# Patient Record
Sex: FEMALE | Race: WHITE | NOT HISPANIC OR LATINO | Employment: FULL TIME | URBAN - METROPOLITAN AREA
[De-identification: names, ages, dates, MRNs, and addresses within clinical notes are randomized per-mention and may not be internally consistent; named-entity substitution may affect disease eponyms.]

---

## 2017-03-28 RX ORDER — ONDANSETRON 4 MG/1
TABLET, ORALLY DISINTEGRATING ORAL
Qty: 30 TABLET | Refills: 0 | Status: SHIPPED | OUTPATIENT
Start: 2017-03-28 | End: 2021-08-03

## 2019-10-10 DIAGNOSIS — S69.91XS INJURY OF RIGHT THUMB, SEQUELA: Primary | ICD-10-CM

## 2019-10-21 DIAGNOSIS — S69.91XS INJURY OF RIGHT THUMB, SEQUELA: Primary | ICD-10-CM

## 2019-10-22 ENCOUNTER — CLINICAL SUPPORT (OUTPATIENT)
Dept: REHABILITATION | Facility: HOSPITAL | Age: 29
End: 2019-10-22
Payer: COMMERCIAL

## 2019-10-22 DIAGNOSIS — Z74.1 SELF-CARE DEFICIT IN DRESSING: ICD-10-CM

## 2019-10-22 DIAGNOSIS — R63.39 SELF-CARE DEFICIT FOR FEEDING: ICD-10-CM

## 2019-10-22 DIAGNOSIS — R29.898 DECREASED GRIP STRENGTH: ICD-10-CM

## 2019-10-22 DIAGNOSIS — R29.898 DECREASED PINCH STRENGTH: ICD-10-CM

## 2019-10-22 DIAGNOSIS — M25.60 RANGE OF MOTION DEFICIT: ICD-10-CM

## 2019-10-22 PROCEDURE — 97165 OT EVAL LOW COMPLEX 30 MIN: CPT | Mod: PN

## 2019-10-22 NOTE — PATIENT INSTRUCTIONS
IP Flexion (Active Blocked)        Brace thumb below tip joint. Bend joint as far as possible.  Repeat __10__ times. Do __2__ sessions per day.    Copyright © [employer]. All rights reserved.      MP Flexion (Active Blocked)        Using other hand to brace base of thumb, bend as far as possible with tip joint held straight.  Repeat __10__ times. Do __2__ sessions per day.    Copyright © [employer]. All rights reserved.       Composite Flexion (Active)        Bend both joints of thumb as far as possible. Try to touch base of little finger.  Repeat __10__ times. Do __2__ sessions per day.    Copyright © [employer]. All rights reserved.   Palmar Adduction/Abduction (Active)        Move thumb down, away from palm. Move back to rest along palm.  Repeat _10___ times. Do __2__ sessions per day.    Copyright © [employer]. All rights reserved.   Radial Adduction/Abduction (Active)        Move thumb out to side. Move back alongside index finger.  Repeat __10__ times. Do _2___ sessions per day.    Copyright © [employer]. All rights reserved.   Extension (Active With Finger Flexion)        With fingers curled, bend hand back at wrist. Hold _2___ seconds.  Repeat __10__ times. Do __2__ sessions per day.    Copyright © [employer]. All rights reserved.

## 2019-10-22 NOTE — PLAN OF CARE
[facility] Occupational Therapy  Initial Evaluation     Date:  10/22/2019    Name: Jennifer Nguyen  Clinic Number: [id number]    Therapy Diagnosis:   1. Range of motion deficit     2. Decreased pinch strength     3. Decreased  strength     4. Self-care deficit in dressing     5. Self-care deficit for feeding        Physician: Jennifer Laureano NP-C    Physician Orders: Eval and Treat  Medical Diagnosis: unspecified injury of right wrist, hand and fingers  Surgical Procedure and Date: n/a  Evaluation Date: 10/22/2019  Insurance Authorization Period Expiration: 12/31/19  Plan of Care Certification Period: 10/22/19 to 12/17/19   Date of Return to MD: not appointed    Visit # / Visits authorized: 1 / 20    Time In:  8:37 am  Time Out: 9:45 am  Total Billable Time: 68 minutes    Precautions:  Standard    Subjective     Involved Side: right   Dominant Side: Right  Date of Onset: Mid July 17, 2019, Then again in September  Mechanism of Injury: Pt was playing dodgeball and the ball hit the thumb.  The slipped and fell playing kickball.  History of Current Condition:  Pt reports she has had multiple MD visits out of the system, no fractures noted but pain present.  She is using thumb splint, during the day and removed at night.   Surgical Procedure: n/a  Imaging: outside xrays scanned in   **    Previous Therapy: none noted    Past Medical History/Physical Systems Review:   Jennifer Nguyen  has a past medical history of Celiac disease.    Jennifer Nguyen  has a past surgical history that includes Breast surgery; Hoffman tooth extraction; and Intrauterine device insertion (04/2015 ).    Jennifer has a current medication list which includes the following prescription(s): clobetasol, fluoxetine, hydroxyzine hcl, ibuprofen, ondansetron, sertraline, triamcinolone acetonide 0.1%, and zolpidem.    Review of patient's allergies indicates:   Allergen Reactions    Ciprofloxacin         Patient's Goals for Therapy: To be able  to  things.    Pain:  Functional Pain Scale Rating 0-10:   2/10 on average  0/10 at best  7-8/10 at worst  Location: Right radial and ulnar MP   Description: dull achy, sharp/burning  Aggravating Factors: grab, sweep, carrying  Easing Factors: rest, elevation and ibuprofen    Occupation:  Director of [employer]  Working presently: employed  Duties: general office work, physical work-serving meals, clean, maintenance    Functional Limitations/Social History:    Previous functional status includes: as follows:    Current FunctionalStatus   Home/Living environment : lives alone      Limitation of Functional Status as follows:   ADLs/IADLs:     - Feeding: modified eating    - Bathing: modified    - Dressing/Grooming: brushing teeth is difficulty, difficulty with pony tails,  Difficulty tying shoes    - Driving: modified starting of the car,      Leisure: recreational kickball/dodgeball, social    Objective    Observation: Skin intact, no significant swelling or bruising noted    Sensation: median and ulnar nerves grossly intact  [place] Eric Monofilament Test: No  Stereognosis: Intact    Special Tests:  UCL stress painful but tight no shifting of proximal phalanx on metacarpal    Wound Assessment: n/a      Edema: Circumferential measurements: as follows:    Thumb left  Thumb right      Proximal Plalnx  5.3cm 5.2 cm  cm  cm  cm   DIP Joint 5.0 cm 5.0 cm  cm  cm  cm   Distal Phalnx 4.2 cm 4.2 cm  cm  cm  cm     Range of Motion: right Active  Digits 2-5 WFL all joints  (Ext/Flex) Thumb left  Thumb right       MP 0/73 0/45 / / /   DIP 0/54 0/56 / / /     Thumb Opposition: all tips                               Left     Right   Palmar Abduction: 50     45  Radial Abduction: 45      30    Wrist Ext/Flex: 75/70     70/60  Wrist RD/UD: 10/40      10/30  Supination/Pronation: WFL/WFL      Manual Muscle Test:   Right Thumb adduction 3/5               Abduction 3/5     Strength: (JAMILA Dynamometer in lbs.) 1  trial, Position II  Right: 20.4#  Left: 55.6#    Pinch Strength: (Pinch Gauge in psi's), Average 3 trials  3pt Pinch   R) 4psi's  L) 13psi's    Fine Joseph Coordination Tests:   9 hole Peg Test R) 28 seconds no drops   L) 16 seconds no drops    Impairment/Limitation/Restriction for FOTO hand Survey    Therapist reviewed FOTO scores for Alec Nguyen on 10/22/2019.   FOTO documents entered into [employer] - see Media section.    Limitation Score: 44%  Category: Carrying         Treatment       Home Exercise Program/Education:  Issued HEP (see patient instructions in EMR) and educated on modality use for pain management . Exercises were reviewed and ALEC was able to demonstrate them prior to the end of the session.   Pt received a written copy of exercises to perform at home. ALEC demonstrated good  understanding of the education provided.  Pt was advised to perform these exercises free of pain, and to stop performing them if pain occurs.    Patient/Family Education: role of OT, goals for OT, scheduling/cancellations - pt verbalized understanding. Discussed insurance limitations with patient.    Additional Education provided: importance of 24 hour use of her splint, avoidance of any lifting or carrying in her hand, use of ice to assist with pain in hand.    Assessment     Alec Nguyen is a 29 y.o. female referred to outpatient occupational therapy and presents with a medical diagnosis of  unspecified injury of right wrist, hand and fingers, resulting in Decreased ROM, Decreased  strength, Decreased pinch strength, Decreased muscle strength, Decreased functional hand use and Increased pain and demonstrates limitations as described in the chart below. Following medical record review it is determined that pt will benefit from occupational therapy services in order to maximize pain free and/or functional use of right hand. The following goals were discussed with the patient and patient is in agreement with them as to be  addressed in the treatment plan. The patient's rehab potential is Good.     Anticipated barriers to occupational therapy: none noed  Pt has no cultural, educational or language barriers to learning provided.    Profile and History Assessment of Occupational Performance Level of Clinical Decision Making Complexity Score   Occupational Profile:   Jennifer Nguyen is a 29 y.o. female who lives alone and is currently employed as director of [employer]. Jennifer Nguyen has difficulty with  feeding, bathing, grooming and dressing  driving/transportation management and housework/household chores  affecting his/her daily functional abilities. His/her main goal for therapy is To be able to  things..     Comorbidities:   none noted    Medical and Therapy History Review:   Brief               Performance Deficits    Physical:  Joint Mobility  Muscle Power/Strength   Strength  Pinch Strength  Pain    Cognitive:  No Deficits    Psychosocial:    No Deficits     Clinical Decision Making:  low    Assessment Process:  Problem-Focused Assessments    Modification/Need for Assistance:  Minimal-Moderate Modifications/Assistance    Intervention Selection:  Several Treatment Options       low  Based on PMHX, co morbidities , data from assessments and functional level of assistance required with task and clinical presentation directly impacting function.       The following goals were discussed with the patient and patient is in agreement with them as to be addressed in the treatment plan.     Goals:    Short term goals to be met in 4 weeks(11/18/19)  Patient to be IND with HEP and modalities for pain/edema managment.  Increase thumb ROM 5 degrees to improve functional hand use for ADLs.    Decrease complaints of pain to  5 out of 10 at worst to increase functional hand use.    Long term goals to be met by d/c.  Increase AROM of thumb to symmetry with left hand to increase functional hand use for dressing and eating.,   Increase   strength 15 lbs. to improve functional grasp for household tasks.   Increase pinch 4 psi's to increase IND with buttoning and opening bottles.    Decrease complaints of pain to  2 out of 10 to increase functional hand use for ADL/work/leisure activities.      Plan   Certification Period/Plan of care expiration: 10/22/2019 to 12/19/19.    Outpatient Occupational Therapy 2 times weekly for 8 weeks to include the following interventions: Paraffin, Fluidotherapy, Manual therapy/joint mobilizations, Modalities for pain management, US 3 mhz, Therapeutic exercises/activities. and Strengthening.      RADHA Espino    I certify the need for these services furnished under this plan of treatment and while under my care    ____________________________________  Physician/Referring Practitioner    _______________  Date of Signature

## 2019-10-28 NOTE — PROGRESS NOTES
Occupational Therapy Daily Treatment Note     Date: 10/29/2019  Name: Alec Nguyen  Clinic Number: [id number]    Therapy Diagnosis:   Encounter Diagnoses   Name Primary?    Range of motion deficit     Decreased pinch strength     Decreased  strength     Self-care deficit in dressing     Self-care deficit for feeding      Physician: Alec Laureano, NP-C    Physician Orders: Eval and Treat  Medical Diagnosis: unspecified injury of right wrist, hand and fingers  Surgical Procedure and Date: n/a  Evaluation Date: 10/22/2019  Insurance Authorization Period Expiration: 12/31/19  Plan of Care Certification Period: 10/22/19 to 12/17/19   Date of Return to MD: not appointed     Visit # / Visits authorized: 2/ 20     Time In:  8:29 am  Time Out: 9:07 am  Total Billable Time: 30 minutes     Precautions:  Standard    Subjective     Pt reports: It took me a couple of days to get into a routine of doing the exercises and so now it may be a little bit less stiff.  she was compliant with home exercise program given last session.   Response to previous treatment: sore  Functional change: none noted    Pain: 1/10  Location: right  Ulnar thumb more the radial       Objective     ALEC received the following direct contact modalities after being cleared for contraindications for 8 minutes:  -Patient receives ultrasound  for pain control and decreased inflammation @ continuous duty cycle, 3.3 Mhz, applied to ulnar and radial tumb @ MP and radial wrist, intensity = .9 w/cm2 for 8 minutes.    ALEC received therapeutic exercises for 22 minutes including:  -active thumb palmar and radial abduction x 10 reps each  -active wrist flexion and extension from table top in pron/supinated forearm x 10 reps each  -manipulation of round head pegs into and out of pegboard x 20 reps  -juxiciser x 5 reps   -flipping of playing cards vertically    ALEC received the following supervised modalities after being cleared for contradictions for 4  minutes:   -ice applied to right wrist and hand      Home Exercises and Education Provided     Education provided:   -continue with her splint use   - Progress towards goals     Written Home Exercises Provided: Patient instructed to cont prior HEP.  Exercises were reviewed and JENNIFER was able to demonstrate them prior to the end of the session.  JENNIFER demonstrated good  understanding of the HEP provided.   .   See EMR under Patient Instructions for exercises provided 10/22/19.        Assessment     Pt would continue to benefit from skilled OT. Jennifer has pain in right thumb and radial wrist, less pain in ulnar wrist.  Pt very expressive re: her pain during therapy.  She still postures her thumb out of splint.      JENNIFER is progressing  towards her goals and there are no updates to goals at this time. Pt prognosis is Good.     Pt will continue to benefit from skilled outpatient occupational therapy to address the deficits listed in the problem list on initial evaluation provide pt/family education and to maximize pt's level of independence in the home and community environment.     Anticipated barriers to occupational therapy: none noted    Pt's spiritual, cultural and educational needs considered and pt agreeable to plan of care and goals.    Goals:  Short term goals to be met in 4 weeks(11/18/19)  Patient to be IND with HEP and modalities for pain/edema managment.  Increase thumb ROM 5 degrees to improve functional hand use for ADLs.    Decrease complaints of pain to  5 out of 10 at worst to increase functional hand use.     Long term goals to be met by d/c.  Increase AROM of thumb to symmetry with left hand to increase functional hand use for dressing and eating.,   Increase  strength 15 lbs. to improve functional grasp for household tasks.   Increase pinch 4 psi's to increase IND with buttoning and opening bottles.    Decrease complaints of pain to  2 out of 10 to increase functional hand use for  ADL/work/leisure activities    Plan   Progress AROM of wrist and thumb as tolerated   Updates/Grading for next session:       RADHA Espino

## 2019-10-29 ENCOUNTER — CLINICAL SUPPORT (OUTPATIENT)
Dept: REHABILITATION | Facility: HOSPITAL | Age: 29
End: 2019-10-29
Payer: COMMERCIAL

## 2019-10-29 DIAGNOSIS — R29.898 DECREASED GRIP STRENGTH: ICD-10-CM

## 2019-10-29 DIAGNOSIS — R63.39 SELF-CARE DEFICIT FOR FEEDING: ICD-10-CM

## 2019-10-29 DIAGNOSIS — R29.898 DECREASED PINCH STRENGTH: ICD-10-CM

## 2019-10-29 DIAGNOSIS — Z74.1 SELF-CARE DEFICIT IN DRESSING: ICD-10-CM

## 2019-10-29 DIAGNOSIS — M25.60 RANGE OF MOTION DEFICIT: ICD-10-CM

## 2019-10-29 PROCEDURE — 97035 APP MDLTY 1+ULTRASOUND EA 15: CPT | Mod: PN

## 2019-10-29 PROCEDURE — 97110 THERAPEUTIC EXERCISES: CPT | Mod: PN

## 2019-11-05 ENCOUNTER — CLINICAL SUPPORT (OUTPATIENT)
Dept: REHABILITATION | Facility: HOSPITAL | Age: 29
End: 2019-11-05
Payer: COMMERCIAL

## 2019-11-05 DIAGNOSIS — M25.60 RANGE OF MOTION DEFICIT: ICD-10-CM

## 2019-11-05 DIAGNOSIS — Z74.1 SELF-CARE DEFICIT IN DRESSING: ICD-10-CM

## 2019-11-05 DIAGNOSIS — R63.39 SELF-CARE DEFICIT FOR FEEDING: ICD-10-CM

## 2019-11-05 DIAGNOSIS — R29.898 DECREASED GRIP STRENGTH: ICD-10-CM

## 2019-11-05 DIAGNOSIS — R29.898 DECREASED PINCH STRENGTH: ICD-10-CM

## 2019-11-05 PROCEDURE — 97110 THERAPEUTIC EXERCISES: CPT | Mod: PN

## 2019-11-05 PROCEDURE — 97035 APP MDLTY 1+ULTRASOUND EA 15: CPT | Mod: PN

## 2019-11-05 NOTE — PROGRESS NOTES
Occupational Therapy Daily Treatment Note     Date: 11/5/2019  Name: Alec Nguyen  Clinic Number: [id number]    Therapy Diagnosis:   Encounter Diagnoses   Name Primary?    Range of motion deficit     Decreased pinch strength     Decreased  strength     Self-care deficit in dressing     Self-care deficit for feeding      Physician: Alec Laureano, NP-C    Physician Orders: Eval and Treat  Medical Diagnosis: unspecified injury of right wrist, hand and fingers  Surgical Procedure and Date: n/a  Evaluation Date: 10/22/2019  Insurance Authorization Period Expiration: 12/31/19  Plan of Care Certification Period: 10/22/19 to 12/17/19   Date of Return to MD: not appointed     Visit # / Visits authorized: 3/ 20     Time In:  8:35 am  Time Out: 9:15 am  Total Billable Time: 35 minutes     Precautions:  Standard    Subjective     Pt reports: My arm got jostled on Halloween.  she was compliant with home exercise program given last session.   Response to previous treatment: slightly sore  Functional change: none reported    Pain: 2/10  Location: right  Ulnar thumb more the radial       Objective     ALEC received the following direct contact modalities after being cleared for contraindications for 8 minutes:  -Patient receives ultrasound  for pain control and decreased inflammation @ continuous duty cycle, 3.3 Mhz, applied to ulnar and radial tumb @ MP and radial wrist, intensity = .9 w/cm2 for 8 minutes.    ALEC received therapeutic exercises for 27 minutes including:  -active thumb palmar and radial abduction x 10 reps each  -yellow band resisted palmar and radial abduction x 10 reps each  -yellow band resisted palmar and radial adduction x 10 reps each  -manipulation of chinese meditation balls x 2 min  -thumb exerciser with yellow band resistance x 2 min  -ergo gripper yellow band x 3 min    ALEC received the following supervised modalities after being cleared for contradictions for 5 minutes:   -ice applied  to right wrist and hand      Home Exercises and Education Provided     Education provided:   -continue with her splint use   - Progress towards goals     Written Home Exercises Provided: Patient instructed to cont prior HEP.  Exercises were reviewed and JENNIFER was able to demonstrate them prior to the end of the session.  JENNIFER demonstrated good  understanding of the HEP provided.   .   See EMR under Patient Instructions for exercises provided 10/22/19.        Assessment     Pt would continue to benefit from skilled OT. Pain in wrist has subsided only pain in radial thumb remains.  Jennifer tolerated light resistive exercises well.    JENNIFER is progressing  towards her goals and there are no updates to goals at this time. Pt prognosis is Good.     Pt will continue to benefit from skilled outpatient occupational therapy to address the deficits listed in the problem list on initial evaluation provide pt/family education and to maximize pt's level of independence in the home and community environment.     Anticipated barriers to occupational therapy: none noted    Pt's spiritual, cultural and educational needs considered and pt agreeable to plan of care and goals.    Goals:  Short term goals to be met in 4 weeks(11/18/19)  Patient to be IND with HEP and modalities for pain/edema managment.  Increase thumb ROM 5 degrees to improve functional hand use for ADLs.    Decrease complaints of pain to  5 out of 10 at worst to increase functional hand use.     Long term goals to be met by d/c.  Increase AROM of thumb to symmetry with left hand to increase functional hand use for dressing and eating.,   Increase  strength 15 lbs. to improve functional grasp for household tasks.   Increase pinch 4 psi's to increase IND with buttoning and opening bottles.    Decrease complaints of pain to  2 out of 10 to increase functional hand use for ADL/work/leisure activities    Plan   Progress AROM of wrist and thumb as tolerated    Updates/Grading for next session:       RADHA Espino

## 2019-11-11 NOTE — PROGRESS NOTES
Occupational Therapy Daily Treatment Note     Date: 11/12/2019  Name: Alec Nguyen  Clinic Number: [id number]    Therapy Diagnosis:   Encounter Diagnoses   Name Primary?    Range of motion deficit     Decreased pinch strength     Decreased  strength     Self-care deficit in dressing     Self-care deficit for feeding      Physician: Alec Laureano, NP-C    Physician Orders: Eval and Treat  Medical Diagnosis: unspecified injury of right wrist, hand and fingers  Surgical Procedure and Date: n/a  Evaluation Date: 10/22/2019  Insurance Authorization Period Expiration: 12/31/19  Plan of Care Certification Period: 10/22/19 to 12/17/19   Date of Return to MD: not appointed     Visit # / Visits authorized: 4 / 20     Time In:  8:40 am  Time Out: 9:25 am  Total Billable Time: 45 minutes     Precautions:  Standard    Subjective     Pt reports: It is feeling okay, not any worse or any better  she was compliant with home exercise program given 10/22/19   Response to previous treatment:  sore  Functional change: none reported    Pain: 1/10  Location: right  Ulnar thumb more the radial       Objective     ALEC received the following direct contact modalities after being cleared for contraindications for 8 minutes:  -Patient receives ultrasound  for pain control and decreased inflammation @ continuous duty cycle, 3.3 Mhz, applied to ulnar and radial tumb @ MP and radial wrist, intensity = .9 w/cm2 for 8 minutes.    ALEC received therapeutic exercises for 32 minutes including:  -gentle passive stretch abduction and flexion  -active thumb palmar and radial abduction x 10 reps each  -ergo gripper yellow band x 3 min  -thumb exerciser with yellow band resistance x 2 min  -yellow putty small ball making with left hand x 10     ALEC received the following supervised modalities after being cleared for contradictions for 5 minutes:   -ice applied to right wrist and hand      Home Exercises and Education Provided      Education provided:   -continue with her splint use   - Progress towards goals     Written Home Exercises Provided: Patient instructed to cont prior HEP.  Exercises were reviewed and JENNIFER was able to demonstrate them prior to the end of the session.  JENNIFER demonstrated good  understanding of the HEP provided.   .   See EMR under Patient Instructions for exercises provided 10/22/19.        Assessment     Pt would continue to benefit from skilled OT. Jennifer with improvement in pain free motion of right thumb.  Good control of yellow putty manipulation.     JENNIFER is progressing  towards her goals and there are no updates to goals at this time. Pt prognosis is Good.     Pt will continue to benefit from skilled outpatient occupational therapy to address the deficits listed in the problem list on initial evaluation provide pt/family education and to maximize pt's level of independence in the home and community environment.     Anticipated barriers to occupational therapy: none noted    Pt's spiritual, cultural and educational needs considered and pt agreeable to plan of care and goals.    Goals:  Short term goals to be met in 4 weeks(11/18/19)  Patient to be IND with HEP and modalities for pain/edema managment.  Increase thumb ROM 5 degrees to improve functional hand use for ADLs.    Decrease complaints of pain to  5 out of 10 at worst to increase functional hand use.     Long term goals to be met by d/c.  Increase AROM of thumb to symmetry with left hand to increase functional hand use for dressing and eating.,   Increase  strength 15 lbs. to improve functional grasp for household tasks.   Increase pinch 4 psi's to increase IND with buttoning and opening bottles.    Decrease complaints of pain to  2 out of 10 to increase functional hand use for ADL/work/leisure activities    Plan   Progress AROM of wrist and thumb as tolerated   Updates/Grading for next session:       RADHA Espino

## 2019-11-12 ENCOUNTER — CLINICAL SUPPORT (OUTPATIENT)
Dept: REHABILITATION | Facility: HOSPITAL | Age: 29
End: 2019-11-12
Payer: COMMERCIAL

## 2019-11-12 DIAGNOSIS — R63.39 SELF-CARE DEFICIT FOR FEEDING: ICD-10-CM

## 2019-11-12 DIAGNOSIS — R29.898 DECREASED GRIP STRENGTH: ICD-10-CM

## 2019-11-12 DIAGNOSIS — R29.898 DECREASED PINCH STRENGTH: ICD-10-CM

## 2019-11-12 DIAGNOSIS — Z74.1 SELF-CARE DEFICIT IN DRESSING: ICD-10-CM

## 2019-11-12 DIAGNOSIS — M25.60 RANGE OF MOTION DEFICIT: ICD-10-CM

## 2019-11-12 PROCEDURE — 97035 APP MDLTY 1+ULTRASOUND EA 15: CPT | Mod: PN

## 2019-11-12 PROCEDURE — 97110 THERAPEUTIC EXERCISES: CPT | Mod: PN

## 2019-11-18 NOTE — PROGRESS NOTES
Occupational Therapy Progress Note     Date: 11/19/2019  Name: Alec Nguyen  Clinic Number: [id number]    Therapy Diagnosis:   Encounter Diagnoses   Name Primary?    Range of motion deficit     Decreased pinch strength     Decreased  strength     Self-care deficit in dressing     Self-care deficit for feeding      Physician: Alec Laureano, NP-C    Physician Orders: Eval and Treat  Medical Diagnosis: unspecified injury of right wrist, hand and fingers  Surgical Procedure and Date: n/a  Evaluation Date: 10/22/2019  Insurance Authorization Period Expiration: 12/31/19  Plan of Care Certification Period: 10/22/19 to 12/17/19   Date of Return to MD: not appointed     Visit # / Visits authorized: 5/ 20     Time In:  8:35 am  Time Out: 9:05 am  Total Billable Time: 30 minutes     Precautions:  Standard    Subjective     Pt reports: she reports she hurt her thumb pulling a suitcase down from the overhead bin.  she was compliant with home exercise program given 10/22/19   Response to previous treatment:  Felt better after  Functional change: none reported    Pain: 4-5/10  Location: right  Ulnar thumb more the radial       Objective     ALEC received the following direct contact modalities after being cleared for contraindications for 8 minutes:  -Patient receives ultrasound  for pain control and decreased inflammation @ continuous duty cycle, 3.3 Mhz, applied to ulnar and radial tumb @ MP and radial wrist, intensity = .9 w/cm2 for 8 minutes.    ALEC received therapeutic exercises for 22 minutes including:    Reassess:   Range of Motion: right Active  Digits 2-5 WFL all joints                                                      10/22/19         11/19/19  (Ext/Flex) Thumb left  Thumb right    thumb right        MP 0/73 0/45   0/46 / /   DIP 0/54 0/56   0/57 / /      Thumb Opposition: all tips                                  10/22/19             11/19/19                              Left     Right             Right  Palmar Abduction: 50     45                45   Radial Abduction: 45      30                 45     Wrist Ext/Flex: 75/70     70/60            85/85  Wrist RD/UD: 10/40      10/30            5/35    Manual Muscle Test:    10/22/10      11/19/19  Right Thumb adduction 3/5               3+/5                       Abduction 3/5                3+/5       Strength: (JAMILA Dynamometer in lbs.) 1 trial, Position II            10/22/19       11/19/19  Right: 20.4#             23 #  Left: 55.6#     Pinch Strength: (Pinch Gauge in psi's), Average 3 trials                    10/22/19               11/19/19  3pt Pinch   R) 4psi's                5.6 psi                     L) 13psi's     Fine Joseph Coordination Tests:                                  10/22/19                               11/19/19  9 hole Peg Test R) 28 seconds no drops         27 seconds no drops                            L) 16 seconds no drops    -active thumb palmar and radial abduction x 10 reps each  -active composite flexion x 10 reps    JENNIFER received the following supervised modalities after being cleared for contradictions for 5 minutes:   -ice applied to right wrist and hand    Impairment/Limitation/Restriction for FOTO hand Survey     Therapist reviewed FOTO scores for Jennifer Nguyen on 10/22/2019.   FOTO documents entered into [employer] - see Media section.     Limitation Score: 47% worsened 3%  Category: Carrying            Home Exercises and Education Provided     Education provided:   -continue with her splint use during activities  - Progress towards goals     Written Home Exercises Provided: Patient instructed to cont prior HEP.  Exercises were reviewed and JENNIFER was able to demonstrate them prior to the end of the session.  JENNIFER demonstrated good  understanding of the HEP provided.   .   See EMR under Patient Instructions for exercises provided 10/22/19.        Assessment     Pt would continue to benefit from skilled OT. Jennifer is  demonstrating improvement in wrist range of motion, slight change in thumb motion as noted.  Improvement in  and pinch strengths noted as well. Pt is accident prone and has injured her thumb additionally with various activities at home. .     ALEC is progressing  towards her goals and there are no updates to goals at this time. Pt prognosis is Good.     Pt will continue to benefit from skilled outpatient occupational therapy to address the deficits listed in the problem list on initial evaluation provide pt/family education and to maximize pt's level of independence in the home and community environment.     Anticipated barriers to occupational therapy: none noted    Pt's spiritual, cultural and educational needs considered and pt agreeable to plan of care and goals.    Goals:  Short term goals to be met in 4 weeks(11/18/19)  Patient to be IND with HEP and modalities for pain/edema management- in progress  Increase thumb ROM 5 degrees to improve functional hand use for ADLs met with radial abduction 11/19/19  Decrease complaints of pain to  5 out of 10 at worst to increase functional hand use - met at times.     Long term goals to be met by d/c.  Increase AROM of thumb to symmetry with left hand to increase functional hand use for dressing and eating.,   Increase  strength 15 lbs. to improve functional grasp for household tasks.   Increase pinch 4 psi's to increase IND with buttoning and opening bottles.    Decrease complaints of pain to  2 out of 10 to increase functional hand use for ADL/work/leisure activities    Plan   Progress AROM of wrist and thumb as tolerated   Updates/Grading for next session:       RADHA Espino

## 2019-11-19 ENCOUNTER — CLINICAL SUPPORT (OUTPATIENT)
Dept: REHABILITATION | Facility: HOSPITAL | Age: 29
End: 2019-11-19
Payer: COMMERCIAL

## 2019-11-19 DIAGNOSIS — R29.898 DECREASED GRIP STRENGTH: ICD-10-CM

## 2019-11-19 DIAGNOSIS — Z74.1 SELF-CARE DEFICIT IN DRESSING: ICD-10-CM

## 2019-11-19 DIAGNOSIS — M25.60 RANGE OF MOTION DEFICIT: ICD-10-CM

## 2019-11-19 DIAGNOSIS — R29.898 DECREASED PINCH STRENGTH: ICD-10-CM

## 2019-11-19 DIAGNOSIS — R63.39 SELF-CARE DEFICIT FOR FEEDING: ICD-10-CM

## 2019-11-19 PROCEDURE — 97035 APP MDLTY 1+ULTRASOUND EA 15: CPT | Mod: PN

## 2019-11-19 PROCEDURE — 97110 THERAPEUTIC EXERCISES: CPT | Mod: PN

## 2019-11-26 ENCOUNTER — DOCUMENTATION ONLY (OUTPATIENT)
Dept: REHABILITATION | Facility: HOSPITAL | Age: 29
End: 2019-11-26

## 2019-11-26 DIAGNOSIS — R63.39 SELF-CARE DEFICIT FOR FEEDING: ICD-10-CM

## 2019-11-26 DIAGNOSIS — M25.60 RANGE OF MOTION DEFICIT: ICD-10-CM

## 2019-11-26 DIAGNOSIS — R29.898 DECREASED GRIP STRENGTH: ICD-10-CM

## 2019-11-26 DIAGNOSIS — R29.898 DECREASED PINCH STRENGTH: ICD-10-CM

## 2019-11-26 DIAGNOSIS — Z74.1 SELF-CARE DEFICIT IN DRESSING: ICD-10-CM

## 2019-11-26 NOTE — PROGRESS NOTES
Jennifer cancelled her occupational therapy appointment today via the SMS system, no reason for cancellation provided.

## 2019-12-12 DIAGNOSIS — R52 PAIN: Primary | ICD-10-CM

## 2019-12-13 ENCOUNTER — OFFICE VISIT (OUTPATIENT)
Dept: ORTHOPEDICS | Facility: CLINIC | Age: 29
End: 2019-12-13
Payer: COMMERCIAL

## 2019-12-13 ENCOUNTER — HOSPITAL ENCOUNTER (OUTPATIENT)
Dept: RADIOLOGY | Facility: OTHER | Age: 29
Discharge: HOME | End: 2019-12-13
Attending: PHYSICIAN ASSISTANT
Payer: COMMERCIAL

## 2019-12-13 VITALS
WEIGHT: 156 LBS | SYSTOLIC BLOOD PRESSURE: 111 MMHG | BODY MASS INDEX: 26.63 KG/M2 | DIASTOLIC BLOOD PRESSURE: 74 MMHG | HEIGHT: 64 IN | HEART RATE: 71 BPM

## 2019-12-13 DIAGNOSIS — M79.644 THUMB PAIN, RIGHT: Primary | ICD-10-CM

## 2019-12-13 DIAGNOSIS — M25.341 CHRONIC INSTABILITY OF METACARPOPHALANGEAL JOINT OF RIGHT THUMB: ICD-10-CM

## 2019-12-13 DIAGNOSIS — R52 PAIN: ICD-10-CM

## 2019-12-13 PROCEDURE — 3008F PR BODY MASS INDEX (BMI) DOCUMENTED: ICD-10-PCS | Mod: CPTII,S$GLB,, | Performed by: PHYSICIAN ASSISTANT

## 2019-12-13 PROCEDURE — 99204 PR OFFICE/OUTPT VISIT, NEW, LEVL IV, 45-59 MIN: ICD-10-PCS | Mod: 25,S$GLB,, | Performed by: PHYSICIAN ASSISTANT

## 2019-12-13 PROCEDURE — 99999 PR PBB SHADOW E&M-EST. PATIENT-LVL III: CPT | Mod: PBBFAC,,, | Performed by: PHYSICIAN ASSISTANT

## 2019-12-13 PROCEDURE — 99204 OFFICE O/P NEW MOD 45 MIN: CPT | Mod: 25,S$GLB,, | Performed by: PHYSICIAN ASSISTANT

## 2019-12-13 PROCEDURE — 73130 XR HAND COMPLETE 3 VIEW RIGHT: ICD-10-PCS | Mod: 26,RT,, | Performed by: RADIOLOGY

## 2019-12-13 PROCEDURE — 73130 X-RAY EXAM OF HAND: CPT | Mod: TC,FY,RT

## 2019-12-13 PROCEDURE — 99999 PR PBB SHADOW E&M-EST. PATIENT-LVL III: ICD-10-PCS | Mod: PBBFAC,,, | Performed by: PHYSICIAN ASSISTANT

## 2019-12-13 PROCEDURE — 97760 PR ORTHOTIC MGMT&TRAINJ INITIAL ENC EA 15 MINS: ICD-10-PCS | Mod: GP,S$GLB,, | Performed by: PHYSICIAN ASSISTANT

## 2019-12-13 PROCEDURE — 73130 X-RAY EXAM OF HAND: CPT | Mod: 26,RT,, | Performed by: RADIOLOGY

## 2019-12-13 PROCEDURE — 97760 ORTHOTIC MGMT&TRAING 1ST ENC: CPT | Mod: GP,S$GLB,, | Performed by: PHYSICIAN ASSISTANT

## 2019-12-13 PROCEDURE — 3008F BODY MASS INDEX DOCD: CPT | Mod: CPTII,S$GLB,, | Performed by: PHYSICIAN ASSISTANT

## 2019-12-13 NOTE — LETTER
December 13, 2019      Lisette Zaman MD  [address]  [address]  [facility] [address]           [facility] [address]  [address], [address]  [facility] [address]  Phone: [phone]          Patient: Jennifer Nguyen   MR Number: [id number]   YOB: 1990   Date of Visit: 12/13/2019       Dear Dr. Lisette Zaman:    Thank you for referring Jennifer Nguyen to me for evaluation. Attached you will find relevant portions of my assessment and plan of care.    If you have questions, please do not hesitate to call me. I look forward to following Jennifer Nguyen along with you.    Sincerely,    MUSA Chapman    Enclosure  CC:  No Recipients    If you would like to receive this communication electronically, please contact [email or web address] or [phone] to request more information on [employer] access.    For providers and/or their staff who would like to refer a patient to [facility], please contact us through our one-stop-shop provider referral line, [facility], at [phone].    If you feel you have received this communication in error or would no longer like to receive these types of communications, please e-mail [email or web address]

## 2019-12-13 NOTE — PROGRESS NOTES
Subjective:      Patient ID: Jennifer Nguyen is a 29 y.o. female.    Chief Complaint: Pain of the Right Hand      HPI  Jennifer Nguyen is a right hand dominant 29 y.o. female presenting today for right thumb pain.  She reports initially injuring the right thumb playing dodge ball in July 2019.  She was seen at [facility], reports being diagnosed with a gamekeeper's thumb, placed in a brace, and eventually started in therapy.  She has attended approximately 5 weeks of occupational therapy, from October to November 2019.  She reports no significant improvement in her pain and thumb function. She does report that 3-4 weeks ago she re-injured the thumb trying to pull down a suitcase.  She denies any finger numbness or tingling.  She works for the [employer].      Review of patient's allergies indicates:   Allergen Reactions    Ciprofloxacin          Current Outpatient Medications   Medication Sig Dispense Refill    clobetasol (TEMOVATE) 0.05 % cream   0    fluoxetine (PROZAC) 20 MG tablet Take 3 tablets (60 mg total) by mouth once daily. (Patient not taking: Reported on 12/13/2019) 90 tablet 1    hydrOXYzine HCl (ATARAX) 25 MG tablet Take 1 tablet (25 mg total) by mouth every evening. (Patient not taking: Reported on 12/13/2019) 30 tablet 2    ibuprofen (ADVIL,MOTRIN) 600 MG tablet Take 1 tablet (600 mg total) by mouth every 6 (six) hours as needed for Pain. (Patient not taking: Reported on 12/13/2019) 20 tablet 0    ondansetron (ZOFRAN-ODT) 4 MG TbDL DISSOLVE 1 TABLET ON THE TONGUE EVERY 12 HOURS AS NEEDED (Patient not taking: Reported on 12/13/2019) 30 tablet 0    sertraline (ZOLOFT) 100 MG tablet Take 1.5 tablets (150 mg total) by mouth once daily. 30 tablet 1    triamcinolone acetonide 0.1% (KENALOG) 0.1 % cream Apply topically 2 (two) times daily. Topically bid prn 45 g 1    zolpidem (AMBIEN CR) 6.25 MG CR tablet Take 1 tablet (6.25 mg total) by mouth nightly as needed for Insomnia. (Patient  "not taking: Reported on 12/13/2019) 30 tablet 1     No current facility-administered medications for this visit.        Past Medical History:   Diagnosis Date    Celiac disease        Past Surgical History:   Procedure Laterality Date    BREAST SURGERY      REDUCTION    INTRAUTERINE DEVICE INSERTION  04/2015     KJB---MIRENA    WISDOM TOOTH EXTRACTION           Review of Systems:  Review of Systems   Constitution: Negative for chills and fever.   Skin: Negative for rash and suspicious lesions.   Musculoskeletal:        See HPI   Neurological: Negative for dizziness, headaches, light-headedness, numbness and paresthesias.   Psychiatric/Behavioral: Negative for depression. The patient is not nervous/anxious.          OBJECTIVE:     PHYSICAL EXAM:  Height: 5' 4" (162.6 cm) Weight: 70.8 kg (156 lb)  Vitals:    12/13/19 0822   BP: 111/74   Pulse: 71   Weight: 70.8 kg (156 lb)   Height: 5' 4" (1.626 m)   PainSc:   3     General    Vitals reviewed.  Constitutional: She is oriented to person, place, and time. She appears well-developed and well-nourished.   HENT:   Head: Normocephalic and atraumatic.   Neck: Normal range of motion.   Cardiovascular: Normal rate.    Pulmonary/Chest: Effort normal. No respiratory distress.   Neurological: She is alert and oriented to person, place, and time.   Psychiatric: She has a normal mood and affect. Her behavior is normal. Judgment and thought content normal.             Musculoskeletal:  No scars or edema appreciated.  She is tender to palpation diffusely over the right thumb and 1st metacarpal.  Significant tenderness at the MCP joint of the right thumb, both ulnarly and radially.  There is slight laxity at the MCP of the right thumb.  Pain with motion both passive and active.  She does have decreased right thumb range of motion compared to left due to pain. Neurovascularly intact-reports slight hypersensitivity in the radial nerve distribution on the right compared to left, " equal ulnar and median nerve sensation.  Good motor function, good capillary refill, 2+ radial pulses.    RADIOGRAPHS:  Right Hand XRay, 12/13/19  FINDINGS:  Skeletal structures are intact with good alignment.  The joint spaces are normal.  Articular surfaces are smooth.  No erosive change or focal soft tissue swelling is detected.      Impression     No acute abnormality or significant arthritis.     Comments: I have personally reviewed the imaging and I agree with the above radiologist's report.    ASSESSMENT/PLAN:   Jennifer was seen today for pain.    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 Patient has undergone conservative treatment with bracing and therapy with no significant improvement.  MRI to evaluate possible ligamentous instability, possible gamekeeper's thumb.  - follow-up after MRI  - continue use of thumb spica brace; patient will change to a more solid brace, thumb spica brace provided (15 minutes spent preparing, fitting, and educating on brace).      Disclaimer: This note has been generated using voice-recognition software. There may be typographical errors that have been missed during proof-reading.

## 2019-12-17 ENCOUNTER — DOCUMENTATION ONLY (OUTPATIENT)
Dept: REHABILITATION | Facility: HOSPITAL | Age: 29
End: 2019-12-17

## 2019-12-17 DIAGNOSIS — M25.60 RANGE OF MOTION DEFICIT: ICD-10-CM

## 2019-12-17 DIAGNOSIS — R63.39 SELF-CARE DEFICIT FOR FEEDING: ICD-10-CM

## 2019-12-17 DIAGNOSIS — R29.898 DECREASED GRIP STRENGTH: ICD-10-CM

## 2019-12-17 DIAGNOSIS — Z74.1 SELF-CARE DEFICIT IN DRESSING: ICD-10-CM

## 2019-12-17 DIAGNOSIS — R29.898 DECREASED PINCH STRENGTH: ICD-10-CM

## 2019-12-17 NOTE — PROGRESS NOTES
Outpatient Therapy Discharge Summary     Name: Jennifer Nguyen  Clinic Number: [id number]    Therapy Diagnosis:   Encounter Diagnoses   Name Primary?    Range of motion deficit     Decreased pinch strength     Decreased  strength     Self-care deficit in dressing     Self-care deficit for feeding      Physician: Jennifer Laureano NP-C     Physician Orders: Eval and Treat  Medical Diagnosis: unspecified injury of right wrist, hand and fingers  Surgical Procedure and Date: n/a  Evaluation Date: 10/22/2019    Date of Last visit: 11/19/19  Total Visits Received: 5  Cancelled Visits: 1  No Show Visits: 0    Assessment    Goals:   Short term goals to be met in 4 weeks(11/18/19)  Patient to be IND with HEP and modalities for pain/edema management- in progress  Increase thumb ROM 5 degrees to improve functional hand use for ADLs met with radial abduction 11/19/19  Decrease complaints of pain to  5 out of 10 at worst to increase functional hand use - met at times.     Long term goals to be met by d/c.  Increase AROM of thumb to symmetry with left hand to increase functional hand use for dressing and eating.,   Increase  strength 15 lbs. to improve functional grasp for household tasks.   Increase pinch 4 psi's to increase IND with buttoning and opening bottles.    Decrease complaints of pain to  2 out of 10 to increase functional hand use for ADL/work/leisure activities    Discharge reason: Other:  Lack of progression to goals and further medical workup for diagnostics    Plan   This patient is discharged from Occupational Therapy

## 2019-12-20 ENCOUNTER — HOSPITAL ENCOUNTER (OUTPATIENT)
Dept: RADIOLOGY | Facility: HOSPITAL | Age: 29
Discharge: HOME | End: 2019-12-20
Attending: PHYSICIAN ASSISTANT
Payer: COMMERCIAL

## 2019-12-20 DIAGNOSIS — M25.341 CHRONIC INSTABILITY OF METACARPOPHALANGEAL JOINT OF RIGHT THUMB: ICD-10-CM

## 2019-12-20 DIAGNOSIS — M79.644 THUMB PAIN, RIGHT: ICD-10-CM

## 2019-12-20 PROCEDURE — 73218 MRI THUMB WITHOUT CONTRAST RIGHT: ICD-10-PCS | Mod: 26,RT,, | Performed by: RADIOLOGY

## 2019-12-20 PROCEDURE — 73218 MRI UPPER EXTREMITY W/O DYE: CPT | Mod: TC,RT

## 2019-12-20 PROCEDURE — 73218 MRI UPPER EXTREMITY W/O DYE: CPT | Mod: 26,RT,, | Performed by: RADIOLOGY

## 2019-12-21 ENCOUNTER — OFFICE VISIT (OUTPATIENT)
Dept: URGENT CARE | Facility: CLINIC | Age: 29
End: 2019-12-21
Payer: COMMERCIAL

## 2019-12-21 VITALS
DIASTOLIC BLOOD PRESSURE: 69 MMHG | BODY MASS INDEX: 25.4 KG/M2 | SYSTOLIC BLOOD PRESSURE: 120 MMHG | TEMPERATURE: 101 F | HEART RATE: 120 BPM | WEIGHT: 148 LBS | OXYGEN SATURATION: 98 %

## 2019-12-21 DIAGNOSIS — J98.01 BRONCHOSPASM: ICD-10-CM

## 2019-12-21 DIAGNOSIS — J11.1 INFLUENZA: Primary | ICD-10-CM

## 2019-12-21 DIAGNOSIS — R50.9 FEVER, UNSPECIFIED FEVER CAUSE: ICD-10-CM

## 2019-12-21 LAB
CTP QC/QA: YES
CTP QC/QA: YES
FLUAV AG NPH QL: NEGATIVE
FLUBV AG NPH QL: POSITIVE
S PYO RRNA THROAT QL PROBE: NEGATIVE

## 2019-12-21 PROCEDURE — 94640 PR INHAL RX, AIRWAY OBST/DX SPUTUM INDUCT: ICD-10-PCS | Mod: S$GLB,,, | Performed by: FAMILY MEDICINE

## 2019-12-21 PROCEDURE — 87880 POCT RAPID STREP A: ICD-10-PCS | Mod: QW,S$GLB,, | Performed by: FAMILY MEDICINE

## 2019-12-21 PROCEDURE — 94640 AIRWAY INHALATION TREATMENT: CPT | Mod: S$GLB,,, | Performed by: FAMILY MEDICINE

## 2019-12-21 PROCEDURE — 96372 THER/PROPH/DIAG INJ SC/IM: CPT | Mod: 59,S$GLB,, | Performed by: FAMILY MEDICINE

## 2019-12-21 PROCEDURE — 99214 PR OFFICE/OUTPT VISIT, EST, LEVL IV, 30-39 MIN: ICD-10-PCS | Mod: 25,S$GLB,, | Performed by: FAMILY MEDICINE

## 2019-12-21 PROCEDURE — 99214 OFFICE O/P EST MOD 30 MIN: CPT | Mod: 25,S$GLB,, | Performed by: FAMILY MEDICINE

## 2019-12-21 PROCEDURE — 87804 POCT INFLUENZA A/B: ICD-10-PCS | Mod: 59,QW,S$GLB, | Performed by: FAMILY MEDICINE

## 2019-12-21 PROCEDURE — 87880 STREP A ASSAY W/OPTIC: CPT | Mod: QW,S$GLB,, | Performed by: FAMILY MEDICINE

## 2019-12-21 PROCEDURE — 96372 PR INJECTION,THERAP/PROPH/DIAG2ST, IM OR SUBCUT: ICD-10-PCS | Mod: 59,S$GLB,, | Performed by: FAMILY MEDICINE

## 2019-12-21 PROCEDURE — 87804 INFLUENZA ASSAY W/OPTIC: CPT | Mod: QW,S$GLB,, | Performed by: FAMILY MEDICINE

## 2019-12-21 RX ORDER — IPRATROPIUM BROMIDE 0.5 MG/2.5ML
0.5 SOLUTION RESPIRATORY (INHALATION)
Status: COMPLETED | OUTPATIENT
Start: 2019-12-21 | End: 2019-12-21

## 2019-12-21 RX ORDER — IBUPROFEN 200 MG
600 TABLET ORAL
Status: COMPLETED | OUTPATIENT
Start: 2019-12-21 | End: 2019-12-21

## 2019-12-21 RX ORDER — OSELTAMIVIR PHOSPHATE 75 MG/1
75 CAPSULE ORAL 2 TIMES DAILY
Qty: 10 CAPSULE | Refills: 0 | Status: SHIPPED | OUTPATIENT
Start: 2019-12-21 | End: 2019-12-26

## 2019-12-21 RX ORDER — BETAMETHASONE SODIUM PHOSPHATE AND BETAMETHASONE ACETATE 3; 3 MG/ML; MG/ML
9 INJECTION, SUSPENSION INTRA-ARTICULAR; INTRALESIONAL; INTRAMUSCULAR; SOFT TISSUE
Status: COMPLETED | OUTPATIENT
Start: 2019-12-21 | End: 2019-12-21

## 2019-12-21 RX ORDER — ALBUTEROL SULFATE 0.83 MG/ML
2.5 SOLUTION RESPIRATORY (INHALATION)
Status: COMPLETED | OUTPATIENT
Start: 2019-12-21 | End: 2019-12-21

## 2019-12-21 RX ORDER — PROMETHAZINE HYDROCHLORIDE AND CODEINE PHOSPHATE 6.25; 1 MG/5ML; MG/5ML
5 SOLUTION ORAL EVERY 4 HOURS PRN
Qty: 118 ML | Refills: 0 | Status: SHIPPED | OUTPATIENT
Start: 2019-12-21 | End: 2019-12-31

## 2019-12-21 RX ADMIN — Medication 600 MG: at 01:12

## 2019-12-21 RX ADMIN — IPRATROPIUM BROMIDE 0.5 MG: 0.5 SOLUTION RESPIRATORY (INHALATION) at 01:12

## 2019-12-21 RX ADMIN — ALBUTEROL SULFATE 2.5 MG: 0.83 SOLUTION RESPIRATORY (INHALATION) at 01:12

## 2019-12-21 RX ADMIN — BETAMETHASONE SODIUM PHOSPHATE AND BETAMETHASONE ACETATE 9 MG: 3; 3 INJECTION, SUSPENSION INTRA-ARTICULAR; INTRALESIONAL; INTRAMUSCULAR; SOFT TISSUE at 01:12

## 2019-12-21 NOTE — PATIENT INSTRUCTIONS
Influenza (Adult)    Influenza is also called the flu. It is a viral illness that affects the air passages of your lungs. It is different from the common cold. The flu can easily be passed from one to person to another. It may be spread through the air by coughing and sneezing. Or it can be spread by touching the sick person and then touching your own eyes, nose, or mouth.  The flu starts 1 to 3 days after you are exposed to the flu virus. It may last for 1 to 2 weeks but many people feel tired or fatigued for many weeks afterward. You usually dont need to take antibiotics unless you have a complication. This might be an ear or sinus infection or pneumonia.  Symptoms of the flu may be mild or severe. They can include extreme tiredness (wanting to stay in bed all day), chills, fevers, muscle aches, soreness with eye movement, headache, and a dry, hacking cough.  Home care  Follow these guidelines when caring for yourself at home:  · Avoid being around cigarette smoke, whether yours or other peoples.  · Acetaminophen or ibuprofen will help ease your fever, muscle aches, and headache. Dont give aspirin to anyone younger than 18 who has the flu. Aspirin can harm the liver.  · Nausea and loss of appetite are common with the flu. Eat light meals. Drink 6 to 8 glasses of liquids every day. Good choices are water, sport drinks, soft drinks without caffeine, juices, tea, and soup. Extra fluids will also help loosen secretions in your nose and lungs.  · Over-the-counter cold medicines will not make the flu go away faster. But the medicines may help with coughing, sore throat, and congestion in your nose and sinuses. Dont use a decongestant if you have high blood pressure.  · Stay home until your fever has been gone for at least 24 hours without using medicine to reduce fever.  Follow-up care  Follow up with your healthcare provider, or as advised, if you are not getting better over the next week.  If you are age 65 or  older, talk with your provider about getting a pneumococcal vaccine every 5 years. You should also get this vaccine if you have chronic asthma or COPD. All adults should get a flu vaccine every fall. Ask your provider about this.  When to seek medical advice  Call your healthcare provider right away if any of these occur:  · Cough with lots of colored mucus (sputum) or blood in your mucus  · Chest pain, shortness of breath, wheezing, or trouble breathing  · Severe headache, or face, neck, or ear pain  · New rash with fever  · Fever of 100.4°F (38°C) or higher, or as directed by your healthcare provider  · Confusion, behavior change, or seizure  · Severe weakness or dizziness  · You get a new fever or cough after getting better for a few days  Date Last Reviewed: 1/1/2017  © 1563-9987 [employer]. [address] [place] [address]. All rights reserved. This information is not intended as a substitute for professional medical care. Always follow your healthcare professional's instructions.      BE AWARE THAT THE COUGH MEDICINE CAN MAKE YOU SLEEPY, SO DO NOT TAKE BEFORE DRIVING OR OPERATING HEAVY MACHINERY.      Make sure that you follow up with your primary care doctor in the next 2-5 days if needed .  Return to the Urgent Care if signs or symptoms change and certainly if you have worsening symptoms go to the nearest emergency department for further evaluation.

## 2019-12-21 NOTE — PROGRESS NOTES
Subjective:       Patient ID: Jennifer Nguyen is a 29 y.o. female.    Vitals:  weight is 67.1 kg (148 lb). Her temperature is 101.4 °F (38.6 °C) (abnormal). Her blood pressure is 120/69 and her pulse is 120 (abnormal). Her oxygen saturation is 98%.     Chief Complaint: URI    29-year-old female with 2-3 day history of sore throat, congestion, cough, aches, and now fever, which has been worse in the past 24 hr.  Main complaint is cough, preventing sleep.  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  Nonsmoker.  Denies history of asthma or reactive airways.    URI    This is a new problem. The current episode started in the past 7 days (3 days). The problem has been gradually worsening. The maximum temperature recorded prior to her arrival was 101 - 101.9 F. The fever has been present for less than 1 day. Associated symptoms include congestion, coughing, headaches and a sore throat. Pertinent negatives include no ear pain, nausea, rash, sinus pain, vomiting or wheezing. Treatments tried: nyquil/dayquil. The treatment provided mild relief.       Constitution: Positive for chills, fatigue and fever. Negative for sweating.   HENT: Positive for congestion, sore throat and voice change. Negative for ear pain, sinus pain and sinus pressure.    Neck: Negative for painful lymph nodes.   Eyes: Negative for eye redness.   Respiratory: Positive for cough. Negative for chest tightness, sputum production, bloody sputum, COPD, shortness of breath, stridor, wheezing and asthma.    Gastrointestinal: Negative for nausea and vomiting.   Musculoskeletal: Positive for muscle ache.   Skin: Negative for rash.   Allergic/Immunologic: Negative for seasonal allergies and asthma.   Neurological: Positive for headaches.   Hematologic/Lymphatic: Negative for swollen lymph nodes.       Objective:      Physical Exam   Constitutional: She is oriented to person, place, and time. She appears well-developed and well-nourished. She is cooperative.   Non-toxic appearance. She does not have a sickly appearance. She does not appear ill. No distress.   Ill-appearing.   HENT:   Head: Normocephalic and atraumatic.   Right Ear: Hearing, tympanic membrane, external ear and ear canal normal.   Left Ear: Hearing, tympanic membrane, external ear and ear canal normal.   Nose: Nose normal. No mucosal edema, rhinorrhea or nasal deformity. No epistaxis. Right sinus exhibits no maxillary sinus tenderness and no frontal sinus tenderness. Left sinus exhibits no maxillary sinus tenderness and no frontal sinus tenderness.   Mouth/Throat: Uvula is midline, oropharynx is clear and moist and mucous membranes are normal. No trismus in the jaw. Normal dentition. No uvula swelling. No oropharyngeal exudate, posterior oropharyngeal edema or posterior oropharyngeal erythema.   Eyes: Conjunctivae and lids are normal. No scleral icterus.   Neck: Trachea normal, normal range of motion, full passive range of motion without pain and phonation normal. Neck supple. No neck rigidity. No edema and no erythema present.   Cardiovascular: Normal rate, regular rhythm, normal heart sounds, intact distal pulses and normal pulses.   Pulmonary/Chest: Effort normal. No stridor. No respiratory distress. She has no decreased breath sounds. She has no rhonchi. She has no rales.   Initially with expiratory wheezing.  This improved after nebulizer treatment with improved air movement, and only faint end expiratory wheezing noted. No rales.   Abdominal: Normal appearance.   Musculoskeletal: Normal range of motion. She exhibits no edema or deformity.   Lymphadenopathy:     She has no cervical adenopathy.   Neurological: She is alert and oriented to person, place, and time. She exhibits normal muscle tone. Coordination normal.   Skin: Skin is warm, dry, intact, not diaphoretic and not pale.   Psychiatric: She has a normal mood and affect. Her speech is normal and behavior is normal. Judgment and thought content  normal. Cognition and memory are normal.   Nursing note and vitals reviewed.        Results for orders placed or performed in visit on 12/21/19   POCT Influenza A/B   Result Value Ref Range    Rapid Influenza A Ag Negative Negative    Rapid Influenza B Ag Positive (A) Negative     Acceptable Yes    POCT rapid strep A   Result Value Ref Range    Rapid Strep A Screen Negative Negative     Acceptable Yes      Assessment:       1. Influenza    2. Fever, unspecified fever cause    3. Bronchospasm        Plan:         Influenza  -     promethazine-codeine 6.25-10 mg/5 ml (PHENERGAN WITH CODEINE) 6.25-10 mg/5 mL syrup; Take 5 mLs by mouth every 4 (four) hours as needed.  Dispense: 118 mL; Refill: 0  -     oseltamivir (TAMIFLU) 75 MG capsule; Take 1 capsule (75 mg total) by mouth 2 (two) times daily. for 5 days  Dispense: 10 capsule; Refill: 0    Fever, unspecified fever cause  -     POCT Influenza A/B  -     POCT rapid strep A  -     ibuprofen tablet 600 mg    Bronchospasm  -     albuterol nebulizer solution 2.5 mg  -     ipratropium 0.02 % nebulizer solution 0.5 mg  -     betamethasone acetate-betamethasone sodium phosphate injection 9 mg    Make sure that you follow up with your primary care doctor in the next 2-5 days if needed .  Return to the Urgent Care if signs or symptoms change and certainly if you have worsening symptoms go to the nearest emergency department for further evaluation.

## 2020-01-02 ENCOUNTER — OFFICE VISIT (OUTPATIENT)
Dept: ORTHOPEDICS | Facility: CLINIC | Age: 30
End: 2020-01-02
Payer: COMMERCIAL

## 2020-01-02 DIAGNOSIS — M79.644 THUMB PAIN, RIGHT: Primary | ICD-10-CM

## 2020-01-02 DIAGNOSIS — M25.341 CHRONIC INSTABILITY OF METACARPOPHALANGEAL JOINT OF RIGHT THUMB: Primary | ICD-10-CM

## 2020-01-02 PROCEDURE — 99213 PR OFFICE/OUTPT VISIT, EST, LEVL III, 20-29 MIN: ICD-10-PCS | Mod: S$GLB,,, | Performed by: ORTHOPAEDIC SURGERY

## 2020-01-02 PROCEDURE — 99213 OFFICE O/P EST LOW 20 MIN: CPT | Mod: S$GLB,,, | Performed by: ORTHOPAEDIC SURGERY

## 2020-01-02 PROCEDURE — 99999 PR PBB SHADOW E&M-EST. PATIENT-LVL I: CPT | Mod: PBBFAC,,, | Performed by: ORTHOPAEDIC SURGERY

## 2020-01-02 PROCEDURE — 99999 PR PBB SHADOW E&M-EST. PATIENT-LVL I: ICD-10-PCS | Mod: PBBFAC,,, | Performed by: ORTHOPAEDIC SURGERY

## 2020-01-02 NOTE — PROGRESS NOTES
The patient is here for MRI results. The patient does complain of continued thumb .    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    Plan:  Conservative versus surgical treatment was discussed. The patient is electing for surgery. The patient is not electing for conservative treatment. I have explained the risks, benefits, and alternatives of the procedure to the patient in great detail. The patient voices understanding and all questions have been answered. The patient agrees with to proceed as planned. Consents were performed in clinic.

## 2020-01-03 ENCOUNTER — TELEPHONE (OUTPATIENT)
Dept: ORTHOPEDICS | Facility: CLINIC | Age: 30
End: 2020-01-03

## 2020-01-03 ENCOUNTER — ANESTHESIA EVENT (OUTPATIENT)
Dept: SURGERY | Facility: HOSPITAL | Age: 30
End: 2020-01-03
Payer: COMMERCIAL

## 2020-01-03 ENCOUNTER — PATIENT MESSAGE (OUTPATIENT)
Dept: ORTHOPEDICS | Facility: CLINIC | Age: 30
End: 2020-01-03

## 2020-01-03 NOTE — TELEPHONE ENCOUNTER
Informed patient of arrival time for surgery (0600), location, and scheduled patient for 2 week post op visit. Patient verbalized understanding.

## 2020-01-03 NOTE — TELEPHONE ENCOUNTER
Jennifer Nguyen notified of arrival time 0600 for surgery on 1/6/20 with Dr. JOSE DANIEL Zaman at [facility]. Post Op appointment made, slip in mail. Patient verbalized understanding.

## 2020-01-06 ENCOUNTER — ANESTHESIA (OUTPATIENT)
Dept: SURGERY | Facility: HOSPITAL | Age: 30
End: 2020-01-06
Payer: COMMERCIAL

## 2020-01-06 ENCOUNTER — HOSPITAL ENCOUNTER (OUTPATIENT)
Facility: HOSPITAL | Age: 30
Discharge: HOME | End: 2020-01-06
Attending: ORTHOPAEDIC SURGERY | Admitting: ORTHOPAEDIC SURGERY
Payer: COMMERCIAL

## 2020-01-06 VITALS
TEMPERATURE: 98 F | WEIGHT: 148 LBS | HEIGHT: 64 IN | HEART RATE: 76 BPM | SYSTOLIC BLOOD PRESSURE: 92 MMHG | DIASTOLIC BLOOD PRESSURE: 62 MMHG | RESPIRATION RATE: 16 BRPM | OXYGEN SATURATION: 98 % | BODY MASS INDEX: 25.27 KG/M2

## 2020-01-06 DIAGNOSIS — S63.649A GAMEKEEPER'S THUMB: ICD-10-CM

## 2020-01-06 LAB
B-HCG UR QL: NEGATIVE
CTP QC/QA: YES

## 2020-01-06 PROCEDURE — C1713 ANCHOR/SCREW BN/BN,TIS/BN: HCPCS | Performed by: ORTHOPAEDIC SURGERY

## 2020-01-06 PROCEDURE — D9220A PRA ANESTHESIA: ICD-10-PCS | Mod: CRNA,,, | Performed by: NURSE ANESTHETIST, CERTIFIED REGISTERED

## 2020-01-06 PROCEDURE — 99900035 HC TECH TIME PER 15 MIN (STAT)

## 2020-01-06 PROCEDURE — 94770 HC EXHALED C02 TEST: CPT

## 2020-01-06 PROCEDURE — 27200750 HC INSULATED NEEDLE/ STIMUPLEX: Performed by: ANESTHESIOLOGY

## 2020-01-06 PROCEDURE — 94761 N-INVAS EAR/PLS OXIMETRY MLT: CPT

## 2020-01-06 PROCEDURE — 36000708 HC OR TIME LEV III 1ST 15 MIN: Performed by: ORTHOPAEDIC SURGERY

## 2020-01-06 PROCEDURE — 76942 SUPRACLAVICULAR BRACHIAL PLEXUS SINGLE INJECTION BLOCK: ICD-10-PCS | Mod: 26,,, | Performed by: ANESTHESIOLOGY

## 2020-01-06 PROCEDURE — 26540 REPAIR HAND JOINT: CPT | Mod: F5,,, | Performed by: ORTHOPAEDIC SURGERY

## 2020-01-06 PROCEDURE — 64415 NJX AA&/STRD BRCH PLXS IMG: CPT | Mod: 59,RT,, | Performed by: ANESTHESIOLOGY

## 2020-01-06 PROCEDURE — 63600175 PHARM REV CODE 636 W HCPCS

## 2020-01-06 PROCEDURE — 37000009 HC ANESTHESIA EA ADD 15 MINS: Performed by: ORTHOPAEDIC SURGERY

## 2020-01-06 PROCEDURE — 37000008 HC ANESTHESIA 1ST 15 MINUTES: Performed by: ORTHOPAEDIC SURGERY

## 2020-01-06 PROCEDURE — 26540 PR FIX COLLAT LIG,MC-P JT,I-P JT: ICD-10-PCS | Mod: F5,,, | Performed by: ORTHOPAEDIC SURGERY

## 2020-01-06 PROCEDURE — 36000709 HC OR TIME LEV III EA ADD 15 MIN: Performed by: ORTHOPAEDIC SURGERY

## 2020-01-06 PROCEDURE — 71000033 HC RECOVERY, INTIAL HOUR: Performed by: ORTHOPAEDIC SURGERY

## 2020-01-06 PROCEDURE — 63600175 PHARM REV CODE 636 W HCPCS: Performed by: ORTHOPAEDIC SURGERY

## 2020-01-06 PROCEDURE — 25000003 PHARM REV CODE 250

## 2020-01-06 PROCEDURE — D9220A PRA ANESTHESIA: Mod: CRNA,,, | Performed by: NURSE ANESTHETIST, CERTIFIED REGISTERED

## 2020-01-06 PROCEDURE — 71000015 HC POSTOP RECOV 1ST HR: Performed by: ORTHOPAEDIC SURGERY

## 2020-01-06 PROCEDURE — 25000003 PHARM REV CODE 250: Performed by: ANESTHESIOLOGY

## 2020-01-06 PROCEDURE — 63600175 PHARM REV CODE 636 W HCPCS: Performed by: NURSE ANESTHETIST, CERTIFIED REGISTERED

## 2020-01-06 PROCEDURE — 76942 ECHO GUIDE FOR BIOPSY: CPT | Performed by: ANESTHESIOLOGY

## 2020-01-06 PROCEDURE — D9220A PRA ANESTHESIA: ICD-10-PCS | Mod: ANES,,, | Performed by: ANESTHESIOLOGY

## 2020-01-06 PROCEDURE — 64415 SUPRACLAVICULAR BRACHIAL PLEXUS SINGLE INJECTION BLOCK: ICD-10-PCS | Mod: 59,RT,, | Performed by: ANESTHESIOLOGY

## 2020-01-06 PROCEDURE — D9220A PRA ANESTHESIA: Mod: ANES,,, | Performed by: ANESTHESIOLOGY

## 2020-01-06 RX ORDER — BUPIVACAINE HYDROCHLORIDE AND EPINEPHRINE 5; 5 MG/ML; UG/ML
INJECTION, SOLUTION EPIDURAL; INTRACAUDAL; PERINEURAL
Status: COMPLETED | OUTPATIENT
Start: 2020-01-06 | End: 2020-01-06

## 2020-01-06 RX ORDER — SODIUM CHLORIDE 9 MG/ML
INJECTION, SOLUTION INTRAVENOUS CONTINUOUS
Status: DISCONTINUED | OUTPATIENT
Start: 2020-01-06 | End: 2020-01-06 | Stop reason: HOSPADM

## 2020-01-06 RX ORDER — SODIUM CHLORIDE 0.9 % (FLUSH) 0.9 %
3 SYRINGE (ML) INJECTION EVERY 6 HOURS
Status: DISCONTINUED | OUTPATIENT
Start: 2020-01-06 | End: 2020-01-06 | Stop reason: HOSPADM

## 2020-01-06 RX ORDER — MUPIROCIN 20 MG/G
OINTMENT TOPICAL
Status: DISCONTINUED | OUTPATIENT
Start: 2020-01-06 | End: 2020-01-06 | Stop reason: HOSPADM

## 2020-01-06 RX ORDER — CELECOXIB 200 MG/1
CAPSULE ORAL
Status: COMPLETED
Start: 2020-01-06 | End: 2020-01-06

## 2020-01-06 RX ORDER — LIDOCAINE HCL/PF 100 MG/5ML
SYRINGE (ML) INTRAVENOUS
Status: DISCONTINUED | OUTPATIENT
Start: 2020-01-06 | End: 2020-01-07

## 2020-01-06 RX ORDER — FENTANYL CITRATE 50 UG/ML
25 INJECTION, SOLUTION INTRAMUSCULAR; INTRAVENOUS EVERY 5 MIN PRN
Status: DISCONTINUED | OUTPATIENT
Start: 2020-01-06 | End: 2020-01-06 | Stop reason: HOSPADM

## 2020-01-06 RX ORDER — FENTANYL CITRATE 50 UG/ML
INJECTION, SOLUTION INTRAMUSCULAR; INTRAVENOUS
Status: COMPLETED
Start: 2020-01-06 | End: 2020-01-06

## 2020-01-06 RX ORDER — OXYCODONE AND ACETAMINOPHEN 5; 325 MG/1; MG/1
1 TABLET ORAL
Qty: 28 TABLET | Refills: 0 | Status: SHIPPED | OUTPATIENT
Start: 2020-01-06 | End: 2021-08-03

## 2020-01-06 RX ORDER — PROPOFOL 10 MG/ML
VIAL (ML) INTRAVENOUS CONTINUOUS PRN
Status: DISCONTINUED | OUTPATIENT
Start: 2020-01-06 | End: 2020-01-07

## 2020-01-06 RX ORDER — MIDAZOLAM HYDROCHLORIDE 1 MG/ML
INJECTION, SOLUTION INTRAMUSCULAR; INTRAVENOUS
Status: DISCONTINUED | OUTPATIENT
Start: 2020-01-06 | End: 2020-01-07

## 2020-01-06 RX ORDER — LIDOCAINE HYDROCHLORIDE 10 MG/ML
1 INJECTION, SOLUTION EPIDURAL; INFILTRATION; INTRACAUDAL; PERINEURAL ONCE
Status: DISCONTINUED | OUTPATIENT
Start: 2020-01-06 | End: 2020-01-06 | Stop reason: HOSPADM

## 2020-01-06 RX ORDER — ACETAMINOPHEN 500 MG
1000 TABLET ORAL ONCE
Status: COMPLETED | OUTPATIENT
Start: 2020-01-06 | End: 2020-01-06

## 2020-01-06 RX ORDER — OXYCODONE HYDROCHLORIDE 5 MG/1
5 TABLET ORAL
Status: DISCONTINUED | OUTPATIENT
Start: 2020-01-06 | End: 2020-01-06 | Stop reason: HOSPADM

## 2020-01-06 RX ORDER — CELECOXIB 200 MG/1
400 CAPSULE ORAL ONCE
Status: COMPLETED | OUTPATIENT
Start: 2020-01-06 | End: 2020-01-06

## 2020-01-06 RX ORDER — MIDAZOLAM HYDROCHLORIDE 1 MG/ML
0.5 INJECTION INTRAMUSCULAR; INTRAVENOUS
Status: DISCONTINUED | OUTPATIENT
Start: 2020-01-06 | End: 2020-01-06 | Stop reason: HOSPADM

## 2020-01-06 RX ORDER — SODIUM CHLORIDE 9 MG/ML
INJECTION, SOLUTION INTRAVENOUS CONTINUOUS
Status: CANCELLED | OUTPATIENT
Start: 2020-01-06

## 2020-01-06 RX ORDER — ACETAMINOPHEN 500 MG
TABLET ORAL
Status: COMPLETED
Start: 2020-01-06 | End: 2020-01-06

## 2020-01-06 RX ORDER — CEFAZOLIN SODIUM 1 G/3ML
2 INJECTION, POWDER, FOR SOLUTION INTRAMUSCULAR; INTRAVENOUS
Status: COMPLETED | OUTPATIENT
Start: 2020-01-06 | End: 2020-01-06

## 2020-01-06 RX ORDER — FENTANYL CITRATE 50 UG/ML
100 INJECTION, SOLUTION INTRAMUSCULAR; INTRAVENOUS ONCE
Status: COMPLETED | OUTPATIENT
Start: 2020-01-06 | End: 2020-01-06

## 2020-01-06 RX ORDER — MIDAZOLAM HYDROCHLORIDE 1 MG/ML
INJECTION INTRAMUSCULAR; INTRAVENOUS
Status: COMPLETED
Start: 2020-01-06 | End: 2020-01-06

## 2020-01-06 RX ADMIN — Medication 1000 MG: at 07:01

## 2020-01-06 RX ADMIN — FENTANYL CITRATE 50 MCG: 50 INJECTION INTRAMUSCULAR; INTRAVENOUS at 07:01

## 2020-01-06 RX ADMIN — ACETAMINOPHEN 1000 MG: 500 TABLET ORAL at 07:01

## 2020-01-06 RX ADMIN — BUPIVACAINE HYDROCHLORIDE AND EPINEPHRINE BITARTRATE 30 ML: 5; .005 INJECTION, SOLUTION EPIDURAL; INTRACAUDAL; PERINEURAL at 07:01

## 2020-01-06 RX ADMIN — CELECOXIB 400 MG: 200 CAPSULE ORAL at 07:01

## 2020-01-06 RX ADMIN — MIDAZOLAM 2 MG: 1 INJECTION INTRAMUSCULAR; INTRAVENOUS at 08:01

## 2020-01-06 RX ADMIN — FENTANYL CITRATE 50 MCG: 50 INJECTION, SOLUTION INTRAMUSCULAR; INTRAVENOUS at 07:01

## 2020-01-06 RX ADMIN — CEFAZOLIN 2 G: 330 INJECTION, POWDER, FOR SOLUTION INTRAMUSCULAR; INTRAVENOUS at 08:01

## 2020-01-06 RX ADMIN — PROPOFOL 125 MCG/KG/MIN: 10 INJECTION, EMULSION INTRAVENOUS at 08:01

## 2020-01-06 RX ADMIN — LIDOCAINE HYDROCHLORIDE 60 MG: 20 INJECTION, SOLUTION INTRAVENOUS at 08:01

## 2020-01-06 RX ADMIN — SODIUM CHLORIDE 1000 ML: 0.9 INJECTION, SOLUTION INTRAVENOUS at 07:01

## 2020-01-06 RX ADMIN — MIDAZOLAM HYDROCHLORIDE 2 MG: 1 INJECTION, SOLUTION INTRAMUSCULAR; INTRAVENOUS at 07:01

## 2020-01-06 RX ADMIN — MIDAZOLAM HYDROCHLORIDE 2 MG: 1 INJECTION INTRAMUSCULAR; INTRAVENOUS at 07:01

## 2020-01-06 NOTE — H&P
01/06/2020  No chief complaint on file.       Interval History of Present Illness: Jennifer Nguyen is a 29 y.o. year old female patient here for operative management of right Gamekeeper's thumb. Symptoms have been refractory to conservative management and the pt has elected to proceed with surgical intervention. Symptoms continue to interfere with ADLs. Denies fevers, chills, nausea, vomiting. No significant change to medical history since last clinic visit.    Previous HPI (12/13/19):  Jennifer Nguyen is a right hand dominant 29 y.o. female presenting today for right thumb pain.  She reports initially injuring the right thumb playing [employer] ball in July 2019.  She was seen at [facility], reports being diagnosed with a gamekeeper's thumb, placed in a brace, and eventually started in therapy.  She has attended approximately 5 weeks of occupational therapy, from October to November 2019.  She reports no significant improvement in her pain and thumb function. She does report that 3-4 weeks ago she re-injured the thumb trying to pull down a suitcase.  She denies any finger numbness or tingling.  She works for [employer].    Past Medical History:  Active Ambulatory Problems     Diagnosis Date Noted    Anxiety 12/22/2015    Celiac disease 12/22/2015    Generalized anxiety disorder 04/21/2016    Panic disorder without agoraphobia 04/21/2016    Depression 04/21/2016     Resolved Ambulatory Problems     Diagnosis Date Noted    Range of motion deficit 10/22/2019    Decreased pinch strength 10/22/2019    Decreased  strength 10/22/2019    Self-care deficit in dressing 10/22/2019    Self-care deficit for feeding 10/22/2019     No Additional Past Medical History     Past Surgical History:   Procedure Laterality Date    BREAST SURGERY      REDUCTION    INTRAUTERINE DEVICE INSERTION  04/2015     [facility]---MIRENA    WISDOM TOOTH EXTRACTION       Medications:  Entered into EMR  Review of patient's  allergies indicates:   Allergen Reactions    Ciprofloxacin     Tree pollen-mountain cedar Hives    Gluten Nausea Only     ROS:   Negative except as stated in HPI.    Social History     Socioeconomic History    Marital status: Single     Spouse name: Not on file    Number of children: Not on file    Years of education: Not on file    Highest education level: Not on file   Occupational History    Not on file   Social Needs    Financial resource strain: Not on file    Food insecurity:     Worry: Not on file     Inability: Not on file    Transportation needs:     Medical: Not on file     Non-medical: Not on file   Tobacco Use    Smoking status: Never Smoker   Substance and Sexual Activity    Alcohol use: Yes    Drug use: No    Sexual activity: Yes   Lifestyle    Physical activity:     Days per week: Not on file     Minutes per session: Not on file    Stress: Not on file   Relationships    Social connections:     Talks on phone: Not on file     Gets together: Not on file     Attends Voodoo service: Not on file     Active member of club or organization: Not on file     Attends meetings of clubs or organizations: Not on file     Relationship status: Not on file   Other Topics Concern    Not on file   Social History Narrative    Not on file     Family History   Problem Relation Age of Onset    Breast cancer Paternal Grandmother     Colon cancer Neg Hx     Ovarian cancer Neg Hx      Physical Examination:  Vital Signs: There were no vitals filed for this visit.  General: Awake, alert, oriented x3. NAD  Psych: Mood and affect normal  HEENT: NC/AT  Cardio: Regular rate  Respiratory: Clear, equal, and unlabored respirations  Musculoskeletal:  No scars or edema appreciated.  She is tender to palpation diffusely over the right thumb and 1st metacarpal.  Significant tenderness at the MCP joint of the right thumb, both ulnarly and radially.  There is slight laxity at the MCP of the right thumb.  Pain with  motion both passive and active.  She does have decreased right thumb range of motion compared to left due to pain. Neurovascularly intact-reports slight hypersensitivity in the radial nerve distribution on the right compared to left, equal ulnar and median nerve sensation.  Good motor function, good capillary refill, 2+ radial pulses.    Radiographs:  Radiographs reviewed  The MRI ispositive for Complete tear of the ulnar collateral ligament with retraction.     Impression:  Right thumb UCL rupture     Discussion/Plan:  - NPO  - To OR today for right thumb UCL repair

## 2020-01-06 NOTE — BRIEF OP NOTE
Brief Operative Note     SUMMARY     Surgery Date: 1/6/2020     Surgeon(s) and Role:     * Lisette Zaman MD - Primary    Assistant:     * Meredith Padron PA-C    Pre-op Diagnosis:  Thumb pain, right [M79.644]    Post-op Diagnosis:  Thumb pain, right [M79.644]    Procedure(s) (LRB):  REPAIR, LIGAMENT, COLLATERAL, THUMB right (Right)    Anesthesia: Regional    Description of Procedure:   Right thumb ulnar collateral ligament repair    Findings/Key Components:  Right thumb ulnar collateral ligament repair with internal brace - arthrex  Thumb spica plaster splint with thumb in slight flexion    Estimated Blood Loss: Minimal         Specimens Removed:   Specimen (12h ago, onward)    None          Discharge Note      SUMMARY     Admit Date: 1/6/2020    Attending Physician: Lisette Zaman MD     Discharge Physician: Lisette Zaman MD    Discharge Date: 1/6/2020     Final Diagnosis: Thumb pain, right [M79.644]    Hospital Course: Patient was admitted for an outpatient procedure and tolerated the procedure well with no complications.    Disposition: Home or Self Care    Follow Up/Patient Instructions:   Current Discharge Medication List      START taking these medications    Details   oxyCODONE-acetaminophen (PERCOCET) 5-325 mg per tablet Take 1 tablet by mouth every 4 to 6 hours as needed for Pain (moderate to severe).  Qty: 28 tablet, Refills: 0    Comments: Quantity prescribed more than 7 day supply? No         CONTINUE these medications which have NOT CHANGED    Details   ondansetron (ZOFRAN-ODT) 4 MG TbDL DISSOLVE 1 TABLET ON THE TONGUE EVERY 12 HOURS AS NEEDED  Qty: 30 tablet, Refills: 0      triamcinolone acetonide 0.1% (KENALOG) 0.1 % cream Apply topically 2 (two) times daily. Topically bid prn  Qty: 45 g, Refills: 1      zolpidem (AMBIEN CR) 6.25 MG CR tablet Take 1 tablet (6.25 mg total) by mouth nightly as needed for Insomnia.  Qty: 30 tablet, Refills: 1           Follow-up Information      Follow up In 2 weeks.    Why:  For suture removal, For wound re-check               Discharge Procedure Orders (must include Diet, Follow-up, Activity)   Discharge Procedure Orders (must include Diet, Follow-up, Activity)   Keep surgical extremity elevated     Lifting restrictions     Notify your health care provider if you experience any of the following:  temperature >100.4     Notify your health care provider if you experience any of the following:  severe uncontrolled pain     Notify your health care provider if you experience any of the following:  redness, tenderness, or signs of infection (pain, swelling, redness, odor or green/yellow discharge around incision site)     Notify your health care provider if you experience any of the following:  worsening rash     Leave dressing on - Keep it clean, dry, and intact until clinic visit     Activity as tolerated        Certification of Assistant at Surgery       Surgery Date: 1/6/2020     Participating Surgeons:  Surgeon(s) and Role:     * Lisette Zaman MD - Primary    Procedures:  Procedure(s) (LRB):  REPAIR, LIGAMENT, COLLATERAL, THUMB right (Right)    Assistant Surgeon's Certification of Necessity:  I understand that section 1842 (b) (6) (d) of the Social Security Act generally prohibits Medicare Part B reasonable charge payment for the services of assistants at surgery in teaching hospitals when qualified residents are available to furnish such services. I certify that the services for which payment is claimed were medically necessary, and that no qualified resident was available to perform the services. I further understand that these services are subject to post-payment review by the Medicare carrier.      MUSA Chapman    01/06/2020  9:41 AM

## 2020-01-06 NOTE — ANESTHESIA PROCEDURE NOTES
Supraclavicular Brachial Plexus Single Injection Block    Patient location during procedure: pre-op   Block not for primary anesthetic.  Reason for block: at surgeon's request and post-op pain management   Post-op Pain Location: Right hand pain  Start time: 1/6/2020 7:48 AM  Timeout: 1/6/2020 7:46 AM   End time: 1/6/2020 7:51 AM    Staffing  Authorizing Provider: Ed Montalvo MD  Performing Provider: Ed Montalvo MD    Preanesthetic Checklist  Completed: patient identified, site marked, surgical consent, pre-op evaluation, timeout performed, IV checked, risks and benefits discussed and monitors and equipment checked  Peripheral Block  Patient position: supine  Prep: ChloraPrep  Patient monitoring: heart rate, cardiac monitor, continuous pulse ox, continuous capnometry and frequent blood pressure checks  Block type: supraclavicular  Laterality: right  Injection technique: single shot  Needle  Needle type: Stimuplex   Needle gauge: 22 G  Needle length: 2 in  Needle localization: anatomical landmarks and ultrasound guidance   -ultrasound image captured on disc.  Assessment  Injection assessment: negative aspiration, negative parasthesia and local visualized surrounding nerve  Paresthesia pain: none  Heart rate change: no  Slow fractionated injection: yes  Additional Notes  VSS.  [facility] RN monitoring vitals throughout procedure.  Patient tolerated procedure well.     Clonidine PF 50 mcg and Decardon PF 1 mg added to local anesthetic

## 2020-01-06 NOTE — ANESTHESIA PREPROCEDURE EVALUATION
01/06/2020  Jennifer Nguyen is a 29 y.o., female.    Anesthesia Evaluation    I have reviewed the Patient Summary Reports.    I have reviewed the Nursing Notes.      Review of Systems  Anesthesia Hx:  Denies Hx of Anesthetic complications    Cardiovascular:  Cardiovascular Normal Exercise tolerance: good     Pulmonary:  Pulmonary Normal    Hepatic/GI:  Hepatic/GI Normal    Neurological:  Neurology Normal    Endocrine:  Endocrine Normal        Physical Exam  General:  Well nourished    Airway/Jaw/Neck:  Airway Findings: Mallampati: II Improves to I with phonation.  TM Distance: Normal, at least 6 cm  Jaw/Neck Findings:  Neck ROM: Normal ROM       Chest/Lungs:  Chest/Lungs Clear    Heart/Vascular:  Heart Findings: Normal       Mental Status:  Mental Status Findings:  Cooperative, Alert and Oriented         Anesthesia Plan  Type of Anesthesia, risks & benefits discussed:  Anesthesia Type:  general  Patient's Preference: GA  Intra-op Monitoring Plan: standard ASA monitors  Intra-op Monitoring Plan Comments:   Post Op Pain Control Plan: multimodal analgesia, IV/PO Opiods PRN, per primary service following discharge from PACU and peripheral nerve block  Post Op Pain Control Plan Comments:   Induction:   IV  Beta Blocker:  Patient is not currently on a Beta-Blocker (No further documentation required).       Informed Consent: Patient understands risks and agrees with Anesthesia plan.  Questions answered. Anesthesia consent signed with patient.  ASA Score: 1     Day of Surgery Review of History & Physical:    H&P update referred to the surgeon.     Anesthesia Plan Notes: The patient's PMH was reviewed and PE was performed  Influenza prior to Xmas. Resolved and no residual symptoms  Plan for GA        Ready For Surgery From Anesthesia Perspective.

## 2020-01-06 NOTE — TRANSFER OF CARE
"Anesthesia Transfer of Care Note    Patient: Jennifer Nguyen    Procedure(s) Performed: Procedure(s) (LRB):  REPAIR, LIGAMENT, COLLATERAL, THUMB right (Right)    Patient location: PACU    Anesthesia Type: regional    Transport from OR: Transported from OR on room air with adequate spontaneous ventilation    Post pain: adequate analgesia    Post assessment: no apparent anesthetic complications    Post vital signs: stable    Level of consciousness: awake    Nausea/Vomiting: no nausea/vomiting    Complications: none    Transfer of care protocol was followed      Last vitals:   Visit Vitals  BP (!) 99/55 (BP Location: Left arm, Patient Position: Lying)   Pulse 86   Temp 37.1 °C (98.8 °F) (Oral)   Resp 14   Ht 5' 4" (1.626 m)   Wt 67.1 kg (148 lb)   SpO2 95%   Breastfeeding? No   BMI 25.40 kg/m²     "

## 2020-01-06 NOTE — DISCHARGE INSTRUCTIONS
Recovery After Procedural Sedation (Adult)  You have been given medicine by vein to make you sleep during your surgery. This may have included both a pain medicine and sleeping medicine. Most of the effects have worn off. But you may still have some drowsiness for the next 6 to 8 hours.    Home care  Follow these guidelines when you get home:  · For the next 8 hours, you should be watched by a responsible adult. This person should make sure your condition is not getting worse.  · Don't drink any alcohol for the next 24 hours.  · Don't drive, operate dangerous machinery, or make important business or personal decisions during the next 24 hours.  Note: Your healthcare provider may tell you not to take any medicine by mouth for pain or sleep in the next 4 hours. These medicines may react with the medicines you were given in the hospital. This could cause a much stronger response than usual.    Follow-up care  Follow up with your healthcare provider if you are not alert and back to your usual level of activity within 12 hours.    When to seek medical advice  Call your healthcare provider right away if any of these occur:  · Drowsiness gets worse  · Weakness or dizziness gets worse  · Repeated vomiting  · You can't be awakened     Date Last Reviewed: 10/18/2016  © 0859-3590 [employer]. [address] [place] [address], [place] [address]. All rights reserved. This information is not intended as a substitute for professional medical care. Always follow your healthcare professional's instructions.    Discharge Instructions: After Your Surgery  You've just had surgery. During surgery, you were given medicine called anesthesia to keep you relaxed and free of pain. After surgery, you may have some pain or nausea. This is common. Here are some tips for feeling better and getting well after surgery.    Stay on schedule with your medicine.   Going home  Your healthcare provider will show you how to take care of yourself when  you go home. He or she will also answer your questions. Have an adult family member or friend drive you home. For the first 24 hours after your surgery:  · Have someone stay with you, if needed. He or she can watch for problems and help keep you safe.  Be sure to go to all follow-up visits with your healthcare provider. And rest after your surgery for as long as your healthcare provider tells you to.    Coping with pain  If you have pain after surgery, pain medicine will help you feel better. Take it as told, before pain becomes severe. Also, ask your healthcare provider or pharmacist about other ways to control pain. This might be with heat, ice, or relaxation. And follow any other instructions your surgeon or nurse gives you.    Tips for taking pain medicine  To get the best relief possible, remember these points:  · Pain medicines can upset your stomach. Taking them with a little food may help.  · Most pain relievers taken by mouth need at least 20 to 30 minutes to start to work.  · Taking medicine on a schedule can help you remember to take it. Try to time your medicine so that you can take it before starting an activity. This might be before you get dressed, go for a walk, or sit down for dinner.  · Constipation is a common side effect of pain medicines. Call your healthcare provider before taking any medicines such as laxatives or stool softeners to help ease constipation. Also ask if you should skip any foods. Drinking lots of fluids and eating foods such as fruits and vegetables that are high in fiber can also help. Remember, do not take laxatives unless your surgeon has prescribed them.    Your healthcare provider may tell you to take acetaminophen to help ease your pain. Ask him or her how much you are supposed to take each day. Acetaminophen or other pain relievers may interact with your prescription medicines or other over-the-counter (OTC) medicines. Some prescription medicines have acetaminophen and  other ingredients. Using both prescription and OTC acetaminophen for pain can cause you to overdose. Read the labels on your OTC medicines with care. This will help you to clearly know the list of ingredients, how much to take, and any warnings. It may also help you not take too much acetaminophen. If you have questions or do not understand the information, ask your pharmacist or healthcare provider to explain it to you before you take the OTC medicine.    Managing nausea  Some people have an upset stomach after surgery. This is often because of anesthesia, pain, or pain medicine, or the stress of surgery. These tips will help you handle nausea and eat healthy foods as you get better. If you were on a special food plan before surgery, ask your healthcare provider if you should follow it while you get better. These tips may help:  · Do not push yourself to eat. Your body will tell you when to eat and how much.  · Start off with clear liquids and soup. They are easier to digest.  · Next try semi-solid foods, such as mashed potatoes, applesauce, and gelatin, as you feel ready.  · Slowly move to solid foods. Don't eat fatty, rich, or spicy foods at first.  · Do not force yourself to have 3 large meals a day. Instead eat smaller amounts more often.  · Take pain medicines with a small amount of solid food, such as crackers or toast, to avoid nausea.     Call your surgeon if  · You still have pain an hour after taking medicine. The medicine may not be strong enough.  · You feel too sleepy, dizzy, or groggy. The medicine may be too strong.  · You have side effects like nausea, vomiting, or skin changes, such as rash, itching, or hives.       If you have obstructive sleep apnea  You were given anesthesia medicine during surgery to keep you comfortable and free of pain. After surgery, you may have more apnea spells because of this medicine and other medicines you were given. The spells may last longer than usual.   At  home:  · Keep using the continuous positive airway pressure (CPAP) device when you sleep. Unless your healthcare provider tells you not to, use it when you sleep, day or night. CPAP is a common device used to treat obstructive sleep apnea.  · Talk with your provider before taking any pain medicine, muscle relaxants, or sedatives. Your provider will tell you about the possible dangers of taking these medicines.  Date Last Reviewed: 12/1/2016  © 9208-8424 [employer]. [address] [place] [address]. All rights reserved. This information is not intended as a substitute for professional medical care. Always follow your healthcare professional's instructions.          PATIENT INSTRUCTIONS  POST-ANESTHESIA    IMMEDIATELY FOLLOWING SURGERY:  Do not drive or operate machinery for the first twenty four hours after surgery.  Do not make any important decisions for twenty four hours after surgery or while taking narcotic pain medications or sedatives.  If you develop intractable nausea and vomiting or a severe headache please notify your doctor immediately.    FOLLOW-UP:  Please make an appointment with your surgeon as instructed. You do not need to follow up with anesthesia unless specifically instructed to do so.    WOUND CARE INSTRUCTIONS (if applicable):  Keep a dry clean dressing on the anesthesia/puncture wound site if there is drainage.  Once the wound has quit draining you may leave it open to air.  Generally you should leave the bandage intact for twenty four hours unless there is drainage.  If the epidural site drains for more than 36-48 hours please call the anesthesia department.    QUESTIONS?:  Please feel free to call your physician or the hospital  if you have any questions, and they will be happy to assist you.       [facility] Anesthesia Department  [address] [facility]  [phone]      Wound Check After Surgery: Bleeding  Surgery involves cutting through  layers of skin, fatty tissue, muscle, and sometimes bone and cartilage. Sutures (stitches) or staples are used to close all layers of the wound. The sutures on the inside will dissolve in about 2 to 3 weeks. Any sutures or staples used on the outside need to be removed in about 7 to 14 days, depending on the location.  It is normal to have some clear or bloody discharge on the wound covering or bandage (dressing) for the first few days after surgery. If your wound was sutured (sewn) closed, you should not have to change the dressing more than three times a day in the first few days. Bleeding or discharge requiring more frequent dressing changes can be a sign of a problem.    Home care  Different types of surgery require different types of care and dressing changes. It is important to follow all instructions and advice from your surgeon, as well as other members of your healthcare team.    Wound care  · Keep the wound clean, as directed by your healthcare provider.  · Change the dressing as directed. Change the dressing sooner if it becomes wet or stained with blood or fluid from the wound.  · Bathe with a sponge (no shower or tub baths) for the first few days after surgery, or until there is no more drainage from the wound. Unless you received different instructions from your surgeon, you can then shower. Do not soak the area in water (no baths or swimming) until the tape, sutures, or staples are removed and any wound opening has dried out and healed.    Changing the dressing  · Wash your hands before changing the dressings.  · Carefully remove the dressing and tape; don't just yank it off. If it sticks to the wound, you may need to wet it a little to remove it, unless your healthcare provider told you not to wet it.  · Wash your hands again before putting on a new, clean dressing.  · Gently clean the wound with clean water (or saline) using gauze or a clean washcloth. Do not rub it or pick at it.  · Do not use  soap, alcohol, hydrogen peroxide, or any other cleanser.  · If you were told to dry the wound before putting on a new dressing, gently pat it dry. Do not rub.  · Throw out the old dressing. Do not reuse it!  · Wash your hands again when you are done.    Types of dressings  Your healthcare team will tell you what type of dressing to put on your wound. Follow your healthcare team's instructions carefully, and contact them if you have any questions. Two common types of dressings are described below. You may have one of these or another type.  · Dry dressing. Use dry gauze. If the wound is still draining, use a nonadherent dressing, which shouldn't stick to the wound.  · Wet-to-dry dressing. Wet the gauze, and squeeze out the excess water (or saline), before putting it on. Then, cover this with a dry pad.    Medicines  · If you were given antibiotics, take them until they are used up or your healthcare provider tells you to stop. It is important to finish the antibiotics even though you feel better, to make sure the infection has cleared.  · You can take acetaminophen or ibuprofen for pain, unless you were given a different pain medicine to use. (Note: If you have chronic liver or kidney disease, or have ever had a stomach ulcer or gastrointestinal bleeding, or are taking blood thinner medicines, talk with your healthcare provider before using these medicines.)  · Aspirin should never be used in anyone under 18 years of age who is ill with a fever. It may cause severe liver damage.    Follow-up care  Follow up with your healthcare provider, or as advised, for your next wound check or removal of sutures, staples, or tape.  · If a culture was done, you will be notified if the results will affect your treatment. You can call as directed for the results.  · If imaging tests, such as X-rays, an ultrasound, or CT scan were done, they will be reviewed by a specialist. You will be notified of the results, especially if they  affect treatment.    Call 911  Call emergency services right away if any of these occur:  · Trouble breathing or swallowing, wheezing  · Hoarse voice or trouble speaking  · Extreme confusion  · Extreme drowsiness or trouble awakening  · Fainting or loss of consciousness  · Rapid heart rate or very slow heart rate  · Vomiting blood, or large amounts of blood in stool  · Discomfort in the center of the chest that feels like pressure, squeezing, a sense of fullness, or pain  · Discomfort or pain in other upper body areas, such as the back, one or both arms, neck, jaw, or stomach  · Stroke symptoms (spot a stroke FAST)  ¨ F: Face drooping. One side of the face is numb or droops.  ¨ A: Arm weakness. One arm feels weak or numb.  ¨ S: Speech difficulty: Speech is slurred, or the person is unable to speak.  ¨ T: Time to call 911. Even if symptoms go away, call 911.    When to seek medical advice  Call your healthcare provider right away if any of the following occur:  · Fluid or blood soaking 5 or more bandages a day during the first 3 days after surgery  · Fluid or blood still draining from the wound more than 3 days after surgery  · Increasing pain at the site of surgery  · Fever over 100.4º F (38.0º C)  · Redness around the wound  · Pus coming from the wound  · Vomiting, constipation, or diarrhea    Date Last Reviewed: 9/27/2015  © 1148-1367 [employer]. [address] [place] [address]. All rights reserved. This information is not intended as a substitute for professional medical care. Always follow your healthcare professional's instructions.      Wound Check After Surgery, No Complication    It is normal to feel pain at the incision site. The pain decreases as the wound heals. Most of the pain and soreness from the skin incision should go away by the time the sutures or staples are removed. Soreness and pain from deeper tissues may last another week or two.  Pain that continues more than a few  weeks after surgery or pain that worsens anytime after surgery can be a sign of a problem, such as:  · Infection  · Separation of wound edges  · Collection of blood or other below the skin        Date Last Reviewed: 9/27/2015  © 1153-3844 [employer]. [address] [place] [address]. All rights reserved. This information is not intended as a substitute for professional medical care. Always follow your healthcare professional's instructions.

## 2020-01-07 NOTE — ANESTHESIA POSTPROCEDURE EVALUATION
Anesthesia Post Evaluation    Patient: Jennifer Nguyen    Procedure(s) Performed: Procedure(s) (LRB):  REPAIR, LIGAMENT, COLLATERAL, THUMB right (Right)    Final Anesthesia Type: general    Patient location during evaluation: PACU  Patient participation: Yes- Able to Participate  Level of consciousness: awake and alert  Post-procedure vital signs: reviewed and stable  Pain management: adequate  Airway patency: patent    PONV status at discharge: No PONV  Anesthetic complications: no      Cardiovascular status: blood pressure returned to baseline  Respiratory status: unassisted, spontaneous ventilation and room air  Hydration status: euvolemic  Follow-up not needed.  Comments: I evaluated the patient prior to d/c from PACU            Vitals Value Taken Time   BP 90/55 1/6/2020 10:03 AM   Temp 36.8 °C (98.2 °F) 1/6/2020  9:35 AM   Pulse 65 1/6/2020 10:13 AM   Resp 16 1/6/2020  9:46 AM   SpO2 96 % 1/6/2020 10:13 AM   Vitals shown include unvalidated device data.      Event Time     Out of Recovery 10:14:14          Pain/Audie Score: Pain Rating Prior to Med Admin: 1 (1/6/2020  7:47 AM)  Audie Score: 9 (1/6/2020  9:35 AM)

## 2020-01-08 NOTE — OP NOTE
[facility]  Surgery Department  Operative Note    SUMMARY     Date of Procedure: 1/6/2020     Procedure: Procedure(s) (LRB):  REPAIR, LIGAMENT, COLLATERAL, THUMB right (Right)     Surgeon(s) and Role:     * Lisette Zaman MD - Primary    Assisting Surgeon:    Pre-Operative Diagnosis: Thumb pain, right [M79.644]    Post-Operative Diagnosis: Post-Op Diagnosis Codes:     * Thumb pain, right [M79.644]    Anesthesia: Regional    Technical Procedures Used: surgery    Description of the Findings of the Procedure: Date of Procedure: 1/3/2020      Procedure: Procedure(s) (LRB):  REPAIR, LIGAMENT, COLLATERAL, THUMB right repair with anchors (Right)      Surgeon(s) and Role:     * Lisette Zaman MD - Primary     Assisting Surgeon:      Pre-Operative Diagnosis:tear of metacarpophalangeal (MCP) joint of right thumb, initial encounter [S63.641A]     Post-Operative Diagnosis: Post-Op Diagnosis Codes:     * tear of metacarpophalangeal (MCP) joint of right thumb, initial encounter [S63.641A]     Anesthesia: Regional     Technical Procedures Used: surgery     Description of the Findings of the Procedure:  Indication for procedure Jennifer is a 29-year-old female who injured her right thumb she had failed casting therapy splinting MRI had been performed which did show a tear after much discussion with the patient continue after continued pain we elected for surgical intervention risks and benefits were explained to the patient in clinic.   Procedure in detail the correct site was marked with the patient's participation in the holding area patient was brought to the operating placed in supine position underwent MAC anesthesia he had undergone regional anesthesia while in the holding area in nonsterile tourniquet was placed in a well-padded position on the right upper extremity right upper extremities prepped draped normal sterile fashion time-out was conducted for the correct procedure to be indicated  IV antibiotics were given to the patient preoperatively a S-shaped incision was marked out over the MCP joint of the thumb the arm was exsanguinated with an Esmarch tourniquet was insufflated 250 mmHg the incision was made careful dissection down to the capsule and ligament was maintained of note immediately after opening the aponeurosis there was a hematoma that could be seen just below into the area of the capsule this was released and removed though this was a chronic injury could still the ligament the ligament was completely torn this point the decision was made to repair the ligament and internal brace was utilized. The proximal phalanx was prepared a Arthrex internal brace was utilized . The bone  was then drilled and placed appropriately so that it was buried into the bone. The fibertape was fell seated. The second anchor was placed after the MC was drilled with the guide. The fibertape was locked into position. The the ligament was then repaired in a stable position while the thumb MCP joint was in slight flexion it was stable the capsule as well as the aponeurosis was closed with the running suture stability was still says to showed that it was completely stable the areas irrigated copious amounts of sterile normal saline Vicryl Monocryl Dermabond closed the skin sterile dressing was applied tourniquet was deflated brisk cap refill sued patient was placed in a well-padded thumb spica splint tolerated suture was brought to cover area in stable condition     Postop plans patient keep the dressing clean dry and intact a 2 weeks time ability placed in a cast for 4 weeks 6 weeks and be in a brace therapy be initiated no contact sports for unprotected for 3 months     Significant Surgical Tasks Conducted by the Assistant(s), if Applicable: none     Complications: No    Significant Surgical Tasks Conducted by the Assistant(s), if Applicable: none    Complications: No    Estimated Blood Loss (EBL): * No values  recorded between 1/6/2020  8:32 AM and 1/6/2020  9:34 AM *           Implants:   Implant Name Type Inv. Item Serial No.  Lot No. LRB No. Used   hand wrist internal brace lligament augmentatiion repair      [id number] 1       Specimens:   Specimen (12h ago, onward)    None                  Condition: Good    Disposition: PACU - hemodynamically stable.    Attestation: I performed the procedure.    Discharge Note    SUMMARY     Admit Date: 1/6/2020    Discharge Date and Time: 1/6/2020 10:40 AM    Hospital Course (synopsis of major diagnoses, care, treatment, and services provided during the course of the hospital stay): surgery     Final Diagnosis: Post-Op Diagnosis Codes:     * Thumb pain, right [M79.644]    Disposition: Home or Self Care    Follow Up/Patient Instructions:     Medications:  Reconciled Home Medications:      Medication List      START taking these medications    oxyCODONE-acetaminophen 5-325 mg per tablet  Commonly known as:  PERCOCET  Take 1 tablet by mouth every 4 to 6 hours as needed for Pain (moderate to severe).        CONTINUE taking these medications    ondansetron 4 MG Tbdl  Commonly known as:  ZOFRAN-ODT  DISSOLVE 1 TABLET ON THE TONGUE EVERY 12 HOURS AS NEEDED     triamcinolone acetonide 0.1% 0.1 % cream  Commonly known as:  KENALOG  Apply topically 2 (two) times daily. Topically bid prn     zolpidem 6.25 MG CR tablet  Commonly known as:  AMBIEN CR  Take 1 tablet (6.25 mg total) by mouth nightly as needed for Insomnia.          Discharge Procedure Orders   Keep surgical extremity elevated     Lifting restrictions     Notify your health care provider if you experience any of the following:  temperature >100.4     Notify your health care provider if you experience any of the following:  severe uncontrolled pain     Notify your health care provider if you experience any of the following:  redness, tenderness, or signs of infection (pain, swelling, redness, odor or green/yellow  discharge around incision site)     Notify your health care provider if you experience any of the following:  worsening rash     Leave dressing on - Keep it clean, dry, and intact until clinic visit     Activity as tolerated     Follow-up Information     Follow up In 2 weeks.    Why:  For suture removal, For wound re-check

## 2020-01-14 ENCOUNTER — TELEPHONE (OUTPATIENT)
Dept: ORTHOPEDICS | Facility: CLINIC | Age: 30
End: 2020-01-14

## 2020-01-14 NOTE — TELEPHONE ENCOUNTER
----- Message from Anh Hernandez sent at 1/14/2020  2:01 PM CST -----  Contact: ALEC MCCLELLAND [[id number]]  Type: Patient Call Back    Who called:ALEC MCCLELLAND [[id number]]    What is the request in detail: Patient is requesting a call back. She states that she would like to come in to get her hand checked out. She states that she is having pain. She would like to be seen tomorrow if possible. She is really concerned.   Please contact to further advise.    Can the clinic reply by [facility]? No    Best call back number: [phone] Please leave a message.     Additional Information: N/A

## 2020-01-15 ENCOUNTER — DOCUMENTATION ONLY (OUTPATIENT)
Dept: ORTHOPEDICS | Facility: CLINIC | Age: 30
End: 2020-01-15

## 2020-01-15 ENCOUNTER — OFFICE VISIT (OUTPATIENT)
Dept: ORTHOPEDICS | Facility: CLINIC | Age: 30
End: 2020-01-15
Payer: COMMERCIAL

## 2020-01-15 VITALS
BODY MASS INDEX: 25.25 KG/M2 | HEIGHT: 64 IN | DIASTOLIC BLOOD PRESSURE: 80 MMHG | WEIGHT: 147.94 LBS | HEART RATE: 87 BPM | SYSTOLIC BLOOD PRESSURE: 123 MMHG

## 2020-01-15 DIAGNOSIS — Z98.890 POST-OPERATIVE STATE: Primary | ICD-10-CM

## 2020-01-15 PROCEDURE — 29125 PR APPLY FOREARM SPLINT,STATIC: ICD-10-PCS | Mod: 58,RT,S$GLB, | Performed by: PHYSICIAN ASSISTANT

## 2020-01-15 PROCEDURE — 99024 POSTOP FOLLOW-UP VISIT: CPT | Mod: S$GLB,,, | Performed by: PHYSICIAN ASSISTANT

## 2020-01-15 PROCEDURE — 99999 PR PBB SHADOW E&M-EST. PATIENT-LVL III: CPT | Mod: PBBFAC,,, | Performed by: PHYSICIAN ASSISTANT

## 2020-01-15 PROCEDURE — 99999 PR PBB SHADOW E&M-EST. PATIENT-LVL III: ICD-10-PCS | Mod: PBBFAC,,, | Performed by: PHYSICIAN ASSISTANT

## 2020-01-15 PROCEDURE — 99024 PR POST-OP FOLLOW-UP VISIT: ICD-10-PCS | Mod: S$GLB,,, | Performed by: PHYSICIAN ASSISTANT

## 2020-01-15 PROCEDURE — 29125 APPL SHORT ARM SPLINT STATIC: CPT | Mod: 58,RT,S$GLB, | Performed by: PHYSICIAN ASSISTANT

## 2020-01-15 NOTE — PROGRESS NOTES
Jennifer Nguyen is s/p ulnar collateral ligament repair with an internal brace of the right thumb by Dr. Zaman on 1/6/20. She reports she has had intermittent shooting pain in the thumb and wrist beginning a few days ago. This was concerning to her. She presents today for us to evaluate. She is taking Ibuprofen during the day and Perocet at night. She notes rash proximal to splint, she has eczema, she has begun applying hydrocortisone cream.     Thumb spica splint removed. No significant edema or erythema noted. NVI. There is a rash present proximal to the area of the splint. No signs of infection.     Discussed with pt no concerning findings. Continue pain medication as needed. Discussed elevation, icing. New right thumb spica plaster splint applied. RTC next week for regular 2 wk post op.

## 2020-01-21 ENCOUNTER — DOCUMENTATION ONLY (OUTPATIENT)
Dept: ORTHOPEDICS | Facility: CLINIC | Age: 30
End: 2020-01-21

## 2020-01-21 ENCOUNTER — OFFICE VISIT (OUTPATIENT)
Dept: ORTHOPEDICS | Facility: CLINIC | Age: 30
End: 2020-01-21
Payer: COMMERCIAL

## 2020-01-21 VITALS
WEIGHT: 147 LBS | HEART RATE: 78 BPM | SYSTOLIC BLOOD PRESSURE: 106 MMHG | DIASTOLIC BLOOD PRESSURE: 74 MMHG | HEIGHT: 64 IN | BODY MASS INDEX: 25.1 KG/M2

## 2020-01-21 DIAGNOSIS — Z47.89 ORTHOPEDIC AFTERCARE: ICD-10-CM

## 2020-01-21 DIAGNOSIS — S63.649A GAMEKEEPER'S THUMB: Primary | ICD-10-CM

## 2020-01-21 PROCEDURE — 29075 APPL CST ELBW FNGR SHORT ARM: CPT | Mod: 58,RT,S$GLB, | Performed by: PHYSICIAN ASSISTANT

## 2020-01-21 PROCEDURE — 99999 PR PBB SHADOW E&M-EST. PATIENT-LVL III: CPT | Mod: PBBFAC,,, | Performed by: PHYSICIAN ASSISTANT

## 2020-01-21 PROCEDURE — 99024 POSTOP FOLLOW-UP VISIT: CPT | Mod: S$GLB,,, | Performed by: PHYSICIAN ASSISTANT

## 2020-01-21 PROCEDURE — 29075 PR APPLY FOREARM CAST: ICD-10-PCS | Mod: 58,RT,S$GLB, | Performed by: PHYSICIAN ASSISTANT

## 2020-01-21 PROCEDURE — 99999 PR PBB SHADOW E&M-EST. PATIENT-LVL III: ICD-10-PCS | Mod: PBBFAC,,, | Performed by: PHYSICIAN ASSISTANT

## 2020-01-21 PROCEDURE — 99024 PR POST-OP FOLLOW-UP VISIT: ICD-10-PCS | Mod: S$GLB,,, | Performed by: PHYSICIAN ASSISTANT

## 2020-01-21 NOTE — PROGRESS NOTES
"Ms. Nguyen is here today for a post-operative visit.  She is 15 days status post repair of right thumb UCL with anchors by Dr. Zaman on 1/6/2020. She reports that she is doing well.  Pain is moderate, 6/10.  She is only taking pain medication as needed at night, taking ibuprofen as needed for pain.  She denies fever, chills, and sweats since the time of the surgery.     Physical exam:    Vitals:    01/21/20 0959   BP: 106/74   Pulse: 78   Weight: 66.7 kg (147 lb)   Height: 5' 4.02" (1.626 m)   PainSc:   6     Vital signs are stable, patient is afebrile.  Patient is well dressed and well groomed, no acute distress.  Alert and oriented to person, place, and time.  Post op dressing taken down.  Incision is clean, dry and intact.  There is no erythema or exudate.  There is no sign of any infection. She is NVI. Suture tails removed without difficulty.  New thumb spica cast applied    Assessment: 15 days status post repair of right thumb UCL with anchors.    Plan:  Jennifer was seen today for post-op evaluation, numbness and pain.    Diagnoses and all orders for this visit:    Gamekeeper's thumb  -     Ambulatory Referral to Physical/Occupational Therapy        - PO instruction reviewed and provided to patient  - Right thumb spica cast with thumb MCP in slight flexion  - No lifting or weight bearing  - Discussed scar massage  - Will start OT after next visit  - Follow up in 4 weeks   - Call with questions or concerns      "

## 2020-02-27 ENCOUNTER — OFFICE VISIT (OUTPATIENT)
Dept: ORTHOPEDICS | Facility: CLINIC | Age: 30
End: 2020-02-27
Payer: COMMERCIAL

## 2020-02-27 VITALS
BODY MASS INDEX: 25.1 KG/M2 | WEIGHT: 147 LBS | HEIGHT: 64 IN | HEART RATE: 75 BPM | DIASTOLIC BLOOD PRESSURE: 71 MMHG | SYSTOLIC BLOOD PRESSURE: 108 MMHG

## 2020-02-27 DIAGNOSIS — S63.649A GAMEKEEPER'S THUMB: Primary | ICD-10-CM

## 2020-02-27 PROCEDURE — 99024 PR POST-OP FOLLOW-UP VISIT: ICD-10-PCS | Mod: S$GLB,,, | Performed by: PHYSICIAN ASSISTANT

## 2020-02-27 PROCEDURE — 99999 PR PBB SHADOW E&M-EST. PATIENT-LVL III: CPT | Mod: PBBFAC,,, | Performed by: PHYSICIAN ASSISTANT

## 2020-02-27 PROCEDURE — 99999 PR PBB SHADOW E&M-EST. PATIENT-LVL III: ICD-10-PCS | Mod: PBBFAC,,, | Performed by: PHYSICIAN ASSISTANT

## 2020-02-27 PROCEDURE — 99024 POSTOP FOLLOW-UP VISIT: CPT | Mod: S$GLB,,, | Performed by: PHYSICIAN ASSISTANT

## 2020-02-27 NOTE — PROGRESS NOTES
"Ms. Nguyen is here today for a post-operative visit.  She is 52 days status post repair of right thumb UCL with anchors by Dr. Zaman on 1/6/2020. She reports that she is doing well.  No current pain.  She is not yet scheduled for OT.  She denies fever, chills, and sweats since the time of the surgery.     Physical exam:    Vitals:    02/27/20 0755   BP: 108/71   Pulse: 75   Weight: 66.7 kg (147 lb)   Height: 5' 4.02" (1.626 m)   PainSc: 0-No pain     Vital signs are stable, patient is afebrile.  Patient is well dressed and well groomed, no acute distress.  Alert and oriented to person, place, and time.  Incision is clean, dry and intact.  There is no erythema or exudate.  There is no sign of any infection. She is NVI- mildly diminished radial nerve sensation on the right. Thumb immobilized, good finger ROM.    Assessment: 52 days status post repair of right thumb UCL with anchors.    Plan:  Jennifer was seen today for post-op evaluation and numbness.    Diagnoses and all orders for this visit:    Gamekeeper's thumb  -     Ambulatory referral/consult to Physical/Occupational Therapy          - Right thumb spica splint (patient's own from prior treatment)   - No lifting or weight bearing  - Discussed scar massage  - start OT   - Follow up in 4-5 weeks   - Call with questions or concerns      "

## 2020-03-02 ENCOUNTER — CLINICAL SUPPORT (OUTPATIENT)
Dept: REHABILITATION | Facility: HOSPITAL | Age: 30
End: 2020-03-02
Payer: COMMERCIAL

## 2020-03-02 DIAGNOSIS — M25.649 STIFFNESS OF THUMB JOINT: ICD-10-CM

## 2020-03-02 DIAGNOSIS — M79.644 THUMB PAIN, RIGHT: ICD-10-CM

## 2020-03-02 DIAGNOSIS — M79.89 THUMB SWELLING: ICD-10-CM

## 2020-03-02 PROCEDURE — 97110 THERAPEUTIC EXERCISES: CPT | Mod: PN

## 2020-03-02 PROCEDURE — 97165 OT EVAL LOW COMPLEX 30 MIN: CPT | Mod: PN

## 2020-03-02 NOTE — PATIENT INSTRUCTIONS
thumb IPJ blocking    holding the base of your thumb with your opposite hand, bend the tip of your thumb as far as you can. hold for 5 seconds, repeat 10 times and do this 3 times a day.    Thumb MP active flexion blocking exercise    Hold thumb as shown.  Bend and straighten thumb at the second knuckle.     WRIST RADIAL DEVIATION    Bend your wrist towards the thumb side and then return.    Active Thumb Abduction    Move your thumb out from the palm as pictured.    Active Thumb Extension    Move your thumb out away from your palm as pictured.      thumb opposition    bend your thumb, trying to touch the base of your small finger. repeat 10 times and do this 3 times a day.

## 2020-03-02 NOTE — PLAN OF CARE
[facility] Occupational Therapy  Initial Evaluation     Date: 3/2/2020  Name: Jennifer Nguyen  Clinic Number: [id number]    Therapy Diagnosis:   1. Stiffness of thumb joint     2. Thumb pain, right     3. Thumb swelling         Physician: Meredith Padron PA    Physician Orders:    status post repair of right thumb UCL with anchors  Eval and Treat, modalities as needed  12+ visits  Start in 4 weeks (cast)  CHT for therapy  Early AM appt preferred        Postop plans patient keep the dressing clean dry and intact a 2 weeks time ability placed in a cast for 4 weeks 6 weeks and be in a brace therapy be initiated no contact sports for unprotected for 3 months    Medical Diagnosis:  S63.649A (ICD-10-CM) - Gamekeeper's thumb    Surgical Procedure and Date: 1/6/20, Right UCL of thumb repair  Evaluation Date: 3/2/20  Insurance Authorization Period Expiration: 3/28/20  Plan of Care Certification Period: 3/2/20-5/22/20  Date of Return to MD: 4/2/20    Visit # / Visits authorized: 1 / pending  Time In:7:00 am  Time Out: 8:00 am  Total Billable Time: 60 minutes    Precautions:  Standard    Subjective     Involved Side: Right hand  Dominant Side: Right  Date of Onset: 7/2019  Mechanism of Injury: impact from ball  History of Current Condition: Jennifer is a 29-year-old female who injured her right thumb playing [employer] ball[employer] in July 2019 she had failed casting therapy splinting seen for therapy by Olivia Loco for conservative treatment. MRI had been performed which did show a tear after much discussion with the patient continue after continued pain she elected for surgical intervention. 1/6/20, cast removal 2/27/20  Surgical Procedure: Right UCL   Imaging: Please see [facility] MRI  Previous Therapy: Prior to surgery, OT November 2019.    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 I    Current FunctionalStatus   Home/Living environment : lives alone      Limitation of Functional Status as follows:   ADLs/IADLs:     - Feeding: I    - Bathing: I    - Dressing/Grooming: I    - Driving: I     Leisure: Sports      Past Medical History/Physical Systems Review:   Jennifer Nguyen  has a past medical history of Celiac disease.    Jennifer Nguyen  has a past surgical history that includes Breast surgery; [place] tooth extraction; Intrauterine device insertion (04/2015 ); and Repair of collateral ligament of thumb (Right, 1/6/2020).    Jennifer has a current medication list which includes the following prescription(s): ondansetron, oxycodone-acetaminophen, triamcinolone acetonide 0.1%, and zolpidem.    Review of patient's allergies indicates:   Allergen Reactions    Ciprofloxacin     Tree pollen-mountain cedar Hives    Gluten Nausea Only          Objective       Edema. Measured in centimeters.  Date 3/2/2020      Right   Volume in  Ml   Wrist Crease 14 cm     Edema. Measured in centimeters.   3/2/2020      Right   Thumb:    Prox. Phalanx 8   IP 5.2   Distal Phalanx 4.2       Wrist ROM. Measured in degrees.  Date 3/2/2020      Right   Supination/Pronation WNL   Wrist ext/flex 54/55   Wrist RD/UD 10/20       Hand ROM. Measured in degrees.  Date 3/2/2020      Right       Thumb: MP 20                IP 20       Rad ADD/ABD 46       Pal ADD/ABD 42          Opposition none        Strength (Dyanmometer) and Pinch Strength (Pinch Gauge)  Measured in pounds.  Date 3/2/2020      Right   Rung II TBD   Key Pinch TBD   3pt Pinch TBD   2pt Pinch TBD         Manual Muscle Test  Date 3/2/2020      Right   Wrist Extension  TBD   Wrist  Flexion TBD   Radial Deviation TBD   Ulnar Deviation TBD   Supination TBD   Pronation TBD   EPL TBD   FLP TBD   OP TBD   APL TBD          CMS Impairment/Limitation/Restriction for FOTO hand Survey    Therapist reviewed FOTO scores for Alec Nguyen on 3/2/2020.   FOTO documents entered into [employer] - see Media section.    Limitation Score: 55%  Category: Self Care       Treatment     Treatment Time In: 7:30 am  Treatment Time Out: 8:00 am  Total Treatment time separate from Evaluation time:30  ALEC received therapeutic exercises to develop strength, endurance, ROM, flexibility and posture for 20 minutes including:  Thumb radial and palamar abduction   Thumb MP and IP blocking x 20  Wrist extension stretch + wrist flexion stretch  Radial and ulnar deviation x 20    ALEC received hot pack for 10 minutes to right hand.     Home Exercise Program/Education:  Issued HEP (see patient instructions in EMR) and educated on modality use for pain management . Exercises were reviewed and ALEC was able to demonstrate them prior to the end of the session.   Pt received a written copy of exercises to perform at home. ALEC demonstrated good  understanding of the education provided.  Pt was advised to perform these exercises free of pain, and to stop performing them if pain occurs.    Patient/Family Education: role of OT, goals for OT, scheduling/cancellations - pt verbalized understanding. Discussed insurance limitations with patient.    Additional Education provided: Pt instructed in protective principals and splint wearing schedule     Assessment     Alec Nguyen is a 29 y.o. female referred to outpatient occupational therapy and presents with a medical diagnosis of S/P right UCL repair, resulting in Decreased ROM, Decreased  strength, Decreased pinch strength, Decreased muscle strength, Decreased functional hand use, Increased pain, Edema, Joint Stiffness and Scar Adhesions and demonstrates limitations as described in the  chart below. Following medical record review it is determined that pt will benefit from occupational therapy services in order to maximize pain free and/or functional use of right hand. The following goals were discussed with the patient and patient is in agreement with them as to be addressed in the treatment plan. The patient's rehab potential is Excellent.     Anticipated barriers to occupational therapy: none  Pt has no cultural, educational or language barriers to learning provided.    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 Jennifer Nguyen has difficulty with  feeding, bathing, grooming and dressing  finance management, driving/transportation management, shopping, phone/computer use, housework/household chores and medication management  affecting his/her daily functional abilities. His/her main goal for therapy is to have full use of thumb.     Comorbidities:   none    Medical and Therapy History Review:   Brief               Performance Deficits    Physical:  Joint Mobility  Joint Stability  Muscle Power/Strength  Muscle Endurance  Skin Integrity/Scar Formation  Edema  Control of Voluntary Movement   Strength  Pinch Strength  Gross Motor Coordination  Fine Motor Coordination  Tactile Functions  Muscle Tone  Postural Control  Pain    Cognitive:  No Deficits    Psychosocial:    No Deficits     Clinical Decision Making:  low    Assessment Process:  Problem-Focused Assessments    Modification/Need for Assistance:  Not Necessary    Intervention Selection:  Multiple Treatment Options       low  Based on PMHX, co morbidities , data from assessments and functional level of assistance required with task and clinical presentation directly impacting function.       The following goals were discussed with the patient and patient is in  agreement with them as to be addressed in the treatment plan.     Goals:     Goals to be met in 4 weeks: (3/2/20)  - Patient is independent with initial home exercise program to improve participation with ADL's.  - Pt to increase AROM of thumb by 10 degrees to improve with patients ability to functional utilize arm for sorting coins.  - Pt to increase pinch strength to 4 lbs to open a bag of chips.  - Pt to decrease pain to less than or equal to 2/10 while performing all ADL's.  - Patient will be able to achieve less than or equal to 20% limitation on the FOTO, demonstrating overall improved functional ability with upper extremity.      Goals to be met by discharge:  - Patient is independent with advanced final home exercise program to improve participation with ADL's and IADL's.  - Pt to increase hand AROM to WNL to improve patients ability to throw a ball.  - Pt to increase  strength WNL as compared to RUE by d/c to improve patients ability hold a grocery bag  - Pt to report decrease in pain to less than or equal to 1/10 when performing ADL's  - Patient will be able to achieve less than or equal to 20% limitation on the FOTO, demonstrating overall improved functional ability with upper extremity.         Plan   Certification Period/Plan of care expiration: 3/2/2020 to 3/28/20.    Outpatient Occupational Therapy 2 times weekly for 12 weeks to include the following interventions: Paraffin, Fluidotherapy, Manual therapy/joint mobilizations, Modalities for pain management, US 3 mhz, Therapeutic exercises/activities., Iontophoresis with 2.0 cc Dexamethasone, Strengthening, Orthotic Fabrication/Fit/Training, Edema Control, Scar Management, Wound Care, Electrical Modalities, Joint Protection and Energy Conservation.      Celestino Monahan, OTR/L,CHT

## 2020-03-10 ENCOUNTER — CLINICAL SUPPORT (OUTPATIENT)
Dept: REHABILITATION | Facility: HOSPITAL | Age: 30
End: 2020-03-10
Payer: COMMERCIAL

## 2020-03-10 DIAGNOSIS — M79.644 THUMB PAIN, RIGHT: ICD-10-CM

## 2020-03-10 DIAGNOSIS — M25.649 STIFFNESS OF THUMB JOINT: ICD-10-CM

## 2020-03-10 PROCEDURE — 97140 MANUAL THERAPY 1/> REGIONS: CPT | Mod: PN

## 2020-03-10 PROCEDURE — 97110 THERAPEUTIC EXERCISES: CPT | Mod: PN

## 2020-03-10 PROCEDURE — 97035 APP MDLTY 1+ULTRASOUND EA 15: CPT | Mod: PN

## 2020-03-10 NOTE — PROGRESS NOTES
"  Occupational Therapy Daily Treatment Note     Date: 3/10/2020  Name: Alec Nguyen  Clinic Number: [id number]    Therapy Diagnosis:   Encounter Diagnoses   Name Primary?    Stiffness of thumb joint     Thumb pain, right      Physician: Meredith Padron PA    Physician Orders:     status post repair of right thumb UCL with anchors  Eval and Treat, modalities as needed  12+ visits  Start in 4 weeks (cast)  CHT for therapy  Early AM appt preferred         Postop plans patient keep the dressing clean dry and intact a 2 weeks time ability placed in a cast for 4 weeks 6 weeks and be in a brace therapy be initiated no contact sports for unprotected for 3 months     Medical Diagnosis:  S63.649A (ICD-10-CM) - Gamekeeper's thumb     Surgical Procedure and Date: 1/6/20, Right UCL of thumb repair  Evaluation Date: 3/2/20  Insurance Authorization Period Expiration: 3/28/20  Plan of Care Certification Period: 3/2/20-5/22/20  Date of Return to MD: 4/2/20     Visit # / Visits authorized: 1 / pending  Time In: 11:30 am  Time Out: 12:15 am  Total Billable Time: 46 minutes     Precautions:  Standard        Subjective     Pt reports: "I have more mobility in the hand, I can get to my pinky now"  she was compliant with home exercise program given last session.   Response to previous treatment:Positvie  Functional change: Patient able sort coins and clip hair better    Pain: 5/10  Location: right hands      Objective     ALEC received therapeutic exercises to develop strength, endurance, ROM, flexibility and posture for 28 minutes including:  AROM/ AAROM of thumb: MP, IP, DIP, Radial, Palmar abduction x 30 each  Pom pom  using yellow pinch pin x 2 min  Paper crumple x 5  Rubix cube x 2 min       ALEC received the following manual therapy techniques: applied to the: right hand for 10 minutes, including:  Scar mobilization x 4 min  -Mobilizations grade I-II for thumb MP, IP, CMC  flex/ext, PROM with distraction to increase joint " mobility/flexibility to MP, IP, CMC of the right thumb All mobilizations and PROM performed to patients james.       ALEC received the following direct contact modalities after being cleared for contraindications: Patient received ultrasound to 8 area to increase blood flow, circulation, tissue elasticity, pain management and for wound/scar management for 8 minutes @ 3.3 Mhz, Intensity .08 w/cm2 at 100% duty cycle.      ALEC received the following supervised modalities after being cleared for contradictions: Patient received paraffin bath to right hand(s) for 5 minutes to increase blood flow, circulation, pain management and for tissue elasticity prior to therex.       ALEC received cold pack to right hand for 5 minutes.       Home Exercises and Education Provided     Education provided:   - Pt re-instructed blocking of thumb MP joint to improve AROM  - Progress towards goals     Written Home Exercises Provided: Patient instructed to cont prior HEP.  Exercises were reviewed and ALEC was able to demonstrate them prior to the end of the session.  ALEC demonstrated good  understanding of the HEP provided.   .   See EMR under Patient Instructions for exercises provided prior visit.        Assessment     (Surgery 1/6/20) Patient is 9 weeks post. She reports gradual transition from orthosis and has noticed slight increased pain and improved mobility. Scar was sensitive put appears to be supple and well healed. Pt encouraged to apply feedback.     ALEC is progressing well towards her goals and there are no updates to goals at this time. Pt prognosis is Excellent.     Pt will continue to benefit from skilled outpatient occupational therapy to address the deficits listed in the problem list on initial evaluation provide pt/family education and to maximize pt's level of independence in the home and community environment.     Anticipated barriers to occupational therapy: None    Pt's spiritual, cultural and educational  "needs considered and pt agreeable to plan of care and goals.    Goals:  Goals to be met in 4 weeks: (3/2/20)  - Patient is independent with initial home exercise program to improve participation with ADL's.  - Pt to increase AROM of thumb by 10 degrees to improve with patients ability to functional utilize arm for sorting coins. NOT MET  3/10/20  - Pt to increase pinch strength to 4 lbs to open a bag of chips. NOT MET  3/10/20  - Pt to decrease pain to less than or equal to 2/10 while performing all ADL's. NOT MET  3/10/20  - Patient will be able to achieve less than or equal to 20% limitation on the FOTO, demonstrating overall improved functional ability with upper extremity. NOT MET  3/10/20     Goals to be met by discharge:  - Patient is independent with advanced final home exercise program to improve participation with ADL's and IADL's. NOT MET  3/10/20  - Pt to increase hand AROM to WNL to improve patients ability to throw a ball. NOT MET  3/10/20  - Pt to increase  strength WNL as compared to RUE by d/c to improve patients ability hold a grocery bag NOT MET  3/10/20  - Pt to report decrease in pain to less than or equal to 1/10 when performing ADL's NOT MET  3/10/20  - Patient will be able to achieve less than or equal to 20% limitation on the FOTO, demonstrating overall improved functional ability with upper extremity. NOT MET  3/10/20    Plan   Continue with occupational therapy POC to progress patient to improved participation with ADL's and IADL's.   Updates/Grading for next session: Cont. To progress ROM, strength pain and restore patient to PLOF with ADL"s and IADL's.       Celestino Monahan, OTR/L,CHT   "

## 2020-03-20 ENCOUNTER — CLINICAL SUPPORT (OUTPATIENT)
Dept: REHABILITATION | Facility: HOSPITAL | Age: 30
End: 2020-03-20
Payer: COMMERCIAL

## 2020-03-20 DIAGNOSIS — M79.644 THUMB PAIN, RIGHT: ICD-10-CM

## 2020-03-20 DIAGNOSIS — M25.649 STIFFNESS OF THUMB JOINT: ICD-10-CM

## 2020-03-20 PROCEDURE — 97110 THERAPEUTIC EXERCISES: CPT | Mod: PN

## 2020-03-20 PROCEDURE — 97140 MANUAL THERAPY 1/> REGIONS: CPT | Mod: PN

## 2020-03-20 PROCEDURE — 97035 APP MDLTY 1+ULTRASOUND EA 15: CPT | Mod: PN

## 2020-03-20 NOTE — PROGRESS NOTES
"  Occupational Therapy Daily Treatment Note     Date: 3/20/2020  Name: Alec Nguyen  Clinic Number: [id number]    Therapy Diagnosis:   Encounter Diagnoses   Name Primary?    Stiffness of thumb joint     Thumb pain, right      Physician: Meredith Padron PA    Physician Orders:     status post repair of right thumb UCL with anchors  Eval and Treat, modalities as needed  12+ visits  Start in 4 weeks (cast)  CHT for therapy  Early AM appt preferred         Postop plans patient keep the dressing clean dry and intact a 2 weeks time ability placed in a cast for 4 weeks 6 weeks and be in a brace therapy be initiated no contact sports for unprotected for 3 months     Medical Diagnosis:  S63.649A (ICD-10-CM) - Gamekeeper's thumb     Surgical Procedure and Date: 1/6/20, Right UCL of thumb repair  Evaluation Date: 3/2/20  Insurance Authorization Period Expiration: 3/28/20  Plan of Care Certification Period: 3/2/20-5/22/20  Date of Return to MD: 4/2/20     Visit # / Visits authorized: 3 /  20  Time In: 9:30 am  Time Out: 10:15 am  Total Billable Time: 45 minutes     Precautions:  Standard        Subjective     Pt reports: "I have been less busy and have been working on my hand more"  she was compliant with home exercise program given last session.   Response to previous treatment:Positvie  Functional change: Patient able sort coins and clip hair better    Pain: 5/10  Location: right hands      Objective     ALEC received therapeutic exercises to develop strength, endurance, ROM, flexibility and posture for 28 minutes including:  AROM/ AAROM of thumb: MP, IP, DIP, Radial, Palmar abduction x 30 each  Pom pom  using yellow pinch pin x 2 min  Paper crumple x 5  Rubix cube x 2 min  pom pom   X 50 red   Gripper 30 squeezes minimum     Theraputty: yellow: Gentle gross grasp x 2 min, Removing small objects imbedded in putty x 2 min, rolling into snake shape and opposing 2pt and 3 point pinch with cueing for tip to tip " pinch x 2 min, Rolling putty into loop and expanding x 2 min, rolling putty into snake and scissoring between fingers x 2 min, rolling putty into log and chopping using scrapping tool x 2 min, flattening putty into pancake and dimpling using dowel x 2 min          ALEC received the following manual therapy techniques: applied to the: right hand for 10 minutes, including:  Scar mobilization x 4 min  -Mobilizations grade I-II for thumb MP, IP, CMC  flex/ext, PROM with distraction to increase joint mobility/flexibility to MP, IP, CMC of the right thumb All mobilizations and PROM performed to patients james.       ALEC received the following direct contact modalities after being cleared for contraindications: Patient received ultrasound to 8 area to increase blood flow, circulation, tissue elasticity, pain management and for wound/scar management for 8 minutes @ 3.3 Mhz, Intensity .08 w/cm2 at 100% duty cycle.      ALEC received the following supervised modalities after being cleared for contradictions: Patient received paraffin bath to right hand(s) for 5 minutes to increase blood flow, circulation, pain management and for tissue elasticity prior to therex.       ALEC received cold pack to right hand for 5 minutes.       Home Exercises and Education Provided     Education provided:   - Pt re-instructed blocking of thumb MP joint to improve AROM  - Progress towards goals     Written Home Exercises Provided: Patient instructed to cont prior HEP.  Exercises were reviewed and ALEC was able to demonstrate them prior to the end of the session.  ALEC demonstrated good  understanding of the HEP provided.   .   See EMR under Patient Instructions for exercises provided prior visit.        Assessment     (Surgery 1/6/20) Patient is 10 weeks 4 days post. Reports only use of brace for highly resistive therex. She was introduced to increased resistive opposition. Pt shows progress with strength and functional use. She remains  stiff in the thumb MP and IP joints and strength is a major limitation.  Patient provided with soft yellow putty for strengthening.     Patient able to oppose MP flexion 50   IP 45 flexion      ALEC is progressing well towards her goals and there are no updates to goals at this time. Pt prognosis is Excellent.     Pt will continue to benefit from skilled outpatient occupational therapy to address the deficits listed in the problem list on initial evaluation provide pt/family education and to maximize pt's level of independence in the home and community environment.     Anticipated barriers to occupational therapy: None    Pt's spiritual, cultural and educational needs considered and pt agreeable to plan of care and goals.    Goals:  Goals to be met in 4 weeks: (3/2/20)  - Patient is independent with initial home exercise program to improve participation with ADL's.  - Pt to increase AROM of thumb by 10 degrees to improve with patients ability to functional utilize arm for sorting coins. NOT MET  3/10/20  - Pt to increase pinch strength to 4 lbs to open a bag of chips. NOT MET  3/10/20  - Pt to decrease pain to less than or equal to 2/10 while performing all ADL's. NOT MET  3/10/20  - Patient will be able to achieve less than or equal to 20% limitation on the FOTO, demonstrating overall improved functional ability with upper extremity. NOT MET  3/10/20     Goals to be met by discharge:  - Patient is independent with advanced final home exercise program to improve participation with ADL's and IADL's. NOT MET  3/10/20  - Pt to increase hand AROM to WNL to improve patients ability to throw a ball. NOT MET  3/10/20  - Pt to increase  strength WNL as compared to RUE by d/c to improve patients ability hold a grocery bag NOT MET  3/10/20  - Pt to report decrease in pain to less than or equal to 1/10 when performing ADL's NOT MET  3/10/20  - Patient will be able to achieve less than or equal to 20% limitation on the  "FOTO, demonstrating overall improved functional ability with upper extremity. NOT MET  3/10/20    Plan   Continue with occupational therapy POC to progress patient to improved participation with ADL's and IADL's.   Updates/Grading for next session: Cont. To progress ROM, strength pain and restore patient to PLOF with ADL"s and IADL's.       Celestino Monahan, OTR/L,CHT   "

## 2020-03-26 ENCOUNTER — PATIENT MESSAGE (OUTPATIENT)
Dept: REHABILITATION | Facility: HOSPITAL | Age: 30
End: 2020-03-26

## 2020-03-30 ENCOUNTER — TELEPHONE (OUTPATIENT)
Dept: ORTHOPEDICS | Facility: CLINIC | Age: 30
End: 2020-03-30

## 2020-04-02 ENCOUNTER — TELEPHONE (OUTPATIENT)
Dept: ORTHOPEDICS | Facility: CLINIC | Age: 30
End: 2020-04-02

## 2020-04-02 ENCOUNTER — PATIENT MESSAGE (OUTPATIENT)
Dept: ORTHOPEDICS | Facility: CLINIC | Age: 30
End: 2020-04-02

## 2020-04-03 ENCOUNTER — OFFICE VISIT (OUTPATIENT)
Dept: ORTHOPEDICS | Facility: CLINIC | Age: 30
End: 2020-04-03
Payer: COMMERCIAL

## 2020-04-03 DIAGNOSIS — S63.649A GAMEKEEPER'S THUMB: Primary | ICD-10-CM

## 2020-04-03 PROCEDURE — 99024 PR POST-OP FOLLOW-UP VISIT: ICD-10-PCS | Mod: 95,,, | Performed by: ORTHOPAEDIC SURGERY

## 2020-04-03 PROCEDURE — 99024 POSTOP FOLLOW-UP VISIT: CPT | Mod: 95,,, | Performed by: ORTHOPAEDIC SURGERY

## 2020-04-03 NOTE — PROGRESS NOTES
The patient location is: [place]  The chief complaint leading to consultation is: Postop f/u  Visit type: Virtual visit with synchronous audio and video  Total time spent with patient: 15 minutes  Each patient to whom he or she provides medical services by telemedicine is:  (1) informed of the relationship between the physician and patient and the respective role of any other health care provider with respect to management of the patient; and (2) notified that he or she may decline to receive medical services by telemedicine and may withdraw from such care at any time.    Notes:  Patient is now approximately 3 months status post right thumb UCL repair with internal brace augmentation by Dr. Ariel tillman.  She notes that she was doing well and progressing in therapy until COVID issues hit [place] hard.  Since that time, she has temporarily suspended her formal occupational therapy.  She does note that she is back to using the right thumb for activities of daily living and is not having any virgilio difficulties there.  Pain is minimal and she denies numbness or tingling.  She reports that her motion is good and continues to improve.  Really no complaints today.    PE:    AA&O x 4.  NAD  HEENT:  NCAT, sclera nonicteric  Lungs:  Respirations are equal and unlabored.  CV:  2+ bilateral upper and lower extremity pulses.  MSK: The wound appears to be healing well with no signs of erythema or warmth.  There is no visualized drainage.  No clinical signs or symptoms of infection are present.  The patient has what appears to be full range of motion throughout the right hand and thumb.  IP flexion and extension is intact and full.  MCP flexion extension is intact and full.  The patient can oppose the small finger and the base of the small finger without difficulty.    A/P: Status post above, doing well  1) Continue with full weight bearing.  The patient notes that she will resume some more occupational therapy once the COVID  crisis settle down.  Should she need another referral she will contact clinic.  IA did let her know that should she resume contact sports such as dodge ball and kickball she should return with a trial phase utilizing her thumb spica brace and then wean that out as tolerated.  2) follow-up in 3 months with Dr. George eyes  3) Call with any questions/concerns in the interim     Please be aware that this note has been generated with the assistance of [employer] voice-to-text.  Please excuse any spelling or grammatical errors.   This visit has been performed via a telemedicine virtual visit.  This type of encounter comes with inherent limitations.

## 2020-04-08 ENCOUNTER — PATIENT MESSAGE (OUTPATIENT)
Dept: ORTHOPEDICS | Facility: CLINIC | Age: 30
End: 2020-04-08

## 2020-04-28 ENCOUNTER — DOCUMENTATION ONLY (OUTPATIENT)
Dept: REHABILITATION | Facility: HOSPITAL | Age: 30
End: 2020-04-28

## 2020-12-02 ENCOUNTER — CLINICAL SUPPORT (OUTPATIENT)
Dept: URGENT CARE | Facility: CLINIC | Age: 30
End: 2020-12-02
Payer: COMMERCIAL

## 2020-12-02 DIAGNOSIS — Z11.9 SCREENING EXAMINATION FOR UNSPECIFIED INFECTIOUS DISEASE: Primary | ICD-10-CM

## 2020-12-02 LAB
CTP QC/QA: YES
SARS-COV-2 RDRP RESP QL NAA+PROBE: NEGATIVE

## 2020-12-02 PROCEDURE — 99211 PR OFFICE/OUTPT VISIT, EST, LEVL I: ICD-10-PCS | Mod: S$GLB,,, | Performed by: FAMILY MEDICINE

## 2020-12-02 PROCEDURE — U0002: ICD-10-PCS | Mod: QW,S$GLB,, | Performed by: FAMILY MEDICINE

## 2020-12-02 PROCEDURE — 99211 OFF/OP EST MAY X REQ PHY/QHP: CPT | Mod: S$GLB,,, | Performed by: FAMILY MEDICINE

## 2020-12-02 PROCEDURE — U0002 COVID-19 LAB TEST NON-CDC: HCPCS | Mod: QW,S$GLB,, | Performed by: FAMILY MEDICINE

## 2020-12-02 NOTE — PROGRESS NOTES
Subjective:       Patient ID: Jennifer Nguyen is a 30 y.o. female.    Vitals:  vitals were not taken for this visit.     Chief Complaint: asymptomatic    CDC TESTING AND QUARANTINE GUIDELINES FOR EXPOSURE   A CLOSE EXPOSURE IS DEFINED AS ANYONE WHO HAD A MASKED OR AN UNMASKED EXPOSURE TO A KNOWN  19 POSITIVE PERSON, AT LESS THAN 6 FT FOR MORE THAN 15 MINUTES. IF YOUR EXPOSURE MEETS THIS DEFINITION, THEN YOU ARE REQUIRED TO QUARANTINE FOR 14 DAYS PER THE CDC. THEY RECOMMEND THAT A TEST CAN BE PERFORMED IF YOU ASYMPTOMATIC.(SOMEONE WHO DOES NOT HAVE ANY SYMPTOMS), AND A TEST SHOULD BE DONE IF YOU DEVELOP SYMPTOMS AFTER AN EXPOSURE AS DESCRIBED ABOVE.    IF YOU MEET THE DEFINITION OF A CLOSE EXPOSURE, IT DOES NOT MATTER WHETHER OR NOT YOU ARE ASYMPTOMATIC OR SYMPTOMATIC- A NEGATIVE TEST DOES NOT GET YOU OUT OF 14 DAYS OF QUARANTINE!    PLEASE NOTE THAT IF YOU ARE ASYMPTOMATIC AND WAIT MORE THAN 4 DAYS TO TEST AFTER AN EXPOSURE, YOU RISK LENGTHENING YOUR QUARANTINE.THIS IS BECAUSE IF YOU TEST POSITIVE AS AN ASYMPTOMATIC, YOUR ISOLATION IS 10 DAYS FROM THE DATE OF THE POSITIVE TEST, NOT THE DATE OF EXPOSURE. SO FOR EXAMPLE, IF YOU TEST POSITIVE AS AN ASYMPTOMATIC ON DAY 7 FROM EXPOSURE, YOU HAVE NOW EXTENDED YOUR 14 DAY QUARANTINE TO A  17 DAY ISOLAION    IF YOUR EXPOSURE DOES NOT MEET THE ABOVE DEFINITION, YOU MAY RETURN TO YOUR NORMAL ACTIVITIES INCLUDING SOCIAL DISTANCING, WEARING MASKS, AND FREQUENT HANDWASHING      ROS    Objective:      Physical Exam      Assessment:       No diagnosis found.    Plan:         There are no diagnoses linked to this encounter.

## 2020-12-24 ENCOUNTER — CLINICAL SUPPORT (OUTPATIENT)
Dept: URGENT CARE | Facility: CLINIC | Age: 30
End: 2020-12-24
Payer: COMMERCIAL

## 2020-12-24 DIAGNOSIS — Z11.9 SCREENING EXAMINATION FOR UNSPECIFIED INFECTIOUS DISEASE: Primary | ICD-10-CM

## 2020-12-24 LAB
CTP QC/QA: YES
SARS-COV-2 RDRP RESP QL NAA+PROBE: NEGATIVE

## 2020-12-24 PROCEDURE — U0002 COVID-19 LAB TEST NON-CDC: HCPCS | Mod: QW,S$GLB,, | Performed by: FAMILY MEDICINE

## 2020-12-24 PROCEDURE — 99211 PR OFFICE/OUTPT VISIT, EST, LEVL I: ICD-10-PCS | Mod: S$GLB,,, | Performed by: FAMILY MEDICINE

## 2020-12-24 PROCEDURE — 99211 OFF/OP EST MAY X REQ PHY/QHP: CPT | Mod: S$GLB,,, | Performed by: FAMILY MEDICINE

## 2020-12-24 PROCEDURE — U0002: ICD-10-PCS | Mod: QW,S$GLB,, | Performed by: FAMILY MEDICINE

## 2021-03-29 ENCOUNTER — CLINICAL SUPPORT (OUTPATIENT)
Dept: URGENT CARE | Facility: CLINIC | Age: 31
End: 2021-03-29
Payer: COMMERCIAL

## 2021-03-29 DIAGNOSIS — Z11.52 ENCOUNTER FOR SCREENING FOR COVID-19: Primary | ICD-10-CM

## 2021-03-29 LAB
CTP QC/QA: YES
SARS-COV-2 RDRP RESP QL NAA+PROBE: NEGATIVE

## 2021-03-29 PROCEDURE — 99211 OFF/OP EST MAY X REQ PHY/QHP: CPT | Mod: S$GLB,,, | Performed by: FAMILY MEDICINE

## 2021-03-29 PROCEDURE — U0002 COVID-19 LAB TEST NON-CDC: HCPCS | Mod: QW,S$GLB,, | Performed by: FAMILY MEDICINE

## 2021-03-29 PROCEDURE — 99211 PR OFFICE/OUTPT VISIT, EST, LEVL I: ICD-10-PCS | Mod: S$GLB,,, | Performed by: FAMILY MEDICINE

## 2021-03-29 PROCEDURE — U0002: ICD-10-PCS | Mod: QW,S$GLB,, | Performed by: FAMILY MEDICINE

## 2021-04-26 ENCOUNTER — PATIENT MESSAGE (OUTPATIENT)
Dept: RESEARCH | Facility: HOSPITAL | Age: 31
End: 2021-04-26

## 2021-08-03 ENCOUNTER — OFFICE VISIT (OUTPATIENT)
Dept: URGENT CARE | Facility: CLINIC | Age: 31
End: 2021-08-03
Payer: COMMERCIAL

## 2021-08-03 VITALS
SYSTOLIC BLOOD PRESSURE: 111 MMHG | WEIGHT: 147 LBS | DIASTOLIC BLOOD PRESSURE: 74 MMHG | TEMPERATURE: 99 F | BODY MASS INDEX: 25.1 KG/M2 | HEART RATE: 83 BPM | OXYGEN SATURATION: 97 % | HEIGHT: 64 IN

## 2021-08-03 DIAGNOSIS — M54.9 BACK PAIN, UNSPECIFIED BACK LOCATION, UNSPECIFIED BACK PAIN LATERALITY, UNSPECIFIED CHRONICITY: Primary | ICD-10-CM

## 2021-08-03 DIAGNOSIS — N39.0 URINARY TRACT INFECTION WITH HEMATURIA, SITE UNSPECIFIED: ICD-10-CM

## 2021-08-03 DIAGNOSIS — R31.9 URINARY TRACT INFECTION WITH HEMATURIA, SITE UNSPECIFIED: ICD-10-CM

## 2021-08-03 LAB
B-HCG UR QL: NEGATIVE
BILIRUB UR QL STRIP: NEGATIVE
CTP QC/QA: YES
GLUCOSE UR QL STRIP: NEGATIVE
KETONES UR QL STRIP: NEGATIVE
LEUKOCYTE ESTERASE UR QL STRIP: NEGATIVE
PH, POC UA: 5 (ref 5–8)
POC BLOOD, URINE: POSITIVE
POC NITRATES, URINE: POSITIVE
PROT UR QL STRIP: NEGATIVE
SP GR UR STRIP: 1.02 (ref 1–1.03)
UROBILINOGEN UR STRIP-ACNC: NORMAL (ref 0.1–1.1)

## 2021-08-03 PROCEDURE — 87086 URINE CULTURE/COLONY COUNT: CPT | Performed by: NURSE PRACTITIONER

## 2021-08-03 PROCEDURE — 99204 OFFICE O/P NEW MOD 45 MIN: CPT | Mod: 25,S$GLB,, | Performed by: NURSE PRACTITIONER

## 2021-08-03 PROCEDURE — 99204 PR OFFICE/OUTPT VISIT, NEW, LEVL IV, 45-59 MIN: ICD-10-PCS | Mod: 25,S$GLB,, | Performed by: NURSE PRACTITIONER

## 2021-08-03 PROCEDURE — 96372 PR INJECTION,THERAP/PROPH/DIAG2ST, IM OR SUBCUT: ICD-10-PCS | Mod: S$GLB,,, | Performed by: NURSE PRACTITIONER

## 2021-08-03 PROCEDURE — 87186 SC STD MICRODIL/AGAR DIL: CPT | Performed by: NURSE PRACTITIONER

## 2021-08-03 PROCEDURE — 81003 URINALYSIS AUTO W/O SCOPE: CPT | Mod: QW,S$GLB,, | Performed by: NURSE PRACTITIONER

## 2021-08-03 PROCEDURE — 81025 POCT URINE PREGNANCY: ICD-10-PCS | Mod: S$GLB,,, | Performed by: NURSE PRACTITIONER

## 2021-08-03 PROCEDURE — 81003 POCT URINALYSIS, DIPSTICK, AUTOMATED, W/O SCOPE: ICD-10-PCS | Mod: QW,S$GLB,, | Performed by: NURSE PRACTITIONER

## 2021-08-03 PROCEDURE — 81025 URINE PREGNANCY TEST: CPT | Mod: S$GLB,,, | Performed by: NURSE PRACTITIONER

## 2021-08-03 PROCEDURE — 96372 THER/PROPH/DIAG INJ SC/IM: CPT | Mod: S$GLB,,, | Performed by: NURSE PRACTITIONER

## 2021-08-03 PROCEDURE — 87088 URINE BACTERIA CULTURE: CPT | Performed by: NURSE PRACTITIONER

## 2021-08-03 PROCEDURE — 87077 CULTURE AEROBIC IDENTIFY: CPT | Performed by: NURSE PRACTITIONER

## 2021-08-03 RX ORDER — NAPROXEN 500 MG/1
500 TABLET ORAL 2 TIMES DAILY WITH MEALS
Qty: 10 TABLET | Refills: 0 | Status: SHIPPED | OUTPATIENT
Start: 2021-08-04 | End: 2021-08-09

## 2021-08-03 RX ORDER — NITROFURANTOIN 25; 75 MG/1; MG/1
100 CAPSULE ORAL 2 TIMES DAILY
Qty: 10 CAPSULE | Refills: 0 | Status: SHIPPED | OUTPATIENT
Start: 2021-08-03 | End: 2021-08-08

## 2021-08-03 RX ORDER — KETOROLAC TROMETHAMINE 30 MG/ML
30 INJECTION, SOLUTION INTRAMUSCULAR; INTRAVENOUS
Status: COMPLETED | OUTPATIENT
Start: 2021-08-03 | End: 2021-08-03

## 2021-08-03 RX ADMIN — KETOROLAC TROMETHAMINE 30 MG: 30 INJECTION, SOLUTION INTRAMUSCULAR; INTRAVENOUS at 01:08

## 2021-08-06 LAB — BACTERIA UR CULT: ABNORMAL

## 2021-08-07 ENCOUNTER — TELEPHONE (OUTPATIENT)
Dept: URGENT CARE | Facility: CLINIC | Age: 31
End: 2021-08-07

## 2021-08-08 ENCOUNTER — TELEPHONE (OUTPATIENT)
Dept: URGENT CARE | Facility: CLINIC | Age: 31
End: 2021-08-08

## 2021-08-24 ENCOUNTER — OFFICE VISIT (OUTPATIENT)
Dept: DERMATOLOGY | Facility: CLINIC | Age: 31
End: 2021-08-24
Payer: COMMERCIAL

## 2021-08-24 DIAGNOSIS — L20.84 INTRINSIC ECZEMA: Primary | ICD-10-CM

## 2021-08-24 PROCEDURE — 99203 OFFICE O/P NEW LOW 30 MIN: CPT | Mod: 95,,, | Performed by: STUDENT IN AN ORGANIZED HEALTH CARE EDUCATION/TRAINING PROGRAM

## 2021-08-24 PROCEDURE — 99203 PR OFFICE/OUTPT VISIT, NEW, LEVL III, 30-44 MIN: ICD-10-PCS | Mod: 95,,, | Performed by: STUDENT IN AN ORGANIZED HEALTH CARE EDUCATION/TRAINING PROGRAM

## 2021-08-24 RX ORDER — MOMETASONE FUROATE 1 MG/G
OINTMENT TOPICAL DAILY
Qty: 45 G | Refills: 2 | Status: SHIPPED | OUTPATIENT
Start: 2021-08-24 | End: 2022-09-27

## 2021-09-15 ENCOUNTER — HOSPITAL ENCOUNTER (OUTPATIENT)
Dept: RADIOLOGY | Facility: OTHER | Age: 31
Discharge: HOME | End: 2021-09-15
Attending: NURSE PRACTITIONER
Payer: COMMERCIAL

## 2021-09-15 ENCOUNTER — OFFICE VISIT (OUTPATIENT)
Dept: OBSTETRICS AND GYNECOLOGY | Facility: CLINIC | Age: 31
End: 2021-09-15
Payer: COMMERCIAL

## 2021-09-15 VITALS
WEIGHT: 147.25 LBS | BODY MASS INDEX: 25.14 KG/M2 | HEIGHT: 64 IN | DIASTOLIC BLOOD PRESSURE: 60 MMHG | SYSTOLIC BLOOD PRESSURE: 118 MMHG

## 2021-09-15 DIAGNOSIS — T83.32XA INTRAUTERINE CONTRACEPTIVE DEVICE THREADS LOST, INITIAL ENCOUNTER: ICD-10-CM

## 2021-09-15 DIAGNOSIS — N90.89 VULVAR IRRITATION: ICD-10-CM

## 2021-09-15 DIAGNOSIS — Z11.3 SCREEN FOR STD (SEXUALLY TRANSMITTED DISEASE): ICD-10-CM

## 2021-09-15 DIAGNOSIS — Z01.411 ENCOUNTER FOR WELL WOMAN EXAM WITH ABNORMAL FINDINGS: Primary | ICD-10-CM

## 2021-09-15 DIAGNOSIS — Z12.4 PAP SMEAR FOR CERVICAL CANCER SCREENING: ICD-10-CM

## 2021-09-15 DIAGNOSIS — Z30.014 ENCOUNTER FOR INITIAL PRESCRIPTION OF INTRAUTERINE CONTRACEPTIVE DEVICE (IUD): ICD-10-CM

## 2021-09-15 PROBLEM — Z97.5 IUD (INTRAUTERINE DEVICE) IN PLACE: Status: ACTIVE | Noted: 2021-09-15

## 2021-09-15 PROCEDURE — 87591 N.GONORRHOEAE DNA AMP PROB: CPT | Mod: 59 | Performed by: NURSE PRACTITIONER

## 2021-09-15 PROCEDURE — 76857 US EXAM PELVIC LIMITED: CPT | Mod: TC

## 2021-09-15 PROCEDURE — 99385 PR PREVENTIVE VISIT,NEW,18-39: ICD-10-PCS | Mod: S$GLB,,, | Performed by: NURSE PRACTITIONER

## 2021-09-15 PROCEDURE — 99999 PR PBB SHADOW E&M-EST. PATIENT-LVL III: CPT | Mod: PBBFAC,,, | Performed by: NURSE PRACTITIONER

## 2021-09-15 PROCEDURE — 99385 PREV VISIT NEW AGE 18-39: CPT | Mod: S$GLB,,, | Performed by: NURSE PRACTITIONER

## 2021-09-15 PROCEDURE — 76857 US EXAM PELVIC LIMITED: CPT | Mod: 26,,, | Performed by: RADIOLOGY

## 2021-09-15 PROCEDURE — 76857 US PELVIS LIMITED_IUD PLACEMENT OR CHECK: ICD-10-PCS | Mod: 26,,, | Performed by: RADIOLOGY

## 2021-09-15 PROCEDURE — 87481 CANDIDA DNA AMP PROBE: CPT | Mod: 59 | Performed by: NURSE PRACTITIONER

## 2021-09-15 PROCEDURE — 87624 HPV HI-RISK TYP POOLED RSLT: CPT | Performed by: NURSE PRACTITIONER

## 2021-09-15 PROCEDURE — 87491 CHLMYD TRACH DNA AMP PROBE: CPT | Mod: 59 | Performed by: NURSE PRACTITIONER

## 2021-09-15 PROCEDURE — 99999 PR PBB SHADOW E&M-EST. PATIENT-LVL III: ICD-10-PCS | Mod: PBBFAC,,, | Performed by: NURSE PRACTITIONER

## 2021-09-15 RX ORDER — CLOTRIMAZOLE AND BETAMETHASONE DIPROPIONATE 10; .64 MG/G; MG/G
CREAM TOPICAL
Qty: 15 G | Refills: 1 | Status: SHIPPED | OUTPATIENT
Start: 2021-09-15 | End: 2022-09-15

## 2021-09-15 RX ORDER — MISOPROSTOL 200 UG/1
200 TABLET ORAL DAILY
Qty: 2 TABLET | Refills: 0 | Status: SHIPPED | OUTPATIENT
Start: 2021-09-15 | End: 2021-12-01

## 2021-09-16 LAB
C TRACH DNA SPEC QL NAA+PROBE: NOT DETECTED
N GONORRHOEA DNA SPEC QL NAA+PROBE: NOT DETECTED

## 2021-09-17 DIAGNOSIS — B96.89 BV (BACTERIAL VAGINOSIS): Primary | ICD-10-CM

## 2021-09-17 DIAGNOSIS — N76.0 BV (BACTERIAL VAGINOSIS): Primary | ICD-10-CM

## 2021-09-17 LAB
BACTERIAL VAGINOSIS DNA: POSITIVE
CANDIDA GLABRATA DNA: NEGATIVE
CANDIDA KRUSEI DNA: NEGATIVE
CANDIDA RRNA VAG QL PROBE: NEGATIVE
T VAGINALIS RRNA GENITAL QL PROBE: NEGATIVE

## 2021-09-17 RX ORDER — METRONIDAZOLE 500 MG/1
500 TABLET ORAL EVERY 12 HOURS
Qty: 14 TABLET | Refills: 0 | Status: SHIPPED | OUTPATIENT
Start: 2021-09-17 | End: 2021-09-24

## 2021-09-17 RX ORDER — FLUCONAZOLE 200 MG/1
200 TABLET ORAL ONCE
Qty: 1 TABLET | Refills: 0 | Status: SHIPPED | OUTPATIENT
Start: 2021-09-17 | End: 2021-09-17

## 2021-09-28 ENCOUNTER — OFFICE VISIT (OUTPATIENT)
Dept: ALLERGY | Facility: CLINIC | Age: 31
End: 2021-09-28
Payer: COMMERCIAL

## 2021-09-28 ENCOUNTER — LAB VISIT (OUTPATIENT)
Dept: LAB | Facility: HOSPITAL | Age: 31
End: 2021-09-28
Attending: ALLERGY & IMMUNOLOGY
Payer: COMMERCIAL

## 2021-09-28 VITALS — BODY MASS INDEX: 25.45 KG/M2 | WEIGHT: 149.06 LBS | HEIGHT: 64 IN

## 2021-09-28 DIAGNOSIS — Z91.038 ALLERGY TO INSECT STINGS: ICD-10-CM

## 2021-09-28 DIAGNOSIS — J31.0 CHRONIC RHINITIS: Primary | ICD-10-CM

## 2021-09-28 DIAGNOSIS — J31.0 CHRONIC RHINITIS: ICD-10-CM

## 2021-09-28 DIAGNOSIS — H10.403 CHRONIC CONJUNCTIVITIS OF BOTH EYES, UNSPECIFIED CHRONIC CONJUNCTIVITIS TYPE: ICD-10-CM

## 2021-09-28 DIAGNOSIS — K90.0 CELIAC DISEASE: ICD-10-CM

## 2021-09-28 LAB
IGA SERPL-MCNC: 132 MG/DL (ref 40–350)
IGE SERPL-ACNC: 1114 IU/ML (ref 0–100)

## 2021-09-28 PROCEDURE — 99999 PR PBB SHADOW E&M-EST. PATIENT-LVL III: CPT | Mod: PBBFAC,,, | Performed by: ALLERGY & IMMUNOLOGY

## 2021-09-28 PROCEDURE — 86003 ALLG SPEC IGE CRUDE XTRC EA: CPT | Mod: 59 | Performed by: ALLERGY & IMMUNOLOGY

## 2021-09-28 PROCEDURE — 86003 ALLG SPEC IGE CRUDE XTRC EA: CPT | Performed by: ALLERGY & IMMUNOLOGY

## 2021-09-28 PROCEDURE — 82784 ASSAY IGA/IGD/IGG/IGM EACH: CPT | Performed by: ALLERGY & IMMUNOLOGY

## 2021-09-28 PROCEDURE — 83516 IMMUNOASSAY NONANTIBODY: CPT | Performed by: ALLERGY & IMMUNOLOGY

## 2021-09-28 PROCEDURE — 99204 OFFICE O/P NEW MOD 45 MIN: CPT | Mod: S$GLB,,, | Performed by: ALLERGY & IMMUNOLOGY

## 2021-09-28 PROCEDURE — 83520 IMMUNOASSAY QUANT NOS NONAB: CPT | Performed by: ALLERGY & IMMUNOLOGY

## 2021-09-28 PROCEDURE — 99999 PR PBB SHADOW E&M-EST. PATIENT-LVL III: ICD-10-PCS | Mod: PBBFAC,,, | Performed by: ALLERGY & IMMUNOLOGY

## 2021-09-28 PROCEDURE — 36415 COLL VENOUS BLD VENIPUNCTURE: CPT | Performed by: ALLERGY & IMMUNOLOGY

## 2021-09-28 PROCEDURE — 99204 PR OFFICE/OUTPT VISIT, NEW, LEVL IV, 45-59 MIN: ICD-10-PCS | Mod: S$GLB,,, | Performed by: ALLERGY & IMMUNOLOGY

## 2021-09-28 PROCEDURE — 82785 ASSAY OF IGE: CPT | Performed by: ALLERGY & IMMUNOLOGY

## 2021-09-28 RX ORDER — FLUTICASONE PROPIONATE 50 MCG
1 SPRAY, SUSPENSION (ML) NASAL DAILY
Status: ON HOLD | COMMUNITY
End: 2024-04-02 | Stop reason: HOSPADM

## 2021-09-28 RX ORDER — EPINEPHRINE 0.3 MG/.3ML
1 INJECTION SUBCUTANEOUS ONCE
Qty: 2 DEVICE | Refills: 5 | Status: SHIPPED | OUTPATIENT
Start: 2021-09-28 | End: 2021-12-01

## 2021-09-28 RX ORDER — LORATADINE 10 MG/1
10 TABLET ORAL DAILY
Status: ON HOLD | COMMUNITY
End: 2024-04-02 | Stop reason: HOSPADM

## 2021-09-30 LAB
TRYPTASE LEVEL: 11.6 NG/ML
TTG IGA SER-ACNC: 8 UNITS

## 2021-10-01 ENCOUNTER — PROCEDURE VISIT (OUTPATIENT)
Dept: OBSTETRICS AND GYNECOLOGY | Facility: CLINIC | Age: 31
End: 2021-10-01
Payer: COMMERCIAL

## 2021-10-01 VITALS
SYSTOLIC BLOOD PRESSURE: 112 MMHG | BODY MASS INDEX: 24.72 KG/M2 | WEIGHT: 144.81 LBS | HEIGHT: 64 IN | DIASTOLIC BLOOD PRESSURE: 78 MMHG

## 2021-10-01 DIAGNOSIS — Z30.433 ENCOUNTER FOR IUD REMOVAL AND REINSERTION: Primary | ICD-10-CM

## 2021-10-01 LAB
A ALTERNATA IGE QN: 0.11 KU/L
A FUMIGATUS IGE QN: 0.16 KU/L
BALD CYPRESS IGE QN: <0.1 KU/L
BERMUDA GRASS IGE QN: 0.34 KU/L
CAT DANDER IGE QN: 5.98 KU/L
CEDAR IGE QN: 0.1 KU/L
D FARINAE IGE QN: >100 KU/L
D PTERONYSS IGE QN: >100 KU/L
DEPRECATED A ALTERNATA IGE RAST QL: ABNORMAL
DEPRECATED A FUMIGATUS IGE RAST QL: ABNORMAL
DEPRECATED BALD CYPRESS IGE RAST QL: NORMAL
DEPRECATED BERMUDA GRASS IGE RAST QL: ABNORMAL
DEPRECATED CAT DANDER IGE RAST QL: ABNORMAL
DEPRECATED CEDAR IGE RAST QL: NORMAL
DEPRECATED D FARINAE IGE RAST QL: ABNORMAL
DEPRECATED D PTERONYSS IGE RAST QL: ABNORMAL
DEPRECATED DOG DANDER IGE RAST QL: ABNORMAL
DEPRECATED ELDER IGE RAST QL: ABNORMAL
DEPRECATED ENGL PLANTAIN IGE RAST QL: ABNORMAL
DEPRECATED PECAN/HICK TREE IGE RAST QL: ABNORMAL
DEPRECATED RED IMP FIRE ANT IGE RAST QL: ABNORMAL
DEPRECATED ROACH IGE RAST QL: ABNORMAL
DEPRECATED TIMOTHY IGE RAST QL: ABNORMAL
DEPRECATED WEST RAGWEED IGE RAST QL: ABNORMAL
DEPRECATED WHEAT IGE RAST QL: ABNORMAL
DEPRECATED WHITE OAK IGE RAST QL: ABNORMAL
DOG DANDER IGE QN: 7.69 KU/L
ELDER IGE QN: 1.73 KU/L
ENGL PLANTAIN IGE QN: 2.21 KU/L
PECAN/HICK TREE IGE QN: 1.4 KU/L
RED IMP FIRE ANT IGE QN: 3.46 KU/L
ROACH IGE QN: 10.9 KU/L
TIMOTHY IGE QN: 0.79 KU/L
WEST RAGWEED IGE QN: 5.39 KU/L
WHEAT IGE QN: 0.3 KU/L
WHITE OAK IGE QN: 1.37 KU/L

## 2021-10-01 PROCEDURE — 99499 UNLISTED E&M SERVICE: CPT | Mod: S$GLB,,, | Performed by: OBSTETRICS & GYNECOLOGY

## 2021-10-01 PROCEDURE — 58301 REMOVAL OF IUD: ICD-10-PCS | Mod: S$GLB,,, | Performed by: OBSTETRICS & GYNECOLOGY

## 2021-10-01 PROCEDURE — 58300 INSERTION OF IUD: ICD-10-PCS | Mod: S$GLB,,, | Performed by: OBSTETRICS & GYNECOLOGY

## 2021-10-01 PROCEDURE — 99499 NO LOS: ICD-10-PCS | Mod: S$GLB,,, | Performed by: OBSTETRICS & GYNECOLOGY

## 2021-10-01 PROCEDURE — 58300 INSERT INTRAUTERINE DEVICE: CPT | Mod: S$GLB,,, | Performed by: OBSTETRICS & GYNECOLOGY

## 2021-10-01 PROCEDURE — 58301 REMOVE INTRAUTERINE DEVICE: CPT | Mod: S$GLB,,, | Performed by: OBSTETRICS & GYNECOLOGY

## 2021-10-05 ENCOUNTER — OFFICE VISIT (OUTPATIENT)
Dept: ALLERGY | Facility: CLINIC | Age: 31
End: 2021-10-05
Payer: COMMERCIAL

## 2021-10-05 VITALS — BODY MASS INDEX: 25.18 KG/M2 | HEIGHT: 64 IN | WEIGHT: 147.5 LBS

## 2021-10-05 DIAGNOSIS — J30.9 CHRONIC ALLERGIC RHINITIS: ICD-10-CM

## 2021-10-05 DIAGNOSIS — Z91.09 HOUSE DUST MITE ALLERGY: ICD-10-CM

## 2021-10-05 DIAGNOSIS — T63.421D FIRE ANT BITE, ACCIDENTAL OR UNINTENTIONAL, SUBSEQUENT ENCOUNTER: Primary | ICD-10-CM

## 2021-10-05 DIAGNOSIS — R74.8 ELEVATED SERUM TRYPTASE: ICD-10-CM

## 2021-10-05 DIAGNOSIS — K90.0 CELIAC DISEASE: ICD-10-CM

## 2021-10-05 DIAGNOSIS — J30.9 ALLERGIC RHINOCONJUNCTIVITIS: ICD-10-CM

## 2021-10-05 DIAGNOSIS — L27.2 FOOD ALLERGIC SKIN REACTION: ICD-10-CM

## 2021-10-05 DIAGNOSIS — H10.10 ALLERGIC RHINOCONJUNCTIVITIS: ICD-10-CM

## 2021-10-05 DIAGNOSIS — L20.9 ATOPIC DERMATITIS, UNSPECIFIED TYPE: ICD-10-CM

## 2021-10-05 PROCEDURE — 99214 OFFICE O/P EST MOD 30 MIN: CPT | Mod: S$GLB,,, | Performed by: STUDENT IN AN ORGANIZED HEALTH CARE EDUCATION/TRAINING PROGRAM

## 2021-10-05 PROCEDURE — 99999 PR PBB SHADOW E&M-EST. PATIENT-LVL III: ICD-10-PCS | Mod: PBBFAC,,, | Performed by: STUDENT IN AN ORGANIZED HEALTH CARE EDUCATION/TRAINING PROGRAM

## 2021-10-05 PROCEDURE — 99999 PR PBB SHADOW E&M-EST. PATIENT-LVL III: CPT | Mod: PBBFAC,,, | Performed by: STUDENT IN AN ORGANIZED HEALTH CARE EDUCATION/TRAINING PROGRAM

## 2021-10-05 PROCEDURE — 99214 PR OFFICE/OUTPT VISIT, EST, LEVL IV, 30-39 MIN: ICD-10-PCS | Mod: S$GLB,,, | Performed by: STUDENT IN AN ORGANIZED HEALTH CARE EDUCATION/TRAINING PROGRAM

## 2021-10-05 RX ORDER — TRIAMCINOLONE ACETONIDE 1 MG/G
OINTMENT TOPICAL 2 TIMES DAILY
Qty: 30 G | Refills: 0 | Status: SHIPPED | OUTPATIENT
Start: 2021-10-05 | End: 2022-09-27

## 2021-10-05 RX ORDER — AZELASTINE 1 MG/ML
1 SPRAY, METERED NASAL 2 TIMES DAILY
Qty: 30 ML | Refills: 0 | Status: SHIPPED | OUTPATIENT
Start: 2021-10-05 | End: 2022-09-27 | Stop reason: SDUPTHER

## 2021-10-05 RX ORDER — OLOPATADINE HYDROCHLORIDE 2 MG/ML
1 SOLUTION/ DROPS OPHTHALMIC DAILY
Qty: 5 ML | Refills: 0 | Status: SHIPPED | OUTPATIENT
Start: 2021-10-05 | End: 2022-09-27

## 2021-10-19 ENCOUNTER — PATIENT MESSAGE (OUTPATIENT)
Dept: OBSTETRICS AND GYNECOLOGY | Facility: CLINIC | Age: 31
End: 2021-10-19
Payer: COMMERCIAL

## 2021-10-19 LAB
CYTOLOGIST CVX/VAG CYTO: NORMAL
CYTOLOGY CVX/VAG DOC CYTO: NORMAL
CYTOLOGY CVX/VAG DOC THIN PREP: NORMAL
CYTOLOGY THINPREP PAP COMMENT: NORMAL
HPV HR 12 DNA CVX QL NAA+PROBE: NEGATIVE
HPV16 DNA CVX QL NAA+PROBE: NEGATIVE
HPV18 DNA CVX QL NAA+PROBE: NEGATIVE
PAP NOTE: NORMAL
PAP QC REVIEWED BY: NORMAL
PATHOLOGIST CVX/VAG CYTO: NORMAL
STAT OF ADQ CVX/VAG CYTO-IMP: NORMAL

## 2021-11-11 ENCOUNTER — PATIENT MESSAGE (OUTPATIENT)
Dept: ORTHOPEDICS | Facility: CLINIC | Age: 31
End: 2021-11-11

## 2021-11-11 ENCOUNTER — OFFICE VISIT (OUTPATIENT)
Dept: ORTHOPEDICS | Facility: CLINIC | Age: 31
End: 2021-11-11
Payer: COMMERCIAL

## 2021-11-11 ENCOUNTER — HOSPITAL ENCOUNTER (OUTPATIENT)
Dept: RADIOLOGY | Facility: OTHER | Age: 31
Discharge: HOME | End: 2021-11-11
Attending: PHYSICIAN ASSISTANT
Payer: COMMERCIAL

## 2021-11-11 ENCOUNTER — TELEPHONE (OUTPATIENT)
Dept: ORTHOPEDICS | Facility: CLINIC | Age: 31
End: 2021-11-11

## 2021-11-11 VITALS
DIASTOLIC BLOOD PRESSURE: 74 MMHG | HEART RATE: 74 BPM | SYSTOLIC BLOOD PRESSURE: 107 MMHG | HEIGHT: 64 IN | BODY MASS INDEX: 25.1 KG/M2 | WEIGHT: 147 LBS

## 2021-11-11 DIAGNOSIS — M79.644 THUMB PAIN, RIGHT: Primary | ICD-10-CM

## 2021-11-11 DIAGNOSIS — M79.644 THUMB PAIN, RIGHT: ICD-10-CM

## 2021-11-11 PROCEDURE — 99999 PR PBB SHADOW E&M-EST. PATIENT-LVL III: CPT | Mod: PBBFAC,,, | Performed by: PHYSICIAN ASSISTANT

## 2021-11-11 PROCEDURE — 73140 X-RAY EXAM OF FINGER(S): CPT | Mod: TC,FY

## 2021-11-11 PROCEDURE — 99213 OFFICE O/P EST LOW 20 MIN: CPT | Mod: S$GLB,,, | Performed by: PHYSICIAN ASSISTANT

## 2021-11-11 PROCEDURE — 73140 X-RAY EXAM OF FINGER(S): CPT | Mod: 26,RT,, | Performed by: RADIOLOGY

## 2021-11-11 PROCEDURE — 99213 PR OFFICE/OUTPT VISIT, EST, LEVL III, 20-29 MIN: ICD-10-PCS | Mod: S$GLB,,, | Performed by: PHYSICIAN ASSISTANT

## 2021-11-11 PROCEDURE — 73140 XR FINGER 2 OR MORE VIEWS: ICD-10-PCS | Mod: 26,RT,, | Performed by: RADIOLOGY

## 2021-11-11 PROCEDURE — 99999 PR PBB SHADOW E&M-EST. PATIENT-LVL III: ICD-10-PCS | Mod: PBBFAC,,, | Performed by: PHYSICIAN ASSISTANT

## 2021-11-18 ENCOUNTER — TELEPHONE (OUTPATIENT)
Dept: ORTHOPEDICS | Facility: CLINIC | Age: 31
End: 2021-11-18
Payer: COMMERCIAL

## 2021-11-19 ENCOUNTER — TELEPHONE (OUTPATIENT)
Dept: ORTHOPEDICS | Facility: CLINIC | Age: 31
End: 2021-11-19
Payer: COMMERCIAL

## 2021-12-01 ENCOUNTER — OFFICE VISIT (OUTPATIENT)
Dept: URGENT CARE | Facility: CLINIC | Age: 31
End: 2021-12-01
Payer: COMMERCIAL

## 2021-12-01 VITALS
TEMPERATURE: 99 F | WEIGHT: 147 LBS | BODY MASS INDEX: 25.1 KG/M2 | OXYGEN SATURATION: 96 % | SYSTOLIC BLOOD PRESSURE: 123 MMHG | HEIGHT: 64 IN | HEART RATE: 102 BPM | DIASTOLIC BLOOD PRESSURE: 81 MMHG

## 2021-12-01 DIAGNOSIS — R52 BODY ACHES: Primary | ICD-10-CM

## 2021-12-01 DIAGNOSIS — Z11.9 SCREENING EXAMINATION FOR UNSPECIFIED INFECTIOUS DISEASE: ICD-10-CM

## 2021-12-01 LAB
CTP QC/QA: YES
CTP QC/QA: YES
POC MOLECULAR INFLUENZA A AGN: NEGATIVE
POC MOLECULAR INFLUENZA B AGN: NEGATIVE
SARS-COV-2 RDRP RESP QL NAA+PROBE: NEGATIVE

## 2021-12-01 PROCEDURE — U0002: ICD-10-PCS | Mod: QW,S$GLB,, | Performed by: PHYSICIAN ASSISTANT

## 2021-12-01 PROCEDURE — 87502 POCT INFLUENZA A/B MOLECULAR: ICD-10-PCS | Mod: QW,S$GLB,, | Performed by: PHYSICIAN ASSISTANT

## 2021-12-01 PROCEDURE — 87502 INFLUENZA DNA AMP PROBE: CPT | Mod: QW,S$GLB,, | Performed by: PHYSICIAN ASSISTANT

## 2021-12-01 PROCEDURE — 99213 PR OFFICE/OUTPT VISIT, EST, LEVL III, 20-29 MIN: ICD-10-PCS | Mod: S$GLB,,, | Performed by: PHYSICIAN ASSISTANT

## 2021-12-01 PROCEDURE — 99213 OFFICE O/P EST LOW 20 MIN: CPT | Mod: S$GLB,,, | Performed by: PHYSICIAN ASSISTANT

## 2021-12-01 PROCEDURE — U0002 COVID-19 LAB TEST NON-CDC: HCPCS | Mod: QW,S$GLB,, | Performed by: PHYSICIAN ASSISTANT

## 2021-12-07 ENCOUNTER — PATIENT MESSAGE (OUTPATIENT)
Dept: ALLERGY | Facility: CLINIC | Age: 31
End: 2021-12-07
Payer: COMMERCIAL

## 2021-12-07 ENCOUNTER — OFFICE VISIT (OUTPATIENT)
Dept: ORTHOPEDICS | Facility: CLINIC | Age: 31
End: 2021-12-07
Payer: COMMERCIAL

## 2021-12-07 VITALS
BODY MASS INDEX: 25.1 KG/M2 | SYSTOLIC BLOOD PRESSURE: 112 MMHG | HEART RATE: 68 BPM | DIASTOLIC BLOOD PRESSURE: 76 MMHG | HEIGHT: 64 IN | WEIGHT: 147 LBS

## 2021-12-07 DIAGNOSIS — M79.644 THUMB PAIN, RIGHT: ICD-10-CM

## 2021-12-07 DIAGNOSIS — M25.341 CHRONIC INSTABILITY OF METACARPOPHALANGEAL JOINT OF RIGHT THUMB: Primary | ICD-10-CM

## 2021-12-07 DIAGNOSIS — M25.641 DECREASED RANGE OF MOTION OF RIGHT THUMB: ICD-10-CM

## 2021-12-07 DIAGNOSIS — T63.421D FIRE ANT BITE, ACCIDENTAL OR UNINTENTIONAL, SUBSEQUENT ENCOUNTER: Primary | ICD-10-CM

## 2021-12-07 PROCEDURE — 99214 PR OFFICE/OUTPT VISIT, EST, LEVL IV, 30-39 MIN: ICD-10-PCS | Mod: S$GLB,,, | Performed by: PHYSICIAN ASSISTANT

## 2021-12-07 PROCEDURE — 99999 PR PBB SHADOW E&M-EST. PATIENT-LVL III: CPT | Mod: PBBFAC,,, | Performed by: PHYSICIAN ASSISTANT

## 2021-12-07 PROCEDURE — 99999 PR PBB SHADOW E&M-EST. PATIENT-LVL III: ICD-10-PCS | Mod: PBBFAC,,, | Performed by: PHYSICIAN ASSISTANT

## 2021-12-07 PROCEDURE — 99214 OFFICE O/P EST MOD 30 MIN: CPT | Mod: S$GLB,,, | Performed by: PHYSICIAN ASSISTANT

## 2021-12-08 PROCEDURE — 95165 ANTIGEN THERAPY SERVICES: CPT | Mod: S$GLB,,, | Performed by: STUDENT IN AN ORGANIZED HEALTH CARE EDUCATION/TRAINING PROGRAM

## 2021-12-08 PROCEDURE — 95165 PR PROFES SVC,IMMUNOTHER,SINGLE/MULT AGS: ICD-10-PCS | Mod: S$GLB,,, | Performed by: STUDENT IN AN ORGANIZED HEALTH CARE EDUCATION/TRAINING PROGRAM

## 2021-12-14 ENCOUNTER — TELEPHONE (OUTPATIENT)
Dept: ALLERGY | Facility: CLINIC | Age: 31
End: 2021-12-14
Payer: COMMERCIAL

## 2021-12-14 RX ORDER — PREDNISONE 20 MG/1
20 TABLET ORAL 2 TIMES DAILY
Qty: 6 TABLET | Refills: 0 | Status: CANCELLED | OUTPATIENT
Start: 2021-12-19 | End: 2021-12-22

## 2021-12-14 RX ORDER — CETIRIZINE HYDROCHLORIDE 10 MG/1
10 TABLET ORAL 2 TIMES DAILY
Qty: 6 TABLET | Refills: 0 | Status: SHIPPED | OUTPATIENT
Start: 2021-12-19 | End: 2022-09-27

## 2021-12-14 RX ORDER — MONTELUKAST SODIUM 10 MG/1
10 TABLET ORAL DAILY
Qty: 3 TABLET | Refills: 0 | Status: SHIPPED | OUTPATIENT
Start: 2021-12-19 | End: 2021-12-22

## 2021-12-14 RX ORDER — PREDNISONE 20 MG/1
20 TABLET ORAL DAILY
Qty: 3 TABLET | Refills: 0 | Status: SHIPPED | OUTPATIENT
Start: 2021-12-19 | End: 2021-12-22

## 2021-12-14 RX ORDER — CETIRIZINE HYDROCHLORIDE 10 MG/1
10 TABLET ORAL 2 TIMES DAILY
Qty: 6 TABLET | Refills: 0 | Status: CANCELLED | OUTPATIENT
Start: 2021-12-19 | End: 2021-12-22

## 2021-12-14 RX ORDER — FAMOTIDINE 20 MG/1
20 TABLET, FILM COATED ORAL 2 TIMES DAILY
Qty: 6 TABLET | Refills: 0 | Status: SHIPPED | OUTPATIENT
Start: 2021-12-19 | End: 2022-11-07 | Stop reason: ALTCHOICE

## 2021-12-14 RX ORDER — FAMOTIDINE 20 MG/1
20 TABLET, FILM COATED ORAL 2 TIMES DAILY
Qty: 6 TABLET | Refills: 0 | Status: CANCELLED | OUTPATIENT
Start: 2021-12-19 | End: 2021-12-22

## 2021-12-14 RX ORDER — MONTELUKAST SODIUM 10 MG/1
10 TABLET ORAL DAILY
Qty: 3 TABLET | Refills: 0 | Status: CANCELLED | OUTPATIENT
Start: 2021-12-19 | End: 2021-12-22

## 2021-12-19 ENCOUNTER — PATIENT MESSAGE (OUTPATIENT)
Dept: ALLERGY | Facility: CLINIC | Age: 31
End: 2021-12-19
Payer: COMMERCIAL

## 2021-12-20 ENCOUNTER — TELEPHONE (OUTPATIENT)
Dept: ALLERGY | Facility: CLINIC | Age: 31
End: 2021-12-20
Payer: COMMERCIAL

## 2021-12-20 RX ORDER — PREDNISONE 20 MG/1
20 TABLET ORAL 2 TIMES DAILY
Qty: 3 TABLET | Refills: 0 | Status: SHIPPED | OUTPATIENT
Start: 2021-12-20 | End: 2021-12-22

## 2021-12-21 ENCOUNTER — OFFICE VISIT (OUTPATIENT)
Dept: ALLERGY | Facility: CLINIC | Age: 31
End: 2021-12-21
Payer: COMMERCIAL

## 2021-12-21 DIAGNOSIS — R89.9 ABNORMAL LABORATORY TEST: ICD-10-CM

## 2021-12-21 DIAGNOSIS — T63.421D FIRE ANT BITE, ACCIDENTAL OR UNINTENTIONAL, SUBSEQUENT ENCOUNTER: Primary | ICD-10-CM

## 2021-12-21 PROCEDURE — 99999 PR PBB SHADOW E&M-EST. PATIENT-LVL II: CPT | Mod: PBBFAC,,, | Performed by: STUDENT IN AN ORGANIZED HEALTH CARE EDUCATION/TRAINING PROGRAM

## 2021-12-21 PROCEDURE — 95180 RAPID DESENSITIZATION: CPT | Mod: S$GLB,,, | Performed by: STUDENT IN AN ORGANIZED HEALTH CARE EDUCATION/TRAINING PROGRAM

## 2021-12-21 PROCEDURE — 95180 PR RAPID DESENSITIZATION PROC,EACH HOUR: ICD-10-PCS | Mod: S$GLB,,, | Performed by: STUDENT IN AN ORGANIZED HEALTH CARE EDUCATION/TRAINING PROGRAM

## 2021-12-21 PROCEDURE — 99499 NO LOS: ICD-10-PCS | Mod: S$GLB,,, | Performed by: STUDENT IN AN ORGANIZED HEALTH CARE EDUCATION/TRAINING PROGRAM

## 2021-12-21 PROCEDURE — 99499 UNLISTED E&M SERVICE: CPT | Mod: S$GLB,,, | Performed by: STUDENT IN AN ORGANIZED HEALTH CARE EDUCATION/TRAINING PROGRAM

## 2021-12-21 PROCEDURE — 99999 PR PBB SHADOW E&M-EST. PATIENT-LVL II: ICD-10-PCS | Mod: PBBFAC,,, | Performed by: STUDENT IN AN ORGANIZED HEALTH CARE EDUCATION/TRAINING PROGRAM

## 2022-01-03 ENCOUNTER — CLINICAL SUPPORT (OUTPATIENT)
Dept: INTERNAL MEDICINE | Facility: CLINIC | Age: 32
End: 2022-01-03
Payer: COMMERCIAL

## 2022-01-03 ENCOUNTER — LAB VISIT (OUTPATIENT)
Dept: LAB | Facility: HOSPITAL | Age: 32
End: 2022-01-03
Attending: FAMILY MEDICINE
Payer: COMMERCIAL

## 2022-01-03 DIAGNOSIS — R89.9 ABNORMAL LABORATORY TEST: ICD-10-CM

## 2022-01-03 DIAGNOSIS — Z91.038 ALLERGY TO INSECT STINGS: ICD-10-CM

## 2022-01-03 DIAGNOSIS — J30.1 ALLERGIC RHINITIS DUE TO POLLEN, UNSPECIFIED SEASONALITY: Primary | ICD-10-CM

## 2022-01-03 PROCEDURE — 99499 NO LOS: ICD-10-PCS | Mod: S$GLB,,, | Performed by: ALLERGY & IMMUNOLOGY

## 2022-01-03 PROCEDURE — 95115 PR IMMUNOTHERAPY, ONE INJECTION: ICD-10-PCS | Mod: S$GLB,,, | Performed by: FAMILY MEDICINE

## 2022-01-03 PROCEDURE — 99499 UNLISTED E&M SERVICE: CPT | Mod: S$GLB,,, | Performed by: ALLERGY & IMMUNOLOGY

## 2022-01-03 PROCEDURE — 36415 COLL VENOUS BLD VENIPUNCTURE: CPT | Mod: PO | Performed by: STUDENT IN AN ORGANIZED HEALTH CARE EDUCATION/TRAINING PROGRAM

## 2022-01-03 PROCEDURE — 95115 IMMUNOTHERAPY ONE INJECTION: CPT | Mod: S$GLB,,, | Performed by: FAMILY MEDICINE

## 2022-01-03 PROCEDURE — 83520 IMMUNOASSAY QUANT NOS NONAB: CPT | Performed by: STUDENT IN AN ORGANIZED HEALTH CARE EDUCATION/TRAINING PROGRAM

## 2022-01-03 NOTE — PROGRESS NOTES
Pt. Here today for maintenance split dose, will get 0.25 ml in left arm wait 30 minutes , then repeat remaining dose of  0.25 ml and wait additional 30 minutes.      First half of split dose given---- SQ-LT UPPER ARM  ALLERGY injection red vial # 1/1 Fire ant / 0. 25 ml  given,tolerated well. Pt waited 30 minutes, no local reaction noted      Second half of split dose---- SQ-LT LOWER ARM ALLERGY injection red vial # 1/1 Fire ant / 0.25 ml given, tolerated well. Patient waited 30 minutes,no local reaction noted.

## 2022-01-07 ENCOUNTER — PATIENT MESSAGE (OUTPATIENT)
Dept: ALLERGY | Facility: CLINIC | Age: 32
End: 2022-01-07
Payer: COMMERCIAL

## 2022-01-07 DIAGNOSIS — R74.8 ELEVATED SERUM TRYPTASE: Primary | ICD-10-CM

## 2022-01-07 LAB — TRYPTASE LEVEL: 10.5 NG/ML

## 2022-01-10 PROBLEM — M25.641 DECREASED RANGE OF MOTION OF RIGHT THUMB: Status: ACTIVE | Noted: 2020-03-02

## 2022-01-10 PROBLEM — M25.341 CHRONIC INSTABILITY OF METACARPOPHALANGEAL JOINT OF RIGHT THUMB: Status: ACTIVE | Noted: 2022-01-10

## 2022-01-11 ENCOUNTER — CLINICAL SUPPORT (OUTPATIENT)
Dept: REHABILITATION | Facility: HOSPITAL | Age: 32
End: 2022-01-11
Payer: COMMERCIAL

## 2022-01-11 DIAGNOSIS — M25.341 CHRONIC INSTABILITY OF METACARPOPHALANGEAL JOINT OF RIGHT THUMB: ICD-10-CM

## 2022-01-11 DIAGNOSIS — Z78.9 DEFICIT IN ACTIVITIES OF DAILY LIVING (ADL): ICD-10-CM

## 2022-01-11 DIAGNOSIS — M79.644 THUMB PAIN, RIGHT: Primary | ICD-10-CM

## 2022-01-11 DIAGNOSIS — G89.29 CHRONIC THUMB PAIN, RIGHT: ICD-10-CM

## 2022-01-11 DIAGNOSIS — M25.641 DECREASED RANGE OF MOTION OF RIGHT THUMB: ICD-10-CM

## 2022-01-11 DIAGNOSIS — M79.644 CHRONIC THUMB PAIN, RIGHT: ICD-10-CM

## 2022-01-11 DIAGNOSIS — R29.898 DECREASED GRIP STRENGTH OF RIGHT HAND: ICD-10-CM

## 2022-01-11 PROCEDURE — 97166 OT EVAL MOD COMPLEX 45 MIN: CPT | Mod: PN

## 2022-01-11 NOTE — PLAN OF CARE
[facility] Occupational Therapy   Hand/Wrist Evaluation      Patient: Jennifer Nguyen  Date of Evaluation: 01/11/2022  Referring Physician: Meredith Padron PA  Diagnosis:   Encounter Diagnoses   Name Primary?    Chronic instability of metacarpophalangeal joint of right thumb     Thumb pain, right Yes    Decreased range of motion of right thumb     Decreased  strength of right hand     Chronic thumb pain, right     Deficit in activities of daily living (ADL)      Authorization Expiration Date: TBD  Total Visits Authorized: 1 for eval   Surgical Date/Procedure: 1/6/20; R thumb UCL repair by Dr. Zaman; re-injury to R thumb November 2021 playing dodgeball   Referral Orders: Eval and treat  Plan of Care Certification Period: 1/11/22-3/25/22    Visit #: 1/1 for evaluation ; 10-20 visits on Plan of Care (excluding evaluation)   Start Time: 08:48  End Time: 09:30  Total Billable Time: 42 min    Precautions: standard  Orthopedic Precautions: Thumb spica brace to be worn in community with activity    Past Medical History:   Diagnosis Date    Celiac disease      Current Outpatient Medications   Medication Sig    azelastine (ASTELIN) 137 mcg (0.1 %) nasal spray 1 spray (137 mcg total) by Nasal route 2 (two) times daily.    cetirizine (ZYRTEC) 10 MG tablet Take 1 tablet (10 mg total) by mouth 2 (two) times a day. Start taking Sunday before Rush procedure. for 3 days    clotrimazole-betamethasone 1-0.05% (LOTRISONE) cream Apply to affected area 2 times daily    famotidine (PEPCID) 20 MG tablet Take 1 tablet (20 mg total) by mouth 2 (two) times daily. Start taking Sunday before Rush procedure. for 3 days    fluticasone propionate (FLONASE) 50 mcg/actuation nasal spray 1 spray by Each Nostril route once daily.    loratadine (CLARITIN) 10 mg tablet Take 10 mg by mouth once daily.    mometasone (ELOCON) 0.1 % ointment Apply topically once daily.    olopatadine (PATADAY) 0.2 % Drop  "Place 1 drop into both eyes once daily.    triamcinolone acetonide 0.1% (KENALOG) 0.1 % ointment Apply topically 2 (two) times daily. Use for up to 2 weeks. Use sparingly, thin layer only in affected (itchy) areas.     Current Facility-Administered Medications   Medication    levonorgestreL (MIRENA) 20 mcg/24 hours (6 yrs) 52 mg IUD 1 Intra Uterine Device     Review of patient's allergies indicates:   Allergen Reactions    Insect venom Anxiety, Rash and Shortness Of Breath    Gluten Nausea Only       Imaging Reports:  X-Ray Results:   R hand (11/11/21)   "FINDINGS:  Well-corticated lucencies along the ulnar aspect of the 1st proximal phalanx and distal 1st metacarpal likely related to provided history of prior UCL repair.  No fracture or malalignment.  Joint spaces are maintained.  No erosions.  Soft tissues are normal.     Impression:     No acute osseous abnormality.  Postop changes of prior UCL repair."     Subjective/Occupational Profile   Age: 31 y.o.  Sex: female    Jennifer Nguyen is a right-hand dominant female who presents to Outpatient Occupational Therapy for evaluation. Pt injured her R thumb while playing dodgeball in November 2021. In addition, Pt underwent a R thumb UCL repair on 1/6/20 by Dr. Zaman. Pt states, "I feel like I still have residual weakness from the surgical repair. That's when things started shutting down- so wasn't able to complete therapy. I felt like my thumb was basically ok- just maybe some residual weakness." Pt reports that she feels that her thumb pain has improved since November, however, still reports overall R hand/thumb weakness with certain activities.     Home/Environmental History: Pt resides alone.     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     Driving Status: Pt is able to drive self (I)     Activity Level: Active     Date/Mechanism of Injury: Blow to R hand November 2021    Date of Surgery: 1/6/20; R thumb UCL repair   Involved areas: right thumb    Functional Pain Scale Rating 0-10:   At Rest: 0/10  With Activity: 6/10 intermittently with thumb opposition     Jennifer's goals for therapy are: increase R /pinch and wrist strength, restore R thumb motion, decrease thumb pain, return to sports       Objective     Hand dominance: right  Observation: Skin intact and dry  Sensation: Median Nerve Intact  [place] Eric Monofilament Test: No (Pt reports no sensory impairments in her R hand)     Edema:  Location: R hand   - No edema noted through R hand/thenar region     Range of Motion: right active  (Ext/Flex) Thumb   MP 0*/58 (L: 0*/65*)   IP 0*/80* (L: 0*/85*)   ARSHAD 138* (L: 150*)   *     Thumb Opposition: R 5th volar MP   Palmar Abduction: 55*  (L: 60*)  Radial Abduction: 60*   (L: 65*)     Wrist Flex/Ext: WNL & symmetrical (B)   Wrist RD/UD: WNL & symmetrical (B)     Manual Muscle Test:   Muscle Strength    Median Innervated:  FPL              4/5  APB              4/5  OP                4/5     Radial Nerve Innervated:  AbPL           4/5  EPL             4/5  EPB            4/5    Note: Moderate pain reported through the ulnar aspect of thumb w/ all MMT      Strength: (JAMILA Dynamometer in lbs.) Average 3 trials, Position II  Right: 48.7 lbs  Left: 63.4 lbs    Pinch Strength: (Pinch Gauge in lbs, Average 3 trials  Lateral/Dwyer Pinch       L) 7      R) 7  3pt Pinch                    L) 7.5   R) 5  2pt Pinch                    L) 3.5   R) 3    Problem List: functional limitations: Patient presents with the following functional limitations:   Self Care / ADL: N/A  Work/Activities/Household management tasks: cooking,  "cleaning, yard work, work-related tasks as director of [employer]   Leisure: playing sports     Treatment     Treatment Time In: 09:20 min   Treatment Time Out: 09:30 min   Total Treatment time (time-based codes) separate from Evaluation: 10 min     Home Exercise Program/Education:  Issued HEP (see patient instructions in EMR) and educated on compliance of brace wear & modality use for pain management and for resting of injured thumb . Exercises were reviewed and JENNIFER was able to demonstrate them prior to the end of the session.   JENNIFER demonstrated good  understanding of the education provided.  Pt was advised to perform these exercises free of pain, and to stop performing them if pain occurs.    Patient/Family Education: role of OT, goals for OT, scheduling/cancellations - Pt verbalized understanding. Discussed insurance limitations with patient.    Additional Education provided: role of OT, goals for OT, discussed insurance limitation with Patient- Patient verbalized understanding       Outcome Measure: FOTO  CMS Impairment/Limitation/Restriction for FOTO  Upper Extremity Survey    Therapist reviewed FOTO scores for Jennifer Nguyen on 1/11/2022.   FOTO documents entered into EPIC - see Media section.    Limitation Score: 67%  Category: ADLs/IADLs           History Examination Decision Making Complexity Score   Occupational Profile:   Pt's full occupational profile reviewed , including OD(surgical notes), including report of previous therapies.    Medical and Therapy History:     Please see "Plan" section for reference of therapy goals.    Pt with Hx/o  - See Subjective/Occupational Profile     Brief/expanded review:  Expanded              Performance Deficits     Physical    Please see under "problem list" and the assessment portion of this eval note      Cognitive  WNL      Psychosocial:    Pt with ability to work at this time using just one or both hand.    Rating: mild  Treatment to include :  " paraffin, fluidotherapy, manual therapy/joint mobilizations,scar massage,   modalities for pain management, iontophoresis with dexamethasone, US 3mhz, therapeutic exercises/activities,        strengthening,       Clinical decision  Making of moderate  complexity, mild  modifications for required to complete eval      Rating:moderate Moderate in combination of the 3 categories      Problem List:   Decreased function of Right UE, decreased thumb ROM, increased thumb pain, decreased /pinch strength, inability to perform work/tasks, & inability to perform leisure activities    Assessment   Jennifer Nguyen was referred to Outpatient Occupational Therapy with diagnosis of   Encounter Diagnoses   Name Primary?    Chronic instability of metacarpophalangeal joint of right thumb     Thumb pain, right Yes    Decreased range of motion of right thumb     Decreased  strength of right hand     Chronic thumb pain, right     Deficit in activities of daily living (ADL)     presents with the functional limitations as described in the problem list above. Patient can benefit from Outpatient Occupational Therapy services to restore maximum functional use of her dominant hand to facilitate ease of performance of ADLs and IADLs. The following goals were discussed with the Patient and she is in agreement with them as to be addressed in the treatment plan.     Anticipated barriers to Occupational Therapy: Compliance w/ RICE method & bracing       Pt has no cultural, educational or language barriers to learning provided.      ShortTerm Goals (to be met in 5 weeks or by 2/15/22):   1. Patient to be IND and compliant with brace wear, HEP, & attendance for duration of therapy.  2. Patient will demonstrate increased ARSHAD in R thumb planes of motion by 5-10 degrees to restore functional hand use for ADLs.   3. Patient will demonstrate increased R hand  strength by 3-5 lbs. to improve functional grasp for ADLs/work/leisure  activities.   4. Patient will demonstrate increased 3 pt pinch by 0.5-1 psi's to facilitate ease of performance with fine motor activities.  5. Patient will report no more than 4/10 pain in R hand.       Long Term Goals (to be met in 10 weeks or by DC):   1. Patient to be IND and compliant with HEP & attendance for duration of therapy.  2. Patient will demonstrate increased R hand  strength by 10-15 lbs. to improve functional grasp for ADLs/work/leisure activities.   3. Patient will demonstrate increased 3 pt pinch by 1-2 psi's to facilitate ease of performance with fine motor activities.  5. Patient will report no more than 2/10 pain in R hand.   6. Patient will report increase in functional use of her R hand by 30%-40% as evidenced by the FOTO outcome measure.   7. Patient will demonstrate increased gross muscular strength in isolated thumb tendons by 1-2 muscle grades to facilitate safe performance of sports-related activities .        Plan   Jennifer Nguyen is to be treated by Occupational Therapy 1-2 times per week for 10 weeks, or 10-20 visits, during the certification period from 1/11/22 to 3/25/22,  to achieve the established goals.       Treatment to include: Paraffin/hot packs, manual therapy/joint mobilizations, modalities for pain management, edema control, joint protection, energy conservation, therapeutic exercises, therapeutic activities, and implementation of a personalized Home Exercise Program (HEP), as well as any other treatments deemed necessary based on the patient's needs or progress.       Thank you for allowing me to assist in the care of your Patient. If you have any questions or concerns, please don't hesitate to e-mail or contact me directly.      RADHA Cristina MOT   [facility]   Phone: [phone]      I certify the need for these services furnished under this plan of treatment and while under my care                                                                             Referring practitoner/provider       Date

## 2022-01-18 NOTE — PROGRESS NOTES
"                            Outpatient Occupational Therapy Daily Treatment Note       Date: 1/20/2022  Name: Jennifer Nguyen  Clinic Number: [id number]    Therapy Diagnosis:   Encounter Diagnoses   Name Primary?    Chronic instability of metacarpophalangeal joint of right thumb Yes    Thumb pain, right     Decreased range of motion of right thumb     Decreased  strength of right hand     Deficit in activities of daily living (ADL)      Physician: Meredith Padron PA    Physician Orders: Eval and treat   Medical Diagnosis: M25.341 (ICD-10-CM) - Chronic instability of metacarpophalangeal joint of right thumb  M79.644 (ICD-10-CM) - Thumb pain, right  M25.641 (ICD-10-CM) - Decreased range of motion of right thumb  Surgical Procedure and Date/DOI: 1/6/20; R thumb UCL repair by Dr. Zaman; re-injury to R thumb November 2021 playing [email or web address]   Insurance Authorization Period Expiration: 1/11/23; * pending review as of 1/20/22  Plan of Care Certification Period: 1/11/22-3/25/22  Date of Return to MD: LULA  Date of Evaluation: 1/11/22    Visit # / Visits authorized: 1/10-20 visits on Plan of Care (excluding evaluation)  / 12 visits pending review   Time In:10:46  Time Out: 11:30  Total Billable Time: 28 minutes  Total Treatment Time: 44 minutes    Precautions:  Standard and Thumb spica brace to be worn in community with activity      Subjective     Patient (Pt) reports: "I've been wearing the brace a lot more- I have iced it somewhat when it's sore. I don't feel a huge difference yet."     Pt was not compliant with home exercise program given last session.   Response to previous treatment:favorable       Pain: pre-session:2/10 ; post session: 0/10   Location: right dorsal thumb       Objective    Pt received the following supervised modalities after being cleared for contradictions for 18 minutes:  - Moist heat application to R hand x 8 minutes to decrease joint stiffness and increase circulation "   -Fluidotherapy to R hand/wrist for 10 minutes to increase blood flow, circulation, desensitization, sensory re-education and for pain management. Performed the following therex within fluidotherapy:  -AROM for R wrist and digit planes of motion, focusing on composite flexion & digit extension, thumb opposition & circumduction, and wrist supination and pronation       Pt received the following direct contact modalities after being cleared for contraindications for 8 minutes:  -Utrasound for scar tissue remodeling, increased circulation, & tissue elasticity @ 100% duty cycle, 3.3 Mhz, applied R dorsal and volar thenar region, intensity = 0.6 w/cm2        Therapist performed the following manual therapy techniques to increase joint mobilization and soft tissue mobilization for 18 minutes:   -Soft tissue massage (STM) and myofascial release (MFR) to R hand/wrist/forearm  to increase joint mobility, ROM and for pain management.       Home Exercises and Education Provided     Education provided:  - Progress towards goals  - Pt instructed on importance of compliance w/ bracing and RICE        Written Home Exercises Provided: Patient instructed to cont prior HEP.  Exercises were reviewed and ALEC was able to demonstrate them prior to the end of the session.  ALEC demonstrated good  understanding of the HEP provided.     See EMR under Patient Instructions for exercises provided 1/11/22.       Assessment2     Pt would continue to benefit from skilled Occupational Therapy services. Pt tolerated session well, noting decrease in resting pain levels pre/post session. Pt reports compliance w/ increased bracing instructions from previous visit, w/ partial compliance noted w/ ice modality use and lifting precautions. Pt instructed to continue w/ RICE method and bracing and gentle ROM exercises until next session.      ALEC is progressing fairly well towards her goals and there are no updates to goals at this time. Pt  prognosis is Good.     Pt demonstrated proper understanding of each exercise. Pt continues to be limited in functional and leisurely pursuits. Pain, decreased thumb ROM, and decreased /pinch strength limits Pts participation in ADL's and IADLs. Pt is not able to carryout necessary vocational tasks. Pt continues to requires cues and skilled supervision to complete HEP.     Anticipated barriers to Occupational Therapy: Compliance w/ RICE method and bracing     Pt's spiritual, cultural and educational needs considered and Pt agreeable to Plan of Care and goals.    ShortTerm Goals (to be met in 5 weeks or by 2/15/22):   1. Patient to be IND and compliant with brace wear, HEP, & attendance for duration of therapy.  2. Patient will demonstrate increased ARSHAD in R thumb planes of motion by 5-10 degrees to restore functional hand use for ADLs.   3. Patient will demonstrate increased R hand  strength by 3-5 lbs. to improve functional grasp for ADLs/work/leisure activities.   4. Patient will demonstrate increased 3 pt pinch by 0.5-1 psi's to facilitate ease of performance with fine motor activities.  5. Patient will report no more than 4/10 pain in R hand.         Long Term Goals (to be met in 10 weeks or by DC):   1. Patient to be IND and compliant with HEP & attendance for duration of therapy.  2. Patient will demonstrate increased R hand  strength by 10-15 lbs. to improve functional grasp for ADLs/work/leisure activities.   3. Patient will demonstrate increased 3 pt pinch by 1-2 psi's to facilitate ease of performance with fine motor activities.  5. Patient will report no more than 2/10 pain in R hand.   6. Patient will report increase in functional use of her R hand by 30%-40% as evidenced by the FOTO outcome measure.   7. Patient will demonstrate increased gross muscular strength in isolated thumb tendons by 1-2 muscle grades to facilitate safe performance of sports-related activities .            Plan      Jennifer Nguyen is to be treated by Occupational Therapy 1-2 times per week for 10 weeks, or 10-20 visits, during the certification period from 1/11/22 to 3/25/22,  to achieve the established goals.     Updates/Grading for next session: TBD pending progress       Thank you for allowing me to assist in the care of your Patient. If you have any questions or concerns, please don't hesitate to e-mail or contact me directly.      RADHA Cristina MOT  [facility]   Phone: [phone]  Fax: [phone]

## 2022-01-20 ENCOUNTER — CLINICAL SUPPORT (OUTPATIENT)
Dept: REHABILITATION | Facility: HOSPITAL | Age: 32
End: 2022-01-20
Payer: COMMERCIAL

## 2022-01-20 DIAGNOSIS — G89.29 CHRONIC THUMB PAIN, RIGHT: ICD-10-CM

## 2022-01-20 DIAGNOSIS — M25.341 CHRONIC INSTABILITY OF METACARPOPHALANGEAL JOINT OF RIGHT THUMB: Primary | ICD-10-CM

## 2022-01-20 DIAGNOSIS — M79.644 THUMB PAIN, RIGHT: ICD-10-CM

## 2022-01-20 DIAGNOSIS — M79.644 CHRONIC THUMB PAIN, RIGHT: ICD-10-CM

## 2022-01-20 DIAGNOSIS — R29.898 DECREASED GRIP STRENGTH OF RIGHT HAND: ICD-10-CM

## 2022-01-20 DIAGNOSIS — M25.641 DECREASED RANGE OF MOTION OF RIGHT THUMB: ICD-10-CM

## 2022-01-20 DIAGNOSIS — Z78.9 DEFICIT IN ACTIVITIES OF DAILY LIVING (ADL): ICD-10-CM

## 2022-01-20 PROCEDURE — 97035 APP MDLTY 1+ULTRASOUND EA 15: CPT | Mod: PN

## 2022-01-20 PROCEDURE — 97110 THERAPEUTIC EXERCISES: CPT | Mod: PN

## 2022-01-20 PROCEDURE — 97140 MANUAL THERAPY 1/> REGIONS: CPT | Mod: PN

## 2022-01-20 PROCEDURE — 97010 HOT OR COLD PACKS THERAPY: CPT | Mod: PN

## 2022-01-20 PROCEDURE — 97022 WHIRLPOOL THERAPY: CPT | Mod: PN

## 2022-01-21 ENCOUNTER — CLINICAL SUPPORT (OUTPATIENT)
Dept: INTERNAL MEDICINE | Facility: CLINIC | Age: 32
End: 2022-01-21
Payer: COMMERCIAL

## 2022-01-21 DIAGNOSIS — Z91.038 ALLERGY TO INSECT STINGS: ICD-10-CM

## 2022-01-21 DIAGNOSIS — J30.1 ALLERGIC RHINITIS DUE TO POLLEN, UNSPECIFIED SEASONALITY: Primary | ICD-10-CM

## 2022-01-21 PROCEDURE — 99999 PR PBB SHADOW E&M-EST. PATIENT-LVL I: ICD-10-PCS | Mod: PBBFAC,,,

## 2022-01-21 PROCEDURE — 99499 NO LOS: ICD-10-PCS | Mod: S$GLB,,, | Performed by: ALLERGY & IMMUNOLOGY

## 2022-01-21 PROCEDURE — 95115 IMMUNOTHERAPY ONE INJECTION: CPT | Mod: S$GLB,,, | Performed by: FAMILY MEDICINE

## 2022-01-21 PROCEDURE — 99499 UNLISTED E&M SERVICE: CPT | Mod: S$GLB,,, | Performed by: ALLERGY & IMMUNOLOGY

## 2022-01-21 PROCEDURE — 99999 PR PBB SHADOW E&M-EST. PATIENT-LVL I: CPT | Mod: PBBFAC,,,

## 2022-01-21 PROCEDURE — 95115 PR IMMUNOTHERAPY, ONE INJECTION: ICD-10-PCS | Mod: S$GLB,,, | Performed by: FAMILY MEDICINE

## 2022-01-21 NOTE — PROGRESS NOTES
SQ-LT UPPER ARM  ALLERGY injection red vial # 1/1 Fire ant/ 0.5 ml  given,tolerated well. Pt waited 30 minutes, no local reaction noted

## 2022-01-22 ENCOUNTER — PATIENT MESSAGE (OUTPATIENT)
Dept: ORTHOPEDICS | Facility: CLINIC | Age: 32
End: 2022-01-22
Payer: COMMERCIAL

## 2022-01-25 NOTE — PROGRESS NOTES
"                            Outpatient Occupational Therapy Daily Treatment Note       Date: 1/31/2022  Name: Jennifer Nguyen  Clinic Number: [id number]    Therapy Diagnosis:   Encounter Diagnoses   Name Primary?    Chronic instability of metacarpophalangeal joint of right thumb Yes    Thumb pain, right     Decreased range of motion of right thumb     Decreased  strength of right hand     Deficit in activities of daily living (ADL)     Chronic thumb pain, right     Stiffness of thumb joint      Physician: Meredith Padron PA    Physician Orders: Eval and treat   Medical Diagnosis: M25.341 (ICD-10-CM) - Chronic instability of metacarpophalangeal joint of right thumb  M79.644 (ICD-10-CM) - Thumb pain, right  M25.641 (ICD-10-CM) - Decreased range of motion of right thumb  Surgical Procedure and Date/DOI: 1/6/20; R thumb UCL repair by Dr. Zaman; re-injury to R thumb November 2021 playing [email or web address]   Insurance Authorization Period Expiration: 1/11/23; * pending review as of 1/25/22  Plan of Care Certification Period: 1/11/22-3/25/22  Date of Return to MD: LULA  Date of Evaluation: 1/11/22    Visit # / Visits authorized: 3/10-20 visits on Plan of Care (excluding evaluation)  / 12 visits pending review   Time In: 10:45  Time Out: 11:25  Total Billable Time: 32 minutes  Total Treatment Time: 40 minutes    Precautions:  Standard and Thumb spica brace to be worn in community with activity      Subjective     Patient (Pt) reports: "I've been wearing the brace- and my thumb is actually feeling a little better."     Pt was not compliant with home exercise program given last session.   Response to previous treatment:favorable     Pain: pre-session: 0/10; post session: 0/10  Location: right dorsal thumb       Objective    Pt received the following supervised modalities after being cleared for contradictions for 8 minutes:  - Moist heat application to R hand x 8 minutes to decrease joint stiffness and increase " circulation        Pt received the following direct contact modalities after being cleared for contraindications for 8 minutes:  -Utrasound for scar tissue remodeling, increased circulation, & tissue elasticity @ 100% duty cycle, 3.3 Mhz, applied R dorsal and volar thenar region, intensity = 0.6 w/cm2        Therapist performed the following manual therapy techniques to increase joint mobilization and soft tissue mobilization for 24 minutes:   -Soft tissue massage (STM) and myofascial release (MFR) to R hand/wrist/forearm  to increase joint mobility, ROM and for pain management.       Home Exercises and Education Provided     Education provided:  - Progress towards goals  - Pt instructed on importance of compliance w/ bracing and RICE        Written Home Exercises Provided: Patient instructed to cont prior HEP.  Exercises were reviewed and ALEC was able to demonstrate them prior to the end of the session.  ALEC demonstrated good  understanding of the HEP provided.     See EMR under Patient Instructions for exercises provided 1/11/22.       Assessment2     Pt tolerated session well,  noting slow, but steady reduction of R thumb pain reported over the past several weeks w/ full compliance noted with RICE method and bracing.  Pt would continue to benefit from skilled Occupational Therapy services.      ALEC is progressing fairly well towards her goals and there are no updates to goals at this time. Pt prognosis is Good.     Pt demonstrated proper understanding of each exercise. Pt continues to be limited in functional and leisurely pursuits. Pain, decreased thumb ROM, and decreased /pinch strength limits Pts participation in ADL's and IADLs. Pt is not able to carryout necessary vocational tasks. Pt continues to requires cues and skilled supervision to complete HEP.     Anticipated barriers to Occupational Therapy: Compliance w/ RICE method and bracing     Pt's spiritual, cultural and educational needs  considered and Pt agreeable to Plan of Care and goals.    ShortTerm Goals (to be met in 5 weeks or by 2/15/22):   1. Patient to be IND and compliant with brace wear, HEP, & attendance for duration of therapy.  2. Patient will demonstrate increased ARSHAD in R thumb planes of motion by 5-10 degrees to restore functional hand use for ADLs.   3. Patient will demonstrate increased R hand  strength by 3-5 lbs. to improve functional grasp for ADLs/work/leisure activities.   4. Patient will demonstrate increased 3 pt pinch by 0.5-1 psi's to facilitate ease of performance with fine motor activities.  5. Patient will report no more than 4/10 pain in R hand.         Long Term Goals (to be met in 10 weeks or by DC):   1. Patient to be IND and compliant with HEP & attendance for duration of therapy.  2. Patient will demonstrate increased R hand  strength by 10-15 lbs. to improve functional grasp for ADLs/work/leisure activities.   3. Patient will demonstrate increased 3 pt pinch by 1-2 psi's to facilitate ease of performance with fine motor activities.  5. Patient will report no more than 2/10 pain in R hand.   6. Patient will report increase in functional use of her R hand by 30%-40% as evidenced by the FOTO outcome measure.   7. Patient will demonstrate increased gross muscular strength in isolated thumb tendons by 1-2 muscle grades to facilitate safe performance of sports-related activities .          Plan     Jennifer Nguyen is to be treated by Occupational Therapy 1-2 times per week for 10 weeks, or 10-20 visits, during the certification period from 1/11/22 to 3/25/22,  to achieve the established goals.     Updates/Grading for next session: TBD pending progress       Thank you for allowing me to assist in the care of your Patient. If you have any questions or concerns, please don't hesitate to e-mail or contact me directly.      RADHA Cristina MOT  [facility] [place]   Phone:  [phone]  Fax: [phone]

## 2022-01-26 ENCOUNTER — CLINICAL SUPPORT (OUTPATIENT)
Dept: REHABILITATION | Facility: HOSPITAL | Age: 32
End: 2022-01-26
Payer: COMMERCIAL

## 2022-01-26 DIAGNOSIS — R29.898 DECREASED GRIP STRENGTH OF RIGHT HAND: Primary | ICD-10-CM

## 2022-01-26 DIAGNOSIS — G89.29 CHRONIC THUMB PAIN, RIGHT: ICD-10-CM

## 2022-01-26 DIAGNOSIS — Z78.9 DEFICIT IN ACTIVITIES OF DAILY LIVING (ADL): ICD-10-CM

## 2022-01-26 DIAGNOSIS — M79.644 CHRONIC THUMB PAIN, RIGHT: ICD-10-CM

## 2022-01-26 PROCEDURE — 97140 MANUAL THERAPY 1/> REGIONS: CPT | Mod: PN

## 2022-01-26 PROCEDURE — 97035 APP MDLTY 1+ULTRASOUND EA 15: CPT | Mod: PN

## 2022-01-26 PROCEDURE — 97530 THERAPEUTIC ACTIVITIES: CPT | Mod: PN

## 2022-01-26 NOTE — PROGRESS NOTES
"                            Outpatient Occupational Therapy Daily Treatment Note       Date: 1/26/2022  Name: Jennifer Nguyen  Clinic Number: [id number]    Therapy Diagnosis:   Encounter Diagnoses   Name Primary?    Decreased  strength of right hand Yes    Chronic thumb pain, right     Deficit in activities of daily living (ADL)       Physician: Meredith Padron PA    Physician Orders: Eval and treat   Medical Diagnosis: M25.341 (ICD-10-CM) - Chronic instability of metacarpophalangeal joint of right thumb  M79.644 (ICD-10-CM) - Thumb pain, right  M25.641 (ICD-10-CM) - Decreased range of motion of right thumb  Surgical Procedure and Date/DOI: 1/6/20; R thumb UCL repair by Dr. Zaman; re-injury to R thumb November 2021 playing [email or web address]   Insurance Authorization Period Expiration: 1/11/23; * pending review as of 1/20/22  Plan of Care Certification Period: 1/11/22-3/25/22  Date of Return to MD: LULA  Date of Evaluation: 1/11/22    Visit # / Visits authorized: 1/10-20 visits on Plan of Care (excluding evaluation)  / 12 visits pending review   Time In:9:15  Time Out: 10:00  Total Billable Time: 40 minutes  Total Treatment Time: 45 minutes    Precautions:  Standard and Thumb spica brace to be worn in community with activity      Subjective     Patient (Pt) reports: "It seems to be getting better but the pain at the top knuckle of my thumb (IPJ) is most noticeable."      Pt was not compliant with home exercise program given last session.   Response to previous treatment:favorable 1      Pain: pre-session:1/10 ; post session: 3/10, sore  Location: right dorsal thumb       Objective    Pt received the following supervised modalities after being cleared for contradictions for 10 minutes:  - Moist heat application to R hand x 10 minutes to decrease joint stiffness and increase circulation  -last 5 minutes concurrent gentle massage      Pt received the following direct contact modalities after being cleared for " contraindications for 8 minutes:  -Utrasound for scar tissue remodeling, increased circulation, & tissue elasticity @ 100% duty cycle, 3.3 Mhz, applied R dorsal and volar thenar region, intensity = 0.8 w/cm2      Therapist performed the following manual therapy techniques to increase joint mobilization and soft tissue mobilization for 10 minutes:   -Soft tissue massage (STM) and myofascial release (MFR) to R hand/wrist/forearm  to increase joint mobility, ROM and for pain management.     · Pt performed 15 mins therapeutic activity to increase AROM, provide gentle stretch  -volar isosphere rotations X 1.5 mins  -Thumb MP/IPJ flexion/ext with tennis ball 2x10  -Opposition over tennis ball 2x10  -Oppositional cone slides targeting palmar ABD 2x10  -In-hand translation with coins    Home Exercises and Education Provided     Education provided:  - Discuss monitoring thumb with initiation of gentle range of motion  -Review comfort modalities        Written Home Exercises Provided: Patient instructed to cont prior HEP.  Exercises were reviewed and ALEC was able to demonstrate them prior to the end of the session.  ALEC demonstrated good  understanding of the HEP provided.     See EMR under Patient Instructions for exercises provided 1/11/22.       Assessment2     Pt would continue to benefit from skilled Occupational Therapy services. Pt tolerated session fairly well.  Pretx C/o discomfort radial & ulnar sides of thumb IPJ that subsides post tx. Good ability to engage in theract, though cueing required for pace as she demos tendency to rush through activity.  Good fluidity of functional range of motion throughout tx.     ALEC is progressing fairly well towards her goals and there are no updates to goals at this time. Pt prognosis is Good.     Pt demonstrated proper understanding of each exercise. Pt continues to be limited in functional and leisurely pursuits. Pain, decreased thumb ROM, and decreased /pinch  strength limits Pts participation in ADL's and IADLs. Pt is not able to carryout necessary vocational tasks. Pt continues to requires cues and skilled supervision to complete HEP.     Anticipated barriers to Occupational Therapy: Compliance w/ RICE method and bracing     Pt's spiritual, cultural and educational needs considered and Pt agreeable to Plan of Care and goals.    ShortTerm Goals (to be met in 5 weeks or by 2/15/22):   1. Patient to be IND and compliant with brace wear, HEP, & attendance for duration of therapy.  2. Patient will demonstrate increased ARSHAD in R thumb planes of motion by 5-10 degrees to restore functional hand use for ADLs.   3. Patient will demonstrate increased R hand  strength by 3-5 lbs. to improve functional grasp for ADLs/work/leisure activities.   4. Patient will demonstrate increased 3 pt pinch by 0.5-1 psi's to facilitate ease of performance with fine motor activities.  5. Patient will report no more than 4/10 pain in R hand.         Long Term Goals (to be met in 10 weeks or by DC):   1. Patient to be IND and compliant with HEP & attendance for duration of therapy.  2. Patient will demonstrate increased R hand  strength by 10-15 lbs. to improve functional grasp for ADLs/work/leisure activities.   3. Patient will demonstrate increased 3 pt pinch by 1-2 psi's to facilitate ease of performance with fine motor activities.  5. Patient will report no more than 2/10 pain in R hand.   6. Patient will report increase in functional use of her R hand by 30%-40% as evidenced by the FOTO outcome measure.   7. Patient will demonstrate increased gross muscular strength in isolated thumb tendons by 1-2 muscle grades to facilitate safe performance of sports-related activities .            Plan     Jennifer Nguyen is to be treated by Occupational Therapy 1-2 times per week for 10 weeks, or 10-20 visits, during the certification period from 1/11/22 to 3/25/22,  to achieve the established  goals.     Updates/Grading for next session: TBD pending progress       Thank you for allowing me to assist in the care of your Patient. If you have any questions or concerns, please don't hesitate to e-mail or contact me directly.      RADHA Cristina MOT  [facility]   Phone: [phone]  Fax: [phone]

## 2022-01-31 ENCOUNTER — CLINICAL SUPPORT (OUTPATIENT)
Dept: REHABILITATION | Facility: HOSPITAL | Age: 32
End: 2022-01-31
Payer: COMMERCIAL

## 2022-01-31 DIAGNOSIS — M79.644 THUMB PAIN, RIGHT: ICD-10-CM

## 2022-01-31 DIAGNOSIS — Z78.9 DEFICIT IN ACTIVITIES OF DAILY LIVING (ADL): ICD-10-CM

## 2022-01-31 DIAGNOSIS — M25.649 STIFFNESS OF THUMB JOINT: ICD-10-CM

## 2022-01-31 DIAGNOSIS — M25.641 DECREASED RANGE OF MOTION OF RIGHT THUMB: ICD-10-CM

## 2022-01-31 DIAGNOSIS — M25.341 CHRONIC INSTABILITY OF METACARPOPHALANGEAL JOINT OF RIGHT THUMB: Primary | ICD-10-CM

## 2022-01-31 DIAGNOSIS — R29.898 DECREASED GRIP STRENGTH OF RIGHT HAND: ICD-10-CM

## 2022-01-31 DIAGNOSIS — G89.29 CHRONIC THUMB PAIN, RIGHT: ICD-10-CM

## 2022-01-31 DIAGNOSIS — M79.644 CHRONIC THUMB PAIN, RIGHT: ICD-10-CM

## 2022-01-31 PROCEDURE — 97010 HOT OR COLD PACKS THERAPY: CPT | Mod: PN

## 2022-01-31 PROCEDURE — 97035 APP MDLTY 1+ULTRASOUND EA 15: CPT | Mod: PN

## 2022-01-31 PROCEDURE — 97140 MANUAL THERAPY 1/> REGIONS: CPT | Mod: PN

## 2022-02-04 ENCOUNTER — OFFICE VISIT (OUTPATIENT)
Dept: URGENT CARE | Facility: CLINIC | Age: 32
End: 2022-02-04
Payer: COMMERCIAL

## 2022-02-04 VITALS
SYSTOLIC BLOOD PRESSURE: 116 MMHG | HEART RATE: 66 BPM | TEMPERATURE: 98 F | RESPIRATION RATE: 16 BRPM | OXYGEN SATURATION: 97 % | DIASTOLIC BLOOD PRESSURE: 71 MMHG

## 2022-02-04 DIAGNOSIS — S80.01XA CONTUSION OF RIGHT KNEE, INITIAL ENCOUNTER: Primary | ICD-10-CM

## 2022-02-04 DIAGNOSIS — M25.561 ACUTE PAIN OF RIGHT KNEE: ICD-10-CM

## 2022-02-04 PROCEDURE — 99213 PR OFFICE/OUTPT VISIT, EST, LEVL III, 20-29 MIN: ICD-10-PCS | Mod: S$GLB,,, | Performed by: NURSE PRACTITIONER

## 2022-02-04 PROCEDURE — 73562 XR KNEE 3 VIEW RIGHT: ICD-10-PCS | Mod: RT,S$GLB,, | Performed by: RADIOLOGY

## 2022-02-04 PROCEDURE — 99213 OFFICE O/P EST LOW 20 MIN: CPT | Mod: S$GLB,,, | Performed by: NURSE PRACTITIONER

## 2022-02-04 PROCEDURE — 73562 X-RAY EXAM OF KNEE 3: CPT | Mod: RT,S$GLB,, | Performed by: RADIOLOGY

## 2022-02-04 NOTE — PROGRESS NOTES
Subjective:       Patient ID: Jennifer Nguyen is a 31 y.o. female.    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 She states she had been nursing it at home with ice and the pain is getting worse. She complains of severe pain in one spot when you touch it. Pain level 9/10 to touch. No medication for pain. [place]    Knee Injury  This is a recurrent problem. The current episode started 1 to 4 weeks ago. The problem occurs intermittently. The problem has been unchanged. Associated symptoms include arthralgias and myalgias. Pertinent negatives include no abdominal pain, anorexia, change in bowel habit, chest pain, chills, congestion, coughing, diaphoresis, fatigue, fever, headaches, joint swelling, nausea, neck pain, numbness, rash, sore throat, swollen glands, urinary symptoms, vertigo, visual change, vomiting or weakness. Exacerbated by: palpation. She has tried ice for the symptoms. The treatment provided mild relief.       Constitution: Negative. Negative for chills, sweating, fatigue and fever.   HENT: Negative.  Negative for congestion and sore throat.    Neck: neck negative. Negative for neck pain.   Cardiovascular: Negative.  Negative for chest pain.   Eyes: Negative.    Respiratory: Negative.  Negative for cough.    Gastrointestinal: Negative.  Negative for abdominal pain, nausea and vomiting.   Endocrine: negative.   Genitourinary: Negative.    Musculoskeletal: Positive for pain, trauma, joint pain and muscle ache. Negative for joint swelling.   Skin: Negative.  Negative for rash and erythema.   Allergic/Immunologic: Negative.    Neurological: Negative.  Negative for history of vertigo, headaches and numbness.   Hematologic/Lymphatic: Negative.    Psychiatric/Behavioral: Negative.         Objective:      Physical Exam  Vitals and  nursing note reviewed.   Constitutional:       General: She is not in acute distress.     Appearance: Normal appearance. She is well-developed and well-nourished. She is not ill-appearing, toxic-appearing or diaphoretic.   HENT:      Head: Normocephalic and atraumatic. No abrasion, contusion or laceration.      Right Ear: External ear normal.      Left Ear: External ear normal.      Nose: Nose normal.      Mouth/Throat:      Mouth: Oropharynx is clear and moist.   Eyes:      General: Lids are normal.      Extraocular Movements: EOM normal.      Conjunctiva/sclera: Conjunctivae normal.      Pupils: Pupils are equal, round, and reactive to light.   Neck:      Trachea: Trachea and phonation normal.   Cardiovascular:      Rate and Rhythm: Normal rate and regular rhythm.      Heart sounds: Normal heart sounds.   Pulmonary:      Effort: Pulmonary effort is normal. No respiratory distress.      Breath sounds: Normal breath sounds. No stridor.   Musculoskeletal:         General: Tenderness present. Normal range of motion.      Cervical back: Full passive range of motion without pain and neck supple.        Legs:    Skin:     General: Skin is warm, dry and intact.      Capillary Refill: Capillary refill takes less than 2 seconds.      Findings: No abrasion, bruising, burn, ecchymosis, erythema, laceration, lesion or rash.   Neurological:      Mental Status: She is alert and oriented to person, place, and time.   Psychiatric:         Mood and Affect: Mood and affect normal.         Speech: Speech normal.         Behavior: Behavior normal. Behavior is cooperative.         Thought Content: Thought content normal.         Cognition and Memory: Cognition and memory normal.         Judgment: Judgment normal.         Assessment:       1. Contusion of right knee, initial encounter    2. Acute pain of right knee        Plan:                Restrictions: Regular Duty,Discharged from Occupational Health  Follow up if symptoms worsen or  fail to improve.

## 2022-02-04 NOTE — LETTER
Urgent Care - [address] [address]  Phone: [phone]  Fax: [phone]  [facility]: [email or web address]    Pt Name: Jennifer Nguyen  Injury Date: 01/19/2022    Date of First Treatment: 02/04/2022   Company: [employer]      Appointment Time: 02:45 PM Arrived: 254pm   Provider: Martha Arita NP Time Out:330pm     Office Treatment:   1. Contusion of right knee, initial encounter    2. Acute pain of right knee               Restrictions: Regular Duty,Discharged from Occupational Health     Released from [facility]

## 2022-02-09 ENCOUNTER — CLINICAL SUPPORT (OUTPATIENT)
Dept: REHABILITATION | Facility: HOSPITAL | Age: 32
End: 2022-02-09
Payer: COMMERCIAL

## 2022-02-09 DIAGNOSIS — M79.644 CHRONIC THUMB PAIN, RIGHT: ICD-10-CM

## 2022-02-09 DIAGNOSIS — R29.898 DECREASED GRIP STRENGTH OF RIGHT HAND: Primary | ICD-10-CM

## 2022-02-09 DIAGNOSIS — Z78.9 DEFICIT IN ACTIVITIES OF DAILY LIVING (ADL): ICD-10-CM

## 2022-02-09 DIAGNOSIS — G89.29 CHRONIC THUMB PAIN, RIGHT: ICD-10-CM

## 2022-02-09 PROCEDURE — 97110 THERAPEUTIC EXERCISES: CPT | Mod: PN

## 2022-02-09 NOTE — PLAN OF CARE
"                            Outpatient Occupational Therapy Progress Note       Date: 2/9/2022  Name: Jennifer Nguyen  Clinic Number: [id number]    Therapy Diagnosis:   Encounter Diagnoses   Name Primary?    Decreased  strength of right hand Yes    Chronic thumb pain, right     Deficit in activities of daily living (ADL)      Physician: Meredith Padron PA    Physician Orders: Eval and treat   Medical Diagnosis: M25.341 (ICD-10-CM) - Chronic instability of metacarpophalangeal joint of right thumb  M79.644 (ICD-10-CM) - Thumb pain, right  M25.641 (ICD-10-CM) - Decreased range of motion of right thumb  Surgical Procedure and Date/DOI: 1/6/20; R thumb UCL repair by Dr. Zaman; re-injury to R thumb November 2021 playing [email or web address]   Insurance Authorization Period Expiration: 1/11/23; * pending review as of 1/25/22  Plan of Care Certification Period: 1/11/22-3/25/22  Next Progress Note Due: 3/11/2022  Date of Return to MD: LULA  Date of Evaluation: 1/11/22    Visit # / Visits authorized: 5/10-20 visits on Plan of Care (excluding evaluation)  / 12 visits pending review   Time In: 9:18am  Time Out: 10:02  Total Billable Time: 35 minutes  Total Treatment Time: 44 minutes    Precautions:  Standard and Thumb spica brace to be worn in community with activity      Subjective     Patient (Pt) reports: "I think for the most part the pain has gone away, but because I am wearing the brace there is more stiffness."  Pt was not compliant with home exercise program given last session.   Response to previous treatment: Reduced pain     Pain: current: 0/10; worst: 4/10; best: 0/10  Location: right dorsal thumb, thenar eminence, thumb IPJ      Objective     Thumb AROM 2/9/2022 2/9/2022    Right Left   MP 0/50 0/66   IP 0/70 0/75   Radial ABD 57 70   Palmar ABD 45 60   Opposition SF DPC SF DPC     : R = 49.8 , L = 32.1  Key: R = 2 , L = 11.6  3JC: R =  6.3, L = 12.5  Tip: R = 5.3, L = 7.7    Sensation: Intact    9 hole " peg:  R = 21.55 sec  L = 18.46     Pt received the following supervised modalities after being cleared for contradictions for 8 minutes:  - Moist heat application to R hand x 7 minutes to decrease joint stiffness and increase circulation     · Pt received performed the following therapeutic exercises for 35 minutes:  -Fxnl Assessment: range of motion, sensation, pain, , pinch  -Small peg board, opposition to each digit X 3 mins  -Red & Blue Clip, 3JC with small poms  -Yellow Putty  X 3 mins  -Review AROM thumb for HEP  *Radial ABD  *Palmar ABD  *Circumduction  *MP/IPJ flexion    Home Exercises and Education Provided     Education provided:  - Issue thumb AROM, hand written  - Pt discussed goals of gentle strengthening and AROM to increase strength & joint stability        Written Home Exercises Provided: Patient instructed to cont prior HEP.  Exercises were reviewed and Jennifer was able to demonstrate them prior to the end of the session.  Jennifer demonstrated good  understanding of the HEP provided.     See EMR under Patient Instructions for exercises provided 1/11/22.       Assessment     Pt has participated in just over 1 month skilled OT for treatment of a chronic UCL repair and is making steady progress towards goals. Thus far she reports steady reduction in pain and good compliance with Brace. Range of motion limitations, deficits in  & pinch strength as well as MPJ stability continue to impact functional grasp. At this phase of tx, pt will benefit from skilled OT with a focus of range of motion,  & pinch strengthening in hopes of gradually weaning from brace and increasing joint stability.    Jennifer is progressing fairly well towards her goals and there are no updates to goals at this time. Pt prognosis is Good.     Pt demonstrated proper understanding of each exercise. Pt continues to be limited in functional and leisurely pursuits. Pain, decreased thumb ROM, and decreased /pinch strength  limits Pts participation in ADL's and IADLs. Pt is not able to carryout necessary vocational tasks. Pt continues to requires cues and skilled supervision to complete HEP.     Anticipated barriers to Occupational Therapy: Compliance w/ RICE method and bracing     Pt's spiritual, cultural and educational needs considered and Pt agreeable to Plan of Care and goals.    ShortTerm Goals (to be met in 5 weeks or by 2/15/22):   1. Patient to be IND and compliant with brace wear, HEP, & attendance for duration of therapy. - progressing  2. Patient will demonstrate increased ARSHAD in R thumb planes of motion by 5-10 degrees to restore functional hand use for ADLs.   3. Patient will demonstrate increased R hand  strength by 3-5 lbs. to improve functional grasp for ADLs/work/leisure activities. - Progressing  4. Patient will demonstrate increased 3 pt pinch by 0.5-1 psi's to facilitate ease of performance with fine motor activities. - Progressing  5. Patient will report no more than 4/10 pain in R hand. -Progressing, met at rest        Long Term Goals (to be met in 10 weeks or by DC):   1. Patient to be IND and compliant with HEP & attendance for duration of therapy.  2. Patient will demonstrate increased R hand  strength by 10-15 lbs. to improve functional grasp for ADLs/work/leisure activities.   3. Patient will demonstrate increased 3 pt pinch by 1-2 psi's to facilitate ease of performance with fine motor activities.  5. Patient will report no more than 2/10 pain in R hand.   6. Patient will report increase in functional use of her R hand by 30%-40% as evidenced by the FOTO outcome measure.   7. Patient will demonstrate increased gross muscular strength in isolated thumb tendons by 1-2 muscle grades to facilitate safe performance of sports-related activities .     Plan     Jennifer Nguyen is to be treated by Occupational Therapy 1-2 times per week for 10 weeks, or 10-20 visits, during the certification period from  1/11/22 to 3/25/22,  to achieve the established goals.     Updates/Grading for next session: Gentle Pinch,  Strength incorporate soft tissue work & comfort modalities as indicated      Thank you for allowing me to assist in the care of your Patient. If you have any questions or concerns, please don't hesitate to e-mail or contact me directly.      KYLE Devine  [facility] [place]   Phone: [phone]  Fax: [phone]

## 2022-02-10 NOTE — PROGRESS NOTES
"                            Outpatient Occupational Therapy Daily Treatment Note       Date: 2/14/2022  Name: Jennifer Nguyen  Clinic Number: [id number]    Therapy Diagnosis:   Encounter Diagnoses   Name Primary?    Decreased range of motion of right thumb Yes    Thumb pain, right     Chronic instability of metacarpophalangeal joint of right thumb     Decreased  strength of right hand     Chronic thumb pain, right     Deficit in activities of daily living (ADL)     Stiffness of thumb joint      Physician: Meredith Padron PA    Physician Orders: Eval and treat   Medical Diagnosis: M25.341 (ICD-10-CM) - Chronic instability of metacarpophalangeal joint of right thumb  M79.644 (ICD-10-CM) - Thumb pain, right  M25.641 (ICD-10-CM) - Decreased range of motion of right thumb  Surgical Procedure and Date/DOI: 1/6/20; R thumb UCL repair by Dr. Zaman; re-injury to R thumb November 2021 playing [email or web address]   Insurance Authorization Period Expiration: 1/11/23; * pending review as of 1/25/22  Plan of Care Certification Period: 1/11/22-3/25/22  Date of Return to MD: LULA  Date of Evaluation: 1/11/22    Visit # / Visits authorized: 5/10-20 visits on Plan of Care (excluding evaluation)  / 12 visits pending review   Time In: 10:00  Time Out: 10:45  Total Billable Time: 38 minutes  Total Treatment Time: 45 minutes    Precautions:  Standard and Thumb spica brace to be worn in community with activity    Subjective     Patient (Pt) reports: I'm trying to get out of the brace more at home. I was a little more sore on Saturday- but it's not too bad today."     Pt was not compliant with home exercise program given last session.   Response to previous treatment:favorable     Pain: pre-session: 1/10, dull,aching ; post session: 0/10  Location: right dorsal thumb       Objective       Range of Motion: right active  (Ext/Flex) Thumb   MP 0*/58 (L: 0*/65*)   IP 0*/80* (L: 0*/85*)   ARSHAD 138* (L: 150*)   *      Thumb " Opposition: R 5th volar MP   Palmar Abduction: 55*  (L: 60*)  Radial Abduction: 60*   (L: 65*)        Strength: (JAMILA Dynamometer in lbs.) Average 3 trials, Position II  Right: 49.8 lbs  (+1.1 lb)  Left: 63.4 lbs     Pinch Strength: (Pinch Gauge in lbs, Average 3 trials  Lateral/Dwyer Pinch       L) 7      R) 7 (same)   3pt Pinch                    L) 7.5   R) 6.3 (+1.3 psi)  2pt Pinch                    L) 3.5   R) 5 (+2.3 psi)        Pt received the following supervised modalities after being cleared for contradictions for 8 minutes:  -Fluidotherapy to R hand for 8 minutes to increase blood flow, circulation, desensitization, sensory re-education and for pain management. Performed the following therex within fluidotherapy:  -AROM for R thumb planes of motion        Pt received the following direct contact modalities after being cleared for contraindications for 7 minutes:  -Utrasound for scar tissue remodeling, increased circulation, & tissue elasticity @ 100% duty cycle, 3.3 Mhz, applied R dorsal and volar thenar region, intensity = 0.6 w/cm2        Therapist performed the following manual therapy techniques to increase joint mobilization and soft tissue mobilization for 30 minutes:   -Soft tissue massage (STM) and myofascial release (MFR) to R hand/wrist/forearm  to increase joint mobility, ROM and for pain management.   - Gentle PROM for all thumb planes of motion       Home Exercises and Education Provided     Education provided:  - Progress towards goals  - Pt instructed on importance of compliance w/ bracing and RICE        Written Home Exercises Provided: Patient instructed to cont prior HEP.  Exercises were reviewed and Jennifer was able to demonstrate them prior to the end of the session.  Jennifer demonstrated good  understanding of the HEP provided.     See EMR under Patient Instructions for exercises provided 1/11/22.       Assessment     Pt has met 4 out of 5 STG  established for therapy noting  significant gains demonstrated in regards to decreased thenar pain levels and increased CMC joint mobility. Pt would continue to benefit from skilled Occupational Therapy services.      Jennifer is progressing fairly well towards her goals and there have been updates to goals at this time. Pt prognosis is Good.     Pt demonstrated proper understanding of each exercise. Pt continues to be limited in functional and leisurely pursuits. Pain, decreased thumb ROM, and decreased /pinch strength limits Pts participation in ADL's and IADLs. Pt is not able to carryout necessary vocational tasks. Pt continues to requires cues and skilled supervision to complete HEP.     Anticipated barriers to Occupational Therapy: Compliance w/ RICE method and bracing     Pt's spiritual, cultural and educational needs considered and Pt agreeable to Plan of Care and goals.    ShortTerm Goals (to be met in 5 weeks or by 2/15/22):   1. Patient to be IND and compliant with brace wear, HEP, & attendance for duration of therapy. Goal Met   2. Patient will demonstrate increased ARSHAD in R thumb planes of motion by 5-10 degrees to restore functional hand use for ADLs. Goal Met   3. Patient will demonstrate increased R hand  strength by 3-5 lbs. to improve functional grasp for ADLs/work/leisure activities. In progress   4. Patient will demonstrate increased 3 pt pinch by 0.5-1 psi's to facilitate ease of performance with fine motor activities. - Goal Met   5. Patient will report no more than 4/10 pain in R hand. Goal Met         Long Term Goals (to be met in 10 weeks or by DC):   1. Patient to be IND and compliant with HEP & attendance for duration of therapy.  2. Patient will demonstrate increased R hand  strength by 10-15 lbs. to improve functional grasp for ADLs/work/leisure activities.   3. Patient will demonstrate increased 3 pt pinch by 1-2 psi's to facilitate ease of performance with fine motor activities.  5. Patient will report no  more than 2/10 pain in R hand.   6. Patient will report increase in functional use of her R hand by 30%-40% as evidenced by the FOTO outcome measure.   7. Patient will demonstrate increased gross muscular strength in isolated thumb tendons by 1-2 muscle grades to facilitate safe performance of sports-related activities .          Plan     Jennifre Nguyen is to be treated by Occupational Therapy 1-2 times per week for 10 weeks, or 10-20 visits, during the certification period from 1/11/22 to 3/25/22,  to achieve the established goals.     Updates/Grading for next session: TBD pending progress       Thank you for allowing me to assist in the care of your Patient. If you have any questions or concerns, please don't hesitate to e-mail or contact me directly.      RADHA Cristina MOT  [facility]   Phone: [phone]  Fax: [phone]

## 2022-02-14 ENCOUNTER — CLINICAL SUPPORT (OUTPATIENT)
Dept: REHABILITATION | Facility: HOSPITAL | Age: 32
End: 2022-02-14
Payer: COMMERCIAL

## 2022-02-14 DIAGNOSIS — Z78.9 DEFICIT IN ACTIVITIES OF DAILY LIVING (ADL): ICD-10-CM

## 2022-02-14 DIAGNOSIS — G89.29 CHRONIC THUMB PAIN, RIGHT: ICD-10-CM

## 2022-02-14 DIAGNOSIS — M25.641 DECREASED RANGE OF MOTION OF RIGHT THUMB: Primary | ICD-10-CM

## 2022-02-14 DIAGNOSIS — M79.644 THUMB PAIN, RIGHT: ICD-10-CM

## 2022-02-14 DIAGNOSIS — M79.644 CHRONIC THUMB PAIN, RIGHT: ICD-10-CM

## 2022-02-14 DIAGNOSIS — R29.898 DECREASED GRIP STRENGTH OF RIGHT HAND: ICD-10-CM

## 2022-02-14 DIAGNOSIS — M25.341 CHRONIC INSTABILITY OF METACARPOPHALANGEAL JOINT OF RIGHT THUMB: ICD-10-CM

## 2022-02-14 DIAGNOSIS — M25.649 STIFFNESS OF THUMB JOINT: ICD-10-CM

## 2022-02-14 PROCEDURE — 97140 MANUAL THERAPY 1/> REGIONS: CPT | Mod: PN

## 2022-02-14 PROCEDURE — 97022 WHIRLPOOL THERAPY: CPT | Mod: PN

## 2022-02-14 PROCEDURE — 97035 APP MDLTY 1+ULTRASOUND EA 15: CPT | Mod: PN

## 2022-02-14 PROCEDURE — 97110 THERAPEUTIC EXERCISES: CPT | Mod: PN

## 2022-02-16 ENCOUNTER — CLINICAL SUPPORT (OUTPATIENT)
Dept: REHABILITATION | Facility: HOSPITAL | Age: 32
End: 2022-02-16
Payer: COMMERCIAL

## 2022-02-16 DIAGNOSIS — G89.29 CHRONIC THUMB PAIN, RIGHT: ICD-10-CM

## 2022-02-16 DIAGNOSIS — M79.644 CHRONIC THUMB PAIN, RIGHT: ICD-10-CM

## 2022-02-16 DIAGNOSIS — Z78.9 DEFICIT IN ACTIVITIES OF DAILY LIVING (ADL): ICD-10-CM

## 2022-02-16 DIAGNOSIS — R29.898 DECREASED GRIP STRENGTH OF RIGHT HAND: Primary | ICD-10-CM

## 2022-02-16 PROCEDURE — 97110 THERAPEUTIC EXERCISES: CPT | Mod: PN

## 2022-02-16 PROCEDURE — 97140 MANUAL THERAPY 1/> REGIONS: CPT | Mod: PN

## 2022-02-16 PROCEDURE — 97035 APP MDLTY 1+ULTRASOUND EA 15: CPT | Mod: PN

## 2022-02-16 NOTE — PROGRESS NOTES
"                            Outpatient Occupational Therapy Daily Treatment Note       Date: 2/16/2022  Name: Jennifer Nguyen  Clinic Number: [id number]    Therapy Diagnosis:   Encounter Diagnoses   Name Primary?    Decreased  strength of right hand Yes    Chronic thumb pain, right     Deficit in activities of daily living (ADL)       Physician: Meredith Padron PA    Physician Orders: Eval and treat   Medical Diagnosis: M25.341 (ICD-10-CM) - Chronic instability of metacarpophalangeal joint of right thumb  M79.644 (ICD-10-CM) - Thumb pain, right  M25.641 (ICD-10-CM) - Decreased range of motion of right thumb  Surgical Procedure and Date/DOI: 1/6/20; R thumb UCL repair by Dr. Zaman; re-injury to R thumb November 2021 playing [email or web address]   Insurance Authorization Period Expiration: 12/31/2022  Plan of Care Certification Period: 1/11/22-3/25/22  Date of Return to MD: LULA  Date of Evaluation: 1/11/22    Visit # / Visits authorized: 7/26  Time In:9:15  Time Out: 10:00  Total Billable Time: 38 minutes  Total Treatment Time: 45 minutes    Precautions:  Standard and Thumb spica brace to be worn in community with activity      Subjective     Patient (Pt) reports: "It's a bit achey, I feel like I overworked it during some sweeping."    Pt was not compliant with home exercise program given last session.   Response to previous treatment: good      Pain: pre-session:1/10 ; post session: 3/10, sore  Location: right dorsal thumb       Objective    Pt received the following supervised modalities after being cleared for contradications:  - Moist heat application to R hand x 7 minutes to decrease joint stiffness and increase circulation      Pt received the following direct contact modalities after being cleared for contraindications for 8 minutes:  -Utrasound for scar tissue remodeling, increased soft tissue extensibility @ 100% duty cycle, 3.3 Mhz, applied R dorsal and volar thenar region, intensity = 0.9 w/cm2 " "     Therapist performed the following manual therapy techniques to increase joint mobilization and soft tissue mobilization for 15 minutes:   -Soft tissue massage (STM) and myofascial release (MFR) to thumb, 1st webspace  -Trigger point release to 1st webspace     · Pt performed 15 mins therapeutic activity to increase AROM, provide gentle stretch  -ABD/ADD with 1" cubes X 10  -Yellow putty  *  *roll  *Key pinch  *MP/IP flexion with pinch  *extension    Home Exercises and Education Provided     Education provided:  - Issue Y Putty & Exercises  - See patient instructions     Written Home Exercises Provided: Patient instructed to cont prior HEP.  Exercises were reviewed and Jennifer was able to demonstrate them prior to the end of the session.  Jennifer demonstrated good  understanding of the HEP provided.     See EMR under Patient Instructions for exercises provided 1/11/22.       Assessment2     Pt would continue to benefit from skilled Occupational Therapy services. Pt tolerated session fairly well. Palpable trigger points and accompanying tenderness throughout 1st webspace & thenar eminence.  Fair response to putty though pt reports pulling along ulnar aspect of thumb with resisted flexion.     Jennifer is progressing fairly well towards her goals and there are no updates to goals at this time. Pt prognosis is Good.     Pt demonstrated proper understanding of each exercise. Pt continues to be limited in functional and leisurely pursuits. Pain, decreased thumb ROM, and decreased /pinch strength limits Pts participation in ADL's and IADLs. Pt is not able to carryout necessary vocational tasks. Pt continues to requires cues and skilled supervision to complete HEP.     Anticipated barriers to Occupational Therapy: Compliance w/ RICE method and bracing     Pt's spiritual, cultural and educational needs considered and Pt agreeable to Plan of Care and goals.    ShortTerm Goals (to be met in 5 weeks or by 2/15/22):   1. " Patient to be IND and compliant with brace wear, HEP, & attendance for duration of therapy.  2. Patient will demonstrate increased ARSHAD in R thumb planes of motion by 5-10 degrees to restore functional hand use for ADLs.   3. Patient will demonstrate increased R hand  strength by 3-5 lbs. to improve functional grasp for ADLs/work/leisure activities.   4. Patient will demonstrate increased 3 pt pinch by 0.5-1 psi's to facilitate ease of performance with fine motor activities.  5. Patient will report no more than 4/10 pain in R hand.         Long Term Goals (to be met in 10 weeks or by DC):   1. Patient to be IND and compliant with HEP & attendance for duration of therapy.  2. Patient will demonstrate increased R hand  strength by 10-15 lbs. to improve functional grasp for ADLs/work/leisure activities.   3. Patient will demonstrate increased 3 pt pinch by 1-2 psi's to facilitate ease of performance with fine motor activities.  5. Patient will report no more than 2/10 pain in R hand.   6. Patient will report increase in functional use of her R hand by 30%-40% as evidenced by the FOTO outcome measure.   7. Patient will demonstrate increased gross muscular strength in isolated thumb tendons by 1-2 muscle grades to facilitate safe performance of sports-related activities .       Plan     Jennifer Nguyen is to be treated by Occupational Therapy 1-2 times per week for 10 weeks, or 10-20 visits, during the certification period from 1/11/22 to 3/25/22,  to achieve the established goals.     Updates/Grading for next session: TBD pending progress       Thank you for allowing me to assist in the care of your Patient. If you have any questions or concerns, please don't hesitate to e-mail or contact me directly.      RADHA Devine MOT  [facility]   Phone: [phone]  Fax: [phone]

## 2022-02-18 ENCOUNTER — CLINICAL SUPPORT (OUTPATIENT)
Dept: INTERNAL MEDICINE | Facility: CLINIC | Age: 32
End: 2022-02-18
Payer: COMMERCIAL

## 2022-02-18 DIAGNOSIS — Z91.038 ALLERGY TO INSECT STINGS: ICD-10-CM

## 2022-02-18 PROCEDURE — 95115 IMMUNOTHERAPY ONE INJECTION: CPT | Mod: S$GLB,,, | Performed by: ALLERGY & IMMUNOLOGY

## 2022-02-18 PROCEDURE — 99999 PR PBB SHADOW E&M-EST. PATIENT-LVL I: CPT | Mod: PBBFAC,,,

## 2022-02-18 PROCEDURE — 95115 PR IMMUNOTHERAPY, ONE INJECTION: ICD-10-PCS | Mod: S$GLB,,, | Performed by: ALLERGY & IMMUNOLOGY

## 2022-02-18 PROCEDURE — 99499 UNLISTED E&M SERVICE: CPT | Mod: S$GLB,,, | Performed by: ALLERGY & IMMUNOLOGY

## 2022-02-18 PROCEDURE — 99999 PR PBB SHADOW E&M-EST. PATIENT-LVL I: ICD-10-PCS | Mod: PBBFAC,,,

## 2022-02-18 PROCEDURE — 99499 NO LOS: ICD-10-PCS | Mod: S$GLB,,, | Performed by: ALLERGY & IMMUNOLOGY

## 2022-02-18 PROCEDURE — 95115 IMMUNOTHERAPY ONE INJECTION: CPT | Mod: PBBFAC,PO

## 2022-02-23 ENCOUNTER — CLINICAL SUPPORT (OUTPATIENT)
Dept: REHABILITATION | Facility: HOSPITAL | Age: 32
End: 2022-02-23
Payer: COMMERCIAL

## 2022-02-23 DIAGNOSIS — G89.29 CHRONIC THUMB PAIN, RIGHT: ICD-10-CM

## 2022-02-23 DIAGNOSIS — Z78.9 DEFICIT IN ACTIVITIES OF DAILY LIVING (ADL): ICD-10-CM

## 2022-02-23 DIAGNOSIS — M79.644 CHRONIC THUMB PAIN, RIGHT: ICD-10-CM

## 2022-02-23 DIAGNOSIS — R29.898 DECREASED GRIP STRENGTH OF RIGHT HAND: Primary | ICD-10-CM

## 2022-02-23 PROCEDURE — 97530 THERAPEUTIC ACTIVITIES: CPT | Mod: PN

## 2022-02-23 PROCEDURE — 97022 WHIRLPOOL THERAPY: CPT | Mod: PN

## 2022-02-23 PROCEDURE — 97140 MANUAL THERAPY 1/> REGIONS: CPT | Mod: PN

## 2022-02-23 NOTE — PROGRESS NOTES
"                            Outpatient Occupational Therapy Daily Treatment Note       Date: 2/23/2022  Name: Jennifer Nguyen  Clinic Number: [id number]    Therapy Diagnosis:   No diagnosis found.   Physician: Meredith Padron PA    Physician Orders: Eval and treat   Medical Diagnosis: M25.341 (ICD-10-CM) - Chronic instability of metacarpophalangeal joint of right thumb  M79.644 (ICD-10-CM) - Thumb pain, right  M25.641 (ICD-10-CM) - Decreased range of motion of right thumb  Surgical Procedure and Date/DOI: 1/6/20; R thumb UCL repair by Dr. Zaman; re-injury to R thumb November 2021 playing [email or web address]   Insurance Authorization Period Expiration: 12/31/2022  Plan of Care Certification Period: 1/11/22-3/25/22  Date of Return to MD: LULA  Date of Evaluation: 1/11/22    Visit # / Visits authorized: 8/26  Time In:9:15  Time Out: 9:55 am  Total Billable Time: 38 minutes  Total Treatment Time: 40 minutes    Precautions:  Standard and Thumb spica brace to be worn in community with activity      Subjective     Patient (Pt) reports: "It's a little achey today."  "A ball jammed into my thumb last weekend which kind of hurt."    Pt was not compliant with home exercise program given last session.   Response to previous treatment: good      Pain: pre-session:3/10  Location: right dorsal thumb       Objective    Pt received the following supervised modalities after being cleared for contradications:  - Fluido to R hand x 10 minutes to decrease joint stiffness and increase circulation      Therapist performed the following manual therapy techniques to increase joint mobilization and soft tissue mobilization for 15 minutes:   -Soft tissue massage (STM) and myofascial release (MFR) to thumb, 1st webspace  -Trigger point release to 1st webspace     · Pt performed 15 mins therapeutic activity to increase AROM, provide gentle stretch  -Cone Slides  -Blue & Green Clip with Medium Poms X 2 cycles ea  -Digital Extension with Yellow & " Red Bands X 15 ea  -Wrist PREs on blue wedge X 15 ea, 3 sec hold  *extension  *flexion  *R/UD      Home Exercises and Education Provided     Education provided:  - Issue wrist PREs via HEP2go, see patient instructions    Written Home Exercises Provided: Patient instructed to cont prior HEP.  Exercises were reviewed and Jennifer was able to demonstrate them prior to the end of the session.  Jennifer demonstrated good  understanding of the HEP provided.       Assessment2     Pt would continue to benefit from skilled Occupational Therapy services. Today pt presents with dermatitis along snuff box, dorsal P2, and ulnar wrist that she attributes to prolonged brace wear during festival.  She demos good tolerance for activity though easy fatigue with wrist strengthening particularly extension and during resisted pinching tasks.  Pt denies inc'd pain with progression of session.     Jennifer is progressing fairly well towards her goals and there are no updates to goals at this time. Pt prognosis is Good.     Pt demonstrated proper understanding of each exercise. Pt continues to be limited in functional and leisurely pursuits. Pain, decreased thumb ROM, and decreased /pinch strength limits Pts participation in ADL's and IADLs. Pt is not able to carryout necessary vocational tasks. Pt continues to requires cues and skilled supervision to complete HEP.     Anticipated barriers to Occupational Therapy: Compliance w/ RICE method and bracing     Pt's spiritual, cultural and educational needs considered and Pt agreeable to Plan of Care and goals.    ShortTerm Goals (to be met in 5 weeks or by 2/15/22):   1. Patient to be IND and compliant with brace wear, HEP, & attendance for duration of therapy.  2. Patient will demonstrate increased ARSHAD in R thumb planes of motion by 5-10 degrees to restore functional hand use for ADLs.   3. Patient will demonstrate increased R hand  strength by 3-5 lbs. to improve functional grasp for  ADLs/work/leisure activities.   4. Patient will demonstrate increased 3 pt pinch by 0.5-1 psi's to facilitate ease of performance with fine motor activities.  5. Patient will report no more than 4/10 pain in R hand.         Long Term Goals (to be met in 10 weeks or by DC):   1. Patient to be IND and compliant with HEP & attendance for duration of therapy.  2. Patient will demonstrate increased R hand  strength by 10-15 lbs. to improve functional grasp for ADLs/work/leisure activities.   3. Patient will demonstrate increased 3 pt pinch by 1-2 psi's to facilitate ease of performance with fine motor activities.  5. Patient will report no more than 2/10 pain in R hand.   6. Patient will report increase in functional use of her R hand by 30%-40% as evidenced by the FOTO outcome measure.   7. Patient will demonstrate increased gross muscular strength in isolated thumb tendons by 1-2 muscle grades to facilitate safe performance of sports-related activities .       Plan     Jennifer Nguyen is to be treated by Occupational Therapy 1-2 times per week for 10 weeks, or 10-20 visits, during the certification period from 1/11/22 to 3/25/22,  to achieve the established goals.     Updates/Grading for next session: TBD pending progress       Thank you for allowing me to assist in the care of your Patient. If you have any questions or concerns, please don't hesitate to e-mail or contact me directly.      RADHA Devine MOT  [facility]   Phone: [phone]  Fax: [phone]

## 2022-02-25 ENCOUNTER — CLINICAL SUPPORT (OUTPATIENT)
Dept: REHABILITATION | Facility: HOSPITAL | Age: 32
End: 2022-02-25
Payer: COMMERCIAL

## 2022-02-25 DIAGNOSIS — R29.898 DECREASED GRIP STRENGTH OF RIGHT HAND: Primary | ICD-10-CM

## 2022-02-25 DIAGNOSIS — M79.644 CHRONIC THUMB PAIN, RIGHT: ICD-10-CM

## 2022-02-25 DIAGNOSIS — Z78.9 DEFICIT IN ACTIVITIES OF DAILY LIVING (ADL): ICD-10-CM

## 2022-02-25 DIAGNOSIS — G89.29 CHRONIC THUMB PAIN, RIGHT: ICD-10-CM

## 2022-02-25 PROCEDURE — 97140 MANUAL THERAPY 1/> REGIONS: CPT | Mod: PN

## 2022-02-25 PROCEDURE — 97530 THERAPEUTIC ACTIVITIES: CPT | Mod: PN

## 2022-02-25 PROCEDURE — 97022 WHIRLPOOL THERAPY: CPT | Mod: PN

## 2022-02-25 NOTE — PROGRESS NOTES
"                            Outpatient Occupational Therapy Daily Treatment Note       Date: 2/25/2022  Name: Jennifer Nguyen  Clinic Number: [id number]    Therapy Diagnosis:   Encounter Diagnoses   Name Primary?    Decreased  strength of right hand Yes    Chronic thumb pain, right     Deficit in activities of daily living (ADL)       Physician: Meredith Padron PA    Physician Orders: Eval and treat   Medical Diagnosis: M25.341 (ICD-10-CM) - Chronic instability of metacarpophalangeal joint of right thumb  M79.644 (ICD-10-CM) - Thumb pain, right  M25.641 (ICD-10-CM) - Decreased range of motion of right thumb  Surgical Procedure and Date/DOI: 1/6/20; R thumb UCL repair by Dr. Zaman; re-injury to R thumb November 2021 playing [email or web address]ball   Insurance Authorization Period Expiration: 12/31/2022  Plan of Care Certification Period: 1/11/22-3/25/22  Date of Return to MD: LULA  Date of Evaluation: 1/11/22    Visit # / Visits authorized: 9/26  Time In: 8:30 am  Time Out: 9:15 am  Total Billable Time: 40 minutes  Total Treatment Time: 45 minutes    Precautions:  Standard and Thumb spica brace to be worn in community with activity      Subjective     Patient (Pt) reports: "It's ok today, not terrible."    Pt was compliant with home exercise program given last session.   Response to previous treatment: good    Pain: pre-session: 2/10  Location: right dorsal thumb       Objective    Pt received the following supervised modalities after being cleared for contradications:  - Fluido to R hand x 10 minutes to decrease joint stiffness and increase circulation      Therapist performed the following manual therapy techniques to increase joint mobilization and soft tissue mobilization for 10 minutes:   -Soft tissue massage (STM) and myofascial release (MFR) to thumb, 1st webspace  -Trigger point release to 1st webspace     · Pt performed 15 mins therapeutic activity to increase AROM, provide gentle stretch  -Thumb CMC " stabilization x 20  *Opposition over tennis ball  *MP/IPJ extension/flexion with ball  *Red rubber band over ball, IF extension/ADD/return to neutral  -Digital Extension with R band X 20  -Orange Putty x 2' ea  *  *pinch/pull/roll into small balls  *ABD/ADD       Home Exercises and Education Provided     Education provided:  - Review previously issued HEP    Written Home Exercises Provided: Patient instructed to cont prior HEP.  Exercises were reviewed and Jennifer was able to demonstrate them prior to the end of the session.  Jennifer demonstrated good  understanding of the HEP provided.       Assessment2     Pt would continue to benefit from skilled Occupational Therapy services. Today pt exhibits presence of dermatitis that is slowly resolving, less erythema around snuff box & dorsum of thumb. She tolerates treatment well but reports discomfort during IPJ flexion & IF stabilization activity.  Max effort and good pace full duration of treatment.      Jennifer is progressing fairly well towards her goals and there are no updates to goals at this time. Pt prognosis is Good.     Pt demonstrated proper understanding of each exercise. Pt continues to be limited in functional and leisurely pursuits. Pain, decreased thumb ROM, and decreased /pinch strength limits Pts participation in ADL's and IADLs. Pt is not able to carryout necessary vocational tasks. Pt continues to requires cues and skilled supervision to complete HEP.     Anticipated barriers to Occupational Therapy: Compliance w/ RICE method and bracing     Pt's spiritual, cultural and educational needs considered and Pt agreeable to Plan of Care and goals.    ShortTerm Goals (to be met in 5 weeks or by 2/15/22):   1. Patient to be IND and compliant with brace wear, HEP, & attendance for duration of therapy.  2. Patient will demonstrate increased ARSHAD in R thumb planes of motion by 5-10 degrees to restore functional hand use for ADLs.   3. Patient will demonstrate  increased R hand  strength by 3-5 lbs. to improve functional grasp for ADLs/work/leisure activities.   4. Patient will demonstrate increased 3 pt pinch by 0.5-1 psi's to facilitate ease of performance with fine motor activities.  5. Patient will report no more than 4/10 pain in R hand.         Long Term Goals (to be met in 10 weeks or by DC):   1. Patient to be IND and compliant with HEP & attendance for duration of therapy.  2. Patient will demonstrate increased R hand  strength by 10-15 lbs. to improve functional grasp for ADLs/work/leisure activities.   3. Patient will demonstrate increased 3 pt pinch by 1-2 psi's to facilitate ease of performance with fine motor activities.  5. Patient will report no more than 2/10 pain in R hand.   6. Patient will report increase in functional use of her R hand by 30%-40% as evidenced by the FOTO outcome measure.   7. Patient will demonstrate increased gross muscular strength in isolated thumb tendons by 1-2 muscle grades to facilitate safe performance of sports-related activities .       Plan     Jennifer Nguyen is to be treated by Occupational Therapy 1-2 times per week for 10 weeks, or 10-20 visits, during the certification period from 1/11/22 to 3/25/22,  to achieve the established goals.     Updates/Grading for next session: TBD pending progress       Thank you for allowing me to assist in the care of your Patient. If you have any questions or concerns, please don't hesitate to e-mail or contact me directly.      RADHA Devine MOT  [facility]   Phone: [phone]  Fax: [phone]

## 2022-03-02 ENCOUNTER — OFFICE VISIT (OUTPATIENT)
Dept: URGENT CARE | Facility: CLINIC | Age: 32
End: 2022-03-02
Payer: COMMERCIAL

## 2022-03-02 VITALS
OXYGEN SATURATION: 98 % | BODY MASS INDEX: 24.75 KG/M2 | TEMPERATURE: 97 F | DIASTOLIC BLOOD PRESSURE: 75 MMHG | RESPIRATION RATE: 18 BRPM | HEART RATE: 77 BPM | SYSTOLIC BLOOD PRESSURE: 125 MMHG | HEIGHT: 64 IN | WEIGHT: 145 LBS

## 2022-03-02 DIAGNOSIS — J20.9 ACUTE PURULENT BRONCHITIS: Primary | ICD-10-CM

## 2022-03-02 DIAGNOSIS — K12.0 APHTHOUS ULCER: ICD-10-CM

## 2022-03-02 PROCEDURE — 3074F SYST BP LT 130 MM HG: CPT | Mod: CPTII,S$GLB,,

## 2022-03-02 PROCEDURE — 3074F PR MOST RECENT SYSTOLIC BLOOD PRESSURE < 130 MM HG: ICD-10-PCS | Mod: CPTII,S$GLB,,

## 2022-03-02 PROCEDURE — 99213 PR OFFICE/OUTPT VISIT, EST, LEVL III, 20-29 MIN: ICD-10-PCS | Mod: S$GLB,,,

## 2022-03-02 PROCEDURE — 99213 OFFICE O/P EST LOW 20 MIN: CPT | Mod: S$GLB,,,

## 2022-03-02 PROCEDURE — 3008F PR BODY MASS INDEX (BMI) DOCUMENTED: ICD-10-PCS | Mod: CPTII,S$GLB,,

## 2022-03-02 PROCEDURE — 3008F BODY MASS INDEX DOCD: CPT | Mod: CPTII,S$GLB,,

## 2022-03-02 PROCEDURE — 3078F PR MOST RECENT DIASTOLIC BLOOD PRESSURE < 80 MM HG: ICD-10-PCS | Mod: CPTII,S$GLB,,

## 2022-03-02 PROCEDURE — 1159F MED LIST DOCD IN RCRD: CPT | Mod: CPTII,S$GLB,,

## 2022-03-02 PROCEDURE — 1159F PR MEDICATION LIST DOCUMENTED IN MEDICAL RECORD: ICD-10-PCS | Mod: CPTII,S$GLB,,

## 2022-03-02 PROCEDURE — 1160F PR REVIEW ALL MEDS BY PRESCRIBER/CLIN PHARMACIST DOCUMENTED: ICD-10-PCS | Mod: CPTII,S$GLB,,

## 2022-03-02 PROCEDURE — 1160F RVW MEDS BY RX/DR IN RCRD: CPT | Mod: CPTII,S$GLB,,

## 2022-03-02 PROCEDURE — 3078F DIAST BP <80 MM HG: CPT | Mod: CPTII,S$GLB,,

## 2022-03-02 RX ORDER — PREDNISONE 10 MG/1
10 TABLET ORAL DAILY
Qty: 4 TABLET | Refills: 0 | Status: SHIPPED | OUTPATIENT
Start: 2022-03-02 | End: 2022-03-06

## 2022-03-02 RX ORDER — PROMETHAZINE HYDROCHLORIDE AND DEXTROMETHORPHAN HYDROBROMIDE 6.25; 15 MG/5ML; MG/5ML
5 SYRUP ORAL NIGHTLY PRN
Qty: 118 ML | Refills: 0 | Status: SHIPPED | OUTPATIENT
Start: 2022-03-02 | End: 2022-03-12

## 2022-03-02 RX ORDER — DOXYCYCLINE 100 MG/1
100 CAPSULE ORAL 2 TIMES DAILY
Qty: 14 CAPSULE | Refills: 0 | Status: SHIPPED | OUTPATIENT
Start: 2022-03-02 | End: 2022-03-09

## 2022-03-02 RX ORDER — BENZONATATE 100 MG/1
100 CAPSULE ORAL 3 TIMES DAILY PRN
Qty: 30 CAPSULE | Refills: 0 | Status: SHIPPED | OUTPATIENT
Start: 2022-03-02 | End: 2022-03-12

## 2022-03-02 NOTE — PROGRESS NOTES
"Subjective:       Patient ID: Jennifer Nguyen is a 31 y.o. female.    Vitals:  height is 5' 4" (1.626 m) and weight is 65.8 kg (145 lb). Her temperature is 97.2 °F (36.2 °C). Her blood pressure is 125/75 and her pulse is 77. Her respiration is 18 and oxygen saturation is 98%.     Chief Complaint: Cough    31-year-old female complains of a cough that is been going on with for a week now.  Patient states that cough was present after getting COVID in January but about a week ago started up again.  She states that in the last 2 days the cough has gotten worse with associated congestion.  Patient states the cough is worse at night.  She is taking NyQuil for symptoms without any relief.  She denies any fever, chest pain, shortness of breath, wheezing, sore throat, ear pain.     Cough  This is a new problem. The current episode started in the past 7 days. The problem has been gradually worsening. The cough is productive of sputum. Associated symptoms include nasal congestion. Pertinent negatives include no chest pain, chills, ear congestion, ear pain, fever, postnasal drip, rhinorrhea, sore throat, shortness of breath or sweats. Nothing aggravates the symptoms. She has tried nothing for the symptoms.       Constitution: Negative for chills, sweating, fatigue and fever.   HENT: Positive for congestion. Negative for ear pain, postnasal drip, sinus pain, sinus pressure and sore throat.    Cardiovascular: Negative for chest pain and sob on exertion.   Respiratory: Positive for cough and sputum production. Negative for shortness of breath.        Objective:      Physical Exam   Constitutional: She is oriented to person, place, and time. She appears well-developed. She is cooperative.  Non-toxic appearance. She does not appear ill. No distress.   HENT:   Head: Normocephalic and atraumatic.   Ears:   Right Ear: Hearing, tympanic membrane, external ear and ear canal normal.   Left Ear: Hearing, tympanic membrane, external ear " and ear canal normal.   Nose: Congestion present. No mucosal edema, rhinorrhea or nasal deformity. No epistaxis. Right sinus exhibits no maxillary sinus tenderness and no frontal sinus tenderness. Left sinus exhibits no maxillary sinus tenderness and no frontal sinus tenderness.   Mouth/Throat: Uvula is midline, oropharynx is clear and moist and mucous membranes are normal. Mucous membranes are moist. No trismus in the jaw. Normal dentition. No uvula swelling. No oropharyngeal exudate or posterior oropharyngeal erythema.      Comments: apthous ulcer on posterior oropharynx  Eyes: Conjunctivae and lids are normal. Right eye exhibits no discharge. Left eye exhibits no discharge. No scleral icterus.   Neck: Trachea normal and phonation normal. Neck supple.   Cardiovascular: Normal rate, regular rhythm, normal heart sounds and normal pulses.   Pulmonary/Chest: Effort normal and breath sounds normal. No respiratory distress. She has no wheezes. She has no rhonchi. She has no rales.   Abdominal: Normal appearance and bowel sounds are normal. She exhibits no distension and no mass. Soft. There is no abdominal tenderness.   Musculoskeletal: Normal range of motion.         General: No deformity. Normal range of motion.   Neurological: She is alert and oriented to person, place, and time. She exhibits normal muscle tone. Coordination normal.   Skin: Skin is warm, dry, intact, not diaphoretic and not pale.   Psychiatric: Her speech is normal and behavior is normal. Judgment and thought content normal.   Nursing note and vitals reviewed.        Assessment:       1. Acute purulent bronchitis    2. Aphthous ulcer          Plan:       Discussed diagnosis with patient. Patient has no fever, tachycardia, wheezing, shortness of breath. No indication for imaging today. Recommended antibiotics, OTC medication to aid in symptom relief. Take cough medications as directed and do not take cough suppressants together. Return to clinic if  symptoms worsen or do not resolve.     Acute purulent bronchitis  -     doxycycline (VIBRAMYCIN) 100 MG Cap; Take 1 capsule (100 mg total) by mouth 2 (two) times daily. for 7 days  Dispense: 14 capsule; Refill: 0  -     benzonatate (TESSALON) 100 MG capsule; Take 1 capsule (100 mg total) by mouth 3 (three) times daily as needed for Cough.  Dispense: 30 capsule; Refill: 0  -     promethazine-dextromethorphan (PROMETHAZINE-DM) 6.25-15 mg/5 mL Syrp; Take 5 mLs by mouth nightly as needed (cough). Do not take maximum of 30mLs in 24hrs  Dispense: 118 mL; Refill: 0  -     predniSONE (DELTASONE) 10 MG tablet; Take 1 tablet (10 mg total) by mouth once daily. for 4 days  Dispense: 4 tablet; Refill: 0    Aphthous ulcer  -     (Magic mouthwash) 1:1:1 diphenhydramine(Benadryl) 12.5mg/5ml liq, aluminum & magnesium hydroxide-simethicone (Maalox), LIDOcaine viscous 2%; Swish and spit 5 mLs every 4 (four) hours as needed (apthous ulcer). For sore throat  Dispense: 90 mL; Refill: 0

## 2022-03-28 ENCOUNTER — PATIENT MESSAGE (OUTPATIENT)
Dept: ALLERGY | Facility: CLINIC | Age: 32
End: 2022-03-28
Payer: COMMERCIAL

## 2022-03-30 ENCOUNTER — CLINICAL SUPPORT (OUTPATIENT)
Dept: INTERNAL MEDICINE | Facility: CLINIC | Age: 32
End: 2022-03-30
Payer: COMMERCIAL

## 2022-03-30 DIAGNOSIS — Z91.038 ALLERGY TO INSECT STINGS: ICD-10-CM

## 2022-03-30 PROCEDURE — 95115 IMMUNOTHERAPY ONE INJECTION: CPT | Mod: S$GLB,,, | Performed by: FAMILY MEDICINE

## 2022-03-30 PROCEDURE — 95115 PR IMMUNOTHERAPY, ONE INJECTION: ICD-10-PCS | Mod: S$GLB,,, | Performed by: FAMILY MEDICINE

## 2022-03-30 PROCEDURE — 99499 UNLISTED E&M SERVICE: CPT | Mod: S$GLB,,, | Performed by: ALLERGY & IMMUNOLOGY

## 2022-03-30 PROCEDURE — 99499 NO LOS: ICD-10-PCS | Mod: S$GLB,,, | Performed by: ALLERGY & IMMUNOLOGY

## 2022-04-18 ENCOUNTER — CLINICAL SUPPORT (OUTPATIENT)
Dept: INTERNAL MEDICINE | Facility: CLINIC | Age: 32
End: 2022-04-18
Payer: COMMERCIAL

## 2022-04-18 DIAGNOSIS — J30.1 ALLERGIC RHINITIS DUE TO POLLEN, UNSPECIFIED SEASONALITY: Primary | ICD-10-CM

## 2022-04-18 DIAGNOSIS — Z91.038 ALLERGY TO INSECT STINGS: ICD-10-CM

## 2022-04-18 PROCEDURE — 95115 IMMUNOTHERAPY ONE INJECTION: CPT | Mod: PBBFAC,PO

## 2022-04-18 PROCEDURE — 99499 NO LOS: ICD-10-PCS | Mod: S$GLB,,, | Performed by: ALLERGY & IMMUNOLOGY

## 2022-04-18 PROCEDURE — 99999 PR PBB SHADOW E&M-EST. PATIENT-LVL I: ICD-10-PCS | Mod: PBBFAC,,,

## 2022-04-18 PROCEDURE — 99999 PR PBB SHADOW E&M-EST. PATIENT-LVL I: CPT | Mod: PBBFAC,,,

## 2022-04-18 PROCEDURE — 99499 UNLISTED E&M SERVICE: CPT | Mod: S$GLB,,, | Performed by: ALLERGY & IMMUNOLOGY

## 2022-04-18 NOTE — PROGRESS NOTES
SQ-LT UPPER ARM  ALLERGY injection red vial #1/1 Fire ant / 0.5 ml   given,tolerated well. Pt waited 30 minutes, no local reaction noted

## 2022-04-26 ENCOUNTER — OFFICE VISIT (OUTPATIENT)
Dept: URGENT CARE | Facility: CLINIC | Age: 32
End: 2022-04-26
Payer: COMMERCIAL

## 2022-04-26 VITALS
WEIGHT: 145 LBS | BODY MASS INDEX: 24.75 KG/M2 | HEART RATE: 68 BPM | RESPIRATION RATE: 18 BRPM | DIASTOLIC BLOOD PRESSURE: 76 MMHG | OXYGEN SATURATION: 96 % | SYSTOLIC BLOOD PRESSURE: 117 MMHG | TEMPERATURE: 98 F | HEIGHT: 64 IN

## 2022-04-26 DIAGNOSIS — R30.0 DYSURIA: Primary | ICD-10-CM

## 2022-04-26 DIAGNOSIS — R35.0 URINARY FREQUENCY: ICD-10-CM

## 2022-04-26 DIAGNOSIS — R53.83 FATIGUE, UNSPECIFIED TYPE: ICD-10-CM

## 2022-04-26 LAB
B-HCG UR QL: NEGATIVE
BILIRUB UR QL STRIP: NEGATIVE
CTP QC/QA: YES
CTP QC/QA: YES
GLUCOSE UR QL STRIP: NEGATIVE
HGB, POC: 12.1 G/DL (ref 12–15)
KETONES UR QL STRIP: NEGATIVE
LEUKOCYTE ESTERASE UR QL STRIP: NEGATIVE
PH, POC UA: 6 (ref 5–8)
POC BLOOD, URINE: POSITIVE
POC NITRATES, URINE: NEGATIVE
PROT UR QL STRIP: NEGATIVE
SP GR UR STRIP: 1.01 (ref 1–1.03)
UROBILINOGEN UR STRIP-ACNC: NORMAL (ref 0.1–1.1)

## 2022-04-26 PROCEDURE — 3074F PR MOST RECENT SYSTOLIC BLOOD PRESSURE < 130 MM HG: ICD-10-PCS | Mod: CPTII,S$GLB,, | Performed by: NURSE PRACTITIONER

## 2022-04-26 PROCEDURE — 81025 URINE PREGNANCY TEST: CPT | Mod: S$GLB,,, | Performed by: NURSE PRACTITIONER

## 2022-04-26 PROCEDURE — 3008F BODY MASS INDEX DOCD: CPT | Mod: CPTII,S$GLB,, | Performed by: NURSE PRACTITIONER

## 2022-04-26 PROCEDURE — 3008F PR BODY MASS INDEX (BMI) DOCUMENTED: ICD-10-PCS | Mod: CPTII,S$GLB,, | Performed by: NURSE PRACTITIONER

## 2022-04-26 PROCEDURE — 81003 URINALYSIS AUTO W/O SCOPE: CPT | Mod: QW,S$GLB,, | Performed by: NURSE PRACTITIONER

## 2022-04-26 PROCEDURE — 1160F PR REVIEW ALL MEDS BY PRESCRIBER/CLIN PHARMACIST DOCUMENTED: ICD-10-PCS | Mod: CPTII,S$GLB,, | Performed by: NURSE PRACTITIONER

## 2022-04-26 PROCEDURE — 3078F DIAST BP <80 MM HG: CPT | Mod: CPTII,S$GLB,, | Performed by: NURSE PRACTITIONER

## 2022-04-26 PROCEDURE — 1159F PR MEDICATION LIST DOCUMENTED IN MEDICAL RECORD: ICD-10-PCS | Mod: CPTII,S$GLB,, | Performed by: NURSE PRACTITIONER

## 2022-04-26 PROCEDURE — 99213 OFFICE O/P EST LOW 20 MIN: CPT | Mod: S$GLB,,, | Performed by: NURSE PRACTITIONER

## 2022-04-26 PROCEDURE — 99213 PR OFFICE/OUTPT VISIT, EST, LEVL III, 20-29 MIN: ICD-10-PCS | Mod: S$GLB,,, | Performed by: NURSE PRACTITIONER

## 2022-04-26 PROCEDURE — 1160F RVW MEDS BY RX/DR IN RCRD: CPT | Mod: CPTII,S$GLB,, | Performed by: NURSE PRACTITIONER

## 2022-04-26 PROCEDURE — 81025 POCT URINE PREGNANCY: ICD-10-PCS | Mod: S$GLB,,, | Performed by: NURSE PRACTITIONER

## 2022-04-26 PROCEDURE — 81003 POCT URINALYSIS, DIPSTICK, AUTOMATED, W/O SCOPE: ICD-10-PCS | Mod: QW,S$GLB,, | Performed by: NURSE PRACTITIONER

## 2022-04-26 PROCEDURE — 3074F SYST BP LT 130 MM HG: CPT | Mod: CPTII,S$GLB,, | Performed by: NURSE PRACTITIONER

## 2022-04-26 PROCEDURE — 85018 POCT HEMOGLOBIN (QC): ICD-10-PCS | Mod: QW,S$GLB,, | Performed by: NURSE PRACTITIONER

## 2022-04-26 PROCEDURE — 1159F MED LIST DOCD IN RCRD: CPT | Mod: CPTII,S$GLB,, | Performed by: NURSE PRACTITIONER

## 2022-04-26 PROCEDURE — 85018 HEMOGLOBIN: CPT | Mod: QW,S$GLB,, | Performed by: NURSE PRACTITIONER

## 2022-04-26 PROCEDURE — 3078F PR MOST RECENT DIASTOLIC BLOOD PRESSURE < 80 MM HG: ICD-10-PCS | Mod: CPTII,S$GLB,, | Performed by: NURSE PRACTITIONER

## 2022-04-26 PROCEDURE — 87086 URINE CULTURE/COLONY COUNT: CPT | Performed by: NURSE PRACTITIONER

## 2022-04-26 RX ORDER — NITROFURANTOIN 25; 75 MG/1; MG/1
100 CAPSULE ORAL 2 TIMES DAILY
Qty: 10 CAPSULE | Refills: 0 | Status: SHIPPED | OUTPATIENT
Start: 2022-04-26 | End: 2022-05-01

## 2022-04-26 NOTE — PATIENT INSTRUCTIONS
- You must understand that you have received an Urgent Care treatment only and that you may be released before all of your medical problems are known or treated.   - You, the patient, will arrange for follow up care as instructed.   - If your condition worsens or fails to improve we recommend that you receive another evaluation at the ER immediately or contact your PCP to discuss your concerns.   - You can call [phone] or [phone] to help schedule an appointment with the appropriate provider.    Your urine was sent for culture, we will call you when we get the results of this.

## 2022-04-26 NOTE — PROGRESS NOTES
"Subjective:       Patient ID: Jennifer Ngyuen is a 31 y.o. female.    Vitals:  height is 5' 4" (1.626 m) and weight is 65.8 kg (145 lb). Her temperature is 98 °F (36.7 °C). Her blood pressure is 117/76 and her pulse is 68. Her respiration is 18 and oxygen saturation is 96%.     Chief Complaint: Urinary Frequency and Bruising    Patient is a 30 yo female who presents with c.o urinary frequency since Saturday. She notes mild dysuria and nausea. NO fever. She took otc azo which helped some. Gives hx of reoccuring utis and pyelo. Patient also notes a bruise to her left thigh. She reports scratching the area prior to the bruise. She also reports feeling fatigue and frequent bruising. Gives hx of anemia. She does not take an iron supplement daily. States she has a primary care appointment in july         Urinary Frequency   This is a new problem. The current episode started acute onset. The problem occurs intermittently. The problem has been unchanged. The pain is mild. There has been no fever. There is no history of pyelonephritis. Associated symptoms include frequency. Pertinent negatives include no chills or urgency. Treatments tried: azo. The treatment provided mild relief. Her past medical history is significant for recurrent UTIs.       Constitution: Negative for chills.   Genitourinary: Positive for frequency. Negative for urgency.       Objective:      Physical Exam   Constitutional: She is oriented to person, place, and time. She appears well-developed.   HENT:   Head: Normocephalic and atraumatic.   Ears:   Right Ear: External ear normal.   Left Ear: External ear normal.   Nose: Nose normal. No nasal deformity. No epistaxis.   Mouth/Throat: Oropharynx is clear and moist and mucous membranes are normal.   Eyes: Lids are normal.   Neck: Trachea normal and phonation normal. Neck supple.   Cardiovascular: Normal pulses.   Pulmonary/Chest: Effort normal.   Abdominal: Normal appearance and bowel sounds are normal. " She exhibits no distension. Soft. There is no abdominal tenderness. There is no left CVA tenderness and no right CVA tenderness.   Neurological: She is alert and oriented to person, place, and time.   Skin: Skin is warm, dry and intact. ecchymosis (L inner thigh w/ 7cm round area of ecchymosis, no edema)   Psychiatric: Her speech is normal and behavior is normal.   Nursing note and vitals reviewed.     Results for orders placed or performed in visit on 04/26/22   POCT Urinalysis, Dipstick, Automated, W/O Scope   Result Value Ref Range    POC Blood, Urine Positive (A) Negative    POC Bilirubin, Urine Negative Negative    POC Urobilinogen, Urine Normal 0.1 - 1.1    POC Ketones, Urine Negative Negative    POC Protein, Urine Negative Negative    POC Nitrates, Urine Negative Negative    POC Glucose, Urine Negative Negative    pH, UA 6.0 5 - 8    POC Specific Gravity, Urine 1.015 1.003 - 1.029    POC Leukocytes, Urine Negative Negative   POCT urine pregnancy   Result Value Ref Range    POC Preg Test, Ur Negative Negative     Acceptable Yes    POCT Hemoglobin (QC) - [facility]   Result Value Ref Range    Hemoglobin 12.1 12.0 - 15.0 g/dL     Acceptable Yes              Assessment:       1. Dysuria    2. Urinary frequency    3. Fatigue, unspecified type          Plan:         Dysuria  -     nitrofurantoin, macrocrystal-monohydrate, (MACROBID) 100 MG capsule; Take 1 capsule (100 mg total) by mouth 2 (two) times daily. for 5 days  Dispense: 10 capsule; Refill: 0    Urinary frequency  -     POCT Urinalysis, Dipstick, Automated, W/O Scope  -     POCT urine pregnancy  -     Urine culture    Fatigue, unspecified type  -     POCT Hemoglobin (QC) - [facility]         Patient Instructions   - You must understand that you have received an Urgent Care treatment only and that you may be released before all of your medical problems are known or treated.   - You, the patient, will arrange for follow up care as  instructed.   - If your condition worsens or fails to improve we recommend that you receive another evaluation at the ER immediately or contact your PCP to discuss your concerns.   - You can call [phone] or [phone] to help schedule an appointment with the appropriate provider.    Your urine was sent for culture, we will call you when we get the results of this.

## 2022-04-27 ENCOUNTER — TELEPHONE (OUTPATIENT)
Dept: URGENT CARE | Facility: CLINIC | Age: 32
End: 2022-04-27
Payer: COMMERCIAL

## 2022-04-27 LAB — BACTERIA UR CULT: NO GROWTH

## 2022-04-27 PROCEDURE — 95165 PR PROFES SVC,IMMUNOTHER,SINGLE/MULT AGS: ICD-10-PCS | Mod: S$GLB,,, | Performed by: STUDENT IN AN ORGANIZED HEALTH CARE EDUCATION/TRAINING PROGRAM

## 2022-04-27 PROCEDURE — 95165 ANTIGEN THERAPY SERVICES: CPT | Mod: S$GLB,,, | Performed by: STUDENT IN AN ORGANIZED HEALTH CARE EDUCATION/TRAINING PROGRAM

## 2022-04-28 ENCOUNTER — TELEPHONE (OUTPATIENT)
Dept: URGENT CARE | Facility: CLINIC | Age: 32
End: 2022-04-28
Payer: COMMERCIAL

## 2022-04-28 DIAGNOSIS — R31.9 HEMATURIA, UNSPECIFIED TYPE: Primary | ICD-10-CM

## 2022-05-09 ENCOUNTER — LAB VISIT (OUTPATIENT)
Dept: LAB | Facility: HOSPITAL | Age: 32
End: 2022-05-09
Attending: STUDENT IN AN ORGANIZED HEALTH CARE EDUCATION/TRAINING PROGRAM
Payer: COMMERCIAL

## 2022-05-09 ENCOUNTER — TELEPHONE (OUTPATIENT)
Dept: UROLOGY | Facility: CLINIC | Age: 32
End: 2022-05-09
Payer: COMMERCIAL

## 2022-05-09 ENCOUNTER — OFFICE VISIT (OUTPATIENT)
Dept: GASTROENTEROLOGY | Facility: CLINIC | Age: 32
End: 2022-05-09
Payer: COMMERCIAL

## 2022-05-09 VITALS
WEIGHT: 155 LBS | HEART RATE: 72 BPM | HEIGHT: 64 IN | DIASTOLIC BLOOD PRESSURE: 69 MMHG | SYSTOLIC BLOOD PRESSURE: 107 MMHG | BODY MASS INDEX: 26.46 KG/M2

## 2022-05-09 DIAGNOSIS — K90.0 CELIAC DISEASE: Primary | ICD-10-CM

## 2022-05-09 DIAGNOSIS — K90.0 CELIAC DISEASE: ICD-10-CM

## 2022-05-09 LAB
ALBUMIN SERPL BCP-MCNC: 3.8 G/DL (ref 3.5–5.2)
ALP SERPL-CCNC: 54 U/L (ref 55–135)
ALT SERPL W/O P-5'-P-CCNC: 20 U/L (ref 10–44)
ANION GAP SERPL CALC-SCNC: 8 MMOL/L (ref 8–16)
AST SERPL-CCNC: 18 U/L (ref 10–40)
BILIRUB SERPL-MCNC: 0.7 MG/DL (ref 0.1–1)
BUN SERPL-MCNC: 16 MG/DL (ref 6–20)
CALCIUM SERPL-MCNC: 8.8 MG/DL (ref 8.7–10.5)
CHLORIDE SERPL-SCNC: 108 MMOL/L (ref 95–110)
CO2 SERPL-SCNC: 24 MMOL/L (ref 23–29)
CREAT SERPL-MCNC: 0.8 MG/DL (ref 0.5–1.4)
ERYTHROCYTE [DISTWIDTH] IN BLOOD BY AUTOMATED COUNT: 12 % (ref 11.5–14.5)
EST. GFR  (AFRICAN AMERICAN): >60 ML/MIN/1.73 M^2
EST. GFR  (NON AFRICAN AMERICAN): >60 ML/MIN/1.73 M^2
FERRITIN SERPL-MCNC: 59 NG/ML (ref 20–300)
GLUCOSE SERPL-MCNC: 91 MG/DL (ref 70–110)
HCT VFR BLD AUTO: 37.8 % (ref 37–48.5)
HGB BLD-MCNC: 12.9 G/DL (ref 12–16)
INR PPP: 0.9 (ref 0.8–1.2)
IRON SERPL-MCNC: 90 UG/DL (ref 30–160)
MCH RBC QN AUTO: 29.9 PG (ref 27–31)
MCHC RBC AUTO-ENTMCNC: 34.1 G/DL (ref 32–36)
MCV RBC AUTO: 88 FL (ref 82–98)
PLATELET # BLD AUTO: 276 K/UL (ref 150–450)
PMV BLD AUTO: 9 FL (ref 9.2–12.9)
POTASSIUM SERPL-SCNC: 4.6 MMOL/L (ref 3.5–5.1)
PROT SERPL-MCNC: 6.3 G/DL (ref 6–8.4)
PROTHROMBIN TIME: 9.8 SEC (ref 9–12.5)
RBC # BLD AUTO: 4.31 M/UL (ref 4–5.4)
SATURATED IRON: 24 % (ref 20–50)
SODIUM SERPL-SCNC: 140 MMOL/L (ref 136–145)
TOTAL IRON BINDING CAPACITY: 377 UG/DL (ref 250–450)
TRANSFERRIN SERPL-MCNC: 255 MG/DL (ref 200–375)
TSH SERPL DL<=0.005 MIU/L-ACNC: 1.86 UIU/ML (ref 0.4–4)
WBC # BLD AUTO: 7.96 K/UL (ref 3.9–12.7)

## 2022-05-09 PROCEDURE — 99204 PR OFFICE/OUTPT VISIT, NEW, LEVL IV, 45-59 MIN: ICD-10-PCS | Mod: S$GLB,,, | Performed by: STUDENT IN AN ORGANIZED HEALTH CARE EDUCATION/TRAINING PROGRAM

## 2022-05-09 PROCEDURE — 3008F PR BODY MASS INDEX (BMI) DOCUMENTED: ICD-10-PCS | Mod: CPTII,S$GLB,, | Performed by: STUDENT IN AN ORGANIZED HEALTH CARE EDUCATION/TRAINING PROGRAM

## 2022-05-09 PROCEDURE — 82746 ASSAY OF FOLIC ACID SERUM: CPT | Performed by: STUDENT IN AN ORGANIZED HEALTH CARE EDUCATION/TRAINING PROGRAM

## 2022-05-09 PROCEDURE — 1159F MED LIST DOCD IN RCRD: CPT | Mod: CPTII,S$GLB,, | Performed by: STUDENT IN AN ORGANIZED HEALTH CARE EDUCATION/TRAINING PROGRAM

## 2022-05-09 PROCEDURE — 84207 ASSAY OF VITAMIN B-6: CPT | Performed by: STUDENT IN AN ORGANIZED HEALTH CARE EDUCATION/TRAINING PROGRAM

## 2022-05-09 PROCEDURE — 99999 PR PBB SHADOW E&M-EST. PATIENT-LVL III: ICD-10-PCS | Mod: PBBFAC,,, | Performed by: STUDENT IN AN ORGANIZED HEALTH CARE EDUCATION/TRAINING PROGRAM

## 2022-05-09 PROCEDURE — 85610 PROTHROMBIN TIME: CPT | Performed by: STUDENT IN AN ORGANIZED HEALTH CARE EDUCATION/TRAINING PROGRAM

## 2022-05-09 PROCEDURE — 83516 IMMUNOASSAY NONANTIBODY: CPT | Performed by: STUDENT IN AN ORGANIZED HEALTH CARE EDUCATION/TRAINING PROGRAM

## 2022-05-09 PROCEDURE — 84443 ASSAY THYROID STIM HORMONE: CPT | Performed by: STUDENT IN AN ORGANIZED HEALTH CARE EDUCATION/TRAINING PROGRAM

## 2022-05-09 PROCEDURE — 85027 COMPLETE CBC AUTOMATED: CPT | Performed by: STUDENT IN AN ORGANIZED HEALTH CARE EDUCATION/TRAINING PROGRAM

## 2022-05-09 PROCEDURE — 84425 ASSAY OF VITAMIN B-1: CPT | Performed by: STUDENT IN AN ORGANIZED HEALTH CARE EDUCATION/TRAINING PROGRAM

## 2022-05-09 PROCEDURE — 3074F SYST BP LT 130 MM HG: CPT | Mod: CPTII,S$GLB,, | Performed by: STUDENT IN AN ORGANIZED HEALTH CARE EDUCATION/TRAINING PROGRAM

## 2022-05-09 PROCEDURE — 1159F PR MEDICATION LIST DOCUMENTED IN MEDICAL RECORD: ICD-10-PCS | Mod: CPTII,S$GLB,, | Performed by: STUDENT IN AN ORGANIZED HEALTH CARE EDUCATION/TRAINING PROGRAM

## 2022-05-09 PROCEDURE — 3078F PR MOST RECENT DIASTOLIC BLOOD PRESSURE < 80 MM HG: ICD-10-PCS | Mod: CPTII,S$GLB,, | Performed by: STUDENT IN AN ORGANIZED HEALTH CARE EDUCATION/TRAINING PROGRAM

## 2022-05-09 PROCEDURE — 3074F PR MOST RECENT SYSTOLIC BLOOD PRESSURE < 130 MM HG: ICD-10-PCS | Mod: CPTII,S$GLB,, | Performed by: STUDENT IN AN ORGANIZED HEALTH CARE EDUCATION/TRAINING PROGRAM

## 2022-05-09 PROCEDURE — 3078F DIAST BP <80 MM HG: CPT | Mod: CPTII,S$GLB,, | Performed by: STUDENT IN AN ORGANIZED HEALTH CARE EDUCATION/TRAINING PROGRAM

## 2022-05-09 PROCEDURE — 99204 OFFICE O/P NEW MOD 45 MIN: CPT | Mod: S$GLB,,, | Performed by: STUDENT IN AN ORGANIZED HEALTH CARE EDUCATION/TRAINING PROGRAM

## 2022-05-09 PROCEDURE — 3008F BODY MASS INDEX DOCD: CPT | Mod: CPTII,S$GLB,, | Performed by: STUDENT IN AN ORGANIZED HEALTH CARE EDUCATION/TRAINING PROGRAM

## 2022-05-09 PROCEDURE — 80053 COMPREHEN METABOLIC PANEL: CPT | Performed by: STUDENT IN AN ORGANIZED HEALTH CARE EDUCATION/TRAINING PROGRAM

## 2022-05-09 PROCEDURE — 82728 ASSAY OF FERRITIN: CPT | Performed by: STUDENT IN AN ORGANIZED HEALTH CARE EDUCATION/TRAINING PROGRAM

## 2022-05-09 PROCEDURE — 82607 VITAMIN B-12: CPT | Performed by: STUDENT IN AN ORGANIZED HEALTH CARE EDUCATION/TRAINING PROGRAM

## 2022-05-09 PROCEDURE — 84466 ASSAY OF TRANSFERRIN: CPT | Performed by: STUDENT IN AN ORGANIZED HEALTH CARE EDUCATION/TRAINING PROGRAM

## 2022-05-09 PROCEDURE — 99999 PR PBB SHADOW E&M-EST. PATIENT-LVL III: CPT | Mod: PBBFAC,,, | Performed by: STUDENT IN AN ORGANIZED HEALTH CARE EDUCATION/TRAINING PROGRAM

## 2022-05-09 RX ORDER — ONDANSETRON 4 MG/1
4 TABLET, FILM COATED ORAL EVERY 8 HOURS PRN
Qty: 60 TABLET | Refills: 1 | Status: SHIPPED | OUTPATIENT
Start: 2022-05-09 | End: 2022-06-08

## 2022-05-09 NOTE — PROGRESS NOTES
[facility] Follow up Note    Reason for Consult:  Celiac disease     PCP:   Ariadne Guerrero   No address on file    Referring MD:  Aaareferral Self  No address on file    HPI:  This is a 31 y.o. female with PMHx of Celiac disease, Allergies here for follow up of celiac disease. She was last seen in GI clinic in 2016. She was diagnosed in 2006 based on celiac antibodies and subsequent endoscopy which confirmed the diagnosis. She had a normal cscope at that time. Initial symptoms were abdominal pain, nausea and weight loss. Last EGD was around 2013 which showed improvement in her celiac disease. No report on file. Screening for nutritional deficiencies and anemia at 2016 visit were normal except for insufficient vit D. She follows a gluten free diet well. Does admit to some inadvertent exposures a few times since last visit mostly at restaurants. Denied any diarrhea, blood in stool, constipation, abdominal pain, vomiting, dysphagia or heartburn. Does have nausea at times especially after gluten exposure and takes zofran which helps. Takes a multivitamin daily. She has noted easy bruising recently which is new for her. INR and plt were wnl in the past. She also had dysuria recently and there was blood in urine but no signs of UTI. Pending urology referral. No FH of colon cancer, IBD. Mother has graves disease. Sister developed gastroparesis after a strep infection.     ROS:  Constitutional: No fevers, chills, No weight loss  ENT: No allergies  CV: No chest pain  Pulm: No cough, No shortness of breath  Ophtho: No vision changes  GI: see HPI  Derm: No rash  Heme: No lymphadenopathy, No bruising  MSK: No arthritis  : No dysuria, No hematuria  Endo: No hot or cold intolerance  Neuro: No syncope, No seizure  Psych: No anxiety, No depression    Medical History:  has a past medical history of Allergy and Celiac disease.    Surgical History:  has a past surgical history that includes Breast surgery;  "Hammond tooth extraction; Intrauterine device insertion (04/01/2015); and Repair of collateral ligament of thumb (Right, 01/06/2020).    Family History: family history includes Breast cancer in her paternal grandmother..      Social History:  reports that she has never smoked. She has never used smokeless tobacco. She reports current alcohol use. She reports that she does not use drugs.    Review of patient's allergies indicates:   Allergen Reactions    Insect venom Anxiety, Rash and Shortness Of Breath    Gluten Nausea Only       Current Outpatient Rx   Medication Sig Dispense Refill    cetirizine (ZYRTEC) 10 MG tablet Take 1 tablet (10 mg total) by mouth 2 (two) times a day. Start taking Sunday before Rush procedure. for 3 days 6 tablet 0    clotrimazole-betamethasone 1-0.05% (LOTRISONE) cream Apply to affected area 2 times daily 15 g 1    fluticasone propionate (FLONASE) 50 mcg/actuation nasal spray 1 spray by Each Nostril route once daily.      loratadine (CLARITIN) 10 mg tablet Take 10 mg by mouth once daily.      mometasone (ELOCON) 0.1 % ointment Apply topically once daily. 45 g 2    olopatadine (PATADAY) 0.2 % Drop Place 1 drop into both eyes once daily. 5 mL 0    triamcinolone acetonide 0.1% (KENALOG) 0.1 % ointment Apply topically 2 (two) times daily. Use for up to 2 weeks. Use sparingly, thin layer only in affected (itchy) areas. 30 g 0    azelastine (ASTELIN) 137 mcg (0.1 %) nasal spray 1 spray (137 mcg total) by Nasal route 2 (two) times daily. (Patient not taking: No sig reported) 30 mL 0    famotidine (PEPCID) 20 MG tablet Take 1 tablet (20 mg total) by mouth 2 (two) times daily. Start taking Sunday before Rush procedure. for 3 days (Patient not taking: Reported on 5/9/2022) 6 tablet 0    ondansetron (ZOFRAN) 4 MG tablet Take 1 tablet (4 mg total) by mouth every 8 (eight) hours as needed for Nausea. 60 tablet 1       Objective Findings:    Vital Signs:  /69   Pulse 72   Ht 5' 4" " (1.626 m)   Wt 70.3 kg (154 lb 15.7 oz)   BMI 26.60 kg/m²   Body mass index is 26.6 kg/m².    Physical Exam:  General Appearance: Well appearing in no acute distress  Head:   Normocephalic, without obvious abnormality  Eyes:    No scleral icterus, EOMI  ENT: Neck supple, Lips, mucosa, and tongue normal    Lungs: CTA bilaterally in anterior and posterior fields, no wheezes, no crackles.  Heart:  Regular rate and rhythm, S1, S2 normal, no murmurs heard  Abdomen: Soft, non tender, non distended with positive bowel sounds in all four quadrants. No hepatosplenomegaly, ascites, or mass  Extremities: 2+ pulses, no clubbing, cyanosis or edema  Skin: No rash  Neurologic: no focal deficits       Labs:  Lab Results   Component Value Date    WBC 7.79 12/22/2015    HGB 12.1 04/26/2022    HCT 41.0 12/22/2015     12/22/2015    CHOL 164 01/05/2016    TRIG 74 01/05/2016    HDL 52 01/05/2016    ALT 13 12/22/2015    AST 19 12/22/2015     12/22/2015    K 4.3 12/22/2015     12/22/2015    CREATININE 0.7 12/22/2015    BUN 11 12/22/2015    CO2 27 12/22/2015    TSH 2.404 12/22/2015    INR 0.9 01/05/2016       Imaging:  Reviewed     Endoscopy:  None on file     Assessment:    1. Celiac disease     Recommendations:    - Celiac panel today to see if there are any detectable antibodies   - Continue gluten free diet   - Offered referral to dietician but patient feels comfortable with gluten free diet and avoids cross contamination   - CBC, CMP, TSH, Folate, B12, iron panel, Vit D, b1, b6 today   - Recommend multivitamin, B complex vitamin, Vit D   - Avoid NSAID use   - She has had pneumonia vaccinations   - INR due to easy bruising   - Will continue hematology referral for bruising pending lab results     Follow up in about 6 months (around 11/9/2022).      Order summary:  Orders Placed This Encounter    CBC Without Differential    COMPREHENSIVE METABOLIC PANEL    VITAMIN B12    FOLATE    IRON AND TIBC    FERRITIN     TSH    Celiac Disease Panel    VITAMIN B1    VITAMIN B6    PROTIME-INR    Calcitriol (1,25 di-OH Vitamin D)    ondansetron (ZOFRAN) 4 MG tablet         Thank you so much for allowing me to participate in the care of Jennifer Sharma MD  Gastroenterology   [facility]

## 2022-05-10 ENCOUNTER — OFFICE VISIT (OUTPATIENT)
Dept: UROLOGY | Facility: CLINIC | Age: 32
End: 2022-05-10
Payer: COMMERCIAL

## 2022-05-10 ENCOUNTER — PATIENT MESSAGE (OUTPATIENT)
Dept: UROLOGY | Facility: CLINIC | Age: 32
End: 2022-05-10

## 2022-05-10 VITALS — DIASTOLIC BLOOD PRESSURE: 73 MMHG | SYSTOLIC BLOOD PRESSURE: 117 MMHG | HEART RATE: 69 BPM

## 2022-05-10 DIAGNOSIS — R39.89 BLADDER PAIN: Primary | ICD-10-CM

## 2022-05-10 DIAGNOSIS — R82.90 ABNORMAL URINALYSIS: ICD-10-CM

## 2022-05-10 DIAGNOSIS — R35.0 URINARY FREQUENCY: ICD-10-CM

## 2022-05-10 DIAGNOSIS — R31.29 MICROHEMATURIA: Primary | ICD-10-CM

## 2022-05-10 DIAGNOSIS — R30.0 DYSURIA: ICD-10-CM

## 2022-05-10 LAB
BACTERIA #/AREA URNS AUTO: NORMAL /HPF
BILIRUB SERPL-MCNC: NEGATIVE MG/DL
BLOOD URINE, POC: 250
CLARITY, POC UA: CLEAR
COLOR, POC UA: ABNORMAL
FOLATE SERPL-MCNC: 9.6 NG/ML (ref 4–24)
GLUCOSE UR QL STRIP: NORMAL
KETONES UR QL STRIP: NEGATIVE
LEUKOCYTE ESTERASE URINE, POC: NEGATIVE
MICROSCOPIC COMMENT: NORMAL
NITRITE, POC UA: NEGATIVE
PH, POC UA: 5
POC RESIDUAL URINE VOLUME: 0 ML (ref 0–100)
PROTEIN, POC: ABNORMAL
RBC #/AREA URNS AUTO: 1 /HPF (ref 0–4)
SPECIFIC GRAVITY, POC UA: 1.01
SQUAMOUS #/AREA URNS AUTO: 3 /HPF
UROBILINOGEN, POC UA: NORMAL
VIT B12 SERPL-MCNC: 274 PG/ML (ref 210–950)
WBC #/AREA URNS AUTO: 0 /HPF (ref 0–5)

## 2022-05-10 PROCEDURE — 1160F RVW MEDS BY RX/DR IN RCRD: CPT | Mod: CPTII,S$GLB,, | Performed by: NURSE PRACTITIONER

## 2022-05-10 PROCEDURE — 99204 OFFICE O/P NEW MOD 45 MIN: CPT | Mod: S$GLB,,, | Performed by: NURSE PRACTITIONER

## 2022-05-10 PROCEDURE — 1159F PR MEDICATION LIST DOCUMENTED IN MEDICAL RECORD: ICD-10-PCS | Mod: CPTII,S$GLB,, | Performed by: NURSE PRACTITIONER

## 2022-05-10 PROCEDURE — 1159F MED LIST DOCD IN RCRD: CPT | Mod: CPTII,S$GLB,, | Performed by: NURSE PRACTITIONER

## 2022-05-10 PROCEDURE — 1160F PR REVIEW ALL MEDS BY PRESCRIBER/CLIN PHARMACIST DOCUMENTED: ICD-10-PCS | Mod: CPTII,S$GLB,, | Performed by: NURSE PRACTITIONER

## 2022-05-10 PROCEDURE — 87798 DETECT AGENT NOS DNA AMP: CPT | Performed by: NURSE PRACTITIONER

## 2022-05-10 PROCEDURE — 51798 POCT BLADDER SCAN: ICD-10-PCS | Mod: S$GLB,,, | Performed by: NURSE PRACTITIONER

## 2022-05-10 PROCEDURE — 3078F DIAST BP <80 MM HG: CPT | Mod: CPTII,S$GLB,, | Performed by: NURSE PRACTITIONER

## 2022-05-10 PROCEDURE — 87086 URINE CULTURE/COLONY COUNT: CPT | Performed by: NURSE PRACTITIONER

## 2022-05-10 PROCEDURE — 81002 POCT URINE DIPSTICK WITHOUT MICROSCOPE: ICD-10-PCS | Mod: S$GLB,,, | Performed by: NURSE PRACTITIONER

## 2022-05-10 PROCEDURE — 3074F SYST BP LT 130 MM HG: CPT | Mod: CPTII,S$GLB,, | Performed by: NURSE PRACTITIONER

## 2022-05-10 PROCEDURE — 81002 URINALYSIS NONAUTO W/O SCOPE: CPT | Mod: S$GLB,,, | Performed by: NURSE PRACTITIONER

## 2022-05-10 PROCEDURE — 3078F PR MOST RECENT DIASTOLIC BLOOD PRESSURE < 80 MM HG: ICD-10-PCS | Mod: CPTII,S$GLB,, | Performed by: NURSE PRACTITIONER

## 2022-05-10 PROCEDURE — 81001 URINALYSIS AUTO W/SCOPE: CPT | Performed by: NURSE PRACTITIONER

## 2022-05-10 PROCEDURE — 99204 PR OFFICE/OUTPT VISIT, NEW, LEVL IV, 45-59 MIN: ICD-10-PCS | Mod: S$GLB,,, | Performed by: NURSE PRACTITIONER

## 2022-05-10 PROCEDURE — 87563 M. GENITALIUM AMP PROBE: CPT | Performed by: NURSE PRACTITIONER

## 2022-05-10 PROCEDURE — 51798 US URINE CAPACITY MEASURE: CPT | Mod: S$GLB,,, | Performed by: NURSE PRACTITIONER

## 2022-05-10 PROCEDURE — 3074F PR MOST RECENT SYSTOLIC BLOOD PRESSURE < 130 MM HG: ICD-10-PCS | Mod: CPTII,S$GLB,, | Performed by: NURSE PRACTITIONER

## 2022-05-10 RX ORDER — PUMPKIN SEED EXTRACT/SOY GERM 300 MG
1 CAPSULE ORAL DAILY
COMMUNITY
End: 2023-03-29

## 2022-05-10 NOTE — PROGRESS NOTES
Subjective:      Jennifer Nguyen is a 31 y.o. female who was referred by Damaris Barcenas NP for evaluation of her hematuria.      The patient presents today reporting intermittent dysuria, frequency and feeling of TEDDY that began several weeks ago. Presented to urgent care on 4/26 and was empirically treated with Macrobid. Symptoms have persisted. States that she feels the urge to void sometimes immediately after urinating. Consumes lot of caffeine. Hx of pyelonephritis in 2021. Denies a history of nephrolithiasis. Denies fever/chills, flank pain and gross hematuria. Reports intermittent nausea.     Component      Latest Ref Rng & Units 4/26/2022   Urine Culture, Routine No growth     The following portions of the patient's history were reviewed and updated as appropriate: allergies, current medications, past family history, past medical history, past social history, past surgical history and problem list.    Review of Systems  Constitutional: no fever or chills  ENT: no nasal congestion or sore throat  Respiratory: no cough or shortness of breath  Cardiovascular: no chest pain or palpitations  Gastrointestinal: no nausea or vomiting, tolerating diet  Genitourinary: as per HPI  Hematologic/Lymphatic: no easy bruising or lymphadenopathy  Musculoskeletal: no arthralgias or myalgias  Neurological: no seizures or tremors  Behavioral/Psych: no auditory or visual hallucinations     Objective:   Vital Signs:  Vitals:    05/10/22 1021   BP: 117/73   Pulse: 69     Physical Exam   General: alert and oriented, no acute distress  Head: normocephalic, atraumatic  Neck: supple, normal ROM  Respiratory: Symmetric expansion, non-labored breathing  Cardiovascular: regular rate and rhythm  Abdomen: soft, non tender, non distended  Pelvic: deferred   Skin: normal coloration and turgor, no rashes, no suspicious skin lesions noted  Neuro: alert and oriented x3, no gross deficits  Psych: normal judgment and insight, normal mood/affect and  non-anxious  No CVA tenderness    Lab Review   Urinalysis demonstrates positive for red blood cells --AZO yesterday   PVR: 0 mL  Lab Results   Component Value Date    WBC 7.96 05/09/2022    HGB 12.9 05/09/2022    HCT 37.8 05/09/2022    MCV 88 05/09/2022     05/09/2022     Lab Results   Component Value Date    CREATININE 0.8 05/09/2022    BUN 16 05/09/2022       Imaging   None    Assessment:   Microhematuria  Abnormal urinalysis  Dysuria  Urinary frequency    Plan:   Jennifer was seen today for hematuria, dysuria and urinary frequency.    Diagnoses and all orders for this visit:    Microhematuria  -     POCT URINE DIPSTICK WITHOUT MICROSCOPE  -     POCT Bladder Scan    Abnormal urinalysis  -     Urine culture  -     Urinalysis Microscopic  -     methen-m.blue-s.phos-phsal-hyo (URIBEL) 118-10-40.8-36 mg Cap; Take 1 capsule by mouth 3 (three) times daily as needed (bladder spasms).    Dysuria  -     Ureaplasma PCR Urine  -     Mycoplasma genitalium Molecular Detection, PCR Urine    Urinary frequency    Plan:  --Urine culture and micro UA today  --Ureaplasma and mycoplasma  --Trial of uribel PRN  --Discussed common bladder irritants such as caffeine, alcohol, citrus, and acidic and spicy foods. Encouraged to minimize in diet to reduce symptoms.  --Discussed the AUA guidelines for microhematuria- will proceed with workup if >3 RBCs/HPF

## 2022-05-11 LAB — BACTERIA UR CULT: NORMAL

## 2022-05-12 ENCOUNTER — PATIENT MESSAGE (OUTPATIENT)
Dept: GASTROENTEROLOGY | Facility: CLINIC | Age: 32
End: 2022-05-12
Payer: COMMERCIAL

## 2022-05-12 ENCOUNTER — PATIENT MESSAGE (OUTPATIENT)
Dept: UROLOGY | Facility: CLINIC | Age: 32
End: 2022-05-12
Payer: COMMERCIAL

## 2022-05-12 DIAGNOSIS — N34.1 NONGONOCOCCAL URETHRITIS DUE TO UREAPLASMA UREALYTICUM: Primary | ICD-10-CM

## 2022-05-12 DIAGNOSIS — A49.3 NONGONOCOCCAL URETHRITIS DUE TO UREAPLASMA UREALYTICUM: Primary | ICD-10-CM

## 2022-05-12 LAB
GLIADIN PEPTIDE IGA SER-ACNC: 18 UNITS
GLIADIN PEPTIDE IGG SER-ACNC: 2 UNITS
IGA SERPL-MCNC: 108 MG/DL (ref 70–400)
M GENITALIUM DNA UR QL NAA+PROBE: NEGATIVE
SPECIMEN SOURCE: ABNORMAL
SPECIMEN SOURCE: NORMAL
TTG IGA SER-ACNC: 11 UNITS
TTG IGG SER-ACNC: 3 UNITS
U PARVUM DNA SPEC QL NAA+PROBE: NEGATIVE
U UREALYTICUM DNA SPEC QL NAA+PROBE: POSITIVE

## 2022-05-12 RX ORDER — DOXYCYCLINE 100 MG/1
100 CAPSULE ORAL EVERY 12 HOURS
Qty: 20 CAPSULE | Refills: 0 | Status: SHIPPED | OUTPATIENT
Start: 2022-05-12 | End: 2022-05-22

## 2022-05-13 ENCOUNTER — HOSPITAL ENCOUNTER (OUTPATIENT)
Dept: RADIOLOGY | Facility: OTHER | Age: 32
Discharge: HOME | End: 2022-05-13
Attending: NURSE PRACTITIONER
Payer: COMMERCIAL

## 2022-05-13 ENCOUNTER — PATIENT MESSAGE (OUTPATIENT)
Dept: UROLOGY | Facility: CLINIC | Age: 32
End: 2022-05-13
Payer: COMMERCIAL

## 2022-05-13 DIAGNOSIS — R39.89 BLADDER PAIN: ICD-10-CM

## 2022-05-13 LAB — VIT B1 BLD-MCNC: 63 UG/L (ref 38–122)

## 2022-05-13 PROCEDURE — 76770 US RETROPERITONEAL COMPLETE: ICD-10-PCS | Mod: 26,,, | Performed by: RADIOLOGY

## 2022-05-13 PROCEDURE — 76770 US EXAM ABDO BACK WALL COMP: CPT | Mod: TC

## 2022-05-13 PROCEDURE — 76770 US EXAM ABDO BACK WALL COMP: CPT | Mod: 26,,, | Performed by: RADIOLOGY

## 2022-05-16 ENCOUNTER — OFFICE VISIT (OUTPATIENT)
Dept: INTERNAL MEDICINE | Facility: CLINIC | Age: 32
End: 2022-05-16
Payer: COMMERCIAL

## 2022-05-16 VITALS
HEIGHT: 64 IN | HEART RATE: 60 BPM | BODY MASS INDEX: 26.72 KG/M2 | RESPIRATION RATE: 18 BRPM | WEIGHT: 156.5 LBS | SYSTOLIC BLOOD PRESSURE: 108 MMHG | OXYGEN SATURATION: 99 % | DIASTOLIC BLOOD PRESSURE: 70 MMHG

## 2022-05-16 DIAGNOSIS — K90.0 CELIAC DISEASE: ICD-10-CM

## 2022-05-16 DIAGNOSIS — Z87.440 HISTORY OF RECURRENT UTIS: ICD-10-CM

## 2022-05-16 DIAGNOSIS — F41.1 GENERALIZED ANXIETY DISORDER: ICD-10-CM

## 2022-05-16 DIAGNOSIS — T14.8XXA BRUISING: ICD-10-CM

## 2022-05-16 DIAGNOSIS — L60.0 IGTN (INGROWING TOE NAIL): Primary | ICD-10-CM

## 2022-05-16 PROBLEM — Z78.9 DEFICIT IN ACTIVITIES OF DAILY LIVING (ADL): Status: RESOLVED | Noted: 2022-01-11 | Resolved: 2022-05-16

## 2022-05-16 PROCEDURE — 99395 PR PREVENTIVE VISIT,EST,18-39: ICD-10-PCS | Mod: S$GLB,,, | Performed by: STUDENT IN AN ORGANIZED HEALTH CARE EDUCATION/TRAINING PROGRAM

## 2022-05-16 PROCEDURE — 99395 PREV VISIT EST AGE 18-39: CPT | Mod: S$GLB,,, | Performed by: STUDENT IN AN ORGANIZED HEALTH CARE EDUCATION/TRAINING PROGRAM

## 2022-05-16 PROCEDURE — 99999 PR PBB SHADOW E&M-EST. PATIENT-LVL V: CPT | Mod: PBBFAC,,, | Performed by: STUDENT IN AN ORGANIZED HEALTH CARE EDUCATION/TRAINING PROGRAM

## 2022-05-16 PROCEDURE — 99999 PR PBB SHADOW E&M-EST. PATIENT-LVL V: ICD-10-PCS | Mod: PBBFAC,,, | Performed by: STUDENT IN AN ORGANIZED HEALTH CARE EDUCATION/TRAINING PROGRAM

## 2022-05-16 NOTE — PROGRESS NOTES
Clinic Note  05/16/2022      Subjective:         Chief Complaint: Establish Care    Patient ID: Jennifer Nguyen is a 32 y.o. female with anxiety, Celiac's disease, seasonal allergies, recurrent UTI's being seen for an established visit.    HPI    3 wks ago started having UTI symptoms  Now on 10 days of doxycycline. Symptoms improving.   Neg culture  Saw Urologist last week--  + ureolyticus PCR   Continuing to follow w urology.   Recurrent 1-2 yearly UTIs.   pyelonephritis ~6 times in her life.   Boyfriend getting tested for ureolyticus.     Bruising on legs mid April. No injury. Now resolved, plts wnl. PT/INR wnl.     Active: Kickball, yoga, jogs.    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   New job in March, improving stress.   In therapy, decreased frequency once a month. Started 2020.     Saw GI, Celiac's in well control.     Has small toe-nail on L big toe after nail infection from [employer] house.   No swelling or redness.   Wonders if there's cosmetic options for toe nail appearance.     Mother with Grave's disease. Father with T2DM.       Review of Systems   Constitutional: Positive for fatigue. Negative for chills and fever.   HENT: Negative for congestion and rhinorrhea.    Eyes: Negative for discharge and redness.   Respiratory: Negative for cough and shortness of breath.    Cardiovascular: Negative for chest pain and leg swelling.   Gastrointestinal: Negative for abdominal distention and abdominal pain.   Genitourinary: Negative for dysuria, flank pain and frequency.   Musculoskeletal: Negative for gait problem and joint swelling.   Skin: Negative for pallor and rash.   Neurological: Negative for syncope and facial asymmetry.   Hematological: Bruises/bleeds easily.   Psychiatric/Behavioral: Negative for agitation and confusion.       Patient's Medications   New Prescriptions    No medications on file   Previous Medications    AZELASTINE (ASTELIN) 137 MCG (0.1 %)  "NASAL SPRAY    1 spray (137 mcg total) by Nasal route 2 (two) times daily.    CETIRIZINE (ZYRTEC) 10 MG TABLET    Take 1 tablet (10 mg total) by mouth 2 (two) times a day. Start taking Sunday before Rush procedure. for 3 days    CLOTRIMAZOLE-BETAMETHASONE 1-0.05% (LOTRISONE) CREAM    Apply to affected area 2 times daily    DOXYCYCLINE (MONODOX) 100 MG CAPSULE    Take 1 capsule (100 mg total) by mouth every 12 (twelve) hours. for 10 days    FAMOTIDINE (PEPCID) 20 MG TABLET    Take 1 tablet (20 mg total) by mouth 2 (two) times daily. Start taking Sunday before Rush procedure. for 3 days    FLUTICASONE PROPIONATE (FLONASE) 50 MCG/ACTUATION NASAL SPRAY    1 spray by Each Nostril route once daily.    LORATADINE (CLARITIN) 10 MG TABLET    Take 10 mg by mouth once daily.    METHEN-M.BLUE-S.PHOS-PHSAL-HYO (URIBEL) 118-10-40.8-36 MG CAP    Take 1 capsule by mouth 3 (three) times daily as needed (bladder spasms).    MOMETASONE (ELOCON) 0.1 % OINTMENT    Apply topically once daily.    OLOPATADINE (PATADAY) 0.2 % DROP    Place 1 drop into both eyes once daily.    ONDANSETRON (ZOFRAN) 4 MG TABLET    Take 1 tablet (4 mg total) by mouth every 8 (eight) hours as needed for Nausea.    PUMPKIN SEED EXTRACT-SOY GERM (AZO BLADDER CONTROL) 300 MG CAP    Take 1 tablet by mouth once daily. "I haven't taken it yet today."    TRIAMCINOLONE ACETONIDE 0.1% (KENALOG) 0.1 % OINTMENT    Apply topically 2 (two) times daily. Use for up to 2 weeks. Use sparingly, thin layer only in affected (itchy) areas.   Modified Medications    No medications on file   Discontinued Medications    No medications on file       Patient Active Problem List    Diagnosis Date Noted    Decreased  strength of right hand 01/11/2022    Chronic thumb pain, right 01/11/2022    Deficit in activities of daily living (ADL) 01/11/2022    Chronic instability of metacarpophalangeal joint of right thumb 01/10/2022    IUD (intrauterine device) in place- strings lost on " "9/15 09/15/2021    Decreased range of motion of right thumb 03/02/2020    Thumb pain, right 03/02/2020    Thumb swelling 03/02/2020    Gamekeeper's thumb 01/06/2020    Generalized anxiety disorder 04/21/2016    Panic disorder without agoraphobia 04/21/2016    Depression 04/21/2016    Anxiety 12/22/2015    Celiac disease 12/22/2015    ADHD (attention deficit hyperactivity disorder) 04/28/2014    Insomnia 02/11/2014           Objective:      /70 (BP Location: Right arm, Patient Position: Sitting, BP Method: Large (Manual))   Pulse 60   Resp 18   Ht 5' 4" (1.626 m)   Wt 71 kg (156 lb 8.4 oz)   SpO2 99%   BMI 26.87 kg/m²   Estimated body mass index is 26.87 kg/m² as calculated from the following:    Height as of this encounter: 5' 4" (1.626 m).    Weight as of this encounter: 71 kg (156 lb 8.4 oz).    Physical Exam  Constitutional:       General: She is not in acute distress.     Appearance: Normal appearance. She is normal weight. She is not ill-appearing, toxic-appearing or diaphoretic.   HENT:      Head: Normocephalic and atraumatic.   Eyes:      General: No scleral icterus.     Conjunctiva/sclera: Conjunctivae normal.      Pupils: Pupils are equal, round, and reactive to light.   Cardiovascular:      Rate and Rhythm: Normal rate and regular rhythm.      Pulses: Normal pulses.      Heart sounds: Normal heart sounds. No murmur heard.  Pulmonary:      Effort: Pulmonary effort is normal. No respiratory distress.      Breath sounds: Normal breath sounds. No wheezing or rales.   Abdominal:      General: Abdomen is flat. Bowel sounds are normal. There is no distension.      Palpations: Abdomen is soft.      Tenderness: There is no abdominal tenderness.   Musculoskeletal:         General: Normal range of motion.      Cervical back: Normal range of motion and neck supple. No rigidity.      Right lower leg: No edema.      Left lower leg: No edema.   Skin:     General: Skin is warm and dry.      " Comments: Small toenail on L first toe. No surround swelling or erythema.    Neurological:      General: No focal deficit present.      Mental Status: She is alert and oriented to person, place, and time. Mental status is at baseline.   Psychiatric:         Behavior: Behavior normal.         Thought Content: Thought content normal.         Assessment & Plan:   Jennifer was seen today for establish care.    Diagnoses and all orders for this visit:    Abnormal L toe nail  Has small toe-nail on L big toe after nail infection. Suspect nail-bed damage from prior infection. No signs of ongoing infection. Would like to be considered for cosmetic revision.   -     Ambulatory referral/consult to Podiatry; Future    Generalized anxiety disorder  Improving after changing job. Now needing to see therapist less frequently. Fatigue evaluated, normal TSH. CBC, CMP, iron panel wnl.     Celiac disease  Recent GI visit. Celiac markers well controlled. Symptoms stable.  - continue to follow with GI prn    History of recurrent UTIs  Being followed by urology. On treatment for urolyticus with 10 days of doxycycline. May have contributed to fatigue.   - finish doxycycline course  - continue to follow with urology    Bruising  Unclear etiology given plts wnl. PT/INR wnl. No hx of joint bleeding or epistaxis.   - reassurance, will continue to monitor  - discussed return precautions    Patient seen and plan of care discussed with Dr. Washington.     RTC in 6 months.     Missy Andrade MD  [facility]

## 2022-05-24 ENCOUNTER — PATIENT MESSAGE (OUTPATIENT)
Dept: INTERNAL MEDICINE | Facility: CLINIC | Age: 32
End: 2022-05-24
Payer: COMMERCIAL

## 2022-05-24 NOTE — PROGRESS NOTES
I have personally discussed  this patient and agree with the resident, and agree with  their note as stated above with the following thoughts:    As above.

## 2022-05-25 DIAGNOSIS — T14.8XXA BRUISING: Primary | ICD-10-CM

## 2022-06-03 ENCOUNTER — CLINICAL SUPPORT (OUTPATIENT)
Dept: INTERNAL MEDICINE | Facility: CLINIC | Age: 32
End: 2022-06-03
Payer: COMMERCIAL

## 2022-06-03 DIAGNOSIS — Z91.038 ALLERGY TO INSECT STINGS: ICD-10-CM

## 2022-06-03 PROCEDURE — 99499 UNLISTED E&M SERVICE: CPT | Mod: S$GLB,,, | Performed by: ALLERGY & IMMUNOLOGY

## 2022-06-03 PROCEDURE — 95115 IMMUNOTHERAPY ONE INJECTION: CPT | Mod: S$GLB,,, | Performed by: FAMILY MEDICINE

## 2022-06-03 PROCEDURE — 95115 PR IMMUNOTHERAPY, ONE INJECTION: ICD-10-PCS | Mod: S$GLB,,, | Performed by: FAMILY MEDICINE

## 2022-06-03 PROCEDURE — 99499 NO LOS: ICD-10-PCS | Mod: S$GLB,,, | Performed by: ALLERGY & IMMUNOLOGY

## 2022-06-06 ENCOUNTER — OFFICE VISIT (OUTPATIENT)
Dept: DERMATOLOGY | Facility: CLINIC | Age: 32
End: 2022-06-06
Payer: COMMERCIAL

## 2022-06-06 DIAGNOSIS — L20.84 INTRINSIC ATOPIC DERMATITIS: Primary | ICD-10-CM

## 2022-06-06 DIAGNOSIS — L30.9 HAND ECZEMA: ICD-10-CM

## 2022-06-06 PROCEDURE — 1160F PR REVIEW ALL MEDS BY PRESCRIBER/CLIN PHARMACIST DOCUMENTED: ICD-10-PCS | Mod: CPTII,S$GLB,, | Performed by: DERMATOLOGY

## 2022-06-06 PROCEDURE — 1159F PR MEDICATION LIST DOCUMENTED IN MEDICAL RECORD: ICD-10-PCS | Mod: CPTII,S$GLB,, | Performed by: DERMATOLOGY

## 2022-06-06 PROCEDURE — 99214 PR OFFICE/OUTPT VISIT, EST, LEVL IV, 30-39 MIN: ICD-10-PCS | Mod: S$GLB,,, | Performed by: DERMATOLOGY

## 2022-06-06 PROCEDURE — 99214 OFFICE O/P EST MOD 30 MIN: CPT | Mod: S$GLB,,, | Performed by: DERMATOLOGY

## 2022-06-06 PROCEDURE — 1159F MED LIST DOCD IN RCRD: CPT | Mod: CPTII,S$GLB,, | Performed by: DERMATOLOGY

## 2022-06-06 PROCEDURE — 1160F RVW MEDS BY RX/DR IN RCRD: CPT | Mod: CPTII,S$GLB,, | Performed by: DERMATOLOGY

## 2022-06-06 RX ORDER — FLUTICASONE PROPIONATE 0.5 MG/G
CREAM TOPICAL
Qty: 60 G | Refills: 3 | Status: SHIPPED | OUTPATIENT
Start: 2022-06-06 | End: 2022-09-27 | Stop reason: SDUPTHER

## 2022-06-06 NOTE — PATIENT INSTRUCTIONS
Treatment of Atopic Dermatitis, Eczema, or Dry, Sensitive Skin       Skin Cleansing  Use a mild, fragrance-free cleanser, such as Dove sensitive bar soap, CeraVe hydrating cleanser bar, or Cetaphil eczema calming body wash.  Do not aggressively scrub with washcloths, sponges, or brushes.  Bathe in lukewarm (not hot) water. Hot water worsens dry skin. Do not use bubble bath.    Skin Moisturizing and Treatment  For moisturizing the skin, ointments are better than creams, and creams are better than lotions.  Within 3 minutes of getting the skin wet (after bath/shower/swimming), gently pat dry (do not rub) the skin until partially dry.   If a prescription medication is prescribed, apply the medication to the affected areas while the skin is still damp. Most prescription medications should be used on an as-needed, spot treatment basis only. If an area is red, raised, scaly, or itchy, then treat it.  Next, apply a fragrance-free moisturizer, such as CeraVe moisturizing cream or CeraVe healing ointment to your entire body to lock in the moisture. Use the moisturizer at least 2 times every day. Treat your dry skin like you would chapped lips. If your skin looks or feels dry, then MOISTURIZE! Reapply your moisturizer as many times as needed throughout the day.  For rapid relief of an itchy flare, CeraVe Itch Relief cream or Sarna Sensitive lotion may be useful. You can store it in the refrigerator for an added cooling effect.  Pajama Treatment (for severe flares): After applying medications/moisturizers, wear a layer of slightly damp pajamas under a layer of dry pajamas. Wear overnight or all day.    Avoiding Irritants  Use a fragrance-free laundry detergent for sensitive skin, such as All Free & Clear. Avoid using fabric softeners and dryer sheets.  Run clothes through a second rinse cycle to remove any residual detergent or chemicals.  Avoid heavily scented or perfumed items and products.  Wear cotton or soft fabrics.  Avoid rough, scratchy fibers and tight clothing. Avoid wool.  Avoid becoming too sweaty (sweat is an irritant), but avoid keeping the house too cool and dry with air conditioning. Also, use of heaters in the winter will increase dryness.  Learn your eczema triggers and avoid them.      See nationaleczema.org for more information and for additional product recommendations.

## 2022-06-06 NOTE — PROGRESS NOTES
Patient Information  Name: Jennifer Nguyen  : 1990  MRN: [id number]     Referring Physician:  No ref. provider found   Primary Care Physician:  Missy Andrade MD   Date of Visit: 2022      Subjective:     History of Present lllness:    Jennifer Nguyen is a 32 y.o. female who presents with a chief complaint of eczema.    Diagnosis: eczema  Location: hands, behind the knees and buttocks crease  Signs/Symptoms: redness, itching with some flaking at times  Symptom course: unchanged  Current treatment: mometasone cream, pt uses it at least 1x a day, sometimes BID; not working as well as it was previously  Uses aveeno wash and aveeno lotion    Clinical documentation obtained by nursing staff reviewed.    Review of Systems   Constitutional: Positive for fatigue. Negative for fever, chills and malaise.   Respiratory: Negative for cough.    Skin: Positive for itching, daily sunscreen use and activity-related sunscreen use. Negative for rash.   Hematologic/Lymphatic: Negative for adenopathy.   Allergic/Immunologic: Positive for environmental allergies.       Objective:   Physical Exam   Constitutional: She appears well-developed and well-nourished. No distress.   Neurological: She is alert and oriented to person, place, and time. She is not disoriented.   Psychiatric: She has a normal mood and affect.   Skin:   Areas Examined (abnormalities noted in diagram):   Genitals / Buttocks / Groin Inspection Performed  RUE Inspected  LUE Inspection Performed  RLE Inspected  LLE Inspection Performed            Diagram Legend     Erythematous scaling macule/papule c/w actinic keratosis       Vascular papule c/w angioma      Pigmented verrucoid papule/plaque c/w seborrheic keratosis      Yellow umbilicated papule c/w sebaceous hyperplasia      Irregularly shaped tan macule c/w lentigo     1-2 mm smooth white papules consistent with Milia      Movable subcutaneous cyst with punctum c/w epidermal inclusion cyst       Subcutaneous movable cyst c/w pilar cyst      Firm pink to brown papule c/w dermatofibroma      Pedunculated fleshy papule(s) c/w skin tag(s)      Evenly pigmented macule c/w junctional nevus     Mildly variegated pigmented, slightly irregular-bordered macule c/w mildly atypical nevus      Flesh colored to evenly pigmented papule c/w intradermal nevus       Pink pearly papule/plaque c/w basal cell carcinoma      Erythematous hyperkeratotic cursted plaque c/w SCC      Surgical scar with no sign of skin cancer recurrence      Open and closed comedones      Inflammatory papules and pustules      Verrucoid papule consistent consistent with wart     Erythematous eczematous patches and plaques     Dystrophic onycholytic nail with subungual debris c/w onychomycosis     Umbilicated papule    Erythematous-base heme-crusted tan verrucoid plaque consistent with inflamed seborrheic keratosis     Erythematous Silvery Scaling Plaque c/w Psoriasis     See annotation    No images are attached to the encounter or orders placed in the encounter.      [] Data reviewed  [] Prior external notes reviewed  [] Independent review of test  [] Management discussed with another provider  [] Independent historian    Assessment / Plan:        Intrinsic atopic dermatitis  - chronic problem, not at treatment goal  - Good skin care regimen was discussed, including limiting to one bath or shower per day, using lukewarm water with mild soap, and applying a moisturizing cream to skin 1-2 times per day.   - Recommended Dove sensitive skin soap, CeraVe moisturizing cream or healing ointment, and All Free and Clear detergent.  - Handout was reviewed with and provided to the patient.  -     fluticasone propionate (CUTIVATE) 0.05 % cream; Apply to affected areas of body daily prn eczema.  Dispense: 60 g; Refill: 3    Hand eczema  - chronic problem, not at treatment goal  - Discussed the importance of frequent hand moisturization every hour and after every  hand washing. Wash hands with Dove Sensitive Skin Beauty Bar or other fragrance-free cleanser and moisturize with OTC CeraVe healing ointment or Neutrogena Norwegian Formula Hand Cream.   - Minimize hand washing unless visibly dirty, and use an alcohol-based hand  that contains moisturizers and lacks common allergens, such as Hello Craig, SupplyAID, SanitizeRx, or Hydra Betina.  - Can apply the prescribed topical steroid followed by white cotton gloves or a warm, damp towel for severe flares.   -     fluticasone propionate (CUTIVATE) 0.05 % cream; Apply to affected areas of body daily prn eczema.  Dispense: 60 g; Refill: 3       Follow up in about 3 months (around 9/6/2022).      Janett Raygoza MD, FAAD  [facility]

## 2022-06-29 ENCOUNTER — CLINICAL SUPPORT (OUTPATIENT)
Dept: INTERNAL MEDICINE | Facility: CLINIC | Age: 32
End: 2022-06-29
Payer: COMMERCIAL

## 2022-06-29 DIAGNOSIS — Z91.038 ALLERGY TO INSECT STINGS: ICD-10-CM

## 2022-06-29 DIAGNOSIS — J30.1 ALLERGIC RHINITIS DUE TO POLLEN, UNSPECIFIED SEASONALITY: Primary | ICD-10-CM

## 2022-06-29 PROCEDURE — 99499 UNLISTED E&M SERVICE: CPT | Mod: S$GLB,,, | Performed by: ALLERGY & IMMUNOLOGY

## 2022-06-29 PROCEDURE — 99999 PR PBB SHADOW E&M-EST. PATIENT-LVL I: ICD-10-PCS | Mod: PBBFAC,,,

## 2022-06-29 PROCEDURE — 95117 PR IMMU2THERAPY, 2+ INJECTIONS: ICD-10-PCS | Mod: S$GLB,,, | Performed by: FAMILY MEDICINE

## 2022-06-29 PROCEDURE — 99999 PR PBB SHADOW E&M-EST. PATIENT-LVL I: CPT | Mod: PBBFAC,,,

## 2022-06-29 PROCEDURE — 95117 IMMUNOTHERAPY INJECTIONS: CPT | Mod: S$GLB,,, | Performed by: FAMILY MEDICINE

## 2022-06-29 PROCEDURE — 99499 NO LOS: ICD-10-PCS | Mod: S$GLB,,, | Performed by: ALLERGY & IMMUNOLOGY

## 2022-07-07 ENCOUNTER — LAB VISIT (OUTPATIENT)
Dept: LAB | Facility: HOSPITAL | Age: 32
End: 2022-07-07
Attending: INTERNAL MEDICINE
Payer: COMMERCIAL

## 2022-07-07 ENCOUNTER — OFFICE VISIT (OUTPATIENT)
Dept: HEMATOLOGY/ONCOLOGY | Facility: CLINIC | Age: 32
End: 2022-07-07
Payer: COMMERCIAL

## 2022-07-07 VITALS
SYSTOLIC BLOOD PRESSURE: 105 MMHG | HEIGHT: 64 IN | RESPIRATION RATE: 16 BRPM | OXYGEN SATURATION: 98 % | BODY MASS INDEX: 26.88 KG/M2 | WEIGHT: 157.44 LBS | HEART RATE: 103 BPM | DIASTOLIC BLOOD PRESSURE: 63 MMHG | TEMPERATURE: 98 F

## 2022-07-07 DIAGNOSIS — K90.0 CELIAC DISEASE: ICD-10-CM

## 2022-07-07 DIAGNOSIS — R53.82 CHRONIC FATIGUE: ICD-10-CM

## 2022-07-07 DIAGNOSIS — T14.8XXA BRUISING: ICD-10-CM

## 2022-07-07 DIAGNOSIS — R23.3 EASY BRUISABILITY: ICD-10-CM

## 2022-07-07 DIAGNOSIS — F41.1 GENERALIZED ANXIETY DISORDER: Primary | ICD-10-CM

## 2022-07-07 LAB
APTT BLDCRRT: 24.2 SEC (ref 21–32)
ERYTHROCYTE [DISTWIDTH] IN BLOOD BY AUTOMATED COUNT: 12.3 % (ref 11.5–14.5)
FIBRINOGEN PPP-MCNC: 312 MG/DL (ref 182–400)
HCT VFR BLD AUTO: 40 % (ref 37–48.5)
HGB BLD-MCNC: 13.2 G/DL (ref 12–16)
IMM GRANULOCYTES # BLD AUTO: 0.08 K/UL (ref 0–0.04)
MCH RBC QN AUTO: 29.3 PG (ref 27–31)
MCHC RBC AUTO-ENTMCNC: 33 G/DL (ref 32–36)
MCV RBC AUTO: 89 FL (ref 82–98)
NEUTROPHILS # BLD AUTO: 6.1 K/UL (ref 1.8–7.7)
PLATELET # BLD AUTO: 337 K/UL (ref 150–450)
PMV BLD AUTO: 9.3 FL (ref 9.2–12.9)
RBC # BLD AUTO: 4.5 M/UL (ref 4–5.4)
WBC # BLD AUTO: 9.78 K/UL (ref 3.9–12.7)

## 2022-07-07 PROCEDURE — 99999 PR PBB SHADOW E&M-EST. PATIENT-LVL IV: CPT | Mod: PBBFAC,,, | Performed by: INTERNAL MEDICINE

## 2022-07-07 PROCEDURE — 3078F PR MOST RECENT DIASTOLIC BLOOD PRESSURE < 80 MM HG: ICD-10-PCS | Mod: CPTII,S$GLB,, | Performed by: INTERNAL MEDICINE

## 2022-07-07 PROCEDURE — 85730 THROMBOPLASTIN TIME PARTIAL: CPT | Performed by: INTERNAL MEDICINE

## 2022-07-07 PROCEDURE — 85384 FIBRINOGEN ACTIVITY: CPT | Performed by: INTERNAL MEDICINE

## 2022-07-07 PROCEDURE — 36415 COLL VENOUS BLD VENIPUNCTURE: CPT | Performed by: INTERNAL MEDICINE

## 2022-07-07 PROCEDURE — 99205 OFFICE O/P NEW HI 60 MIN: CPT | Mod: S$GLB,,, | Performed by: INTERNAL MEDICINE

## 2022-07-07 PROCEDURE — 3078F DIAST BP <80 MM HG: CPT | Mod: CPTII,S$GLB,, | Performed by: INTERNAL MEDICINE

## 2022-07-07 PROCEDURE — 3008F PR BODY MASS INDEX (BMI) DOCUMENTED: ICD-10-PCS | Mod: CPTII,S$GLB,, | Performed by: INTERNAL MEDICINE

## 2022-07-07 PROCEDURE — 99999 PR PBB SHADOW E&M-EST. PATIENT-LVL IV: ICD-10-PCS | Mod: PBBFAC,,, | Performed by: INTERNAL MEDICINE

## 2022-07-07 PROCEDURE — 3008F BODY MASS INDEX DOCD: CPT | Mod: CPTII,S$GLB,, | Performed by: INTERNAL MEDICINE

## 2022-07-07 PROCEDURE — 1159F PR MEDICATION LIST DOCUMENTED IN MEDICAL RECORD: ICD-10-PCS | Mod: CPTII,S$GLB,, | Performed by: INTERNAL MEDICINE

## 2022-07-07 PROCEDURE — 85027 COMPLETE CBC AUTOMATED: CPT | Performed by: INTERNAL MEDICINE

## 2022-07-07 PROCEDURE — 3074F PR MOST RECENT SYSTOLIC BLOOD PRESSURE < 130 MM HG: ICD-10-PCS | Mod: CPTII,S$GLB,, | Performed by: INTERNAL MEDICINE

## 2022-07-07 PROCEDURE — 1159F MED LIST DOCD IN RCRD: CPT | Mod: CPTII,S$GLB,, | Performed by: INTERNAL MEDICINE

## 2022-07-07 PROCEDURE — 3074F SYST BP LT 130 MM HG: CPT | Mod: CPTII,S$GLB,, | Performed by: INTERNAL MEDICINE

## 2022-07-07 PROCEDURE — 99205 PR OFFICE/OUTPT VISIT, NEW, LEVL V, 60-74 MIN: ICD-10-PCS | Mod: S$GLB,,, | Performed by: INTERNAL MEDICINE

## 2022-07-07 NOTE — PROGRESS NOTES
CC: Easy bruising, hematology consultation    HPI: , 32, is here for hematology consultation for easy bruising. It started in 2022. No trauma. She denies overt bleeding. No significant bleeding after any surgeries () / procedures/ dental extraction () . No menorrhagia. She takes occasional NSAIDs. She denies using aspirin or herbal or over the counter medications.  No      Past Medical History:   Diagnosis Date    Allergy     Celiac disease        Past Surgical History:   Procedure Laterality Date    BREAST SURGERY      REDUCTION    INTRAUTERINE DEVICE INSERTION  2015    KJB---MIRENA    REPAIR OF COLLATERAL LIGAMENT OF THUMB Right 2020    Procedure: REPAIR, LIGAMENT, COLLATERAL, THUMB right;  Surgeon: Lisette Zaman MD;  Location: [facility];  Service: Orthopedics;  Laterality: Right;  regional MAC    WISDOM TOOTH EXTRACTION         Social History     Socioeconomic History    Marital status: Single   Tobacco Use    Smoking status: Former Smoker     Years: 2.00     Types: Cigarettes     Quit date:      Years since quittin.5    Smokeless tobacco: Never Used    Tobacco comment: 1-2 cig daily   Substance and Sexual Activity    Alcohol use: Not Currently     Comment: ~10 drinks/week. Seltzers, occasional wine    Drug use: No    Sexual activity: Yes     Partners: Male     Birth control/protection: I.U.D.     Comment: no condoms       Review of patient's allergies indicates:   Allergen Reactions    Insect venom Anxiety, Rash and Shortness Of Breath    Gluten Nausea Only       Current Outpatient Medications   Medication Sig    azelastine (ASTELIN) 137 mcg (0.1 %) nasal spray 1 spray (137 mcg total) by Nasal route 2 (two) times daily. (Patient not taking: No sig reported)    cetirizine (ZYRTEC) 10 MG tablet Take 1 tablet (10 mg total) by mouth 2 (two) times a day. Start taking  before Rush procedure. for 3 days    clotrimazole-betamethasone 1-0.05%  "(LOTRISONE) cream Apply to affected area 2 times daily    famotidine (PEPCID) 20 MG tablet Take 1 tablet (20 mg total) by mouth 2 (two) times daily. Start taking Sunday before Rush procedure. for 3 days (Patient not taking: No sig reported)    fluticasone propionate (CUTIVATE) 0.05 % cream Apply to affected areas of body daily prn eczema.    fluticasone propionate (FLONASE) 50 mcg/actuation nasal spray 1 spray by Each Nostril route once daily.    loratadine (CLARITIN) 10 mg tablet Take 10 mg by mouth once daily.    methen-m.blue-s.phos-phsal-hyo (URIBEL) 118-10-40.8-36 mg Cap Take 1 capsule by mouth 3 (three) times daily as needed (bladder spasms). (Patient not taking: Reported on 6/6/2022)    mometasone (ELOCON) 0.1 % ointment Apply topically once daily.    olopatadine (PATADAY) 0.2 % Drop Place 1 drop into both eyes once daily.    pumpkin seed extract-soy germ (AZO BLADDER CONTROL) 300 mg Cap Take 1 tablet by mouth once daily. "I haven't taken it yet today."    triamcinolone acetonide 0.1% (KENALOG) 0.1 % ointment Apply topically 2 (two) times daily. Use for up to 2 weeks. Use sparingly, thin layer only in affected (itchy) areas. (Patient not taking: No sig reported)     Current Facility-Administered Medications   Medication    levonorgestreL (MIRENA) 20 mcg/24 hours (6 yrs) 52 mg IUD 1 Intra Uterine Device       Review of Systems   Constitutional: Negative for chills, fever and weight loss.   HENT: Negative for congestion, ear discharge, ear pain, nosebleeds and sinus pain.    Eyes: Negative for blurred vision, double vision and redness.   Respiratory: Negative for cough and shortness of breath.    Cardiovascular: Positive for chest pain. Negative for claudication and leg swelling.   Gastrointestinal: Negative for abdominal pain, blood in stool, diarrhea and heartburn.   Genitourinary: Negative for dysuria and frequency.   Musculoskeletal: Positive for back pain.   Skin: Negative for rash. "   Neurological: Positive for headaches. Negative for dizziness, tingling, speech change, focal weakness and weakness.   Endo/Heme/Allergies: Does not bruise/bleed easily.   Psychiatric/Behavioral: Negative for depression. The patient is nervous/anxious.        Vitals:    07/07/22 1424   BP: 105/63   Pulse: 103   Resp: 16   Temp: 98.4 °F (36.9 °C)       Physical Exam  HENT:      Head: Normocephalic and atraumatic.      Mouth/Throat:      Pharynx: No posterior oropharyngeal erythema.   Eyes:      General: No scleral icterus.  Cardiovascular:      Rate and Rhythm: Normal rate and regular rhythm.      Pulses: Normal pulses.      Heart sounds: Normal heart sounds. No murmur heard.  Pulmonary:      Effort: No respiratory distress.   Abdominal:      General: There is no distension.      Palpations: There is no mass.      Tenderness: There is no abdominal tenderness.   Lymphadenopathy:      Cervical: No cervical adenopathy.   Skin:     Coloration: Skin is not jaundiced.   Neurological:      General: No focal deficit present.      Mental Status: She is alert and oriented to person, place, and time.      Cranial Nerves: No cranial nerve deficit.         Component      Latest Ref Rng & Units 5/9/2022   WBC      3.90 - 12.70 K/uL 7.96   RBC      4.00 - 5.40 M/uL 4.31   Hemoglobin      12.0 - 16.0 g/dL 12.9   Hematocrit      37.0 - 48.5 % 37.8   MCV      82 - 98 fL 88   MCH      27.0 - 31.0 pg 29.9   MCHC      32.0 - 36.0 g/dL 34.1   RDW      11.5 - 14.5 % 12.0   Platelets      150 - 450 K/uL 276   MPV      9.2 - 12.9 fL 9.0 (L)   Iron      30 - 160 ug/dL 90   Transferrin      200 - 375 mg/dL 255   TIBC      250 - 450 ug/dL 377   Saturated Iron      20 - 50 % 24   Protime      9.0 - 12.5 sec 9.8   INR      0.8 - 1.2 0.9   Vitamin B-12      210 - 950 pg/mL 274   Folate      4.0 - 24.0 ng/mL 9.6   TSH      0.400 - 4.000 uIU/mL 1.858   Thiamine      38 - 122 ug/L 63       Assessment:    1. Easy bruising  2. fatigue      Plan:    1.  Platelets, INR normal. She is on daily flonase. She has never required blood products after surgery/ dental procedures. No menorrhagia. TSH normal. No family history of bleeding disorder. Regular use of fluticasone, occasional NSAID use can be contributing to skin bruising. She will have PTT, fibrinogen checked.     2. TSH normal. CBC, iron studies normal in May 2022.         BMT Chart Routing      Follow up with physician No follow up needed.   Follow up with JAYDEN    Infusion scheduling note    Injection scheduling note    Labs CBC and other   Lab interval:  APTT, fibrinogenm today   Imaging    Pharmacy appointment    Other referrals            Answers for HPI/ROS submitted by the patient on 6/30/2022  appetite change : No  unexpected weight change: No  mouth sores: Yes  visual disturbance: No  cough: No  shortness of breath: No  chest pain: Yes  abdominal pain: No  diarrhea: No  frequency: No  back pain: Yes  rash: No  headaches: Yes  adenopathy: No  nervous/ anxious: Yes

## 2022-07-08 ENCOUNTER — PATIENT MESSAGE (OUTPATIENT)
Dept: HEMATOLOGY/ONCOLOGY | Facility: CLINIC | Age: 32
End: 2022-07-08
Payer: COMMERCIAL

## 2022-07-14 ENCOUNTER — OFFICE VISIT (OUTPATIENT)
Dept: URGENT CARE | Facility: CLINIC | Age: 32
End: 2022-07-14
Payer: COMMERCIAL

## 2022-07-14 VITALS
BODY MASS INDEX: 26.8 KG/M2 | HEART RATE: 85 BPM | TEMPERATURE: 98 F | WEIGHT: 157 LBS | DIASTOLIC BLOOD PRESSURE: 69 MMHG | RESPIRATION RATE: 20 BRPM | OXYGEN SATURATION: 98 % | HEIGHT: 64 IN | SYSTOLIC BLOOD PRESSURE: 123 MMHG

## 2022-07-14 DIAGNOSIS — R23.3 EASY BRUISABILITY: ICD-10-CM

## 2022-07-14 DIAGNOSIS — J30.89 ENVIRONMENTAL AND SEASONAL ALLERGIES: ICD-10-CM

## 2022-07-14 DIAGNOSIS — R04.2 BLOODY SPUTUM: Primary | ICD-10-CM

## 2022-07-14 LAB
CTP QC/QA: YES
SARS-COV-2 RDRP RESP QL NAA+PROBE: NEGATIVE

## 2022-07-14 PROCEDURE — U0002: ICD-10-PCS | Mod: QW,S$GLB,, | Performed by: FAMILY MEDICINE

## 2022-07-14 PROCEDURE — 99214 OFFICE O/P EST MOD 30 MIN: CPT | Mod: S$GLB,,, | Performed by: FAMILY MEDICINE

## 2022-07-14 PROCEDURE — 3074F PR MOST RECENT SYSTOLIC BLOOD PRESSURE < 130 MM HG: ICD-10-PCS | Mod: CPTII,S$GLB,, | Performed by: FAMILY MEDICINE

## 2022-07-14 PROCEDURE — 3078F PR MOST RECENT DIASTOLIC BLOOD PRESSURE < 80 MM HG: ICD-10-PCS | Mod: CPTII,S$GLB,, | Performed by: FAMILY MEDICINE

## 2022-07-14 PROCEDURE — 1159F MED LIST DOCD IN RCRD: CPT | Mod: CPTII,S$GLB,, | Performed by: FAMILY MEDICINE

## 2022-07-14 PROCEDURE — 3008F PR BODY MASS INDEX (BMI) DOCUMENTED: ICD-10-PCS | Mod: CPTII,S$GLB,, | Performed by: FAMILY MEDICINE

## 2022-07-14 PROCEDURE — 71046 XR CHEST PA AND LATERAL: ICD-10-PCS | Mod: S$GLB,,, | Performed by: RADIOLOGY

## 2022-07-14 PROCEDURE — 1160F PR REVIEW ALL MEDS BY PRESCRIBER/CLIN PHARMACIST DOCUMENTED: ICD-10-PCS | Mod: CPTII,S$GLB,, | Performed by: FAMILY MEDICINE

## 2022-07-14 PROCEDURE — 99214 PR OFFICE/OUTPT VISIT, EST, LEVL IV, 30-39 MIN: ICD-10-PCS | Mod: S$GLB,,, | Performed by: FAMILY MEDICINE

## 2022-07-14 PROCEDURE — 1160F RVW MEDS BY RX/DR IN RCRD: CPT | Mod: CPTII,S$GLB,, | Performed by: FAMILY MEDICINE

## 2022-07-14 PROCEDURE — 3074F SYST BP LT 130 MM HG: CPT | Mod: CPTII,S$GLB,, | Performed by: FAMILY MEDICINE

## 2022-07-14 PROCEDURE — 3078F DIAST BP <80 MM HG: CPT | Mod: CPTII,S$GLB,, | Performed by: FAMILY MEDICINE

## 2022-07-14 PROCEDURE — 71046 X-RAY EXAM CHEST 2 VIEWS: CPT | Mod: S$GLB,,, | Performed by: RADIOLOGY

## 2022-07-14 PROCEDURE — U0002 COVID-19 LAB TEST NON-CDC: HCPCS | Mod: QW,S$GLB,, | Performed by: FAMILY MEDICINE

## 2022-07-14 PROCEDURE — 3008F BODY MASS INDEX DOCD: CPT | Mod: CPTII,S$GLB,, | Performed by: FAMILY MEDICINE

## 2022-07-14 PROCEDURE — 1159F PR MEDICATION LIST DOCUMENTED IN MEDICAL RECORD: ICD-10-PCS | Mod: CPTII,S$GLB,, | Performed by: FAMILY MEDICINE

## 2022-07-14 NOTE — PATIENT INSTRUCTIONS
Follow up with primary care provider if symptoms persist or worsen; in the event of fainting, seizures, loss of consciousness or any alarm symptoms then seek care in the Emergency Room

## 2022-07-25 ENCOUNTER — CLINICAL SUPPORT (OUTPATIENT)
Dept: INTERNAL MEDICINE | Facility: CLINIC | Age: 32
End: 2022-07-25
Payer: COMMERCIAL

## 2022-07-25 DIAGNOSIS — J30.1 ALLERGIC RHINITIS DUE TO POLLEN, UNSPECIFIED SEASONALITY: Primary | ICD-10-CM

## 2022-07-25 DIAGNOSIS — Z91.038 ALLERGY TO INSECT STINGS: ICD-10-CM

## 2022-07-25 PROCEDURE — 99999 PR PBB SHADOW E&M-EST. PATIENT-LVL I: CPT | Mod: PBBFAC,,,

## 2022-07-25 PROCEDURE — 95115 IMMUNOTHERAPY ONE INJECTION: CPT | Mod: S$GLB,,, | Performed by: FAMILY MEDICINE

## 2022-07-25 PROCEDURE — 99499 UNLISTED E&M SERVICE: CPT | Mod: S$GLB,,, | Performed by: ALLERGY & IMMUNOLOGY

## 2022-07-25 PROCEDURE — 99499 NO LOS: ICD-10-PCS | Mod: S$GLB,,, | Performed by: ALLERGY & IMMUNOLOGY

## 2022-07-25 PROCEDURE — 95115 PR IMMUNOTHERAPY, ONE INJECTION: ICD-10-PCS | Mod: S$GLB,,, | Performed by: FAMILY MEDICINE

## 2022-07-25 PROCEDURE — 99999 PR PBB SHADOW E&M-EST. PATIENT-LVL I: ICD-10-PCS | Mod: PBBFAC,,,

## 2022-07-25 NOTE — PROGRESS NOTES
SQ-LT UPPER ARM  ALLERGY injection red vial #1/1 Fire ant/ 0.5 ml  given,tolerated well. Pt waited 30 minutes, no local reaction noted

## 2022-08-23 ENCOUNTER — TELEPHONE (OUTPATIENT)
Dept: ALLERGY | Facility: CLINIC | Age: 32
End: 2022-08-23
Payer: COMMERCIAL

## 2022-08-23 NOTE — TELEPHONE ENCOUNTER
Pt. Canceled her allergy injection appointment this week , she has Covid, appt. Was rescheduled for 9/2/22

## 2022-08-23 NOTE — TELEPHONE ENCOUNTER
----- Message from Ana Rosa Bragg MA sent at 8/23/2022 10:52 AM CDT -----  Regarding: FW: schedule  Contact: [phone]    ----- Message -----  From: Francisca Gonzalez  Sent: 8/23/2022   9:34 AM CDT  To: [facility] Clinical Support  Subject: schedule                                         Request Appt    Appt Type: Allergy injection   Call Back Number:[phone]  Additional Information:

## 2022-09-02 ENCOUNTER — CLINICAL SUPPORT (OUTPATIENT)
Dept: INTERNAL MEDICINE | Facility: CLINIC | Age: 32
End: 2022-09-02
Payer: COMMERCIAL

## 2022-09-02 DIAGNOSIS — Z91.038 ALLERGY TO INSECT STINGS: Primary | ICD-10-CM

## 2022-09-02 PROCEDURE — 99499 UNLISTED E&M SERVICE: CPT | Mod: S$GLB,,, | Performed by: ALLERGY & IMMUNOLOGY

## 2022-09-02 PROCEDURE — 95115 PR IMMUNOTHERAPY, ONE INJECTION: ICD-10-PCS | Mod: S$GLB,,, | Performed by: ALLERGY & IMMUNOLOGY

## 2022-09-02 PROCEDURE — 99999 PR PBB SHADOW E&M-EST. PATIENT-LVL I: ICD-10-PCS | Mod: PBBFAC,,,

## 2022-09-02 PROCEDURE — 99499 NO LOS: ICD-10-PCS | Mod: S$GLB,,, | Performed by: ALLERGY & IMMUNOLOGY

## 2022-09-02 PROCEDURE — 99999 PR PBB SHADOW E&M-EST. PATIENT-LVL I: CPT | Mod: PBBFAC,,,

## 2022-09-02 PROCEDURE — 95115 IMMUNOTHERAPY ONE INJECTION: CPT | Mod: S$GLB,,, | Performed by: ALLERGY & IMMUNOLOGY

## 2022-09-27 ENCOUNTER — LAB VISIT (OUTPATIENT)
Dept: LAB | Facility: HOSPITAL | Age: 32
End: 2022-09-27
Payer: COMMERCIAL

## 2022-09-27 ENCOUNTER — OFFICE VISIT (OUTPATIENT)
Dept: INTERNAL MEDICINE | Facility: CLINIC | Age: 32
End: 2022-09-27
Payer: COMMERCIAL

## 2022-09-27 VITALS
SYSTOLIC BLOOD PRESSURE: 111 MMHG | DIASTOLIC BLOOD PRESSURE: 75 MMHG | BODY MASS INDEX: 27.21 KG/M2 | HEART RATE: 76 BPM | WEIGHT: 159.38 LBS | RESPIRATION RATE: 18 BRPM | HEIGHT: 64 IN

## 2022-09-27 DIAGNOSIS — T63.421D FIRE ANT BITE, ACCIDENTAL OR UNINTENTIONAL, SUBSEQUENT ENCOUNTER: ICD-10-CM

## 2022-09-27 DIAGNOSIS — Z91.09 HOUSE DUST MITE ALLERGY: ICD-10-CM

## 2022-09-27 DIAGNOSIS — J30.9 ALLERGIC RHINOCONJUNCTIVITIS: ICD-10-CM

## 2022-09-27 DIAGNOSIS — F41.1 GENERALIZED ANXIETY DISORDER: Primary | ICD-10-CM

## 2022-09-27 DIAGNOSIS — L20.84 INTRINSIC ATOPIC DERMATITIS: ICD-10-CM

## 2022-09-27 DIAGNOSIS — H10.10 ALLERGIC RHINOCONJUNCTIVITIS: ICD-10-CM

## 2022-09-27 DIAGNOSIS — R20.8 DECREASED SENSATION OF FOOT: ICD-10-CM

## 2022-09-27 DIAGNOSIS — L30.9 HAND ECZEMA: ICD-10-CM

## 2022-09-27 LAB
ESTIMATED AVG GLUCOSE: 97 MG/DL (ref 68–131)
HBA1C MFR BLD: 5 % (ref 4–5.6)

## 2022-09-27 PROCEDURE — 1159F PR MEDICATION LIST DOCUMENTED IN MEDICAL RECORD: ICD-10-PCS | Mod: CPTII,S$GLB,, | Performed by: STUDENT IN AN ORGANIZED HEALTH CARE EDUCATION/TRAINING PROGRAM

## 2022-09-27 PROCEDURE — 3008F PR BODY MASS INDEX (BMI) DOCUMENTED: ICD-10-PCS | Mod: CPTII,S$GLB,, | Performed by: STUDENT IN AN ORGANIZED HEALTH CARE EDUCATION/TRAINING PROGRAM

## 2022-09-27 PROCEDURE — 99213 OFFICE O/P EST LOW 20 MIN: CPT | Mod: S$GLB,,, | Performed by: STUDENT IN AN ORGANIZED HEALTH CARE EDUCATION/TRAINING PROGRAM

## 2022-09-27 PROCEDURE — 1159F MED LIST DOCD IN RCRD: CPT | Mod: CPTII,S$GLB,, | Performed by: STUDENT IN AN ORGANIZED HEALTH CARE EDUCATION/TRAINING PROGRAM

## 2022-09-27 PROCEDURE — 36415 COLL VENOUS BLD VENIPUNCTURE: CPT | Performed by: STUDENT IN AN ORGANIZED HEALTH CARE EDUCATION/TRAINING PROGRAM

## 2022-09-27 PROCEDURE — 99213 PR OFFICE/OUTPT VISIT, EST, LEVL III, 20-29 MIN: ICD-10-PCS | Mod: S$GLB,,, | Performed by: STUDENT IN AN ORGANIZED HEALTH CARE EDUCATION/TRAINING PROGRAM

## 2022-09-27 PROCEDURE — 3074F PR MOST RECENT SYSTOLIC BLOOD PRESSURE < 130 MM HG: ICD-10-PCS | Mod: CPTII,S$GLB,, | Performed by: STUDENT IN AN ORGANIZED HEALTH CARE EDUCATION/TRAINING PROGRAM

## 2022-09-27 PROCEDURE — 3078F PR MOST RECENT DIASTOLIC BLOOD PRESSURE < 80 MM HG: ICD-10-PCS | Mod: CPTII,S$GLB,, | Performed by: STUDENT IN AN ORGANIZED HEALTH CARE EDUCATION/TRAINING PROGRAM

## 2022-09-27 PROCEDURE — 3074F SYST BP LT 130 MM HG: CPT | Mod: CPTII,S$GLB,, | Performed by: STUDENT IN AN ORGANIZED HEALTH CARE EDUCATION/TRAINING PROGRAM

## 2022-09-27 PROCEDURE — 3008F BODY MASS INDEX DOCD: CPT | Mod: CPTII,S$GLB,, | Performed by: STUDENT IN AN ORGANIZED HEALTH CARE EDUCATION/TRAINING PROGRAM

## 2022-09-27 PROCEDURE — 3044F PR MOST RECENT HEMOGLOBIN A1C LEVEL <7.0%: ICD-10-PCS | Mod: CPTII,S$GLB,, | Performed by: STUDENT IN AN ORGANIZED HEALTH CARE EDUCATION/TRAINING PROGRAM

## 2022-09-27 PROCEDURE — 99999 PR PBB SHADOW E&M-EST. PATIENT-LVL IV: ICD-10-PCS | Mod: PBBFAC,,, | Performed by: STUDENT IN AN ORGANIZED HEALTH CARE EDUCATION/TRAINING PROGRAM

## 2022-09-27 PROCEDURE — 3044F HG A1C LEVEL LT 7.0%: CPT | Mod: CPTII,S$GLB,, | Performed by: STUDENT IN AN ORGANIZED HEALTH CARE EDUCATION/TRAINING PROGRAM

## 2022-09-27 PROCEDURE — 3078F DIAST BP <80 MM HG: CPT | Mod: CPTII,S$GLB,, | Performed by: STUDENT IN AN ORGANIZED HEALTH CARE EDUCATION/TRAINING PROGRAM

## 2022-09-27 PROCEDURE — 83036 HEMOGLOBIN GLYCOSYLATED A1C: CPT | Performed by: STUDENT IN AN ORGANIZED HEALTH CARE EDUCATION/TRAINING PROGRAM

## 2022-09-27 PROCEDURE — 99999 PR PBB SHADOW E&M-EST. PATIENT-LVL IV: CPT | Mod: PBBFAC,,, | Performed by: STUDENT IN AN ORGANIZED HEALTH CARE EDUCATION/TRAINING PROGRAM

## 2022-09-27 PROCEDURE — 1160F PR REVIEW ALL MEDS BY PRESCRIBER/CLIN PHARMACIST DOCUMENTED: ICD-10-PCS | Mod: CPTII,S$GLB,, | Performed by: STUDENT IN AN ORGANIZED HEALTH CARE EDUCATION/TRAINING PROGRAM

## 2022-09-27 PROCEDURE — 1160F RVW MEDS BY RX/DR IN RCRD: CPT | Mod: CPTII,S$GLB,, | Performed by: STUDENT IN AN ORGANIZED HEALTH CARE EDUCATION/TRAINING PROGRAM

## 2022-09-27 RX ORDER — AZELASTINE 1 MG/ML
1 SPRAY, METERED NASAL 2 TIMES DAILY
Qty: 30 ML | Refills: 0 | Status: SHIPPED | OUTPATIENT
Start: 2022-09-27 | End: 2023-09-27

## 2022-09-27 RX ORDER — FLUTICASONE PROPIONATE 50 MCG
1 SPRAY, SUSPENSION (ML) NASAL DAILY
Qty: 11.1 ML | Refills: 3 | Status: CANCELLED
Start: 2022-09-27

## 2022-09-27 RX ORDER — OLOPATADINE HYDROCHLORIDE 2 MG/ML
1 SOLUTION/ DROPS OPHTHALMIC DAILY PRN
Qty: 5 ML | Refills: 0 | Status: ON HOLD
Start: 2022-09-27 | End: 2024-04-02 | Stop reason: HOSPADM

## 2022-09-27 RX ORDER — FLUTICASONE PROPIONATE 0.5 MG/G
CREAM TOPICAL
Qty: 60 G | Refills: 3 | Status: SHIPPED | OUTPATIENT
Start: 2022-09-27 | End: 2023-06-27 | Stop reason: SDUPTHER

## 2022-09-27 RX ORDER — CETIRIZINE HYDROCHLORIDE 10 MG/1
10 TABLET ORAL 2 TIMES DAILY PRN
Qty: 6 TABLET | Refills: 0
Start: 2022-09-27 | End: 2022-11-07 | Stop reason: ALTCHOICE

## 2022-09-27 NOTE — PROGRESS NOTES
"Clinic Note  09/27/2022      Subjective:         Chief Complaint: Follow-up    Patient ID: Jennifer Nguyen is a 32 y.o. female with anxiety, Celiac disease, seasonal allergies on weekly injections, resolved UTI, being seen for an established visit.    HPI    Drinking more coffee/caffeine and decreased energy.   Seeing therapy, they've recommended psychiatry for consideration of medications.  Decrease concentration.   Working from home now.  Mood has been "decent", talk therapy ~1x/month.   Has tried atomoxetine and Lexapro, interested in psych eval prior to re-commencing medications.   Did have COVID last month, symptoms resolved and feels at baseline.     Has noted 1 month of decreased sensation R foot toes 4th and 5th. No shooting pains down legs or paresthesias.   Has not tried anything to help. Not really associated with pain. Some discomfort when going to sleep. Worse when trying to go to sleep. No weakness but states she feels less balanced on R foot than L when doing yoga. She is R side dominant.   No family hx of RLS or fibromyalgia. Dad with T2DM.   Has gained ~10 lbs and is trying to be more active.   Follows with dermatology for eczema and dry skin. Needs refill on cutivate.     Continues to have intermittent bruising on legs. Seen by Heme/onc, thought not to be hematologic problem. Aptt and fibrinogen ordered were wnl. Plts, pt/inr wnl.     Active: Kickball, yoga, jogs.     Seeing GI, Celiac's in well control.     Review of Systems   Constitutional:  Positive for fatigue. Negative for chills and fever.   HENT:  Negative for congestion and rhinorrhea.    Eyes:  Negative for discharge and redness.   Respiratory:  Negative for cough and shortness of breath.    Cardiovascular:  Negative for chest pain and leg swelling.   Gastrointestinal:  Negative for abdominal distention and abdominal pain.   Genitourinary:  Negative for dysuria, flank pain and frequency.   Musculoskeletal:  Negative for gait problem and " "joint swelling.   Skin:  Negative for pallor and rash.   Neurological:  Negative for syncope and facial asymmetry.   Hematological:  Bruises/bleeds easily.   Psychiatric/Behavioral:  Negative for agitation and confusion.      Patient's Medications   New Prescriptions    No medications on file   Previous Medications    AZELASTINE (ASTELIN) 137 MCG (0.1 %) NASAL SPRAY    1 spray (137 mcg total) by Nasal route 2 (two) times daily.    CETIRIZINE (ZYRTEC) 10 MG TABLET    Take 1 tablet (10 mg total) by mouth 2 (two) times a day. Start taking Sunday before Rush procedure. for 3 days    FAMOTIDINE (PEPCID) 20 MG TABLET    Take 1 tablet (20 mg total) by mouth 2 (two) times daily. Start taking Sunday before Rush procedure. for 3 days    FLUTICASONE PROPIONATE (CUTIVATE) 0.05 % CREAM    Apply to affected areas of body daily prn eczema.    FLUTICASONE PROPIONATE (FLONASE) 50 MCG/ACTUATION NASAL SPRAY    1 spray by Each Nostril route once daily.    LORATADINE (CLARITIN) 10 MG TABLET    Take 10 mg by mouth once daily.    OLOPATADINE (PATADAY) 0.2 % DROP    Place 1 drop into both eyes once daily.    PUMPKIN SEED EXTRACT-SOY GERM (AZO BLADDER CONTROL) 300 MG CAP    Take 1 tablet by mouth once daily. "I haven't taken it yet today."   Modified Medications    No medications on file   Discontinued Medications    METHEN-M.BLUE-S.PHOS-PHSAL-HYO (URIBEL) 118-10-40.8-36 MG CAP    Take 1 capsule by mouth 3 (three) times daily as needed (bladder spasms).    MOMETASONE (ELOCON) 0.1 % OINTMENT    Apply topically once daily.    TRIAMCINOLONE ACETONIDE 0.1% (KENALOG) 0.1 % OINTMENT    Apply topically 2 (two) times daily. Use for up to 2 weeks. Use sparingly, thin layer only in affected (itchy) areas.       Patient Active Problem List    Diagnosis Date Noted    Easy bruisability 07/07/2022    Chronic fatigue 07/07/2022    Decreased  strength of right hand 01/11/2022    Chronic thumb pain, right 01/11/2022    Chronic instability of " "metacarpophalangeal joint of right thumb 01/10/2022    IUD (intrauterine device) in place- strings lost on 9/15 09/15/2021    Decreased range of motion of right thumb 03/02/2020    Thumb pain, right 03/02/2020    Thumb swelling 03/02/2020    Gamekeeper's thumb 01/06/2020    Generalized anxiety disorder 04/21/2016    Panic disorder 04/21/2016    Depression 04/21/2016    Anxiety 12/22/2015    Celiac disease 12/22/2015    ADHD (attention deficit hyperactivity disorder) 04/28/2014    Insomnia 02/11/2014           Objective:      /75 (BP Location: Right arm)   Pulse 76   Resp 18   Ht 5' 4" (1.626 m)   Wt 72.3 kg (159 lb 6.3 oz)   BMI 27.36 kg/m²   Estimated body mass index is 27.36 kg/m² as calculated from the following:    Height as of this encounter: 5' 4" (1.626 m).    Weight as of this encounter: 72.3 kg (159 lb 6.3 oz).    Physical Exam  Constitutional:       General: She is not in acute distress.     Appearance: Normal appearance. She is normal weight. She is not ill-appearing, toxic-appearing or diaphoretic.   HENT:      Head: Normocephalic and atraumatic.   Eyes:      General: No scleral icterus.     Conjunctiva/sclera: Conjunctivae normal.      Pupils: Pupils are equal, round, and reactive to light.   Cardiovascular:      Rate and Rhythm: Normal rate and regular rhythm.      Pulses: Normal pulses.      Heart sounds: Normal heart sounds. No murmur heard.  Pulmonary:      Effort: Pulmonary effort is normal. No respiratory distress.      Breath sounds: Normal breath sounds. No wheezing or rales.   Abdominal:      General: Abdomen is flat. Bowel sounds are normal. There is no distension.      Palpations: Abdomen is soft.      Tenderness: There is no abdominal tenderness.   Musculoskeletal:         General: No swelling, tenderness or signs of injury. Normal range of motion.      Cervical back: Normal range of motion and neck supple. No rigidity.      Right lower leg: No edema.      Left lower leg: No " edema.   Skin:     General: Skin is warm and dry.      Findings: Bruising (leg bruising without hematoma or purpura) present.   Neurological:      General: No focal deficit present.      Mental Status: She is alert and oriented to person, place, and time. Mental status is at baseline.      Sensory: No sensory deficit (normal sensation to sharp and dull stimuli bilaterally, normal monofillent foot testing bilaterally).      Motor: No weakness.      Gait: Gait normal.   Psychiatric:         Behavior: Behavior normal.         Thought Content: Thought content normal.       Assessment & Plan:   Jennifer was seen today for establish care.    Diagnoses and all orders for this visit:    Decreased sensation of foot  Subjective decreased sensation of R 4th and 5th toes. Normal monofilament testing bilaterally and normal discrimination to sharp and dull sensation. No weakness. Cognitively at baseline. No falls.   - conservative tx and reassurance provided  - will continue to follow symptom pattern  - f/u in 6 months    Generalized anxiety disorder  Fatigue evaluated, normal TSH. CBC, CMP, iron panel wnl. Vitamin B12, b1, folate wnl.   - f/u psychiatry for psych eval and potential med management    Celiac disease  Recent GI visit. Celiac markers well controlled. Symptoms stable.  - continue to follow with GI prn    History of recurrent UTIs  Being followed by urology.   - s/p tx of urolyticus.     Bruising  Seen by heme/onc thought not to be hematologic issue. Labs reviewed. Thought may be related to flonase use.  - hold Flonase use and replace with Astalin.       Patient seen and plan of care discussed with Dr. Mustafa.    RTC in 6 months.     Missy Andrade MD  [facility]    I have discussed A/P with Dr Andrade and agree with plan of action.  Fermin Nieto.

## 2022-09-30 ENCOUNTER — CLINICAL SUPPORT (OUTPATIENT)
Dept: INTERNAL MEDICINE | Facility: CLINIC | Age: 32
End: 2022-09-30
Payer: COMMERCIAL

## 2022-09-30 DIAGNOSIS — Z91.038 ALLERGY TO INSECT STINGS: Primary | ICD-10-CM

## 2022-09-30 PROCEDURE — 95115 PR IMMUNOTHERAPY, ONE INJECTION: ICD-10-PCS | Mod: S$GLB,,, | Performed by: FAMILY MEDICINE

## 2022-09-30 PROCEDURE — 99999 PR PBB SHADOW E&M-EST. PATIENT-LVL I: ICD-10-PCS | Mod: PBBFAC,,,

## 2022-09-30 PROCEDURE — 95115 IMMUNOTHERAPY ONE INJECTION: CPT | Mod: S$GLB,,, | Performed by: FAMILY MEDICINE

## 2022-09-30 PROCEDURE — 99499 NO LOS: ICD-10-PCS | Mod: S$GLB,,, | Performed by: ALLERGY & IMMUNOLOGY

## 2022-09-30 PROCEDURE — 99999 PR PBB SHADOW E&M-EST. PATIENT-LVL I: CPT | Mod: PBBFAC,,,

## 2022-09-30 PROCEDURE — 99499 UNLISTED E&M SERVICE: CPT | Mod: S$GLB,,, | Performed by: ALLERGY & IMMUNOLOGY

## 2022-10-11 ENCOUNTER — OFFICE VISIT (OUTPATIENT)
Dept: ALLERGY | Facility: CLINIC | Age: 32
End: 2022-10-11
Payer: COMMERCIAL

## 2022-10-11 VITALS
BODY MASS INDEX: 28 KG/M2 | HEIGHT: 64 IN | HEART RATE: 54 BPM | DIASTOLIC BLOOD PRESSURE: 77 MMHG | OXYGEN SATURATION: 97 % | WEIGHT: 164 LBS | SYSTOLIC BLOOD PRESSURE: 118 MMHG

## 2022-10-11 DIAGNOSIS — J30.89 ALLERGIC RHINITIS DUE TO DUST MITE: Primary | ICD-10-CM

## 2022-10-11 DIAGNOSIS — J30.1 ALLERGIC RHINITIS DUE TO POLLEN, UNSPECIFIED SEASONALITY: ICD-10-CM

## 2022-10-11 DIAGNOSIS — H10.13 ALLERGIC CONJUNCTIVITIS, BILATERAL: ICD-10-CM

## 2022-10-11 DIAGNOSIS — Z91.038 HYMENOPTERA ALLERGY: ICD-10-CM

## 2022-10-11 PROCEDURE — 99214 PR OFFICE/OUTPT VISIT, EST, LEVL IV, 30-39 MIN: ICD-10-PCS | Mod: S$GLB,,, | Performed by: ALLERGY & IMMUNOLOGY

## 2022-10-11 PROCEDURE — 3078F PR MOST RECENT DIASTOLIC BLOOD PRESSURE < 80 MM HG: ICD-10-PCS | Mod: CPTII,S$GLB,, | Performed by: ALLERGY & IMMUNOLOGY

## 2022-10-11 PROCEDURE — 1159F PR MEDICATION LIST DOCUMENTED IN MEDICAL RECORD: ICD-10-PCS | Mod: CPTII,S$GLB,, | Performed by: ALLERGY & IMMUNOLOGY

## 2022-10-11 PROCEDURE — 3044F HG A1C LEVEL LT 7.0%: CPT | Mod: CPTII,S$GLB,, | Performed by: ALLERGY & IMMUNOLOGY

## 2022-10-11 PROCEDURE — 3074F SYST BP LT 130 MM HG: CPT | Mod: CPTII,S$GLB,, | Performed by: ALLERGY & IMMUNOLOGY

## 2022-10-11 PROCEDURE — 3008F PR BODY MASS INDEX (BMI) DOCUMENTED: ICD-10-PCS | Mod: CPTII,S$GLB,, | Performed by: ALLERGY & IMMUNOLOGY

## 2022-10-11 PROCEDURE — 1160F RVW MEDS BY RX/DR IN RCRD: CPT | Mod: CPTII,S$GLB,, | Performed by: ALLERGY & IMMUNOLOGY

## 2022-10-11 PROCEDURE — 99999 PR PBB SHADOW E&M-EST. PATIENT-LVL IV: CPT | Mod: PBBFAC,,, | Performed by: ALLERGY & IMMUNOLOGY

## 2022-10-11 PROCEDURE — 1160F PR REVIEW ALL MEDS BY PRESCRIBER/CLIN PHARMACIST DOCUMENTED: ICD-10-PCS | Mod: CPTII,S$GLB,, | Performed by: ALLERGY & IMMUNOLOGY

## 2022-10-11 PROCEDURE — 1159F MED LIST DOCD IN RCRD: CPT | Mod: CPTII,S$GLB,, | Performed by: ALLERGY & IMMUNOLOGY

## 2022-10-11 PROCEDURE — 3074F PR MOST RECENT SYSTOLIC BLOOD PRESSURE < 130 MM HG: ICD-10-PCS | Mod: CPTII,S$GLB,, | Performed by: ALLERGY & IMMUNOLOGY

## 2022-10-11 PROCEDURE — 99214 OFFICE O/P EST MOD 30 MIN: CPT | Mod: S$GLB,,, | Performed by: ALLERGY & IMMUNOLOGY

## 2022-10-11 PROCEDURE — 3078F DIAST BP <80 MM HG: CPT | Mod: CPTII,S$GLB,, | Performed by: ALLERGY & IMMUNOLOGY

## 2022-10-11 PROCEDURE — 3044F PR MOST RECENT HEMOGLOBIN A1C LEVEL <7.0%: ICD-10-PCS | Mod: CPTII,S$GLB,, | Performed by: ALLERGY & IMMUNOLOGY

## 2022-10-11 PROCEDURE — 3008F BODY MASS INDEX DOCD: CPT | Mod: CPTII,S$GLB,, | Performed by: ALLERGY & IMMUNOLOGY

## 2022-10-11 PROCEDURE — 99999 PR PBB SHADOW E&M-EST. PATIENT-LVL IV: ICD-10-PCS | Mod: PBBFAC,,, | Performed by: ALLERGY & IMMUNOLOGY

## 2022-10-11 RX ORDER — EPINEPHRINE 0.3 MG/.3ML
1 INJECTION SUBCUTANEOUS ONCE
Qty: 1 EACH | Refills: 5 | Status: SHIPPED | OUTPATIENT
Start: 2022-10-11 | End: 2022-11-07 | Stop reason: ALTCHOICE

## 2022-10-11 NOTE — PROGRESS NOTES
Jennifer Nguyen returns to clinic today for continued evaluation of chronic rhinitis and imported fire ants sensitivity. She is here alone.  She was last seen by me 2021.     Since that time, she has seen Dr. Ebonie Husain who started her on fire ant desensitization.  She has tolerated well without any difficulty.  She has not had any stings.  Her EpiPen has .    She has been seeing Dr. Janett Raygoza in Dermatology for atopic dermatitis and hand eczema. She has prescribed Cutivate.    Her rhinitis and conjunctivitis have been well controlled with daily Flonase and antihistamines.    This past March she noticed increased bruisability.  This increased in May.    At the time she was taking ibuprofen about one every week or two.    She saw Dr. Darling in Hematology-Oncology who advised that she discontinue Flonase and ibuprofen.    She has since changed to acetaminophen. She did stop her Flonase.     Since doing so her rhinitis has been more difficult to control.    [facility] PEQ ALLERGY QUESTIONNAIRE SHORT 10/11/2022   Facial swelling? No   Sinus pain? No   Sinus pressure? No   Ears: -   Ear discharge? No   Ear pain? No   Hearing loss? No   Nosebleeds? No   Postnasal drip? No   Sneezing? Yes   Runny nose? Yes   Congestion? No   Sore throat? No   Trouble swallowing? No   Voice change? No   Eye itching? Yes   Eye redness? No   Eye discharge? No   Eye pain?  No   Light sensitivity / light hurts the eyes? Yes   Lungs: -   Cough? Yes   Wheezing? No   Shortness of breath? No   Apnea? No   Choking? No   Chest tightness? No   Rash? Yes   Color change of skin? No      Physical Examination:  General: Well-developed, well-nourished, no acute distress.  Head: No sinus tenderness.  Eyes: Conjunctivae:  No bulbar or palpebral conjunctival injection.  Ears: EAC's clear.  TM's clear.  No pre-auricular nodes.  Nose: Nasal Mucosa:  Pink.  Septum: No apparent deviation.  Turbinates:  No significant edema.  Polyps/Mass:  None  visible.  Teeth/Gums:  No bleeding noted.  Oropharynx: No exudates.  Neck: Supple without thyromegaly. No cervical lymphadenopathy.    Respiratory/Chest: Effort: Good.  Auscultation:  Clear bilaterally.  Skin: Good turgor.  No urticaria or angioedema.    Neuro/Psych: Oriented x 3.    Pictures are reviewed on her cell phone on October 11, 2022 and a picture from May showed diffuse ecchymotic lesions of her lower extremities.    Laboratory 09/28/2021:   IgE level:  1114.   ImmunoCAP:  Class VI: Dust mites.   Class III:  Ragweed, cat, dog, cockroach.   Class II:  Venu English plantain oak, pecan pollen, marsh elder, fire ant.  Class 0/I:  Bermuda (0.34), Alternaria (0.11), Aspergillus (0.16), wheat (0.30).  Serum tryptase:  11.6.  IgA:  132.   TTG IgA:  8.    Assessment:  1.  Allergic rhinitis.  2.  Allergic conjunctivitis.  3.  Atopic dermatitis.  4.  Venom hypersensitivity, IFA.  5.  Celiac disease.  6.  Easy bruisability probably not secondary to topical nasal steroids.    Recommendations:  1.  Avoid ibuprofen for now.  2.  Restart Flonase.  3.  OTC antihistamines as needed.  4.  EpiPen.    5.  Continue fire ant desensitization.  6.  Steroid cream as prescribed a Dr. Raygoza.  7.  Observe for any further bruising while off of nonsteroidals and on topical steroids.  8.  Return to clinic in one year or sooner if needed.

## 2022-10-20 ENCOUNTER — OFFICE VISIT (OUTPATIENT)
Dept: PSYCHIATRY | Facility: CLINIC | Age: 32
End: 2022-10-20
Payer: COMMERCIAL

## 2022-10-20 VITALS
HEART RATE: 65 BPM | BODY MASS INDEX: 27.4 KG/M2 | DIASTOLIC BLOOD PRESSURE: 60 MMHG | WEIGHT: 160.5 LBS | SYSTOLIC BLOOD PRESSURE: 114 MMHG | HEIGHT: 64 IN

## 2022-10-20 DIAGNOSIS — F90.0 ATTENTION DEFICIT HYPERACTIVITY DISORDER (ADHD), PREDOMINANTLY INATTENTIVE TYPE: Primary | ICD-10-CM

## 2022-10-20 PROCEDURE — 3044F PR MOST RECENT HEMOGLOBIN A1C LEVEL <7.0%: ICD-10-PCS | Mod: CPTII,S$GLB,, | Performed by: NURSE PRACTITIONER

## 2022-10-20 PROCEDURE — 99999 PR PBB SHADOW E&M-EST. PATIENT-LVL III: ICD-10-PCS | Mod: PBBFAC,,, | Performed by: NURSE PRACTITIONER

## 2022-10-20 PROCEDURE — 3074F SYST BP LT 130 MM HG: CPT | Mod: CPTII,S$GLB,, | Performed by: NURSE PRACTITIONER

## 2022-10-20 PROCEDURE — 90792 PR PSYCHIATRIC DIAGNOSTIC EVALUATION W/MEDICAL SERVICES: ICD-10-PCS | Mod: S$GLB,,, | Performed by: NURSE PRACTITIONER

## 2022-10-20 PROCEDURE — 3078F DIAST BP <80 MM HG: CPT | Mod: CPTII,S$GLB,, | Performed by: NURSE PRACTITIONER

## 2022-10-20 PROCEDURE — 1159F PR MEDICATION LIST DOCUMENTED IN MEDICAL RECORD: ICD-10-PCS | Mod: CPTII,S$GLB,, | Performed by: NURSE PRACTITIONER

## 2022-10-20 PROCEDURE — 3078F PR MOST RECENT DIASTOLIC BLOOD PRESSURE < 80 MM HG: ICD-10-PCS | Mod: CPTII,S$GLB,, | Performed by: NURSE PRACTITIONER

## 2022-10-20 PROCEDURE — 99999 PR PBB SHADOW E&M-EST. PATIENT-LVL III: CPT | Mod: PBBFAC,,, | Performed by: NURSE PRACTITIONER

## 2022-10-20 PROCEDURE — 1159F MED LIST DOCD IN RCRD: CPT | Mod: CPTII,S$GLB,, | Performed by: NURSE PRACTITIONER

## 2022-10-20 PROCEDURE — 1160F PR REVIEW ALL MEDS BY PRESCRIBER/CLIN PHARMACIST DOCUMENTED: ICD-10-PCS | Mod: CPTII,S$GLB,, | Performed by: NURSE PRACTITIONER

## 2022-10-20 PROCEDURE — 3044F HG A1C LEVEL LT 7.0%: CPT | Mod: CPTII,S$GLB,, | Performed by: NURSE PRACTITIONER

## 2022-10-20 PROCEDURE — 90792 PSYCH DIAG EVAL W/MED SRVCS: CPT | Mod: S$GLB,,, | Performed by: NURSE PRACTITIONER

## 2022-10-20 PROCEDURE — 3074F PR MOST RECENT SYSTOLIC BLOOD PRESSURE < 130 MM HG: ICD-10-PCS | Mod: CPTII,S$GLB,, | Performed by: NURSE PRACTITIONER

## 2022-10-20 PROCEDURE — 1160F RVW MEDS BY RX/DR IN RCRD: CPT | Mod: CPTII,S$GLB,, | Performed by: NURSE PRACTITIONER

## 2022-10-20 RX ORDER — BUPROPION HYDROCHLORIDE 150 MG/1
150 TABLET ORAL DAILY
Qty: 30 TABLET | Refills: 2 | Status: SHIPPED | OUTPATIENT
Start: 2022-10-20 | End: 2022-12-22

## 2022-10-20 NOTE — PROGRESS NOTES
Outpatient Psychiatry Initial Visit (MD/NP)    10/20/2022    Jennifer Nguyen, a 32 y.o. female, presenting for initial evaluation visit. Met with patient.    Reason for Encounter: self-referral. Patient complains of ADHD, depression, anxiety    History of Present Illness:   Jennifer Nguyen checked in early for her appointment. She reports being diagnosed in 2014 with ADHD as well as depression and anxiety- tried Strattera but found she didn't need it once moving to a job that required less mental effort more physical. Reports suffering with adjustment disorder around that time. Admits her depression is well controlled presently. Anxiety less so but feels it is related to uncontrolled ADHD.     Regarding work- works from home, converted sun room into office, spends time answering emails, building web sites, writing news letter, leading projects, starting things but does not finish, getting distracted, fixating on things as a distraction, struggling prioritizing. Effecting her ability to get things done.       Anxiety  Patient is here for evaluation of anxiety.  She has the following anxiety symptoms: chest pain, difficulty concentrating, dizziness, fatigue, feelings of losing control, psychomotor agitation, racing thoughts, headaches, GI upset, decreased appetite . Onset of symptoms was approximately 10 years ago.  Symptoms have been stable since that time. She denies current suicidal and homicidal ideation. Family history significant for anxiety and Bipolar 2 .Possible organic causes contributing are:  unknown at this time- Vit D is a send out at this facility and therefore cannot read results . Risk factors: positive family history in  sister(s) and cousin and previous episode of depression Previous treatment includes individual therapy, group therapy, and medication Ativan, Lexapro, Zoloft, and Prozac .   She complains of the following medication side effects: see below.    Depression  Patient complains of  depression. She reports historically experiencing  anhedonia, depressed mood, difficulty concentrating, fatigue, feelings of worthlessness/guilt, hopelessness, impaired memory, insomnia, psychomotor retardation, suicidal attempt, weight gain, and weight loss. Initial onset was approximately  12 years of age . Symptoms have ebbed and flowed since that time. Current symptoms include: difficulty concentrating and fatigue. Patient currently denies anhedonia, depressed mood, feelings of worthlessness/guilt, hopelessness, hypersomnia, impaired memory, insomnia, psychomotor agitation, psychomotor retardation, recurrent thoughts of death, suicidal attempt, suicidal thoughts with specific plan, suicidal thoughts without plan, weight gain, and weight loss.     Symptoms of ADHD:   DSM-V ADHD Criteria:  Inattentive symptoms  Often fails to give close attention to details or makes careless mistakes in schoolwork, at work, or during other activities (e.g., overlooks or misses details, work is inaccurate). Yes- miss spelling, forgetting to attach documents  Often has difficulty sustaining attention in tasks or play activities (e.g., has difficulty remaining focused during lectures, conversations, or lengthy reading). Yes- zone out during social conversations, looking at phone; tries to take notes to remember   Often does not seem to listen when spoken to directly (e.g., mind seems elsewhere, even in the absence of any obvious distraction). Depends on situation  Often does not follow through on instructions and fails to finish schoolwork, chores, or duties in the workplace (e.g., starts tasks but quickly loses focus and is easily sidetracked). Yes- switch from tasks to tasks and get nothing done  Often has difficulty organizing tasks and activities (e.g., difficulty managing sequential tasks; difficulty keeping materials and belongings in order; messy, disorganized work; has poor time management; fails to meet deadlines). Yes-  procrastinates until deadline; prioritizes things base on time rather than importance  Often avoids, dislikes, or is reluctant to engage in tasks that require sustained mental effort (e.g., schoolwork or homework; for older adolescents and adults, preparing reports, completing forms, reviewing lengthy papers). Yes  Often loses things necessary for tasks or activities (e.g., school materials, pencils, books, tools, wallets, keys, paperwork, eyeglasses, mobile telephones). Yes- loses keys, ID, phone   Is often easily distracted by extraneous stimuli (for older adolescents and adults, may include unrelated thoughts). Yes- overstimulated at times, has to use noise to tune out other unexpected noises which will be distracting  Is often forgetful in daily activities (e.g., doing chores, running errands; for older adolescents and adults, returning calls, paying bills, keeping appointments). Yes- returning phone calls     Hyperactivity symptoms:  Often fidgets with or taps hands or feet or squirms in seat. states Yes but has been sitting still throughout interview  Often leaves seat in situations when remaining seated is expected (e.g., leaves his or her place in the classroom, in the office or other workplace, or in other situations that require remaining in place). No  Often runs about or climbs in situations where it is inappropriate. (Note: In adolescents or adults, may be limited to feeling restless). No  Often unable to play or engage in leisure activities quietly. No  Is often on the go, acting as if driven by a motor (e.g., is unable to be or uncomfortable being still for extended time, as in restaurants, meetings; may be experienced by others as being restless or difficult to keep up with). No  Often talks excessively. Yes  Often blurts out an answer before a question has been completed (e.g., completes peoples sentences; cannot wait for turn in conversation). Yes- wanting people to hurry up and finish there  "part of the conversation  Often has difficulty waiting his or her turn (e.g., while waiting in line). No "I enjoyed [place]."  Often interrupts or intrudes on others (e.g., butts into conversations, games, or activities; may start using other peoples things without asking or receiving permission; for adolescents and adults, may intrude into or take over what others are doing). No      Stressors:  poor concentration, family dynamics    History:     Past Psychiatric History:   Previous therapy: yes  Previous psychiatric treatment and medication trials: yes - Lexapro - partially effective, Zoloft - severe GI upset, Prozac- irritable  Previous psychiatric hospitalizations: no  Previous diagnoses: yes - MDD, CHRISTINE, Adjustment disorder  Previous suicide attempts: yes - in 9th grade - OD attempt but then got scared and through them up  History of violence: no  Currently in treatment with ISABEL Barrett LPC.  Education: college graduate  Other pertinent history: Trauma - PTSD from bullying in highschool    Substance Abuse History:  Recreational drugs: marijuana - for nausea a few times a year  Use of alcohol: occasional, social use  Use of caffeine: coffee 2-3 /day  Tobacco use: no  Legal consequences of chemical use: yes- MIP in college  Patient feels she ought to cut down on drinking and/or drug use: no  Patient has been annoyed by others criticizing her drinking or drug use: no  Patient has felt bad or guilty about drinking or drug use:no  Patient has had a drink or used drugs as an eye opener first thing in the morning to steady nerves, get rid of a hangover or get the day started: no  Use of OTC medications: melatonin, tylenol as needed, Flonase    Additional historical information includes:   Seizure: denies  Head trauma/TBI:yes- 4 -5 concussion- last one in highschool    The following portions of the patient's history were reviewed and updated as appropriate: allergies, current medications, past family history, past " "medical history, past social history, past surgical history, and problem list.    Record Review: brief   Per initial visit with JIMI Gasca MD in 3/15/2016:  Patient reports struggling with depression since 7th or 8th grade. Was bullied in high school after she posted something on Facebook about some of her bandmates. She still experiences significant distress when recalling this incident. Engaged in cutting and burning herself throughout high school. Had intermittent thoughts of suicide. Anxiety started around college age, experienced first panic attack during freshman year. Had to go to ER for panic attack. After graduating college, returned to live in [place], [place], and saw girl who instigated bullying, a friend , and says "this sent me into a downward spiral." Started seeing a psychiatrist and therapist in  until moving to [place] from [place] in May 2014. Was on Lexapro 20mg daily and Ativan 1-2mg prn while in [place]. Also started on Ambien CR with significant improvement in sleep and no side effects. Psychiatrist had wanted to start patient on Strattera for ADHD but patient did not start due to moving. After moving was doing ok for a while, then began having worsening anxiety during fall of . Started worrying about health issues and turning 25. Endorses excessive worry, ruminating, fatigue, difficulty with sleeping, difficulty with "making long-term decisions." Also endorses frequent panic symptoms. Has had to leave work and miss activities due to panic attacks. Denies any recent episodes of major depression. Was restarted on Lexapro by PCP about 3 months ago, reports little improvement in symptoms since that time.        Review Of Systems:     Medical Review Of Systems:  Constitutional: negative  Respiratory: negative  Cardiovascular: negative  Gastrointestinal: negative  Musculoskeletal:negative  Neurological: negative    Psychiatric Review Of Systems:  Sleep: no, only when " "anxious  Appetite changes: no  Weight changes: no  Energy: yes, down recently  Interest/pleasure/anhedonia: no  Somatic symptoms: yes, headaches  Libido: no  Anxiety/panic: yes, worse when tasks getting behind  Guilty/hopeless: no  Self-injurious behavior/risky behavior: not presently- most recently in 2020  Any drugs: yes, marijuana  Alcohol: no       Current Evaluation:     Musculoskeletal  Muscle Strength/Tone:  no tremor, no tic   Gait & Station:  non-ataxic      Relevant Elements of Neurological Exam: normal gait    Nutritional Screening: Considering the patient's height and weight, medications, medical history and preferences, should a referral be made to the dietitian? no    Mental Status Evaluation:  Appearance:  unremarkable, age appropriate   Behavior:  normal, cooperative   Speech:  no latency; no press   Mood:  steady   Affect:  congruent and appropriate   Thought Process:  normal and logical   Thought Content:  normal, no suicidality, no homicidality, delusions, or paranoia   Sensorium:  grossly intact   Cognition:  grossly intact   Insight:  intact   Judgment:  behavior is adequate to circumstances     Physical/Somatic Complaints   The patient lists: GI upset    Functioning in Relationships:  Spouse/partner: partnered for 2 years- intact  Peers: good  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    Vitals:    10/20/22 0755   BP: 114/60   Pulse: 65   Weight: 72.8 kg (160 lb 7.9 oz)   Height: 5' 4" (1.626 m)        General:  unremarkable, age appropriate       Laboratory Data  Lab Visit on 09/27/2022   Component Date Value Ref Range Status    Hemoglobin A1C 09/27/2022 5.0  4.0 - 5.6 % Final    Estimated Avg Glucose 09/27/2022 97  68 - 131 mg/dL Final     Lab Visit on 09/27/2022   Component Date Value Ref Range Status    Hemoglobin A1C 09/27/2022 5.0  4.0 - 5.6 % Final    " Comment: ADA Screening Guidelines:  5.7-6.4%  Consistent with prediabetes  >or=6.5%  Consistent with diabetes    High levels of fetal hemoglobin interfere with the HbA1C  assay. Heterozygous hemoglobin variants (HbS, HgC, etc)do  not significantly interfere with this assay.   However, presence of multiple variants may affect accuracy.      Estimated Avg Glucose 09/27/2022 97  68 - 131 mg/dL Final   Office Visit on 07/14/2022   Component Date Value Ref Range Status    POC Rapid COVID 07/14/2022 Negative  Negative Final     Acceptable 07/14/2022 Yes   Final   Lab Visit on 07/07/2022   Component Date Value Ref Range Status    aPTT 07/07/2022 24.2  21.0 - 32.0 sec Final    aPTT therapeutic range = 39-69 seconds    Fibrinogen 07/07/2022 312  182 - 400 mg/dL Final    WBC 07/07/2022 9.78  3.90 - 12.70 K/uL Final    RBC 07/07/2022 4.50  4.00 - 5.40 M/uL Final    Hemoglobin 07/07/2022 13.2  12.0 - 16.0 g/dL Final    Hematocrit 07/07/2022 40.0  37.0 - 48.5 % Final    MCV 07/07/2022 89  82 - 98 fL Final    MCH 07/07/2022 29.3  27.0 - 31.0 pg Final    MCHC 07/07/2022 33.0  32.0 - 36.0 g/dL Final    RDW 07/07/2022 12.3  11.5 - 14.5 % Final    Platelets 07/07/2022 337  150 - 450 K/uL Final    MPV 07/07/2022 9.3  9.2 - 12.9 fL Final    Gran # (ANC) 07/07/2022 6.1  1.8 - 7.7 K/uL Final    Comment: The ANC is based on a white cell differential from an   automated cell counter. It has not been microscopically   reviewed for the presence of abnormal cells. Clinical   correlation is required.      Immature Grans (Abs) 07/07/2022 0.08 (H)  0.00 - 0.04 K/uL Final    Comment: Mild elevation in immature granulocytes is non specific and   can be seen in a variety of conditions including stress response,   acute inflammation, trauma and pregnancy. Correlation with other   laboratory and clinical findings is essential.     Office Visit on 05/10/2022   Component Date Value Ref Range Status    Color, UA 05/10/2022 Straw   Final     pH, UA 05/10/2022 5   Final    WBC, UA 05/10/2022 Negative   Final    Nitrite, UA 05/10/2022 Negative   Final    Protein, POC 05/10/2022 Trace   Final    Glucose, UA 05/10/2022 Normal   Final    Ketones, UA 05/10/2022 Negative   Final    Urobilinogen, UA 05/10/2022 Normal   Final    Bilirubin, POC 05/10/2022 Negative   Final    Blood, UA 05/10/2022 250   Final    Clarity, UA 05/10/2022 Clear   Final    Spec Grav UA 05/10/2022 1.01   Final    POC Residual Urine Volume 05/10/2022 0  0 - 100 mL Final    Urine Culture, Routine 05/10/2022 No significant growth   Final    RBC, UA 05/10/2022 1  0 - 4 /hpf Final    WBC, UA 05/10/2022 0  0 - 5 /hpf Final    Bacteria 05/10/2022 Rare  None-Occ /hpf Final    Squam Epithel, UA 05/10/2022 3  /hpf Final    Microscopic Comment 05/10/2022 SEE COMMENT   Final    Comment: Other formed elements not mentioned in the report are not   present in the microscopic examination.       Ureaplasma, PCR Source 05/10/2022 Urine   Final    Ureaplasma urealyticum PCR 05/10/2022 Positive (A)  Not Applicable Final    Ureaplasma parvum PCR 05/10/2022 Negative  Not Applicable Final    Comment: -------------------ADDITIONAL INFORMATION-------------------  This test was developed and its performance characteristics   determined by [facility] in a manner consistent with CLIA   requirements. This test has not been cleared or approved by   the U.S. Food and Drug Administration.    Test Performed by:  [facility] - [address] [address]  : Sulaiman Guzman M.D. Ph.D.; CLIA# [id number]      Mycoplasma Genitalium Specimen Dalila* 05/10/2022 URINE   Corrected    Mycoplasma Genitalium Result 05/10/2022 Negative  Negative Final    Comment: -------------------ADDITIONAL INFORMATION-------------------  This report is intended for use in clinical monitoring and  management of patients.  It is not intended for use in   medical-legal  applications.    This test has been modified from the 's  instructions.  Its performance characteristics were  determined by [facility] in a manner consistent with CLIA  requirements.  This test has not been cleared or approved  by the U.S. Food and Drug Administration.    Test Performed by:  [address] [address]  : Sulaiman Guzman M.D. Ph.D.; CLIA# [id number]     Lab Visit on 05/09/2022   Component Date Value Ref Range Status    WBC 05/09/2022 7.96  3.90 - 12.70 K/uL Final    RBC 05/09/2022 4.31  4.00 - 5.40 M/uL Final    Hemoglobin 05/09/2022 12.9  12.0 - 16.0 g/dL Final    Hematocrit 05/09/2022 37.8  37.0 - 48.5 % Final    MCV 05/09/2022 88  82 - 98 fL Final    MCH 05/09/2022 29.9  27.0 - 31.0 pg Final    MCHC 05/09/2022 34.1  32.0 - 36.0 g/dL Final    RDW 05/09/2022 12.0  11.5 - 14.5 % Final    Platelets 05/09/2022 276  150 - 450 K/uL Final    MPV 05/09/2022 9.0 (L)  9.2 - 12.9 fL Final    Sodium 05/09/2022 140  136 - 145 mmol/L Final    Potassium 05/09/2022 4.6  3.5 - 5.1 mmol/L Final    Chloride 05/09/2022 108  95 - 110 mmol/L Final    CO2 05/09/2022 24  23 - 29 mmol/L Final    Glucose 05/09/2022 91  70 - 110 mg/dL Final    BUN 05/09/2022 16  6 - 20 mg/dL Final    Creatinine 05/09/2022 0.8  0.5 - 1.4 mg/dL Final    Calcium 05/09/2022 8.8  8.7 - 10.5 mg/dL Final    Total Protein 05/09/2022 6.3  6.0 - 8.4 g/dL Final    Albumin 05/09/2022 3.8  3.5 - 5.2 g/dL Final    Total Bilirubin 05/09/2022 0.7  0.1 - 1.0 mg/dL Final    Comment: For infants and newborns, interpretation of results should be based  on gestational age, weight and in agreement with clinical  observations.    Premature Infant recommended reference ranges:  Up to 24 hours.............<8.0 mg/dL  Up to 48 hours............<12.0 mg/dL  3-5 days..................<15.0 mg/dL  6-29 days.................<15.0 mg/dL      Alkaline Phosphatase 05/09/2022 54 (L)   55 - 135 U/L Final    AST 05/09/2022 18  10 - 40 U/L Final    ALT 05/09/2022 20  10 - 44 U/L Final    Anion Gap 05/09/2022 8  8 - 16 mmol/L Final    eGFR if African American 05/09/2022 >60  >60 mL/min/1.73 m^2 Final    eGFR if non African American 05/09/2022 >60  >60 mL/min/1.73 m^2 Final    Comment: Calculation used to obtain the estimated glomerular filtration  rate (eGFR) is the CKD-EPI equation.       Vitamin B-12 05/09/2022 274  210 - 950 pg/mL Final    Folate 05/09/2022 9.6  4.0 - 24.0 ng/mL Final    Iron 05/09/2022 90  30 - 160 ug/dL Final    Transferrin 05/09/2022 255  200 - 375 mg/dL Final    TIBC 05/09/2022 377  250 - 450 ug/dL Final    Saturated Iron 05/09/2022 24  20 - 50 % Final    Ferritin 05/09/2022 59  20.0 - 300.0 ng/mL Final    TSH 05/09/2022 1.858  0.400 - 4.000 uIU/mL Final    Antigliadin Abs, IgA 05/09/2022 18  <20 UNITS Final    Interpretation: Negative    Antigliadin Ab IgG 05/09/2022 2  <20 UNITS Final    Comment: Interpretation: Negative    Test performed at [facility],  [address] , [place]  [address]     [phone]  Sulaiman Simon MD  - Medical Director      TTG IgA 05/09/2022 11  <20 UNITS Final    Interpretation: Negative    TTG IgG 05/09/2022 3  <20 UNITS Final    Interpretation: Negative    Immunoglobulin A (IgA) 05/09/2022 108  70 - 400 mg/dL Final    Comment: Test performed at [facility],  [address] [place], [place]  [id number] [phone]  Sulaiman Simon MD  - Medical Director      Thiamine 05/09/2022 63  38 - 122 ug/L Final    Comment: This test was developed and the performance   characteristics determined by [facility].   It has not been cleared or approved by the FDA.   The laboratory is regulated under CLIA as qualified to   perform high-complexity testing. This test is used for   patient testing purposes. It should not be regarded   as investigational or for research.    Test performed at [facility],  [address], [place]  [address]     [phone]  Sulaiman Simon MD  - Medical Director      Prothrombin Time 05/09/2022 9.8  9.0 - 12.5 sec Final    INR 05/09/2022 0.9  0.8 - 1.2 Final    Comment: Coumadin Therapy:  2.0 - 3.0 for INR for all indicators except mechanical heart valves  and antiphospholipid syndromes which should use 2.5 - 3.5.     Office Visit on 04/26/2022   Component Date Value Ref Range Status    POC Blood, Urine 04/26/2022 Positive (A)  Negative Final    POC Bilirubin, Urine 04/26/2022 Negative  Negative Final    POC Urobilinogen, Urine 04/26/2022 Normal  0.1 - 1.1 Final    POC Ketones, Urine 04/26/2022 Negative  Negative Final    POC Protein, Urine 04/26/2022 Negative  Negative Final    POC Nitrates, Urine 04/26/2022 Negative  Negative Final    POC Glucose, Urine 04/26/2022 Negative  Negative Final    pH, UA 04/26/2022 6.0  5 - 8 Final    POC Specific Gravity, Urine 04/26/2022 1.015  1.003 - 1.029 Final    POC Leukocytes, Urine 04/26/2022 Negative  Negative Final    POC Preg Test, Ur 04/26/2022 Negative  Negative Final     Acceptable 04/26/2022 Yes   Final    Urine Culture, Routine 04/26/2022 No growth   Final    Hemoglobin 04/26/2022 12.1  12.0 - 15.0 g/dL Final     Acceptable 04/26/2022 Yes   Final           Medications  Outpatient Encounter Medications as of 10/20/2022   Medication Sig Dispense Refill    azelastine (ASTELIN) 137 mcg (0.1 %) nasal spray 1 spray (137 mcg total) by Nasal route 2 (two) times daily. 30 mL 0    buPROPion (WELLBUTRIN XL) 150 MG TB24 tablet Take 1 tablet (150 mg total) by mouth once daily. 30 tablet 2    cetirizine (ZYRTEC) 10 MG tablet Take 1 tablet (10 mg total) by mouth 2 (two) times daily as needed for Allergies. Start taking Sunday before Rush procedure. 6 tablet 0    EPINEPHrine (EPIPEN) 0.3 mg/0.3 mL AtIn Inject 0.3 mLs (0.3 mg total) into the muscle once. for 1 dose 1 each 5    famotidine (PEPCID) 20 MG  "tablet Take 1 tablet (20 mg total) by mouth 2 (two) times daily. Start taking Sunday before Rush procedure. for 3 days (Patient not taking: No sig reported) 6 tablet 0    fluticasone propionate (CUTIVATE) 0.05 % cream Apply to affected areas of body daily prn eczema. 60 g 3    fluticasone propionate (FLONASE) 50 mcg/actuation nasal spray 1 spray by Each Nostril route once daily.      loratadine (CLARITIN) 10 mg tablet Take 10 mg by mouth once daily.      olopatadine (PATADAY) 0.2 % Drop Place 1 drop into both eyes daily as needed (itching eyes). 5 mL 0    pumpkin seed extract-soy germ (AZO BLADDER CONTROL) 300 mg Cap Take 1 tablet by mouth once daily. "I haven't taken it yet today."       Facility-Administered Encounter Medications as of 10/20/2022   Medication Dose Route Frequency Provider Last Rate Last Admin    levonorgestreL (MIRENA) 20 mcg/24 hours (6 yrs) 52 mg IUD 1 Intra Uterine Device  1 Intra Uterine Device Intrauterine  Jemma Cochran MD   1 Intra Uterine Device at 10/01/21 0900           Assessment - Diagnosis - Goals:     Impression: Jennifer Nguyen, a 32 y.o. female, presenting for initial evaluation visit for management of ADHD symptoms.       ICD-10-CM ICD-9-CM   1. Attention deficit hyperactivity disorder (ADHD), predominantly inattentive type  F90.0 314.00       Strengths and Liabilities: Strength: Patient accepts guidance/feedback, Strength: Patient is expressive/articulate., Strength: Patient is intelligent., Liability: Patient lacks coping skills.    Treatment Goals:    Anxiety: acquiring relapse prevention skills, reducing negative automatic thoughts, reducing physical symptoms of anxiety, and reducing time spent worrying (<30 minutes/day)  Depression: acquiring relapse prevention skills, increasing energy, increasing interest in usual activities, increasing motivation, reducing excessive guilt, reducing fatigue, and reducing negative automatic thoughts  ADHD: complete work/school " assignments on time, stay on task without frequent reminders, less frequent interruptions of others, remain focused during conversations with others as evidenced by self-report and observation    Treatment Plan/Recommendations:   Medication Management:   Start Wellbutrin  mg daily  Denies issues with seizures, discussed risk of anxiety  The risks and benefits of medication were discussed with the patient.  Labs reviewed as noted above  Will need to get UDS if stimulant is desired  The treatment plan and follow up plan were reviewed with the patient.  Discussed diagnosis, risks and benefits of proposed treatment above vs alternative treatments vs no treatment, and potential side effects of these treatments. The patient expresses understanding of the above and displays the capacity to agree with this treatment given said understanding. Patient also agrees that, currently, the benefits outweigh the risks and would like to pursue treatment at this time.  Discussed inherent unpredictability of medications in each individual.   Encouraged Patient to keep future appointments.   Take medications as prescribed and abstain from substance abuse.   In the event of an emergency patient was advised to go to the emergency room      Continue psychotherapy with: ISABEL Barrett LPC    Return to Clinic: 3 months    Total time: 75 minutes with more than 50% of time spent counseling and/or coordinating care.  (which included pts differential diagnosis and prognosis for psychiatric conditions, risks, benefits of treatments, instructions and adherence to treatment plan, risk reduction, reviewing current psychiatric medication regimen, medical problems and social stressors. In addtion to possible discussion with other healthcare provider/s)    Gisele Chavarria  DNP-BC PMHNP  [facility]

## 2022-10-26 ENCOUNTER — CLINICAL SUPPORT (OUTPATIENT)
Dept: INTERNAL MEDICINE | Facility: CLINIC | Age: 32
End: 2022-10-26
Payer: COMMERCIAL

## 2022-10-26 DIAGNOSIS — Z91.038 HYMENOPTERA ALLERGY: ICD-10-CM

## 2022-10-26 DIAGNOSIS — Z91.038 ALLERGY TO INSECT STINGS: Primary | ICD-10-CM

## 2022-10-26 PROCEDURE — 99999 PR PBB SHADOW E&M-EST. PATIENT-LVL I: CPT | Mod: PBBFAC,,,

## 2022-10-26 PROCEDURE — 95115 PR IMMUNOTHERAPY, ONE INJECTION: ICD-10-PCS | Mod: S$GLB,,, | Performed by: FAMILY MEDICINE

## 2022-10-26 PROCEDURE — 99499 NO LOS: ICD-10-PCS | Mod: S$GLB,,, | Performed by: ALLERGY & IMMUNOLOGY

## 2022-10-26 PROCEDURE — 99999 PR PBB SHADOW E&M-EST. PATIENT-LVL I: ICD-10-PCS | Mod: PBBFAC,,,

## 2022-10-26 PROCEDURE — 95115 IMMUNOTHERAPY ONE INJECTION: CPT | Mod: S$GLB,,, | Performed by: FAMILY MEDICINE

## 2022-10-26 PROCEDURE — 99499 UNLISTED E&M SERVICE: CPT | Mod: S$GLB,,, | Performed by: ALLERGY & IMMUNOLOGY

## 2022-11-07 ENCOUNTER — OFFICE VISIT (OUTPATIENT)
Dept: URGENT CARE | Facility: CLINIC | Age: 32
End: 2022-11-07
Payer: COMMERCIAL

## 2022-11-07 VITALS
OXYGEN SATURATION: 98 % | HEART RATE: 65 BPM | SYSTOLIC BLOOD PRESSURE: 125 MMHG | WEIGHT: 160 LBS | HEIGHT: 64 IN | TEMPERATURE: 97 F | RESPIRATION RATE: 18 BRPM | BODY MASS INDEX: 27.31 KG/M2 | DIASTOLIC BLOOD PRESSURE: 84 MMHG

## 2022-11-07 DIAGNOSIS — R30.0 DYSURIA: Primary | ICD-10-CM

## 2022-11-07 DIAGNOSIS — N30.00 ACUTE CYSTITIS WITHOUT HEMATURIA: ICD-10-CM

## 2022-11-07 LAB
B-HCG UR QL: NEGATIVE
BILIRUB UR QL STRIP: POSITIVE
CTP QC/QA: YES
GLUCOSE UR QL STRIP: NEGATIVE
KETONES UR QL STRIP: NEGATIVE
LEUKOCYTE ESTERASE UR QL STRIP: NEGATIVE
PH, POC UA: 5 (ref 5–8)
POC BLOOD, URINE: POSITIVE
POC NITRATES, URINE: POSITIVE
PROT UR QL STRIP: NEGATIVE
SP GR UR STRIP: 1.02 (ref 1–1.03)
UROBILINOGEN UR STRIP-ACNC: 1 (ref 0.1–1.1)

## 2022-11-07 PROCEDURE — 3008F BODY MASS INDEX DOCD: CPT | Mod: CPTII,S$GLB,, | Performed by: SURGERY

## 2022-11-07 PROCEDURE — 99214 OFFICE O/P EST MOD 30 MIN: CPT | Mod: S$GLB,,, | Performed by: SURGERY

## 2022-11-07 PROCEDURE — 3074F PR MOST RECENT SYSTOLIC BLOOD PRESSURE < 130 MM HG: ICD-10-PCS | Mod: CPTII,S$GLB,, | Performed by: SURGERY

## 2022-11-07 PROCEDURE — 3044F HG A1C LEVEL LT 7.0%: CPT | Mod: CPTII,S$GLB,, | Performed by: SURGERY

## 2022-11-07 PROCEDURE — 3074F SYST BP LT 130 MM HG: CPT | Mod: CPTII,S$GLB,, | Performed by: SURGERY

## 2022-11-07 PROCEDURE — 81025 POCT URINE PREGNANCY: ICD-10-PCS | Mod: S$GLB,,, | Performed by: SURGERY

## 2022-11-07 PROCEDURE — 3079F DIAST BP 80-89 MM HG: CPT | Mod: CPTII,S$GLB,, | Performed by: SURGERY

## 2022-11-07 PROCEDURE — 1159F MED LIST DOCD IN RCRD: CPT | Mod: CPTII,S$GLB,, | Performed by: SURGERY

## 2022-11-07 PROCEDURE — 81003 POCT URINALYSIS, DIPSTICK, AUTOMATED, W/O SCOPE: ICD-10-PCS | Mod: QW,S$GLB,, | Performed by: SURGERY

## 2022-11-07 PROCEDURE — 87147 CULTURE TYPE IMMUNOLOGIC: CPT | Performed by: SURGERY

## 2022-11-07 PROCEDURE — 81025 URINE PREGNANCY TEST: CPT | Mod: S$GLB,,, | Performed by: SURGERY

## 2022-11-07 PROCEDURE — 3079F PR MOST RECENT DIASTOLIC BLOOD PRESSURE 80-89 MM HG: ICD-10-PCS | Mod: CPTII,S$GLB,, | Performed by: SURGERY

## 2022-11-07 PROCEDURE — 81003 URINALYSIS AUTO W/O SCOPE: CPT | Mod: QW,S$GLB,, | Performed by: SURGERY

## 2022-11-07 PROCEDURE — 3008F PR BODY MASS INDEX (BMI) DOCUMENTED: ICD-10-PCS | Mod: CPTII,S$GLB,, | Performed by: SURGERY

## 2022-11-07 PROCEDURE — 1160F RVW MEDS BY RX/DR IN RCRD: CPT | Mod: CPTII,S$GLB,, | Performed by: SURGERY

## 2022-11-07 PROCEDURE — 99214 PR OFFICE/OUTPT VISIT, EST, LEVL IV, 30-39 MIN: ICD-10-PCS | Mod: S$GLB,,, | Performed by: SURGERY

## 2022-11-07 PROCEDURE — 3044F PR MOST RECENT HEMOGLOBIN A1C LEVEL <7.0%: ICD-10-PCS | Mod: CPTII,S$GLB,, | Performed by: SURGERY

## 2022-11-07 PROCEDURE — 87088 URINE BACTERIA CULTURE: CPT | Performed by: SURGERY

## 2022-11-07 PROCEDURE — 1160F PR REVIEW ALL MEDS BY PRESCRIBER/CLIN PHARMACIST DOCUMENTED: ICD-10-PCS | Mod: CPTII,S$GLB,, | Performed by: SURGERY

## 2022-11-07 PROCEDURE — 1159F PR MEDICATION LIST DOCUMENTED IN MEDICAL RECORD: ICD-10-PCS | Mod: CPTII,S$GLB,, | Performed by: SURGERY

## 2022-11-07 PROCEDURE — 87086 URINE CULTURE/COLONY COUNT: CPT | Performed by: SURGERY

## 2022-11-07 RX ORDER — PHENAZOPYRIDINE HYDROCHLORIDE 200 MG/1
200 TABLET, FILM COATED ORAL 3 TIMES DAILY PRN
Qty: 6 TABLET | Refills: 0 | Status: SHIPPED | OUTPATIENT
Start: 2022-11-07 | End: 2022-11-09

## 2022-11-07 RX ORDER — NITROFURANTOIN 25; 75 MG/1; MG/1
100 CAPSULE ORAL 2 TIMES DAILY
Qty: 10 CAPSULE | Refills: 0 | Status: SHIPPED | OUTPATIENT
Start: 2022-11-07 | End: 2022-11-09

## 2022-11-07 NOTE — PROGRESS NOTES
"Subjective:       Patient ID: Jennifer Nguyen is a 32 y.o. female.    Vitals:  height is 5' 4" (1.626 m) and weight is 72.6 kg (160 lb). Her oral temperature is 97.3 °F (36.3 °C). Her blood pressure is 125/84 and her pulse is 65. Her respiration is 18 and oxygen saturation is 98%.     Chief Complaint: Dysuria (/)    Patient presents with c.o dysuria. Onset of symptom 1 week ago which resolved on its own and returned this morning. Patient notes urinary urgency. Denies any hematuria, back pain, or fever. Gives hx of pyelo and also a dx  Ureaplasma urealyticum.       Dysuria   This is a new problem. The current episode started acute onset. The problem occurs intermittently. The problem has been gradually worsening. There has been no fever. There is A history of pyelonephritis. Associated symptoms include frequency, nausea and urgency. Pertinent negatives include no flank pain, hematuria or vomiting. Treatments tried: azo. The treatment provided no relief.     Gastrointestinal:  Positive for nausea. Negative for vomiting.   Genitourinary:  Positive for dysuria, frequency and urgency. Negative for flank pain and hematuria.     Objective:      Physical Exam   Constitutional: She is oriented to person, place, and time. She appears well-developed.   HENT:   Head: Normocephalic and atraumatic.   Ears:   Right Ear: External ear normal.   Left Ear: External ear normal.   Nose: Nose normal. No nasal deformity. No epistaxis.   Mouth/Throat: Oropharynx is clear and moist and mucous membranes are normal.   Eyes: Lids are normal.   Neck: Trachea normal and phonation normal. Neck supple.   Cardiovascular: Normal pulses.   Pulmonary/Chest: Effort normal.   Abdominal: Normal appearance and bowel sounds are normal. She exhibits no distension. Soft. There is no abdominal tenderness.   Neurological: She is alert and oriented to person, place, and time.   Skin: Skin is warm, dry and intact.   Psychiatric: Her speech is normal and " behavior is normal.   Nursing note and vitals reviewed.      Assessment:       1. Dysuria    2. Acute cystitis without hematuria          Plan:         Dysuria  -     POCT Urinalysis, Dipstick, Automated, W/O Scope  -     POCT urine pregnancy  -     CULTURE, URINE    Acute cystitis without hematuria  -     nitrofurantoin, macrocrystal-monohydrate, (MACROBID) 100 MG capsule; Take 1 capsule (100 mg total) by mouth 2 (two) times daily. for 5 days  Dispense: 10 capsule; Refill: 0  -     phenazopyridine (PYRIDIUM) 200 MG tablet; Take 1 tablet (200 mg total) by mouth 3 (three) times daily as needed for Pain.  Dispense: 6 tablet; Refill: 0       Results for orders placed or performed in visit on 11/07/22   POCT Urinalysis, Dipstick, Automated, W/O Scope   Result Value Ref Range    POC Blood, Urine Positive (A) Negative    POC Bilirubin, Urine Positive (A) Negative    POC Urobilinogen, Urine 1.0 0.1 - 1.1    POC Ketones, Urine Negative Negative    POC Protein, Urine Negative Negative    POC Nitrates, Urine Positive (A) Negative    POC Glucose, Urine Negative Negative    pH, UA 5.0 5 - 8    POC Specific Gravity, Urine 1.020 1.003 - 1.029    POC Leukocytes, Urine Negative Negative   POCT urine pregnancy   Result Value Ref Range    POC Preg Test, Ur Negative Negative     Acceptable Yes        Urine culture sent given concerns re resistent bacteria and hx ureaplasma. Will treat with macrobid pending results.

## 2022-11-08 LAB — BACTERIA UR CULT: ABNORMAL

## 2022-11-09 ENCOUNTER — PATIENT MESSAGE (OUTPATIENT)
Dept: URGENT CARE | Facility: CLINIC | Age: 32
End: 2022-11-09
Payer: COMMERCIAL

## 2022-11-09 ENCOUNTER — TELEPHONE (OUTPATIENT)
Dept: URGENT CARE | Facility: CLINIC | Age: 32
End: 2022-11-09
Payer: COMMERCIAL

## 2022-11-09 DIAGNOSIS — R31.9 URINARY TRACT INFECTION WITH HEMATURIA, SITE UNSPECIFIED: Primary | ICD-10-CM

## 2022-11-09 DIAGNOSIS — N39.0 URINARY TRACT INFECTION WITH HEMATURIA, SITE UNSPECIFIED: Primary | ICD-10-CM

## 2022-11-09 RX ORDER — CEPHALEXIN 500 MG/1
500 CAPSULE ORAL EVERY 12 HOURS
Qty: 14 CAPSULE | Refills: 0 | Status: SHIPPED | OUTPATIENT
Start: 2022-11-09 | End: 2022-11-16

## 2022-11-14 ENCOUNTER — PATIENT MESSAGE (OUTPATIENT)
Dept: UROLOGY | Facility: CLINIC | Age: 32
End: 2022-11-14
Payer: COMMERCIAL

## 2022-11-15 ENCOUNTER — OFFICE VISIT (OUTPATIENT)
Dept: UROLOGY | Facility: CLINIC | Age: 32
End: 2022-11-15
Payer: COMMERCIAL

## 2022-11-15 VITALS — SYSTOLIC BLOOD PRESSURE: 123 MMHG | DIASTOLIC BLOOD PRESSURE: 78 MMHG | HEART RATE: 82 BPM

## 2022-11-15 DIAGNOSIS — R39.15 URINARY URGENCY: ICD-10-CM

## 2022-11-15 DIAGNOSIS — R35.0 URINARY FREQUENCY: Primary | ICD-10-CM

## 2022-11-15 DIAGNOSIS — R10.9 BILATERAL FLANK PAIN: ICD-10-CM

## 2022-11-15 DIAGNOSIS — R30.0 DYSURIA: ICD-10-CM

## 2022-11-15 LAB
BILIRUB UR QL STRIP: NEGATIVE
GLUCOSE UR QL STRIP: NEGATIVE
KETONES UR QL STRIP: NEGATIVE
LEUKOCYTE ESTERASE UR QL STRIP: NEGATIVE
PH, POC UA: 6
POC BLOOD, URINE: POSITIVE
POC NITRATES, URINE: NEGATIVE
PROT UR QL STRIP: NEGATIVE
SP GR UR STRIP: 1 (ref 1–1.03)
UROBILINOGEN UR STRIP-ACNC: NEGATIVE (ref 0.1–1.1)

## 2022-11-15 PROCEDURE — 1160F RVW MEDS BY RX/DR IN RCRD: CPT | Mod: CPTII,S$GLB,, | Performed by: NURSE PRACTITIONER

## 2022-11-15 PROCEDURE — 3078F DIAST BP <80 MM HG: CPT | Mod: CPTII,S$GLB,, | Performed by: NURSE PRACTITIONER

## 2022-11-15 PROCEDURE — 3044F HG A1C LEVEL LT 7.0%: CPT | Mod: CPTII,S$GLB,, | Performed by: NURSE PRACTITIONER

## 2022-11-15 PROCEDURE — 3078F PR MOST RECENT DIASTOLIC BLOOD PRESSURE < 80 MM HG: ICD-10-PCS | Mod: CPTII,S$GLB,, | Performed by: NURSE PRACTITIONER

## 2022-11-15 PROCEDURE — 99214 OFFICE O/P EST MOD 30 MIN: CPT | Mod: S$GLB,,, | Performed by: NURSE PRACTITIONER

## 2022-11-15 PROCEDURE — 3074F SYST BP LT 130 MM HG: CPT | Mod: CPTII,S$GLB,, | Performed by: NURSE PRACTITIONER

## 2022-11-15 PROCEDURE — 87086 URINE CULTURE/COLONY COUNT: CPT | Performed by: NURSE PRACTITIONER

## 2022-11-15 PROCEDURE — 3044F PR MOST RECENT HEMOGLOBIN A1C LEVEL <7.0%: ICD-10-PCS | Mod: CPTII,S$GLB,, | Performed by: NURSE PRACTITIONER

## 2022-11-15 PROCEDURE — 1159F MED LIST DOCD IN RCRD: CPT | Mod: CPTII,S$GLB,, | Performed by: NURSE PRACTITIONER

## 2022-11-15 PROCEDURE — 3074F PR MOST RECENT SYSTOLIC BLOOD PRESSURE < 130 MM HG: ICD-10-PCS | Mod: CPTII,S$GLB,, | Performed by: NURSE PRACTITIONER

## 2022-11-15 PROCEDURE — 1159F PR MEDICATION LIST DOCUMENTED IN MEDICAL RECORD: ICD-10-PCS | Mod: CPTII,S$GLB,, | Performed by: NURSE PRACTITIONER

## 2022-11-15 PROCEDURE — 99214 PR OFFICE/OUTPT VISIT, EST, LEVL IV, 30-39 MIN: ICD-10-PCS | Mod: S$GLB,,, | Performed by: NURSE PRACTITIONER

## 2022-11-15 PROCEDURE — 1160F PR REVIEW ALL MEDS BY PRESCRIBER/CLIN PHARMACIST DOCUMENTED: ICD-10-PCS | Mod: CPTII,S$GLB,, | Performed by: NURSE PRACTITIONER

## 2022-11-15 PROCEDURE — 87563 M. GENITALIUM AMP PROBE: CPT | Performed by: NURSE PRACTITIONER

## 2022-11-15 RX ORDER — AMOXICILLIN AND CLAVULANATE POTASSIUM 875; 125 MG/1; MG/1
1 TABLET, FILM COATED ORAL EVERY 12 HOURS
Qty: 14 TABLET | Refills: 0 | Status: SHIPPED | OUTPATIENT
Start: 2022-11-15 | End: 2022-11-22

## 2022-11-15 RX ORDER — PHENAZOPYRIDINE HYDROCHLORIDE 200 MG/1
200 TABLET, FILM COATED ORAL 3 TIMES DAILY PRN
Qty: 30 TABLET | Refills: 3 | Status: SHIPPED | OUTPATIENT
Start: 2022-11-15 | End: 2022-11-25

## 2022-11-15 NOTE — PROGRESS NOTES
Subjective:      Jennifer Nguyen is a 32 y.o. female who presents today regarding her urinary symptoms.     The patient presented to the clinic in May reporting intermittent dysuria, frequency and feeling of TEDDY. Urine culture was negative but ureaplasma was positive. Completed rx for doxy. Consumes lot of caffeine. Hx of pyelonephritis in 2021.    Component Urine Culture, Routine   Latest Ref Rng & Units    11/7/2022 STREPTOCOCCUS AGALACTIAE (GROUP B) (A) .   5/10/2022 No significant growth   4/26/2022 No growth   8/3/2021 ESCHERICHIA COLI (A) . . .     HAILE in May 2022 showed no stones or hydronephrosis.     Presented to urgent care on 11/7 with dysuria. Treated empirically with Macrobid. Antibiotic was adjusted to keflex after urine was positive for GBS. She reports no improvement in symptoms today. Reports bilateral lower back pain, nausea, dysuria, frequency and urgency. Taking AZO. Denies fever/chills.     The following portions of the patient's history were reviewed and updated as appropriate: allergies, current medications, past family history, past medical history, past social history, past surgical history and problem list.    Review of Systems  Constitutional: no fever or chills  ENT: no nasal congestion or sore throat  Respiratory: no cough or shortness of breath  Cardiovascular: no chest pain or palpitations  Gastrointestinal: no nausea or vomiting, tolerating diet  Genitourinary: as per HPI  Hematologic/Lymphatic: no easy bruising or lymphadenopathy  Musculoskeletal: no arthralgias or myalgias  Neurological: no seizures or tremors  Behavioral/Psych: no auditory or visual hallucinations     Objective:   Vital Signs:  Vitals:    11/15/22 1306   BP: 123/78   Pulse: 82     Physical Exam   General: alert and oriented, no acute distress  Head: normocephalic, atraumatic  Neck: supple, normal ROM  Respiratory: Symmetric expansion, non-labored breathing  Cardiovascular: regular rate and rhythm  Abdomen: soft,  non tender, non distended  Pelvic: deferred  Skin: normal coloration and turgor, no rashes, no suspicious skin lesions noted  Neuro: alert and oriented x3, no gross deficits  Psych: normal judgment and insight, normal mood/affect, and non-anxious    Lab Review   Urinalysis demonstrates positive for red blood cells (on AZO)  Lab Results   Component Value Date    WBC 9.78 07/07/2022    HGB 13.2 07/07/2022    HCT 40.0 07/07/2022    MCV 89 07/07/2022     07/07/2022     Lab Results   Component Value Date    CREATININE 0.8 05/09/2022    BUN 16 05/09/2022       Imaging  (all images personally reviewed; agree with report below)  HAILE- no stones, masses or hydro   Assessment:     1. Urinary frequency    2. Bilateral flank pain    3. Urinary urgency    4. Dysuria      Plan:   Jennifer was seen today for hematuria, urinary tract infection and urinary frequency.    Diagnoses and all orders for this visit:    Urinary frequency  -     Urine culture  -     Ureaplasma PCR Urine; Future  -     Mycoplasma genitalium Molecular Detection, PCR Urine  -     amoxicillin-clavulanate 875-125mg (AUGMENTIN) 875-125 mg per tablet; Take 1 tablet by mouth every 12 (twelve) hours. for 7 days  -     phenazopyridine (PYRIDIUM) 200 MG tablet; Take 1 tablet (200 mg total) by mouth 3 (three) times daily as needed for Pain.    Bilateral flank pain    Urinary urgency    Dysuria    Plan:  --Urine culture  --Ureaplasma and mycoplasma testing  --D/C keflex and begin augmentin BID x 7 days  --Pyridium PRN  --Will proceed with CT stone study if symptoms do not improve over the next 48 hours

## 2022-11-16 ENCOUNTER — HOSPITAL ENCOUNTER (EMERGENCY)
Facility: OTHER | Age: 32
Discharge: HOME | End: 2022-11-16
Attending: EMERGENCY MEDICINE
Payer: COMMERCIAL

## 2022-11-16 ENCOUNTER — CLINICAL SUPPORT (OUTPATIENT)
Dept: INTERNAL MEDICINE | Facility: CLINIC | Age: 32
End: 2022-11-16
Payer: COMMERCIAL

## 2022-11-16 VITALS
HEIGHT: 64 IN | SYSTOLIC BLOOD PRESSURE: 117 MMHG | RESPIRATION RATE: 16 BRPM | BODY MASS INDEX: 26.46 KG/M2 | OXYGEN SATURATION: 100 % | DIASTOLIC BLOOD PRESSURE: 71 MMHG | HEART RATE: 63 BPM | WEIGHT: 155 LBS | TEMPERATURE: 98 F

## 2022-11-16 DIAGNOSIS — R10.9 LEFT FLANK PAIN: Primary | ICD-10-CM

## 2022-11-16 DIAGNOSIS — Z91.038 ALLERGY TO INSECT STINGS: Primary | ICD-10-CM

## 2022-11-16 DIAGNOSIS — Z91.038 HYMENOPTERA ALLERGY: ICD-10-CM

## 2022-11-16 DIAGNOSIS — R30.0 DYSURIA: ICD-10-CM

## 2022-11-16 LAB
ALBUMIN SERPL BCP-MCNC: 4.4 G/DL (ref 3.5–5.2)
ALP SERPL-CCNC: 58 U/L (ref 55–135)
ALT SERPL W/O P-5'-P-CCNC: 14 U/L (ref 10–44)
ANION GAP SERPL CALC-SCNC: 4 MMOL/L (ref 8–16)
AST SERPL-CCNC: 17 U/L (ref 10–40)
B-HCG UR QL: NEGATIVE
BACTERIA UR CULT: NO GROWTH
BASOPHILS # BLD AUTO: 0.04 K/UL (ref 0–0.2)
BASOPHILS NFR BLD: 0.5 % (ref 0–1.9)
BILIRUB SERPL-MCNC: 0.8 MG/DL (ref 0.1–1)
BILIRUB UR QL STRIP: NEGATIVE
BUN SERPL-MCNC: 17 MG/DL (ref 6–20)
CALCIUM SERPL-MCNC: 9.3 MG/DL (ref 8.7–10.5)
CHLORIDE SERPL-SCNC: 107 MMOL/L (ref 95–110)
CLARITY UR: CLEAR
CO2 SERPL-SCNC: 27 MMOL/L (ref 23–29)
COLOR UR: YELLOW
CREAT SERPL-MCNC: 0.7 MG/DL (ref 0.5–1.4)
CTP QC/QA: YES
DIFFERENTIAL METHOD: ABNORMAL
EOSINOPHIL # BLD AUTO: 0.3 K/UL (ref 0–0.5)
EOSINOPHIL NFR BLD: 4.2 % (ref 0–8)
ERYTHROCYTE [DISTWIDTH] IN BLOOD BY AUTOMATED COUNT: 12.2 % (ref 11.5–14.5)
EST. GFR  (NO RACE VARIABLE): >60 ML/MIN/1.73 M^2
GLUCOSE SERPL-MCNC: 92 MG/DL (ref 70–110)
GLUCOSE UR QL STRIP: NEGATIVE
HCT VFR BLD AUTO: 40.7 % (ref 37–48.5)
HCV AB SERPL QL IA: NEGATIVE
HGB BLD-MCNC: 14 G/DL (ref 12–16)
HGB UR QL STRIP: ABNORMAL
HIV 1+2 AB+HIV1 P24 AG SERPL QL IA: NEGATIVE
IMM GRANULOCYTES # BLD AUTO: 0.01 K/UL (ref 0–0.04)
IMM GRANULOCYTES NFR BLD AUTO: 0.1 % (ref 0–0.5)
KETONES UR QL STRIP: NEGATIVE
LEUKOCYTE ESTERASE UR QL STRIP: NEGATIVE
LYMPHOCYTES # BLD AUTO: 1.6 K/UL (ref 1–4.8)
LYMPHOCYTES NFR BLD: 22.1 % (ref 18–48)
MCH RBC QN AUTO: 29.4 PG (ref 27–31)
MCHC RBC AUTO-ENTMCNC: 34.4 G/DL (ref 32–36)
MCV RBC AUTO: 86 FL (ref 82–98)
MICROSCOPIC COMMENT: NORMAL
MONOCYTES # BLD AUTO: 0.5 K/UL (ref 0.3–1)
MONOCYTES NFR BLD: 7 % (ref 4–15)
NEUTROPHILS # BLD AUTO: 4.9 K/UL (ref 1.8–7.7)
NEUTROPHILS NFR BLD: 66.1 % (ref 38–73)
NITRITE UR QL STRIP: NEGATIVE
NRBC BLD-RTO: 0 /100 WBC
PH UR STRIP: 6 [PH] (ref 5–8)
PLATELET # BLD AUTO: 331 K/UL (ref 150–450)
PMV BLD AUTO: 9.1 FL (ref 9.2–12.9)
POTASSIUM SERPL-SCNC: 4.1 MMOL/L (ref 3.5–5.1)
PROT SERPL-MCNC: 7.5 G/DL (ref 6–8.4)
PROT UR QL STRIP: NEGATIVE
RBC # BLD AUTO: 4.76 M/UL (ref 4–5.4)
RBC #/AREA URNS HPF: 1 /HPF (ref 0–4)
SODIUM SERPL-SCNC: 138 MMOL/L (ref 136–145)
SP GR UR STRIP: <=1.005 (ref 1–1.03)
SQUAMOUS #/AREA URNS HPF: 1 /HPF
URN SPEC COLLECT METH UR: ABNORMAL
UROBILINOGEN UR STRIP-ACNC: NEGATIVE EU/DL
WBC # BLD AUTO: 7.41 K/UL (ref 3.9–12.7)

## 2022-11-16 PROCEDURE — 96374 THER/PROPH/DIAG INJ IV PUSH: CPT

## 2022-11-16 PROCEDURE — 96361 HYDRATE IV INFUSION ADD-ON: CPT

## 2022-11-16 PROCEDURE — 63600175 PHARM REV CODE 636 W HCPCS: Performed by: PHYSICIAN ASSISTANT

## 2022-11-16 PROCEDURE — 86803 HEPATITIS C AB TEST: CPT | Performed by: EMERGENCY MEDICINE

## 2022-11-16 PROCEDURE — 99999 PR PBB SHADOW E&M-EST. PATIENT-LVL I: CPT | Mod: PBBFAC,,,

## 2022-11-16 PROCEDURE — 81025 URINE PREGNANCY TEST: CPT | Performed by: PHYSICIAN ASSISTANT

## 2022-11-16 PROCEDURE — 95115 IMMUNOTHERAPY ONE INJECTION: CPT | Mod: S$GLB,,, | Performed by: FAMILY MEDICINE

## 2022-11-16 PROCEDURE — 81000 URINALYSIS NONAUTO W/SCOPE: CPT | Performed by: PHYSICIAN ASSISTANT

## 2022-11-16 PROCEDURE — 87389 HIV-1 AG W/HIV-1&-2 AB AG IA: CPT | Performed by: EMERGENCY MEDICINE

## 2022-11-16 PROCEDURE — 95115 PR IMMUNOTHERAPY, ONE INJECTION: ICD-10-PCS | Mod: S$GLB,,, | Performed by: FAMILY MEDICINE

## 2022-11-16 PROCEDURE — 96375 TX/PRO/DX INJ NEW DRUG ADDON: CPT

## 2022-11-16 PROCEDURE — 25000003 PHARM REV CODE 250: Performed by: PHYSICIAN ASSISTANT

## 2022-11-16 PROCEDURE — 99285 EMERGENCY DEPT VISIT HI MDM: CPT | Mod: 25

## 2022-11-16 PROCEDURE — 80053 COMPREHEN METABOLIC PANEL: CPT | Performed by: PHYSICIAN ASSISTANT

## 2022-11-16 PROCEDURE — 85025 COMPLETE CBC W/AUTO DIFF WBC: CPT | Performed by: PHYSICIAN ASSISTANT

## 2022-11-16 PROCEDURE — 99999 PR PBB SHADOW E&M-EST. PATIENT-LVL I: ICD-10-PCS | Mod: PBBFAC,,,

## 2022-11-16 RX ORDER — ONDANSETRON 2 MG/ML
4 INJECTION INTRAMUSCULAR; INTRAVENOUS
Status: COMPLETED | OUTPATIENT
Start: 2022-11-16 | End: 2022-11-16

## 2022-11-16 RX ORDER — HYDROCODONE BITARTRATE AND ACETAMINOPHEN 5; 325 MG/1; MG/1
1-2 TABLET ORAL EVERY 6 HOURS PRN
Qty: 14 TABLET | Refills: 0 | Status: SHIPPED | OUTPATIENT
Start: 2022-11-16 | End: 2022-11-26

## 2022-11-16 RX ORDER — KETOROLAC TROMETHAMINE 30 MG/ML
10 INJECTION, SOLUTION INTRAMUSCULAR; INTRAVENOUS
Status: COMPLETED | OUTPATIENT
Start: 2022-11-16 | End: 2022-11-16

## 2022-11-16 RX ADMIN — KETOROLAC TROMETHAMINE 10 MG: 30 INJECTION, SOLUTION INTRAMUSCULAR; INTRAVENOUS at 01:11

## 2022-11-16 RX ADMIN — SODIUM CHLORIDE 1000 ML: 0.9 INJECTION, SOLUTION INTRAVENOUS at 01:11

## 2022-11-16 RX ADMIN — ONDANSETRON 4 MG: 2 INJECTION INTRAMUSCULAR; INTRAVENOUS at 01:11

## 2022-11-16 NOTE — FIRST PROVIDER EVALUATION
Emergency Department TeleTriage Encounter Note      CHIEF COMPLAINT    Chief Complaint   Patient presents with    back pain      Pt c.o mid left back pain onset this AM. Pt is currently being tx for uti. Pt was seen by urologist yesterday.  Pt states discomfort when urinating that has improved some.  AAO x 3 nadn skin w.d        VITAL SIGNS   Initial Vitals [11/16/22 1142]   BP Pulse Resp Temp SpO2   (!) 135/90 84 18 98.4 °F (36.9 °C) 98 %      MAP       --            ALLERGIES    Review of patient's allergies indicates:   Allergen Reactions    Insect venom Anxiety, Rash and Shortness Of Breath    Gluten Nausea Only       PROVIDER TRIAGE NOTE  This is a teletriage evaluation of a 32 y.o. female presenting to the ED with c/o left flank/back pain on abx for UTI (GBS on culture), augmentin. +nausea, worsening pain this morning. No fever.     PE: appears uncomfortable. Non-toxic/well-appearing. No respiratory distress, speaks in full sentences without issue. No active emesis nor cough. Normal eye contact and mentation.     Plan: labs, UA meds. Further/augmented workup at discretion of examining provider.     All ED beds are full at present; patient notified of this status.  Patient seen and medically screened by JAYDEN via teletriage. Orders initiated at triage to expedite care.  Patient is stable and will be placed in an ED bed when available.  Care will be transferred to an alternate provider when patient has been placed in an Exam Room further exam, additional orders, and disposition.         ORDERS  Labs Reviewed   HIV 1 / 2 ANTIBODY   HEPATITIS C ANTIBODY   URINALYSIS, REFLEX TO URINE CULTURE   POCT URINE PREGNANCY       ED Orders (720h ago, onward)      Start Ordered     Status Ordering Provider    11/16/22 1201 11/16/22 1200  POCT urine pregnancy  Once         Ordered STACY LIND    11/16/22 1201 11/16/22 1200  Urinalysis, Reflex to Urine Culture Urine, Clean Catch  STAT         Ordered STACY LIND     11/16/22 1144 11/16/22 1143  HIV 1/2 Ag/Ab (4th Gen)  STAT         Ordered JOYA BLAKE II    11/16/22 1144 11/16/22 1143  Hepatitis C Antibody  STAT         Ordered JOYA BLAKE II              Virtual Visit Note: The provider triage portion of this emergency department evaluation and documentation was performed via [employer], a HIPAA-compliant telemedicine application, in concert with a tele-presenter in the room. A face to face patient evaluation with one of my colleagues will occur once the patient is placed in an emergency department room.      DISCLAIMER: This note was prepared with [employer] voice recognition transcription software. Garbled syntax, mangled pronouns, and other bizarre constructions may be attributed to that software system.

## 2022-11-16 NOTE — ED PROVIDER NOTES
Encounter Date: 2022    SCRIBE #1 NOTE: I, Stevo King, roddy scribing for, and in the presence of,  Conrad Ardon II, MD. I have scribed the following portions of the note - Other sections scribed: HPI, ROS, PE.     History     Chief Complaint   Patient presents with    back pain      Pt c.o mid left back pain onset this AM. Pt is currently being tx for uti. Pt was seen by urologist yesterday.  Pt states discomfort when urinating that has improved some.  AAO x 3 nadn skin w.d      Time seen by provider: 1:15 PM    This is a 32 y.o. female who presents with complaint of left flank pain starting three days ago. Patient was seen at urgent care nine days ago for dysuria and started on Macrobid. Several days later, her cultures returned positive for Strep and she was switched to Keflex. Patient has also been taking JEFF since, and was taking Motrin to alleviate her symptoms up until four to five days ago. She was seen by her urologist yesterday for her flank pain and started on Augmentin. Her pain acutely worsened this morning and now increased with movement. Associated symptoms include nausea. She does not recall any recent heavy lifting or injury. Patient denies any fever, abdominal pain, constipation, vomiting, diarrhea, vaginal bleeding, or vaginal discharge. PMHx includes celiac's disease and prior pyelonephritis seven years ago. She denies any history of diabetes or HTN. Patient is  with IUD in place. SHx of occasional alcohol use but none since starting on antibiotics. She denies any tobacco or illicit drug use. NKDA. This is the extent of the patient's complaints at this time.    The history is provided by the patient.   Review of patient's allergies indicates:   Allergen Reactions    Insect venom Anxiety, Rash and Shortness Of Breath    Gluten Nausea Only     Past Medical History:   Diagnosis Date    Allergy     Celiac disease      Past Surgical History:   Procedure Laterality Date    BREAST SURGERY       REDUCTION    INTRAUTERINE DEVICE INSERTION  2015    KJB---MIRENA    REPAIR OF COLLATERAL LIGAMENT OF THUMB Right 2020    Procedure: REPAIR, LIGAMENT, COLLATERAL, THUMB right;  Surgeon: Lisette Zaman MD;  Location: [facility];  Service: Orthopedics;  Laterality: Right;  regional MAC    WISDOM TOOTH EXTRACTION       Family History   Problem Relation Age of Onset    Breast cancer Paternal Grandmother     Colon cancer Neg Hx     Ovarian cancer Neg Hx     Esophageal cancer Neg Hx      Social History     Tobacco Use    Smoking status: Former     Years: 2.00     Types: Cigarettes     Quit date:      Years since quittin.8    Smokeless tobacco: Never    Tobacco comments:     1-2 cig daily   Substance Use Topics    Alcohol use: Not Currently     Comment: ~10 drinks/week. Seltzers, occasional wine    Drug use: No     Review of Systems   Constitutional:  Negative for fever.   HENT:  Negative for sore throat.    Respiratory:  Negative for shortness of breath.    Cardiovascular:  Negative for chest pain.   Gastrointestinal:  Positive for nausea. Negative for abdominal pain, constipation, diarrhea and vomiting.   Genitourinary:  Positive for flank pain. Negative for dysuria, vaginal bleeding and vaginal discharge.   Musculoskeletal:  Negative for back pain.   Skin:  Negative for rash.   Neurological:  Negative for weakness.   Hematological:  Does not bruise/bleed easily.     Physical Exam     Initial Vitals [22 1142]   BP Pulse Resp Temp SpO2   (!) 135/90 84 18 98.4 °F (36.9 °C) 98 %      MAP       --         Physical Exam    Nursing note and vitals reviewed.  Constitutional: She appears well-developed and well-nourished. No distress.   HENT:   Head: Normocephalic and atraumatic.   Eyes: Conjunctivae are normal.   Neck: Neck supple.   Cardiovascular:  Normal rate, regular rhythm and normal heart sounds.           No murmur heard.  Pulmonary/Chest: Breath sounds normal. No respiratory distress.  She has no wheezes. She has no rales.   Abdominal: Abdomen is soft. She exhibits no distension.   No right CVA tenderness.  There is left CVA tenderness. There is no rebound and no guarding.   Musculoskeletal:         General: No edema.      Cervical back: Neck supple.     Neurological: She is alert and oriented to person, place, and time.   Skin: Skin is warm and dry.   Psychiatric: She has a normal mood and affect.       ED Course   Procedures  Labs Reviewed   URINALYSIS, REFLEX TO URINE CULTURE - Abnormal; Notable for the following components:       Result Value    Specific Gravity, UA <=1.005 (*)     Occult Blood UA 1+ (*)     All other components within normal limits    Narrative:     Specimen Source->Urine   CBC W/ AUTO DIFFERENTIAL - Abnormal; Notable for the following components:    MPV 9.1 (*)     All other components within normal limits   COMPREHENSIVE METABOLIC PANEL - Abnormal; Notable for the following components:    Anion Gap 4 (*)     All other components within normal limits   HIV 1 / 2 ANTIBODY    Narrative:     Release to patient->Immediate   HEPATITIS C ANTIBODY    Narrative:     Release to patient->Immediate   URINALYSIS MICROSCOPIC    Narrative:     Specimen Source->Urine   POCT URINE PREGNANCY          Imaging Results              CT Renal Stone Study ABD Pelvis WO (Final result)  Result time 11/16/22 14:36:46      Final result by Uri Pruitt MD (11/16/22 14:36:46)                   Impression:      No nephrolithiasis or other acute process.      Electronically signed by: Uri Pruitt MD  Date:    11/16/2022  Time:    14:36               Narrative:    EXAMINATION:  CT RENAL STONE STUDY ABD PELVIS WO    CLINICAL HISTORY:  Flank pain, kidney stone suspected;    TECHNIQUE:  Low dose axial images, sagittal and coronal reformations were obtained from the lung bases to the pubic symphysis.  Contrast was not administered.    COMPARISON:  Ultrasound 05/13/2022    FINDINGS:  Visualized lung  bases show micro nodules in the left and right lower lobes which require no follow-up.  Minor atelectatic change.    The liver is normal in size.  No solid mass or biliary ductal dilatation.  Gallbladder appears grossly unremarkable.    Spleen, adrenal glands, and pancreas show no focal abnormality noting lack of IV contrast.    Bilateral kidneys are normal in size and position.  No hydronephrosis or nephrolithiasis.  Urinary bladder is decompressed.  There is an IUD in place within the uterus.    The gastrointestinal tract shows no wall thickening or obstruction.  A normal appendix is present.  No free fluid or free gas.  No concerning lymphadenopathy.    Visualized vascular structures show normal course and caliber.  Mild degenerative change at the lumbosacral junction without an acute fracture or destructive osseous lesion.                                       Medications   sodium chloride 0.9% bolus 1,000 mL (0 mLs Intravenous Stopped 11/16/22 1525)   ondansetron injection 4 mg (4 mg Intravenous Given 11/16/22 1339)   ketorolac injection 9.999 mg (9.999 mg Intravenous Given 11/16/22 1339)     Medical Decision Making:   History:   Old Medical Records: I decided to obtain old medical records.  Clinical Tests:   Lab Tests: Ordered and Reviewed  Radiological Study: Ordered and Reviewed     Patient presents with ongoing dysuria left flank pain.  She has recently seen her urologist, switched to a 3rd antibiotic which she has taken 2 doses of.  No fevers.  Exam she is uncomfortable appearing, but afebrile, benign abdomen.  Urinalysis shows improvement from recent samples, now does not show markers of infection.  Stressed the patient that she should finish her current course of antibiotics.  CT scan does not show kidney stone or other acute intra-abdominal process.  Patient at this point is very distressed as she is still having significant pain and it is upsetting to her that her workup today is unremarkable despite  her continued pain.  Reassured patient, will write for Vicodin for improved pain control, continue the Pyridium, and close follow-up with her urology clinic.  Discussed return precautions       Scribe Attestation:   Scribe #1: I performed the above scribed service and the documentation accurately describes the services I performed. I attest to the accuracy of the note.            Physician Attestation for Scribe: I, TLH, reviewed documentation as scribed in my presence, which is both accurate and complete.         Clinical Impression:   Final diagnoses:  [R10.9] Left flank pain (Primary)  [R30.0] Dysuria      ED Disposition Condition    Discharge Stable          ED Prescriptions       Medication Sig Dispense Start Date End Date Auth. Provider    HYDROcodone-acetaminophen (NORCO) 5-325 mg per tablet Take 1-2 tablets by mouth every 6 (six) hours as needed for Pain. 14 tablet 11/16/2022 11/26/2022 Conrad Ardon II, MD          Follow-up Information       Follow up With Specialties Details Why Contact Info    Jennifer Bragg NP Urology Schedule an appointment as soon as possible for a visit   [address] [place] [id number]  [phone]               Conrad Ardon II, MD  11/17/22 0670

## 2022-11-16 NOTE — ED TRIAGE NOTES
C/o left flank pain and urinary frequency for the last 2 weeks. No improvement with prescribed antibiotics. Hx of uti's denies fever or foul smelling urine. Denies hematuria. Aaox3 . Resp even unlabored. Skin warm and dry.

## 2022-11-17 ENCOUNTER — PATIENT MESSAGE (OUTPATIENT)
Dept: UROLOGY | Facility: CLINIC | Age: 32
End: 2022-11-17
Payer: COMMERCIAL

## 2022-11-17 LAB
M GENITALIUM DNA UR QL NAA+PROBE: NEGATIVE
SPECIMEN SOURCE: NORMAL

## 2022-11-21 ENCOUNTER — PATIENT MESSAGE (OUTPATIENT)
Dept: UROLOGY | Facility: CLINIC | Age: 32
End: 2022-11-21
Payer: COMMERCIAL

## 2022-11-21 DIAGNOSIS — R30.0 DYSURIA: Primary | ICD-10-CM

## 2022-11-22 ENCOUNTER — LAB VISIT (OUTPATIENT)
Dept: LAB | Facility: OTHER | Age: 32
End: 2022-11-22
Attending: NURSE PRACTITIONER
Payer: COMMERCIAL

## 2022-11-22 DIAGNOSIS — R30.0 DYSURIA: ICD-10-CM

## 2022-11-22 PROCEDURE — 87798 DETECT AGENT NOS DNA AMP: CPT | Performed by: NURSE PRACTITIONER

## 2022-11-25 LAB
SPECIMEN SOURCE: NORMAL
U PARVUM DNA SPEC QL NAA+PROBE: NEGATIVE
U UREALYTICUM DNA SPEC QL NAA+PROBE: NEGATIVE

## 2022-12-14 ENCOUNTER — CLINICAL SUPPORT (OUTPATIENT)
Dept: INTERNAL MEDICINE | Facility: CLINIC | Age: 32
End: 2022-12-14
Payer: COMMERCIAL

## 2022-12-14 DIAGNOSIS — Z91.038 ALLERGY TO INSECT STINGS: Primary | ICD-10-CM

## 2022-12-14 DIAGNOSIS — Z91.038 HYMENOPTERA ALLERGY: ICD-10-CM

## 2022-12-14 PROCEDURE — 99999 PR PBB SHADOW E&M-EST. PATIENT-LVL I: CPT | Mod: PBBFAC,,,

## 2022-12-14 PROCEDURE — 95115 IMMUNOTHERAPY ONE INJECTION: CPT | Mod: S$GLB,,, | Performed by: FAMILY MEDICINE

## 2022-12-14 PROCEDURE — 99999 PR PBB SHADOW E&M-EST. PATIENT-LVL I: ICD-10-PCS | Mod: PBBFAC,,,

## 2022-12-14 PROCEDURE — 95115 PR IMMUNOTHERAPY, ONE INJECTION: ICD-10-PCS | Mod: S$GLB,,, | Performed by: FAMILY MEDICINE

## 2022-12-21 ENCOUNTER — OFFICE VISIT (OUTPATIENT)
Dept: URGENT CARE | Facility: CLINIC | Age: 32
End: 2022-12-21
Payer: COMMERCIAL

## 2022-12-21 VITALS
TEMPERATURE: 99 F | HEIGHT: 64 IN | HEART RATE: 96 BPM | BODY MASS INDEX: 26.46 KG/M2 | WEIGHT: 155 LBS | OXYGEN SATURATION: 97 % | SYSTOLIC BLOOD PRESSURE: 111 MMHG | DIASTOLIC BLOOD PRESSURE: 75 MMHG

## 2022-12-21 DIAGNOSIS — R09.81 SINUS CONGESTION: ICD-10-CM

## 2022-12-21 DIAGNOSIS — U07.1 COVID-19: ICD-10-CM

## 2022-12-21 DIAGNOSIS — R52 BODY ACHES: ICD-10-CM

## 2022-12-21 DIAGNOSIS — R05.9 COUGH, UNSPECIFIED TYPE: Primary | ICD-10-CM

## 2022-12-21 LAB
CTP QC/QA: YES
CTP QC/QA: YES
POC MOLECULAR INFLUENZA A AGN: NEGATIVE
POC MOLECULAR INFLUENZA B AGN: NEGATIVE
SARS-COV-2 AG RESP QL IA.RAPID: POSITIVE

## 2022-12-21 PROCEDURE — 1160F PR REVIEW ALL MEDS BY PRESCRIBER/CLIN PHARMACIST DOCUMENTED: ICD-10-PCS | Mod: CPTII,S$GLB,, | Performed by: FAMILY MEDICINE

## 2022-12-21 PROCEDURE — 1159F MED LIST DOCD IN RCRD: CPT | Mod: CPTII,S$GLB,, | Performed by: FAMILY MEDICINE

## 2022-12-21 PROCEDURE — 87502 POCT INFLUENZA A/B MOLECULAR: ICD-10-PCS | Mod: QW,S$GLB,, | Performed by: FAMILY MEDICINE

## 2022-12-21 PROCEDURE — 3044F HG A1C LEVEL LT 7.0%: CPT | Mod: CPTII,S$GLB,, | Performed by: FAMILY MEDICINE

## 2022-12-21 PROCEDURE — 3044F PR MOST RECENT HEMOGLOBIN A1C LEVEL <7.0%: ICD-10-PCS | Mod: CPTII,S$GLB,, | Performed by: FAMILY MEDICINE

## 2022-12-21 PROCEDURE — 3074F PR MOST RECENT SYSTOLIC BLOOD PRESSURE < 130 MM HG: ICD-10-PCS | Mod: CPTII,S$GLB,, | Performed by: FAMILY MEDICINE

## 2022-12-21 PROCEDURE — 87502 INFLUENZA DNA AMP PROBE: CPT | Mod: QW,S$GLB,, | Performed by: FAMILY MEDICINE

## 2022-12-21 PROCEDURE — 3008F BODY MASS INDEX DOCD: CPT | Mod: CPTII,S$GLB,, | Performed by: FAMILY MEDICINE

## 2022-12-21 PROCEDURE — 87811 SARS-COV-2 COVID19 W/OPTIC: CPT | Mod: QW,S$GLB,, | Performed by: FAMILY MEDICINE

## 2022-12-21 PROCEDURE — 3078F PR MOST RECENT DIASTOLIC BLOOD PRESSURE < 80 MM HG: ICD-10-PCS | Mod: CPTII,S$GLB,, | Performed by: FAMILY MEDICINE

## 2022-12-21 PROCEDURE — 99214 OFFICE O/P EST MOD 30 MIN: CPT | Mod: S$GLB,,, | Performed by: FAMILY MEDICINE

## 2022-12-21 PROCEDURE — 1160F RVW MEDS BY RX/DR IN RCRD: CPT | Mod: CPTII,S$GLB,, | Performed by: FAMILY MEDICINE

## 2022-12-21 PROCEDURE — 99214 PR OFFICE/OUTPT VISIT, EST, LEVL IV, 30-39 MIN: ICD-10-PCS | Mod: S$GLB,,, | Performed by: FAMILY MEDICINE

## 2022-12-21 PROCEDURE — 1159F PR MEDICATION LIST DOCUMENTED IN MEDICAL RECORD: ICD-10-PCS | Mod: CPTII,S$GLB,, | Performed by: FAMILY MEDICINE

## 2022-12-21 PROCEDURE — 3074F SYST BP LT 130 MM HG: CPT | Mod: CPTII,S$GLB,, | Performed by: FAMILY MEDICINE

## 2022-12-21 PROCEDURE — 87811 SARS CORONAVIRUS 2 ANTIGEN POCT, MANUAL READ: ICD-10-PCS | Mod: QW,S$GLB,, | Performed by: FAMILY MEDICINE

## 2022-12-21 PROCEDURE — 3078F DIAST BP <80 MM HG: CPT | Mod: CPTII,S$GLB,, | Performed by: FAMILY MEDICINE

## 2022-12-21 PROCEDURE — 3008F PR BODY MASS INDEX (BMI) DOCUMENTED: ICD-10-PCS | Mod: CPTII,S$GLB,, | Performed by: FAMILY MEDICINE

## 2022-12-21 RX ORDER — BENZONATATE 200 MG/1
200 CAPSULE ORAL 3 TIMES DAILY PRN
Qty: 21 CAPSULE | Refills: 0 | Status: SHIPPED | OUTPATIENT
Start: 2022-12-21 | End: 2022-12-28

## 2022-12-21 RX ORDER — PROMETHAZINE HYDROCHLORIDE AND DEXTROMETHORPHAN HYDROBROMIDE 6.25; 15 MG/5ML; MG/5ML
5 SYRUP ORAL 3 TIMES DAILY PRN
Qty: 180 ML | Refills: 0 | Status: SHIPPED | OUTPATIENT
Start: 2022-12-21 | End: 2022-12-31

## 2022-12-21 NOTE — PROGRESS NOTES
"Subjective:       Patient ID: Jennifer Nguyen is a 32 y.o. female.    Vitals:  height is 5' 4" (1.626 m) and weight is 70.3 kg (154 lb 15.7 oz). Her oral temperature is 98.6 °F (37 °C). Her blood pressure is 111/75 and her pulse is 96. Her oxygen saturation is 97%.     Chief Complaint: Cough    Pt comes in with URI symptoms, Cough, body aches, fever, fatigue, and Congestion. Symptoms began yesterday afternoon. Pt denies Cp, SOB, diarrhea, N/V, abd pain, dysuria,   Pt was in contact with someone with covid over the weekend.    Cough  This is a new problem. The current episode started yesterday. The problem has been rapidly worsening. The problem occurs constantly. The cough is Non-productive. Associated symptoms include chills, a fever, headaches, nasal congestion, postnasal drip, rhinorrhea and a sore throat (from coughing). Treatments tried: tylenol and nyquil. The treatment provided mild relief. Her past medical history is significant for bronchitis.     Constitution: Positive for chills and fever.   HENT:  Positive for postnasal drip and sore throat (from coughing).    Respiratory:  Positive for cough.    Neurological:  Positive for headaches.     Objective:      Physical Exam   Constitutional: She is oriented to person, place, and time. She appears well-developed. She is cooperative.  Non-toxic appearance. She does not appear ill. No distress.   HENT:   Head: Normocephalic and atraumatic.   Ears:   Right Ear: Hearing normal.   Left Ear: Hearing normal.   Nose: Congestion present. No mucosal edema, rhinorrhea or nasal deformity. No epistaxis. Right sinus exhibits no maxillary sinus tenderness and no frontal sinus tenderness. Left sinus exhibits no maxillary sinus tenderness and no frontal sinus tenderness.   Mouth/Throat: Uvula is midline, oropharynx is clear and moist and mucous membranes are normal. No trismus in the jaw. Normal dentition. No uvula swelling. No posterior oropharyngeal edema.   Eyes: " Conjunctivae and lids are normal. No scleral icterus.   Neck: Trachea normal and phonation normal. Neck supple. No edema present. No erythema present. No neck rigidity present.   Cardiovascular: Normal rate, regular rhythm, normal heart sounds and normal pulses.   No murmur heard.  Pulmonary/Chest: Effort normal and breath sounds normal. No stridor. No respiratory distress. She has no decreased breath sounds. She has no wheezes. She has no rhonchi. She has no rales.   Abdominal: Normal appearance.   Musculoskeletal: Normal range of motion.         General: No deformity. Normal range of motion.      Cervical back: She exhibits no tenderness.   Lymphadenopathy:     She has no cervical adenopathy.   Neurological: She is alert and oriented to person, place, and time. She exhibits normal muscle tone. Coordination normal.   Skin: Skin is warm, dry, intact, not diaphoretic and not pale.   Psychiatric: Her speech is normal and behavior is normal. Judgment and thought content normal.   Nursing note and vitals reviewed.      Assessment:       1. Cough, unspecified type    2. COVID-19    3. Sinus congestion    4. Body aches          Plan:     Counseled patient and answered questions in regards to COVID-19 testing and diagnosis. Quarantine precautions, home care with plenty of fluids and use of OTC meds discussed. Use of pulse oxymeter discussed. Advised the patient to inform the PCP for +ve COVID status. ER precautions discussed. Patient verbalized understanding.      Cough, unspecified type  -     SARS Coronavirus 2 Antigen, POCT Manual Read  -     POCT Influenza A/B MOLECULAR    COVID-19    Sinus congestion    Body aches    Other orders  -     nirmatrelvir-ritonavir 300 mg (150 mg x 2)-100 mg copackaged tablets (EUA); Take 3 tablets by mouth 2 (two) times daily for 5 days. Each dose contains 2 nirmatrelvir (pink tablets) and 1 ritonavir (white tablet). Take all 3 tablets together  Dispense: 30 tablet; Refill: 0  -      benzonatate (TESSALON) 200 MG capsule; Take 1 capsule (200 mg total) by mouth 3 (three) times daily as needed for Cough.  Dispense: 21 capsule; Refill: 0  -     promethazine-dextromethorphan (PROMETHAZINE-DM) 6.25-15 mg/5 mL Syrp; Take 5 mLs by mouth 3 (three) times daily as needed (cough).  Dispense: 180 mL; Refill: 0         PLEASE READ YOUR DISCHARGE INSTRUCTIONS ENTIRELY AS IT CONTAINS IMPORTANT INFORMATION.    Please return here or go to the Emergency Department for any concerns or worsening of condition.      Get rest  and drink plenty of fluids.     If not allergic, please take over the counter Tylenol (Acetaminophen) and/or Motrin (Ibuprofen) as directed for control of pain and/or fever. Antiinflammatories like Ibuprofen, Advil, Aleve, Motrin, Naproxen, Meloxicam should not be taken in case of stomach ulcers, kidney disease or if on blood thinners. These medicines should always be taken with food.      Take an OTC cough or cold medicine as needed.    If your symptoms get worse or start developing difficulty breathing or oxygen saturation drops below 94, go to ER for further evaluation and treatment.      You may need to buy a pulse oxymeter from a pharmacy to check oxygen saturation    Please follow up with your primary care doctor or specialist as needed.    If you smoke, please stop smoking.    Please return or see your primary care doctor if you develop new or worsening symptoms.     Please arrange follow up with your primary medical clinic as soon as possible. You must understand that you've received an Urgent Care treatment only and that you may be released before all of your medical problems are known or treated. You, the patient, will arrange for follow up as instructed. If your symptoms worsen or fail to improve you should go to the Emergency Room.Instructions for Patients with Confirmed or Suspected COVID-19        COVID-19 Discharge Instructions   About this topic   Coronavirus disease 2019 is also  known as COVID-19. It is a viral illness that infects the lungs. It is caused by a virus called SARS-associated coronavirus (SARS-CoV-2).  The signs of COVID-19 most often start a few days after you have been infected. In some people, it takes longer to show signs. Others never show signs of the infection. You may have a cough, fever, shaking chills and it may be hard to breathe. You may be very tired, have muscle aches, a headache or sore throat. Some people have an upset stomach or loose stools. Others lose their sense of smell or taste. You may not have these signs all the time and they may come and go while you are sick.  The virus spreads easily through droplets when you talk, sneeze, or cough. You can pass the virus to others when you are talking close together, singing, hugging, sharing food, or shaking hands. Doctors believe the germs also survive on surfaces like tables, door handles, and telephones. However, this is not a common way that COVID-19 spreads. Doctors believe you can also spread the infection even if you dont have any symptoms, but they do not know how that happens. This is why getting vaccinated is one of the best ways to keep you healthy and slow the spread of the virus.  Some people have a mild case of COVID-19 and are able to stay at home and away from others until they feel better. Others may need to be in the hospital if they are very sick. Some people with COVID-19 can have some symptoms for weeks or months. People with COVID-19 must isolate themselves. You can start to be around others when your doctor says it is safe to do so.       What care is needed at home?   Ask your doctor what you need to do when you go home. Make sure you ask questions if you do not understand what the doctor says.  Drink lots of water, juice, or broth to replace fluids lost from a fever.  You may use cool mist humidifiers to help ease congestion and coughing.  Use 2 to 3 pillows to prop yourself up when you  lie down to make it easier to breathe and sleep.  Do not smoke and do not drink beer, wine, and mixed drinks (alcohol).  To lower the chance of passing the infection to others, get a COVID-19 vaccine after your infection has resolved.  If you have not been fully vaccinated:  Wear a mask over your mouth and nose if you are around others who are not sick. Cloth masks work best if they have more than one layer of fabric.  Wash your hands often.  Stay home in a separate room, if possible, away from others. Only go out to get medical care.  Use a separate bathroom if possible.  Do not make food for others.  What follow-up care is needed?   Your doctor may ask you to make visits to the office to check on your progress. Be sure to keep these visits. Make sure you wear a mask at these visits.  If you can, tell the staff you have COVID-19 ahead of time so they can take extra care to stop the disease from spreading.  It may take a few weeks before your health returns to normal.  What drugs may be needed?   The doctor may order drugs to:  Help with breathing  Help with fever  Help with swelling in your airways and lungs  Control coughing  Ease a sore throat  Help a runny or stuffy nose  Will physical activity be limited?   You may have to limit your physical activity. Talk to your doctor about the right amount of activity for you. If you have been very sick with COVID-19, it can take some time to get your strength back.  Will there be any other care needed?   Doctors do not know how long you can pass the virus on to others after you are sick. This is why it is important to stay in a separate room, if possible, when you are sick. For now, doctors are giving general guidelines for you to follow after you have been sick. Before you go around other people, you should:  Be fever free for 24 hours without taking any drugs to lower the fever  Have no symptoms of cough or shortness of breath  Wait at least 10 days after first having  symptoms or your first positive test, and you need to be symptom free as above. Some experts suggest waiting 20 days if you have had a more severe infection.  Talk with your doctor about getting a COVID-19 vaccine.  What problems could happen?   Fluid loss. This is dehydration.  Short-term or long-term lung damage  Heart problems  Death  When do I need to call the doctor?   You are having so much trouble breathing that you can only say one or two words at a time.  You need to sit upright at all times to be able to breathe and/or cannot lie down.  You are very confused or cannot stay awake.  Your lips or skin start to turn blue or grey.  You think you might be having a medical emergency. Some examples of medical emergencies are:  Severe chest pain.  Not able to speak or move normally.  You have trouble breathing when talking or sitting still.  You have new shortness of breath.  You become weak or dizzy.  You have very dark urine or do not pass urine for more than 8 hours.  You have new or worsening COVID-19 symptoms like:  Fever  Cough  Feeling very tired  Shaking chills  Headache  Trouble swallowing  Throwing up  Loose stools  Reddish purple spots on your fingers or toes  Teach Back: Helping You Understand   The Teach Back Method helps you understand the information we are giving you. After you talk with the staff, tell them in your own words what you learned. This helps to make sure the staff has described each thing clearly. It also helps to explain things that may have been confusing. Before going home, make sure you can do these:  I can tell you about my condition.  I can tell you what may help ease my breathing.  I can tell you what I can do to help avoid passing the infection to others.  I can tell you what I will do if I have trouble breathing; feel sleepy or confused; or my fingertips, fingernails, skin, or lips are blue.  Where can I learn more?   Centers for Disease Control and  Prevention  https://www.cdc.gov/coronavirus/2019-ncov/about/index.html   Centers for Disease Control and Prevention  https://www.cdc.gov/coronavirus/2019-ncov/hcp/disposition-in-home-patients.html   World Health Organization  https://www.who.int/news-room/q-a-detail/n-f-kkvhghjtxukiu   Last Reviewed Date   2021-10-05  Consumer Information Use and Disclaimer   This information is not specific medical advice and does not replace information you receive from your health care provider. This is only a brief summary of general information. It does NOT include all information about conditions, illnesses, injuries, tests, procedures, treatments, therapies, discharge instructions or life-style choices that may apply to you. You must talk with your health care provider for complete information about your health and treatment options. This information should not be used to decide whether or not to accept your health care providers advice, instructions or recommendations. Only your health care provider has the knowledge and training to provide advice that is right for you.  Copyright   Copyright © 2021 [employer] and its affiliates and/or licensors. All rights reserved.  Patient Education

## 2022-12-21 NOTE — PATIENT INSTRUCTIONS
PLEASE READ YOUR DISCHARGE INSTRUCTIONS ENTIRELY AS IT CONTAINS IMPORTANT INFORMATION.    Please return here or go to the Emergency Department for any concerns or worsening of condition.      Get rest  and drink plenty of fluids.     If not allergic, please take over the counter Tylenol (Acetaminophen) and/or Motrin (Ibuprofen) as directed for control of pain and/or fever. Antiinflammatories like Ibuprofen, Advil, Aleve, Motrin, Naproxen, Meloxicam should not be taken in case of stomach ulcers, kidney disease or if on blood thinners. These medicines should always be taken with food.      Take an OTC cough or cold medicine as needed.    If your symptoms get worse or start developing difficulty breathing or oxygen saturation drops below 94, go to ER for further evaluation and treatment.      You may need to buy a pulse oxymeter from a pharmacy to check oxygen saturation    Please follow up with your primary care doctor or specialist as needed.    If you smoke, please stop smoking.    Please return or see your primary care doctor if you develop new or worsening symptoms.     Please arrange follow up with your primary medical clinic as soon as possible. You must understand that you've received an Urgent Care treatment only and that you may be released before all of your medical problems are known or treated. You, the patient, will arrange for follow up as instructed. If your symptoms worsen or fail to improve you should go to the Emergency Room.Instructions for Patients with Confirmed or Suspected COVID-19        COVID-19 Discharge Instructions   About this topic   Coronavirus disease 2019 is also known as COVID-19. It is a viral illness that infects the lungs. It is caused by a virus called SARS-associated coronavirus (SARS-CoV-2).  The signs of COVID-19 most often start a few days after you have been infected. In some people, it takes longer to show signs. Others never show signs of the infection. You may have a  cough, fever, shaking chills and it may be hard to breathe. You may be very tired, have muscle aches, a headache or sore throat. Some people have an upset stomach or loose stools. Others lose their sense of smell or taste. You may not have these signs all the time and they may come and go while you are sick.  The virus spreads easily through droplets when you talk, sneeze, or cough. You can pass the virus to others when you are talking close together, singing, hugging, sharing food, or shaking hands. Doctors believe the germs also survive on surfaces like tables, door handles, and telephones. However, this is not a common way that COVID-19 spreads. Doctors believe you can also spread the infection even if you dont have any symptoms, but they do not know how that happens. This is why getting vaccinated is one of the best ways to keep you healthy and slow the spread of the virus.  Some people have a mild case of COVID-19 and are able to stay at home and away from others until they feel better. Others may need to be in the hospital if they are very sick. Some people with COVID-19 can have some symptoms for weeks or months. People with COVID-19 must isolate themselves. You can start to be around others when your doctor says it is safe to do so.       What care is needed at home?   Ask your doctor what you need to do when you go home. Make sure you ask questions if you do not understand what the doctor says.  Drink lots of water, juice, or broth to replace fluids lost from a fever.  You may use cool mist humidifiers to help ease congestion and coughing.  Use 2 to 3 pillows to prop yourself up when you lie down to make it easier to breathe and sleep.  Do not smoke and do not drink beer, wine, and mixed drinks (alcohol).  To lower the chance of passing the infection to others, get a COVID-19 vaccine after your infection has resolved.  If you have not been fully vaccinated:  Wear a mask over your mouth and nose if you are  around others who are not sick. Cloth masks work best if they have more than one layer of fabric.  Wash your hands often.  Stay home in a separate room, if possible, away from others. Only go out to get medical care.  Use a separate bathroom if possible.  Do not make food for others.  What follow-up care is needed?   Your doctor may ask you to make visits to the office to check on your progress. Be sure to keep these visits. Make sure you wear a mask at these visits.  If you can, tell the staff you have COVID-19 ahead of time so they can take extra care to stop the disease from spreading.  It may take a few weeks before your health returns to normal.  What drugs may be needed?   The doctor may order drugs to:  Help with breathing  Help with fever  Help with swelling in your airways and lungs  Control coughing  Ease a sore throat  Help a runny or stuffy nose  Will physical activity be limited?   You may have to limit your physical activity. Talk to your doctor about the right amount of activity for you. If you have been very sick with COVID-19, it can take some time to get your strength back.  Will there be any other care needed?   Doctors do not know how long you can pass the virus on to others after you are sick. This is why it is important to stay in a separate room, if possible, when you are sick. For now, doctors are giving general guidelines for you to follow after you have been sick. Before you go around other people, you should:  Be fever free for 24 hours without taking any drugs to lower the fever  Have no symptoms of cough or shortness of breath  Wait at least 10 days after first having symptoms or your first positive test, and you need to be symptom free as above. Some experts suggest waiting 20 days if you have had a more severe infection.  Talk with your doctor about getting a COVID-19 vaccine.  What problems could happen?   Fluid loss. This is dehydration.  Short-term or long-term lung damage  Heart  problems  Death  When do I need to call the doctor?   You are having so much trouble breathing that you can only say one or two words at a time.  You need to sit upright at all times to be able to breathe and/or cannot lie down.  You are very confused or cannot stay awake.  Your lips or skin start to turn blue or grey.  You think you might be having a medical emergency. Some examples of medical emergencies are:  Severe chest pain.  Not able to speak or move normally.  You have trouble breathing when talking or sitting still.  You have new shortness of breath.  You become weak or dizzy.  You have very dark urine or do not pass urine for more than 8 hours.  You have new or worsening COVID-19 symptoms like:  Fever  Cough  Feeling very tired  Shaking chills  Headache  Trouble swallowing  Throwing up  Loose stools  Reddish purple spots on your fingers or toes  Teach Back: Helping You Understand   The Teach Back Method helps you understand the information we are giving you. After you talk with the staff, tell them in your own words what you learned. This helps to make sure the staff has described each thing clearly. It also helps to explain things that may have been confusing. Before going home, make sure you can do these:  I can tell you about my condition.  I can tell you what may help ease my breathing.  I can tell you what I can do to help avoid passing the infection to others.  I can tell you what I will do if I have trouble breathing; feel sleepy or confused; or my fingertips, fingernails, skin, or lips are blue.  Where can I learn more?   Centers for Disease Control and Prevention  https://www.cdc.gov/coronavirus/2019-ncov/about/index.html   Centers for Disease Control and Prevention  https://www.cdc.gov/coronavirus/2019-ncov/hcp/disposition-in-home-patients.html   World Health Organization  https://www.who.int/news-room/q-a-detail/e-a-txyxtznfelbho   Last Reviewed Date   2021-10-05  Consumer Information Use and  Disclaimer   This information is not specific medical advice and does not replace information you receive from your health care provider. This is only a brief summary of general information. It does NOT include all information about conditions, illnesses, injuries, tests, procedures, treatments, therapies, discharge instructions or life-style choices that may apply to you. You must talk with your health care provider for complete information about your health and treatment options. This information should not be used to decide whether or not to accept your health care providers advice, instructions or recommendations. Only your health care provider has the knowledge and training to provide advice that is right for you.  Copyright   Copyright © 2021 [employer] and its affiliates and/or licensors. All rights reserved.  Patient Education

## 2022-12-22 ENCOUNTER — OFFICE VISIT (OUTPATIENT)
Dept: PSYCHIATRY | Facility: CLINIC | Age: 32
End: 2022-12-22
Payer: COMMERCIAL

## 2022-12-22 DIAGNOSIS — F90.0 ATTENTION DEFICIT HYPERACTIVITY DISORDER (ADHD), PREDOMINANTLY INATTENTIVE TYPE: Primary | ICD-10-CM

## 2022-12-22 PROCEDURE — 3044F HG A1C LEVEL LT 7.0%: CPT | Mod: CPTII,95,, | Performed by: NURSE PRACTITIONER

## 2022-12-22 PROCEDURE — 1160F PR REVIEW ALL MEDS BY PRESCRIBER/CLIN PHARMACIST DOCUMENTED: ICD-10-PCS | Mod: CPTII,95,, | Performed by: NURSE PRACTITIONER

## 2022-12-22 PROCEDURE — 1159F MED LIST DOCD IN RCRD: CPT | Mod: CPTII,95,, | Performed by: NURSE PRACTITIONER

## 2022-12-22 PROCEDURE — 99214 PR OFFICE/OUTPT VISIT, EST, LEVL IV, 30-39 MIN: ICD-10-PCS | Mod: 95,,, | Performed by: NURSE PRACTITIONER

## 2022-12-22 PROCEDURE — 1160F RVW MEDS BY RX/DR IN RCRD: CPT | Mod: CPTII,95,, | Performed by: NURSE PRACTITIONER

## 2022-12-22 PROCEDURE — 3044F PR MOST RECENT HEMOGLOBIN A1C LEVEL <7.0%: ICD-10-PCS | Mod: CPTII,95,, | Performed by: NURSE PRACTITIONER

## 2022-12-22 PROCEDURE — 1159F PR MEDICATION LIST DOCUMENTED IN MEDICAL RECORD: ICD-10-PCS | Mod: CPTII,95,, | Performed by: NURSE PRACTITIONER

## 2022-12-22 PROCEDURE — 99214 OFFICE O/P EST MOD 30 MIN: CPT | Mod: 95,,, | Performed by: NURSE PRACTITIONER

## 2022-12-22 RX ORDER — LISDEXAMFETAMINE DIMESYLATE 30 MG/1
30 CAPSULE ORAL EVERY MORNING
Qty: 30 CAPSULE | Refills: 0 | Status: SHIPPED | OUTPATIENT
Start: 2023-01-20 | End: 2023-03-21 | Stop reason: SDUPTHER

## 2022-12-22 RX ORDER — LISDEXAMFETAMINE DIMESYLATE 30 MG/1
30 CAPSULE ORAL EVERY MORNING
Qty: 30 CAPSULE | Refills: 0 | Status: SHIPPED | OUTPATIENT
Start: 2023-02-18 | End: 2023-01-25

## 2022-12-22 RX ORDER — LISDEXAMFETAMINE DIMESYLATE 30 MG/1
30 CAPSULE ORAL EVERY MORNING
Qty: 30 CAPSULE | Refills: 0 | Status: SHIPPED | OUTPATIENT
Start: 2022-12-22 | End: 2023-03-21 | Stop reason: SDUPTHER

## 2022-12-22 NOTE — PROGRESS NOTES
Outpatient Psychiatry Follow-Up Visit (MD/NP)    12/22/2022    The patient location is: [place]   The chief complaint leading to consultation is: ADHD    Visit type: audiovisual    Face to Face time with patient: 11 minutes  18 minutes of total time spent on the encounter, which includes face to face time and non-face to face time preparing to see the patient (eg, review of tests), Obtaining and/or reviewing separately obtained history, Documenting clinical information in the electronic or other health record, Independently interpreting results (not separately reported) and communicating results to the patient/family/caregiver, or Care coordination (not separately reported).     Each patient to whom he or she provides medical services by telemedicine is:  (1) informed of the relationship between the physician and patient and the respective role of any other health care provider with respect to management of the patient; and (2) notified that he or she may decline to receive medical services by telemedicine and may withdraw from such care at any time.    Clinical Status of Patient:  Outpatient (Ambulatory)    Chief Complaint:  Jennifer Nguyen is a 32 y.o. female who presents today for follow-up of attention problems.  Met with patient.      Interval History and Content of Current Session:  Interim Events/Subjective Report/Content of Current Session:   Jennifer Nguyen checked in on time for her visit. At initial visit we started Wellbutrin for management of ADHD symptoms. Today she reports mild improvement in ADHD symptoms but irritability at the end of the day. Discussed trial of Vyvanse - patient agreeable to trial. Denies SI/HI/AVH. No objective s/sx of psychosis or alisson. Patient verbalized motivation for compliance with medications and all other elements of treatment plan.         Review of Systems   PSYCHIATRIC: Pertinant items are noted in the narrative.  CONSTITUTIONAL: No weight gain or loss.    MUSCULOSKELETAL: No pain or stiffness of the joints.  ENT: See above.  RESPIRATORY: See above.  CARDIOVASCULAR: No tachycardia or chest pain.  GASTROINTESTINAL: No nausea, vomiting, pain, constipation or diarrhea.    Past Medical, Family and Social History: The patient's past medical, family and social history have been reviewed and updated as appropriate within the electronic medical record - see encounter notes.    Compliance: yes    Side effects: see above    Risk Parameters:  Patient reports no suicidal ideation  Patient reports no homicidal ideation  Patient reports no self-injurious behavior  Patient reports no violent behavior    Exam (detailed: at least 9 elements; comprehensive: all 15 elements)   Constitutional  Vitals:  Most recent vital signs, dated less than 90 days prior to this appointment, were reviewed.   From 12/21/22:  BP:111/75  HR:96     General:  unremarkable, age appropriate     Musculoskeletal  Muscle Strength/Tone:  not examined   Gait & Station:  non-ataxic     Psychiatric  Speech:  no latency; no press   Mood & Affect:  steady  congruent and appropriate   Thought Process:  normal and logical   Associations:  intact   Thought Content:  normal, no suicidality, no homicidality, delusions, or paranoia   Insight:  intact   Judgement: behavior is adequate to circumstances   Orientation:  grossly intact   Memory: intact for content of interview   Language: grossly intact   Attention Span & Concentration:  able to focus   Fund of Knowledge:  intact and appropriate to age and level of education     Assessment and Diagnosis   Status/Progress: Based on the examination today, the patient's problem(s) is/are adequately but not ideally controlled.  New problems have not been presented today.   Co-morbidities, Diagnostic uncertainty, and Lack of compliance are not complicating management of the primary condition.  There are no active rule-out diagnoses for this patient at this time.     General  Impression: Jennifer Nguyen, a 32 y.o.  female, presenting for follow-up visit for management of ADHD symptoms. Presents 12/22/22 - did not tolerate Wellbutrin; Vyvanse started      ICD-10-CM ICD-9-CM   1. Attention deficit hyperactivity disorder (ADHD), predominantly inattentive type  F90.0 314.00       Intervention/Counseling/Treatment Plan   Medication Management: Continue current medications.   Stop Wellbutrin  mg daily   Start Vyvanse 30 mg daily  Patient has no contraindications: no h/o allergic rxn, agitation, anxiety, tourette's, arrythmia, cardiovascular disease, cardiac structural abnormalities, hyperthyroidism, glaucoma, Other psychiatric illness, etc.   Reviewed possible side effects 3 times. Discussed diagnosis, risks and benefits of proposed treatment vs alternative treatments vs no treatment, and potential side effects of these treatments (death, dependency, psychosis, alisson, aggression, HTN, ticks, MI, stroke, arrythmia, seizure, anaphylaxis or other allergic reactions, leukopenia, nervousness, anorexia, insomnia, tachycardia, palpitations, dizziness, BP changes, HR changes, visual disturbance, etc.). The patient expresses understanding of the above and displays the capacity to agree with this treatment given said understanding. Patient also agrees that, currently, the benefits outweigh the risks and would like to pursue treatment at this time.  The risks and benefits of medication were discussed with the patient.  Discussed diagnosis, risks and benefits of proposed treatment above vs alternative treatments vs no treatment, and potential side effects of these treatments. The patient expresses understanding of the above and displays the capacity to agree with this treatment given said understanding. Patient also agrees that, currently, the benefits outweigh the risks and would like to pursue treatment at this time.  Discussed inherent unpredictability of medications in each individual.    Encouraged Patient to keep future appointments.   Take medications as prescribed and abstain from substance abuse.   In the event of an emergency patient was advised to go to the emergency room      Informed patient I will be leaving [facility] and the need to schedule with another provider. Encouraged to schedule at conclusion of this visit to insure a timely appointment and avoid a gap in treatment including medication management. Patient verbalized understanding.       Return to Clinic: as scheduled    Gisele Chavarria DNP-BC PMHNP  [facility]

## 2022-12-23 ENCOUNTER — PATIENT MESSAGE (OUTPATIENT)
Dept: PSYCHIATRY | Facility: CLINIC | Age: 32
End: 2022-12-23
Payer: COMMERCIAL

## 2022-12-23 DIAGNOSIS — F90.0 ATTENTION DEFICIT HYPERACTIVITY DISORDER (ADHD), PREDOMINANTLY INATTENTIVE TYPE: Primary | ICD-10-CM

## 2023-01-11 ENCOUNTER — CLINICAL SUPPORT (OUTPATIENT)
Dept: INTERNAL MEDICINE | Facility: CLINIC | Age: 33
End: 2023-01-11
Payer: COMMERCIAL

## 2023-01-11 DIAGNOSIS — Z91.038 ALLERGY TO INSECT STINGS: Primary | ICD-10-CM

## 2023-01-11 DIAGNOSIS — Z91.038 HYMENOPTERA ALLERGY: ICD-10-CM

## 2023-01-11 PROCEDURE — 95115 IMMUNOTHERAPY ONE INJECTION: CPT | Mod: S$GLB,,, | Performed by: FAMILY MEDICINE

## 2023-01-11 PROCEDURE — 99999 PR PBB SHADOW E&M-EST. PATIENT-LVL I: CPT | Mod: PBBFAC,,,

## 2023-01-11 PROCEDURE — 99999 PR PBB SHADOW E&M-EST. PATIENT-LVL I: ICD-10-PCS | Mod: PBBFAC,,,

## 2023-01-11 PROCEDURE — 95115 PR IMMUNOTHERAPY, ONE INJECTION: ICD-10-PCS | Mod: S$GLB,,, | Performed by: FAMILY MEDICINE

## 2023-01-25 RX ORDER — LISDEXAMFETAMINE DIMESYLATE 30 MG/1
30 CAPSULE ORAL EVERY MORNING
Qty: 30 CAPSULE | Refills: 0 | Status: SHIPPED | OUTPATIENT
Start: 2023-01-25 | End: 2023-03-03 | Stop reason: SDUPTHER

## 2023-02-08 ENCOUNTER — CLINICAL SUPPORT (OUTPATIENT)
Dept: INTERNAL MEDICINE | Facility: CLINIC | Age: 33
End: 2023-02-08
Payer: COMMERCIAL

## 2023-02-08 DIAGNOSIS — Z91.038 HYMENOPTERA ALLERGY: ICD-10-CM

## 2023-02-08 DIAGNOSIS — Z91.038 ALLERGY TO INSECT STINGS: Primary | ICD-10-CM

## 2023-02-08 PROCEDURE — 95115 PR IMMUNOTHERAPY, ONE INJECTION: ICD-10-PCS | Mod: S$GLB,,, | Performed by: FAMILY MEDICINE

## 2023-02-08 PROCEDURE — 99999 PR PBB SHADOW E&M-EST. PATIENT-LVL I: CPT | Mod: PBBFAC,,,

## 2023-02-08 PROCEDURE — 95115 IMMUNOTHERAPY ONE INJECTION: CPT | Mod: S$GLB,,, | Performed by: FAMILY MEDICINE

## 2023-02-08 PROCEDURE — 99999 PR PBB SHADOW E&M-EST. PATIENT-LVL I: ICD-10-PCS | Mod: PBBFAC,,,

## 2023-03-06 ENCOUNTER — OFFICE VISIT (OUTPATIENT)
Dept: URGENT CARE | Facility: CLINIC | Age: 33
End: 2023-03-06
Payer: COMMERCIAL

## 2023-03-06 VITALS
HEART RATE: 80 BPM | BODY MASS INDEX: 26.29 KG/M2 | OXYGEN SATURATION: 99 % | WEIGHT: 154 LBS | HEIGHT: 64 IN | SYSTOLIC BLOOD PRESSURE: 123 MMHG | TEMPERATURE: 97 F | DIASTOLIC BLOOD PRESSURE: 82 MMHG | RESPIRATION RATE: 19 BRPM

## 2023-03-06 DIAGNOSIS — S69.90XA FINGER INJURY, INITIAL ENCOUNTER: Primary | ICD-10-CM

## 2023-03-06 DIAGNOSIS — S60.419A ABRASION OF FINGER, INITIAL ENCOUNTER: ICD-10-CM

## 2023-03-06 PROCEDURE — 1159F PR MEDICATION LIST DOCUMENTED IN MEDICAL RECORD: ICD-10-PCS | Mod: CPTII,S$GLB,, | Performed by: NURSE PRACTITIONER

## 2023-03-06 PROCEDURE — 1159F MED LIST DOCD IN RCRD: CPT | Mod: CPTII,S$GLB,, | Performed by: NURSE PRACTITIONER

## 2023-03-06 PROCEDURE — 90471 IMMUNIZATION ADMIN: CPT | Mod: S$GLB,,, | Performed by: NURSE PRACTITIONER

## 2023-03-06 PROCEDURE — 1160F RVW MEDS BY RX/DR IN RCRD: CPT | Mod: CPTII,S$GLB,, | Performed by: NURSE PRACTITIONER

## 2023-03-06 PROCEDURE — 90715 TDAP VACCINE GREATER THAN OR EQUAL TO 7YO IM: ICD-10-PCS | Mod: S$GLB,,, | Performed by: NURSE PRACTITIONER

## 2023-03-06 PROCEDURE — 99214 PR OFFICE/OUTPT VISIT, EST, LEVL IV, 30-39 MIN: ICD-10-PCS | Mod: 25,S$GLB,, | Performed by: NURSE PRACTITIONER

## 2023-03-06 PROCEDURE — 3079F DIAST BP 80-89 MM HG: CPT | Mod: CPTII,S$GLB,, | Performed by: NURSE PRACTITIONER

## 2023-03-06 PROCEDURE — 3008F BODY MASS INDEX DOCD: CPT | Mod: CPTII,S$GLB,, | Performed by: NURSE PRACTITIONER

## 2023-03-06 PROCEDURE — 99214 OFFICE O/P EST MOD 30 MIN: CPT | Mod: 25,S$GLB,, | Performed by: NURSE PRACTITIONER

## 2023-03-06 PROCEDURE — 90715 TDAP VACCINE 7 YRS/> IM: CPT | Mod: S$GLB,,, | Performed by: NURSE PRACTITIONER

## 2023-03-06 PROCEDURE — 90471 TDAP VACCINE GREATER THAN OR EQUAL TO 7YO IM: ICD-10-PCS | Mod: S$GLB,,, | Performed by: NURSE PRACTITIONER

## 2023-03-06 PROCEDURE — 73140 XR FINGER 2 OR MORE VIEWS LEFT: ICD-10-PCS | Mod: LT,S$GLB,, | Performed by: RADIOLOGY

## 2023-03-06 PROCEDURE — 3074F SYST BP LT 130 MM HG: CPT | Mod: CPTII,S$GLB,, | Performed by: NURSE PRACTITIONER

## 2023-03-06 PROCEDURE — 3074F PR MOST RECENT SYSTOLIC BLOOD PRESSURE < 130 MM HG: ICD-10-PCS | Mod: CPTII,S$GLB,, | Performed by: NURSE PRACTITIONER

## 2023-03-06 PROCEDURE — 1160F PR REVIEW ALL MEDS BY PRESCRIBER/CLIN PHARMACIST DOCUMENTED: ICD-10-PCS | Mod: CPTII,S$GLB,, | Performed by: NURSE PRACTITIONER

## 2023-03-06 PROCEDURE — 73140 X-RAY EXAM OF FINGER(S): CPT | Mod: LT,S$GLB,, | Performed by: RADIOLOGY

## 2023-03-06 PROCEDURE — 3079F PR MOST RECENT DIASTOLIC BLOOD PRESSURE 80-89 MM HG: ICD-10-PCS | Mod: CPTII,S$GLB,, | Performed by: NURSE PRACTITIONER

## 2023-03-06 PROCEDURE — 3008F PR BODY MASS INDEX (BMI) DOCUMENTED: ICD-10-PCS | Mod: CPTII,S$GLB,, | Performed by: NURSE PRACTITIONER

## 2023-03-06 NOTE — PATIENT INSTRUCTIONS
- You must understand that you have received an Urgent Care treatment only and that you may be released before all of your medical problems are known or treated.   - You, the patient, will arrange for follow up care as instructed.   - If your condition worsens or fails to improve we recommend that you receive another evaluation at the ER immediately or contact your PCP to discuss your concerns.   - You can call [phone] or [phone] to help schedule an appointment with the appropriate provider.    Take OTC ibuprofen 400-800 mg 3 times per day. Max dose 3200 mg from all sources per day. Or Tylenol 500-1000 mg 3 times per day. Max 4000 mg from all sources per day.   Rest as much as possible  Alternate heat and ice. Apply heat for 20-30 minutes, perform gentle range of motion exercises, and then apply ice for 15 minutes. Do this 3-4 times per day.    If you received an injection of toradol in the clinic today, DO NOT take any anti-inflammatory medications today. These include: Advil/Motrin (ibuprofen), Aleve (naproxen), Mobic (meloxicam).

## 2023-03-06 NOTE — PROGRESS NOTES
"Subjective:       Patient ID: Jennifer Nguyen is a 32 y.o. female.    Vitals:  height is 5' 4" (1.626 m) and weight is 69.9 kg (154 lb). Her oral temperature is 97 °F (36.1 °C). Her blood pressure is 123/82 and her pulse is 80. Her respiration is 19 and oxygen saturation is 99%.     Chief Complaint: Injury    Patient is a 31 yo female who reports to the clinic with the compliant of a finger injury to the left hand middle finger that occurred on Saturday during a paint ball game, patient reports with being hit in the finger 4 ft away with a pain ball gun she was wearing a ring on the finger when the injury occurred. Pt has been unable to remove the ring due to the swelling. She does not wish to have the ring cut off, states the ring is not tight, but it does hurt if it hits the site of the injury.     Injury  This is a new problem. Episode onset: Saturday. The problem occurs constantly. The problem has been gradually worsening. Associated symptoms include joint swelling. The symptoms are aggravated by bending and twisting. She has tried ice, heat, NSAIDs, position changes and acetaminophen for the symptoms. The treatment provided no relief.     Musculoskeletal:  Positive for joint swelling.     Objective:      Physical Exam   Constitutional: She is oriented to person, place, and time. She appears well-developed. She is cooperative.  Non-toxic appearance. She does not appear ill. No distress.   HENT:   Head: Normocephalic and atraumatic. Head is without abrasion, without contusion and without laceration.   Ears:   Right Ear: Hearing, tympanic membrane, external ear and ear canal normal. No hemotympanum.   Left Ear: Hearing, tympanic membrane, external ear and ear canal normal. No hemotympanum.   Nose: Nose normal. No mucosal edema, rhinorrhea or nasal deformity. No epistaxis. Right sinus exhibits no maxillary sinus tenderness and no frontal sinus tenderness. Left sinus exhibits no maxillary sinus tenderness and no " frontal sinus tenderness.   Mouth/Throat: Uvula is midline, oropharynx is clear and moist and mucous membranes are normal. No trismus in the jaw. Normal dentition. No uvula swelling. No posterior oropharyngeal erythema.   Eyes: Conjunctivae, EOM and lids are normal. Pupils are equal, round, and reactive to light. Right eye exhibits no discharge. Left eye exhibits no discharge. No scleral icterus.   Neck: Trachea normal and phonation normal. Neck supple. No tracheal deviation present. No neck rigidity present. No spinous process tenderness present. No muscular tenderness present.   Cardiovascular: Normal rate, regular rhythm, normal heart sounds and normal pulses.   Pulmonary/Chest: Effort normal and breath sounds normal. No respiratory distress.   Abdominal: Normal appearance and bowel sounds are normal. She exhibits no distension and no mass. Soft. There is no abdominal tenderness.   Musculoskeletal: Normal range of motion.         General: No deformity. Normal range of motion.      Left hand: Left ring finger: Exhibits swelling, ecchymosis, tenderness and decreased ROM.   Neurological: She is alert and oriented to person, place, and time. She has normal strength. No cranial nerve deficit or sensory deficit. She exhibits normal muscle tone. She displays no seizure activity. Coordination normal. GCS eye subscore is 4. GCS verbal subscore is 5. GCS motor subscore is 6.   Skin: Skin is warm, dry, intact, not diaphoretic and not pale. Capillary refill takes less than 2 seconds. No abrasion, No burn, No bruising and No ecchymosis   Psychiatric: Her speech is normal and behavior is normal. Judgment and thought content normal.   Nursing note and vitals reviewed.    X-Ray Finger 2 or More Views Left    Result Date: 3/6/2023  EXAMINATION: XR FINGER 2 OR MORE VIEWS LEFT CLINICAL HISTORY: Unspecified injury of unspecified wrist, hand and finger(s), initial encounter COMPARISON: None FINDINGS: Three views left 3rd digit. No  acute displaced fracture or dislocation of the 3rd digit.  No radiopaque foreign body.  No significant edema.     1. No acute displaced fracture or dislocation of the 3rd digit. Electronically signed by: Jevon Willingham MD Date:    03/06/2023 Time:    10:29           Assessment:       1. Finger injury, initial encounter    2. Abrasion of finger, initial encounter          Plan:         Finger injury, initial encounter  -     X-Ray Finger 2 or More Views Left; Future; Expected date: 03/06/2023    Abrasion of finger, initial encounter  -     (In Office Administered) Tdap Vaccine         Patient Instructions   - You must understand that you have received an Urgent Care treatment only and that you may be released before all of your medical problems are known or treated.   - You, the patient, will arrange for follow up care as instructed.   - If your condition worsens or fails to improve we recommend that you receive another evaluation at the ER immediately or contact your PCP to discuss your concerns.   - You can call [phone] or [phone] to help schedule an appointment with the appropriate provider.    Take OTC ibuprofen 400-800 mg 3 times per day. Max dose 3200 mg from all sources per day. Or Tylenol 500-1000 mg 3 times per day. Max 4000 mg from all sources per day.   Rest as much as possible  Alternate heat and ice. Apply heat for 20-30 minutes, perform gentle range of motion exercises, and then apply ice for 15 minutes. Do this 3-4 times per day.    If you received an injection of toradol in the clinic today, DO NOT take any anti-inflammatory medications today. These include: Advil/Motrin (ibuprofen), Aleve (naproxen), Mobic (meloxicam).

## 2023-03-08 ENCOUNTER — CLINICAL SUPPORT (OUTPATIENT)
Dept: INTERNAL MEDICINE | Facility: CLINIC | Age: 33
End: 2023-03-08
Payer: COMMERCIAL

## 2023-03-08 DIAGNOSIS — Z91.038 HYMENOPTERA ALLERGY: ICD-10-CM

## 2023-03-08 DIAGNOSIS — Z91.038 ALLERGY TO INSECT STINGS: Primary | ICD-10-CM

## 2023-03-08 PROCEDURE — 99999 PR PBB SHADOW E&M-EST. PATIENT-LVL I: ICD-10-PCS | Mod: PBBFAC,,,

## 2023-03-08 PROCEDURE — 99999 PR PBB SHADOW E&M-EST. PATIENT-LVL I: CPT | Mod: PBBFAC,,,

## 2023-03-08 PROCEDURE — 95115 PR IMMUNOTHERAPY, ONE INJECTION: ICD-10-PCS | Mod: S$GLB,,, | Performed by: FAMILY MEDICINE

## 2023-03-08 PROCEDURE — 95115 IMMUNOTHERAPY ONE INJECTION: CPT | Mod: S$GLB,,, | Performed by: FAMILY MEDICINE

## 2023-03-21 ENCOUNTER — OFFICE VISIT (OUTPATIENT)
Dept: PSYCHIATRY | Facility: CLINIC | Age: 33
End: 2023-03-21
Payer: COMMERCIAL

## 2023-03-21 VITALS
SYSTOLIC BLOOD PRESSURE: 134 MMHG | DIASTOLIC BLOOD PRESSURE: 77 MMHG | HEART RATE: 80 BPM | WEIGHT: 143.19 LBS | BODY MASS INDEX: 24.58 KG/M2

## 2023-03-21 DIAGNOSIS — F90.0 ATTENTION DEFICIT HYPERACTIVITY DISORDER (ADHD), PREDOMINANTLY INATTENTIVE TYPE: Primary | ICD-10-CM

## 2023-03-21 PROCEDURE — 99999 PR PBB SHADOW E&M-EST. PATIENT-LVL II: CPT | Mod: PBBFAC,,, | Performed by: STUDENT IN AN ORGANIZED HEALTH CARE EDUCATION/TRAINING PROGRAM

## 2023-03-21 PROCEDURE — 3075F PR MOST RECENT SYSTOLIC BLOOD PRESS GE 130-139MM HG: ICD-10-PCS | Mod: CPTII,S$GLB,, | Performed by: STUDENT IN AN ORGANIZED HEALTH CARE EDUCATION/TRAINING PROGRAM

## 2023-03-21 PROCEDURE — 3075F SYST BP GE 130 - 139MM HG: CPT | Mod: CPTII,S$GLB,, | Performed by: STUDENT IN AN ORGANIZED HEALTH CARE EDUCATION/TRAINING PROGRAM

## 2023-03-21 PROCEDURE — 3008F BODY MASS INDEX DOCD: CPT | Mod: CPTII,S$GLB,, | Performed by: STUDENT IN AN ORGANIZED HEALTH CARE EDUCATION/TRAINING PROGRAM

## 2023-03-21 PROCEDURE — 3078F DIAST BP <80 MM HG: CPT | Mod: CPTII,S$GLB,, | Performed by: STUDENT IN AN ORGANIZED HEALTH CARE EDUCATION/TRAINING PROGRAM

## 2023-03-21 PROCEDURE — 3078F PR MOST RECENT DIASTOLIC BLOOD PRESSURE < 80 MM HG: ICD-10-PCS | Mod: CPTII,S$GLB,, | Performed by: STUDENT IN AN ORGANIZED HEALTH CARE EDUCATION/TRAINING PROGRAM

## 2023-03-21 PROCEDURE — 99215 PR OFFICE/OUTPT VISIT, EST, LEVL V, 40-54 MIN: ICD-10-PCS | Mod: S$GLB,,, | Performed by: STUDENT IN AN ORGANIZED HEALTH CARE EDUCATION/TRAINING PROGRAM

## 2023-03-21 PROCEDURE — 3008F PR BODY MASS INDEX (BMI) DOCUMENTED: ICD-10-PCS | Mod: CPTII,S$GLB,, | Performed by: STUDENT IN AN ORGANIZED HEALTH CARE EDUCATION/TRAINING PROGRAM

## 2023-03-21 PROCEDURE — 99999 PR PBB SHADOW E&M-EST. PATIENT-LVL II: ICD-10-PCS | Mod: PBBFAC,,, | Performed by: STUDENT IN AN ORGANIZED HEALTH CARE EDUCATION/TRAINING PROGRAM

## 2023-03-21 PROCEDURE — 99215 OFFICE O/P EST HI 40 MIN: CPT | Mod: S$GLB,,, | Performed by: STUDENT IN AN ORGANIZED HEALTH CARE EDUCATION/TRAINING PROGRAM

## 2023-03-21 RX ORDER — LISDEXAMFETAMINE DIMESYLATE 30 MG/1
30 CAPSULE ORAL EVERY MORNING
Qty: 30 CAPSULE | Refills: 0 | Status: SHIPPED | OUTPATIENT
Start: 2023-04-21 | End: 2023-06-14 | Stop reason: SDUPTHER

## 2023-03-21 RX ORDER — LISDEXAMFETAMINE DIMESYLATE 30 MG/1
30 CAPSULE ORAL EVERY MORNING
Qty: 30 CAPSULE | Refills: 0 | Status: SHIPPED | OUTPATIENT
Start: 2023-03-21 | End: 2023-06-14 | Stop reason: SDUPTHER

## 2023-03-21 RX ORDER — LISDEXAMFETAMINE DIMESYLATE 30 MG/1
30 CAPSULE ORAL EVERY MORNING
Qty: 30 CAPSULE | Refills: 0 | Status: SHIPPED | OUTPATIENT
Start: 2023-05-21 | End: 2023-06-14 | Stop reason: SDUPTHER

## 2023-03-21 NOTE — PROGRESS NOTES
Outpatient Psychiatry Follow-Up Visit (MD/NP)    3/21/2023        Chief Complaint:  Jennifer Nguyen is a 32 y.o. female who presents today for follow-up of attention problems.  Met with patient.      Interval History and Content of Current Session:  Interim Events/Subjective Report/Content of Current Session:   Jennifer Nguyen checked in on time for her visit. At last visit was started on vyvanse 30 mg daily. Had been a patient of Gisele Chavarria- chart reviewed and transferred to my care.     Had tried wellbutrin, it made her irritable, which she was not a fan of. She likes the vyvanse, but she is concerned about the cost. She is able to sit still, sit in silence. She is no longer as distracted by extraneous stimuli. 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 She feels like her skin may be drier from the medication- she does have a history of eczema. She is ok with it. She denies chest pain, palpitations. NO hx of heart murmur, no hx of heart problems.     She states her anxiety has been OK. She is also in therapy and saw her therapist yesterday. A lot of her anxiety was secondary to problems with focus. She feels like things are much better and her focus and functioning is much better. She had tried anxiety, depression medications and had not seen benefit.   She has had weird bruising, fatigue- saw GP, saw a hematologist.     Pt is from [place], [place]. Appetite has decreased, she has lost about 10 lbs.   Denies SI, HI, AVH. She is very accident prone.   No thoughts about suicide.     No gun in her house. Did attempt suicide in 9th grade. Has a history of self harm-  was picking scabs during the pandemic, but hasn't cut in a long time. She lives alone, and was very isolated in the pandemic. IT was hard for her to cope with the loneliness.     Review of Systems   PSYCHIATRIC: Pertinant items are noted in the narrative.  CONSTITUTIONAL: No weight gain or loss.   MUSCULOSKELETAL: No pain or stiffness of the joints.  ENT: See  above.  RESPIRATORY: See above.  CARDIOVASCULAR: No tachycardia or chest pain.  GASTROINTESTINAL: No nausea, vomiting, pain, constipation or diarrhea.    Past Medical, Family and Social History: The patient's past medical, family and social history have been reviewed and updated as appropriate within the electronic medical record - see encounter notes.    Compliance: yes    Side effects: see above    Risk Parameters:  Patient reports no suicidal ideation  Patient reports no homicidal ideation  Patient reports no self-injurious behavior  Patient reports no violent behavior    Exam (detailed: at least 9 elements; comprehensive: all 15 elements)   Constitutional  Vitals:  Most recent vital signs, dated less than 90 days prior to this appointment, were reviewed.   From 12/21/22:  BP:111/75  HR:96     General:  unremarkable, age appropriate     Musculoskeletal  Muscle Strength/Tone:  not examined   Gait & Station:  non-ataxic     Psychiatric  Speech:  no latency; no press   Mood & Affect:  steady  congruent and appropriate   Thought Process:  normal and logical   Associations:  intact   Thought Content:  normal, no suicidality, no homicidality, delusions, or paranoia   Insight:  intact   Judgement: behavior is adequate to circumstances   Orientation:  grossly intact   Memory: intact for content of interview   Language: grossly intact   Attention Span & Concentration:  able to focus   Fund of Knowledge:  intact and appropriate to age and level of education     Assessment and Diagnosis   Status/Progress: Based on the examination today, the patient's problem(s) is/are adequately but not ideally controlled.  New problems have not been presented today.   Co-morbidities, Diagnostic uncertainty, and Lack of compliance are not complicating management of the primary condition.  There are no active rule-out diagnoses for this patient at this time.     General Impression: Jennifer Nguyen, a 32 y.o.  female, presenting for  follow-up visit for management of ADHD symptoms. Presents 12/22/22 - did not tolerate Wellbutrin; Vyvanse started. Presents at 3/21/22 for F/u, jon has been helping and she denies SE, feels great benefit.     ADHD, inattentive type     Intervention/Counseling/Treatment Plan   Medication Management: Continue current medications.    Continue Vyvanse 30 mg daily  Patient has no contraindications: no h/o allergic rxn, agitation, anxiety, tourette's, arrythmia, cardiovascular disease, cardiac structural abnormalities, hyperthyroidism, glaucoma, Other psychiatric illness, etc.   Reviewed possible side effects 3 times. Discussed diagnosis, risks and benefits of proposed treatment vs alternative treatments vs no treatment, and potential side effects of these treatments (death, dependency, psychosis, alisson, aggression, HTN, tics, MI, stroke, arrythmia, seizure, anaphylaxis or other allergic reactions, leukopenia, nervousness, anorexia, insomnia, tachycardia, palpitations, dizziness, BP changes, HR changes, visual disturbance, etc.). The patient expresses understanding of the above and displays the capacity to agree with this treatment given said understanding. Patient also agrees that, currently, the benefits outweigh the risks and would like to pursue treatment at this time.  The risks and benefits of medication were discussed with the patient.  Discussed diagnosis, risks and benefits of proposed treatment above vs alternative treatments vs no treatment, and potential side effects of these treatments. The patient expresses understanding of the above and displays the capacity to agree with this treatment given said understanding. Patient also agrees that, currently, the benefits outweigh the risks and would like to pursue treatment at this time.  Discussed inherent unpredictability of medications in each individual.   Encouraged Patient to keep future appointments.   Take medications as prescribed and abstain from  substance abuse.   In the event of an emergency patient was advised to go to the emergency room      Return to Clinic: as scheduled  F/u 3 months  Lien Kessler MD

## 2023-03-29 ENCOUNTER — LAB VISIT (OUTPATIENT)
Dept: LAB | Facility: HOSPITAL | Age: 33
End: 2023-03-29
Payer: COMMERCIAL

## 2023-03-29 ENCOUNTER — OFFICE VISIT (OUTPATIENT)
Dept: INTERNAL MEDICINE | Facility: CLINIC | Age: 33
End: 2023-03-29
Payer: COMMERCIAL

## 2023-03-29 VITALS
OXYGEN SATURATION: 100 % | WEIGHT: 144.38 LBS | HEIGHT: 64 IN | SYSTOLIC BLOOD PRESSURE: 120 MMHG | BODY MASS INDEX: 24.65 KG/M2 | HEART RATE: 84 BPM | DIASTOLIC BLOOD PRESSURE: 80 MMHG

## 2023-03-29 DIAGNOSIS — R06.2 WHEEZING: Primary | ICD-10-CM

## 2023-03-29 DIAGNOSIS — T14.8XXA BRUISING: ICD-10-CM

## 2023-03-29 PROCEDURE — 1159F PR MEDICATION LIST DOCUMENTED IN MEDICAL RECORD: ICD-10-PCS | Mod: CPTII,S$GLB,, | Performed by: STUDENT IN AN ORGANIZED HEALTH CARE EDUCATION/TRAINING PROGRAM

## 2023-03-29 PROCEDURE — 3079F DIAST BP 80-89 MM HG: CPT | Mod: CPTII,S$GLB,, | Performed by: STUDENT IN AN ORGANIZED HEALTH CARE EDUCATION/TRAINING PROGRAM

## 2023-03-29 PROCEDURE — 86038 ANTINUCLEAR ANTIBODIES: CPT | Performed by: STUDENT IN AN ORGANIZED HEALTH CARE EDUCATION/TRAINING PROGRAM

## 2023-03-29 PROCEDURE — 36415 COLL VENOUS BLD VENIPUNCTURE: CPT | Performed by: STUDENT IN AN ORGANIZED HEALTH CARE EDUCATION/TRAINING PROGRAM

## 2023-03-29 PROCEDURE — 1160F RVW MEDS BY RX/DR IN RCRD: CPT | Mod: CPTII,S$GLB,, | Performed by: STUDENT IN AN ORGANIZED HEALTH CARE EDUCATION/TRAINING PROGRAM

## 2023-03-29 PROCEDURE — 99213 OFFICE O/P EST LOW 20 MIN: CPT | Mod: S$GLB,,, | Performed by: STUDENT IN AN ORGANIZED HEALTH CARE EDUCATION/TRAINING PROGRAM

## 2023-03-29 PROCEDURE — 3074F PR MOST RECENT SYSTOLIC BLOOD PRESSURE < 130 MM HG: ICD-10-PCS | Mod: CPTII,S$GLB,, | Performed by: STUDENT IN AN ORGANIZED HEALTH CARE EDUCATION/TRAINING PROGRAM

## 2023-03-29 PROCEDURE — 3008F BODY MASS INDEX DOCD: CPT | Mod: CPTII,S$GLB,, | Performed by: STUDENT IN AN ORGANIZED HEALTH CARE EDUCATION/TRAINING PROGRAM

## 2023-03-29 PROCEDURE — 3074F SYST BP LT 130 MM HG: CPT | Mod: CPTII,S$GLB,, | Performed by: STUDENT IN AN ORGANIZED HEALTH CARE EDUCATION/TRAINING PROGRAM

## 2023-03-29 PROCEDURE — 1159F MED LIST DOCD IN RCRD: CPT | Mod: CPTII,S$GLB,, | Performed by: STUDENT IN AN ORGANIZED HEALTH CARE EDUCATION/TRAINING PROGRAM

## 2023-03-29 PROCEDURE — 99999 PR PBB SHADOW E&M-EST. PATIENT-LVL V: ICD-10-PCS | Mod: PBBFAC,,, | Performed by: STUDENT IN AN ORGANIZED HEALTH CARE EDUCATION/TRAINING PROGRAM

## 2023-03-29 PROCEDURE — 3079F PR MOST RECENT DIASTOLIC BLOOD PRESSURE 80-89 MM HG: ICD-10-PCS | Mod: CPTII,S$GLB,, | Performed by: STUDENT IN AN ORGANIZED HEALTH CARE EDUCATION/TRAINING PROGRAM

## 2023-03-29 PROCEDURE — 3008F PR BODY MASS INDEX (BMI) DOCUMENTED: ICD-10-PCS | Mod: CPTII,S$GLB,, | Performed by: STUDENT IN AN ORGANIZED HEALTH CARE EDUCATION/TRAINING PROGRAM

## 2023-03-29 PROCEDURE — 99999 PR PBB SHADOW E&M-EST. PATIENT-LVL V: CPT | Mod: PBBFAC,,, | Performed by: STUDENT IN AN ORGANIZED HEALTH CARE EDUCATION/TRAINING PROGRAM

## 2023-03-29 PROCEDURE — 99213 PR OFFICE/OUTPT VISIT, EST, LEVL III, 20-29 MIN: ICD-10-PCS | Mod: S$GLB,,, | Performed by: STUDENT IN AN ORGANIZED HEALTH CARE EDUCATION/TRAINING PROGRAM

## 2023-03-29 PROCEDURE — 1160F PR REVIEW ALL MEDS BY PRESCRIBER/CLIN PHARMACIST DOCUMENTED: ICD-10-PCS | Mod: CPTII,S$GLB,, | Performed by: STUDENT IN AN ORGANIZED HEALTH CARE EDUCATION/TRAINING PROGRAM

## 2023-03-29 NOTE — PROGRESS NOTES
Clinic Note  03/29/2023      Subjective:         Chief Complaint: Follow-up    Patient ID: Jennifer Nguyen is a 32 y.o. female with anxiety, Celiac disease, ADHD, seasonal allergies on weekly injections, UTIs, being seen for an established visit.    HPI    Ongoing concerns of bruising w/o trauma. Occurs intermittently. Areas of confluent bruising worse on thighs with intermittent petechia. Seen by Heme/onc, thought not to be hematologic problem. Aptt and fibrinogen ordered were wnl. Plts, pt/inr wnl.   No sensory or motor impairments.   Follows with dermatology for eczema and dry skin, has not seen them recently. Has noticed central nail cupping on hands.   C/o wheezing w/o childhood asthma, no significant SOB or KHALIL. No LE edema.   No GERD or dysphagia. No joint pain or swelling.   Following with psych, taking Vyvance for ADHD with improvement in concentration.   Seeing GI, Celiac's in well control.   Active: Kickball, yoga, jogs.     Review of Systems   Constitutional:  Positive for fatigue. Negative for chills and fever.   HENT:  Negative for congestion and rhinorrhea.    Eyes:  Negative for discharge and redness.   Respiratory:  Negative for cough and shortness of breath.    Cardiovascular:  Negative for chest pain and leg swelling.   Gastrointestinal:  Negative for abdominal distention and abdominal pain.   Genitourinary:  Negative for dysuria, flank pain and frequency.   Musculoskeletal:  Negative for gait problem and joint swelling.   Skin:  Negative for pallor and rash.   Neurological:  Negative for syncope and facial asymmetry.   Hematological:  Bruises/bleeds easily.   Psychiatric/Behavioral:  Negative for agitation and confusion.      Patient's Medications   New Prescriptions    No medications on file   Previous Medications    AZELASTINE (ASTELIN) 137 MCG (0.1 %) NASAL SPRAY    1 spray (137 mcg total) by Nasal route 2 (two) times daily.    FLUTICASONE PROPIONATE (CUTIVATE) 0.05 % CREAM    Apply to  "affected areas of body daily prn eczema.    FLUTICASONE PROPIONATE (FLONASE) 50 MCG/ACTUATION NASAL SPRAY    1 spray by Each Nostril route once daily.    LISDEXAMFETAMINE (VYVANSE) 30 MG CAPSULE    Take 1 capsule (30 mg total) by mouth every morning.    LISDEXAMFETAMINE (VYVANSE) 30 MG CAPSULE    Take 1 capsule (30 mg total) by mouth every morning.    LISDEXAMFETAMINE (VYVANSE) 30 MG CAPSULE    Take 1 capsule (30 mg total) by mouth every morning.    LORATADINE (CLARITIN) 10 MG TABLET    Take 10 mg by mouth once daily.    OLOPATADINE (PATADAY) 0.2 % DROP    Place 1 drop into both eyes daily as needed (itching eyes).   Modified Medications    No medications on file   Discontinued Medications    PUMPKIN SEED EXTRACT-SOY GERM (AZO BLADDER CONTROL) 300 MG CAP    Take 1 tablet by mouth once daily.       Patient Active Problem List    Diagnosis Date Noted    Hymenoptera allergy 10/11/2022    Allergic rhinitis due to dust mite 10/11/2022    Allergic rhinitis due to pollen 10/11/2022    Allergic conjunctivitis, bilateral 10/11/2022    Easy bruisability 07/07/2022    Chronic fatigue 07/07/2022    Decreased  strength of right hand 01/11/2022    Chronic thumb pain, right 01/11/2022    Chronic instability of metacarpophalangeal joint of right thumb 01/10/2022    IUD (intrauterine device) in place- strings lost on 9/15 09/15/2021    Decreased range of motion of right thumb 03/02/2020    Thumb pain, right 03/02/2020    Thumb swelling 03/02/2020    Gamekeeper's thumb 01/06/2020    Generalized anxiety disorder 04/21/2016    Panic disorder 04/21/2016    Depression 04/21/2016    Anxiety 12/22/2015    Celiac disease 12/22/2015    ADHD (attention deficit hyperactivity disorder) 04/28/2014    Insomnia 02/11/2014           Objective:      /80 (BP Location: Right arm, Patient Position: Sitting, BP Method: Medium (Manual))   Pulse 84   Ht 5' 4" (1.626 m)   Wt 65.5 kg (144 lb 6.4 oz)   LMP  (LMP Unknown)   SpO2 100%   BMI " "24.79 kg/m²   Estimated body mass index is 24.79 kg/m² as calculated from the following:    Height as of this encounter: 5' 4" (1.626 m).    Weight as of this encounter: 65.5 kg (144 lb 6.4 oz).    Physical Exam  Constitutional:       General: She is not in acute distress.     Appearance: Normal appearance. She is normal weight. She is not ill-appearing, toxic-appearing or diaphoretic.   HENT:      Head: Normocephalic and atraumatic.   Eyes:      General: No scleral icterus.     Conjunctiva/sclera: Conjunctivae normal.      Pupils: Pupils are equal, round, and reactive to light.   Cardiovascular:      Rate and Rhythm: Normal rate and regular rhythm.      Pulses: Normal pulses.      Heart sounds: Normal heart sounds. No murmur heard.  Pulmonary:      Effort: Pulmonary effort is normal. No respiratory distress.      Breath sounds: Normal breath sounds. No wheezing or rales.   Abdominal:      General: Abdomen is flat. Bowel sounds are normal. There is no distension.      Palpations: Abdomen is soft.      Tenderness: There is no abdominal tenderness.   Musculoskeletal:         General: No swelling, tenderness or signs of injury. Normal range of motion.      Cervical back: Normal range of motion and neck supple. No rigidity.      Right lower leg: No edema.      Left lower leg: No edema.   Skin:     General: Skin is warm and dry.      Findings: Bruising (leg bruising without hematoma or purpura) present.   Neurological:      General: No focal deficit present.      Mental Status: She is alert and oriented to person, place, and time. Mental status is at baseline.      Sensory: No sensory deficit (normal sensation to sharp and dull stimuli bilaterally, normal monofillent foot testing bilaterally).      Motor: No weakness.      Gait: Gait normal.   Psychiatric:         Behavior: Behavior normal.         Thought Content: Thought content normal.             Picture of prior bruising from patient's phone.      Assessment & Plan: "   Jennifer was seen today for establish care.    Diagnoses and all orders for this visit:    Bruising  Normal TSH. CBC, CMP, iron panel wnl. Vitamin B12, b1, folate wnl.   Seen by heme/onc thought not to be hematologic issue. Labs reviewed. Thought may be related to flonase use, however she does not use this frequently.   Pictures in media tab.   - Given ongoing bruising, will check JERILYN and recommend dermatology re-eval   - If abnormal JERILYN, would refer to rheumatology    ADHD  Following with psych. Stable.   - continue vyvance    Celiac disease  Follows with GI. Celiac markers well controlled. Symptoms stable.  - continue to follow with GI prn    Wheezing  No hx of asthma. FeNO at a conference she attended was elevated.   No wheezing on exam.   - check PFTs    Patient seen and plan of care discussed with Dr. Cardona.    RTC in 6 months.     Missy Andrade MD  [facility]

## 2023-03-30 LAB — ANA SER QL IF: NORMAL

## 2023-04-03 ENCOUNTER — HOSPITAL ENCOUNTER (OUTPATIENT)
Dept: PULMONOLOGY | Facility: CLINIC | Age: 33
Discharge: HOME | End: 2023-04-03
Payer: COMMERCIAL

## 2023-04-03 DIAGNOSIS — R06.2 WHEEZING: ICD-10-CM

## 2023-04-03 PROCEDURE — 94729 PR C02/MEMBANE DIFFUSE CAPACITY: ICD-10-PCS | Mod: S$GLB,,, | Performed by: INTERNAL MEDICINE

## 2023-04-03 PROCEDURE — 94060 PR EVAL OF BRONCHOSPASM: ICD-10-PCS | Mod: S$GLB,,, | Performed by: INTERNAL MEDICINE

## 2023-04-03 PROCEDURE — 94729 DIFFUSING CAPACITY: CPT | Mod: S$GLB,,, | Performed by: INTERNAL MEDICINE

## 2023-04-03 PROCEDURE — 94727 GAS DIL/WSHOT DETER LNG VOL: CPT | Mod: S$GLB,,, | Performed by: INTERNAL MEDICINE

## 2023-04-03 PROCEDURE — 94727 PR PULM FUNCTION TEST BY GAS: ICD-10-PCS | Mod: S$GLB,,, | Performed by: INTERNAL MEDICINE

## 2023-04-03 PROCEDURE — 94060 EVALUATION OF WHEEZING: CPT | Mod: S$GLB,,, | Performed by: INTERNAL MEDICINE

## 2023-04-04 ENCOUNTER — PATIENT MESSAGE (OUTPATIENT)
Dept: INTERNAL MEDICINE | Facility: CLINIC | Age: 33
End: 2023-04-04
Payer: COMMERCIAL

## 2023-04-04 NOTE — PROGRESS NOTES
I have reviewed and concur with the resident's history, physical, assessment, and plan.  I did not personally interview or examine the patient at bedside.

## 2023-04-10 ENCOUNTER — CLINICAL SUPPORT (OUTPATIENT)
Dept: INTERNAL MEDICINE | Facility: CLINIC | Age: 33
End: 2023-04-10
Payer: COMMERCIAL

## 2023-04-10 DIAGNOSIS — Z91.038 ALLERGY TO INSECT STINGS: Primary | ICD-10-CM

## 2023-04-10 DIAGNOSIS — J45.20 MILD INTERMITTENT ASTHMA WITHOUT COMPLICATION: Primary | ICD-10-CM

## 2023-04-10 DIAGNOSIS — J45.20 MILD INTERMITTENT ASTHMA WITHOUT COMPLICATION: ICD-10-CM

## 2023-04-10 DIAGNOSIS — Z91.038 HYMENOPTERA ALLERGY: ICD-10-CM

## 2023-04-10 PROCEDURE — 99999 PR PBB SHADOW E&M-EST. PATIENT-LVL I: CPT | Mod: PBBFAC,,,

## 2023-04-10 PROCEDURE — 95115 IMMUNOTHERAPY ONE INJECTION: CPT | Mod: S$GLB,,, | Performed by: FAMILY MEDICINE

## 2023-04-10 PROCEDURE — 99999 PR PBB SHADOW E&M-EST. PATIENT-LVL I: ICD-10-PCS | Mod: PBBFAC,,,

## 2023-04-10 PROCEDURE — 95115 PR IMMUNOTHERAPY, ONE INJECTION: ICD-10-PCS | Mod: S$GLB,,, | Performed by: FAMILY MEDICINE

## 2023-04-10 RX ORDER — ALBUTEROL SULFATE 90 UG/1
1-2 AEROSOL, METERED RESPIRATORY (INHALATION) 2 TIMES DAILY PRN
Qty: 18 G | Refills: 0 | Status: SHIPPED | OUTPATIENT
Start: 2023-04-10 | End: 2023-04-10 | Stop reason: SDUPTHER

## 2023-04-10 RX ORDER — ALBUTEROL SULFATE 90 UG/1
1-2 AEROSOL, METERED RESPIRATORY (INHALATION) 2 TIMES DAILY PRN
Qty: 18 G | Refills: 0 | Status: SHIPPED | OUTPATIENT
Start: 2023-04-10 | End: 2024-04-09

## 2023-04-10 NOTE — TELEPHONE ENCOUNTER
----- Message from Zulema Irizarry sent at 4/10/2023  3:17 PM CDT -----  Type: Patient Call Back         Who called: pt          What is the request in detail: Pt called in regarding wanting to change her prescription  on the albuterol (VENTOLIN HFA) 90 mcg/actuation inhaler to a different location  . If so send to [id number] [place] [address] AT [address]         Can the clinic reply by [facility]? No          Would the patient rather a call back or a response via [facility]? Call back          Best call back number: [phone] (mobile)          Additional Information:           Thank You

## 2023-04-25 PROCEDURE — 95165 PR PROFES SVC,IMMUNOTHER,SINGLE/MULT AGS: ICD-10-PCS | Mod: S$GLB,,, | Performed by: ALLERGY & IMMUNOLOGY

## 2023-04-25 PROCEDURE — 95165 ANTIGEN THERAPY SERVICES: CPT | Mod: S$GLB,,, | Performed by: ALLERGY & IMMUNOLOGY

## 2023-04-26 ENCOUNTER — DOCUMENTATION ONLY (OUTPATIENT)
Dept: ALLERGY | Facility: CLINIC | Age: 33
End: 2023-04-26
Payer: COMMERCIAL

## 2023-05-05 ENCOUNTER — PATIENT MESSAGE (OUTPATIENT)
Dept: ALLERGY | Facility: CLINIC | Age: 33
End: 2023-05-05
Payer: COMMERCIAL

## 2023-05-08 ENCOUNTER — CLINICAL SUPPORT (OUTPATIENT)
Dept: ALLERGY | Facility: CLINIC | Age: 33
End: 2023-05-08
Payer: COMMERCIAL

## 2023-05-08 DIAGNOSIS — J45.909 ASTHMA, UNSPECIFIED ASTHMA SEVERITY, UNSPECIFIED WHETHER COMPLICATED, UNSPECIFIED WHETHER PERSISTENT: ICD-10-CM

## 2023-05-08 DIAGNOSIS — Z91.038 ALLERGY TO INSECT BITES AND STINGS: Primary | ICD-10-CM

## 2023-05-08 PROCEDURE — A4614 HAND-HELD PEFR METER: HCPCS | Mod: S$GLB,,, | Performed by: ALLERGY & IMMUNOLOGY

## 2023-05-08 PROCEDURE — A4614 PR HAND-HELD PEFR METER: ICD-10-PCS | Mod: S$GLB,,, | Performed by: ALLERGY & IMMUNOLOGY

## 2023-05-08 PROCEDURE — 95117 IMMUNOTHERAPY INJECTIONS: CPT | Mod: S$GLB,,, | Performed by: ALLERGY & IMMUNOLOGY

## 2023-05-08 PROCEDURE — 99999 PR PBB SHADOW E&M-EST. PATIENT-LVL I: CPT | Mod: PBBFAC,,,

## 2023-05-08 PROCEDURE — 99999 PR PBB SHADOW E&M-EST. PATIENT-LVL I: ICD-10-PCS | Mod: PBBFAC,,,

## 2023-05-08 PROCEDURE — 95117 PR IMMU2THERAPY, 2+ INJECTIONS: ICD-10-PCS | Mod: S$GLB,,, | Performed by: ALLERGY & IMMUNOLOGY

## 2023-05-10 ENCOUNTER — OFFICE VISIT (OUTPATIENT)
Dept: INTERNAL MEDICINE | Facility: CLINIC | Age: 33
End: 2023-05-10
Payer: COMMERCIAL

## 2023-05-10 VITALS
OXYGEN SATURATION: 96 % | WEIGHT: 139.13 LBS | BODY MASS INDEX: 23.75 KG/M2 | DIASTOLIC BLOOD PRESSURE: 79 MMHG | SYSTOLIC BLOOD PRESSURE: 119 MMHG | HEART RATE: 82 BPM | HEIGHT: 64 IN

## 2023-05-10 DIAGNOSIS — J45.20 MILD INTERMITTENT ASTHMA WITHOUT COMPLICATION: ICD-10-CM

## 2023-05-10 PROCEDURE — 99999 PR PBB SHADOW E&M-EST. PATIENT-LVL IV: CPT | Mod: PBBFAC,,, | Performed by: STUDENT IN AN ORGANIZED HEALTH CARE EDUCATION/TRAINING PROGRAM

## 2023-05-10 PROCEDURE — 3078F PR MOST RECENT DIASTOLIC BLOOD PRESSURE < 80 MM HG: ICD-10-PCS | Mod: CPTII,S$GLB,, | Performed by: STUDENT IN AN ORGANIZED HEALTH CARE EDUCATION/TRAINING PROGRAM

## 2023-05-10 PROCEDURE — 99213 PR OFFICE/OUTPT VISIT, EST, LEVL III, 20-29 MIN: ICD-10-PCS | Mod: S$GLB,,, | Performed by: STUDENT IN AN ORGANIZED HEALTH CARE EDUCATION/TRAINING PROGRAM

## 2023-05-10 PROCEDURE — 3078F DIAST BP <80 MM HG: CPT | Mod: CPTII,S$GLB,, | Performed by: STUDENT IN AN ORGANIZED HEALTH CARE EDUCATION/TRAINING PROGRAM

## 2023-05-10 PROCEDURE — 99213 OFFICE O/P EST LOW 20 MIN: CPT | Mod: S$GLB,,, | Performed by: STUDENT IN AN ORGANIZED HEALTH CARE EDUCATION/TRAINING PROGRAM

## 2023-05-10 PROCEDURE — 3008F BODY MASS INDEX DOCD: CPT | Mod: CPTII,S$GLB,, | Performed by: STUDENT IN AN ORGANIZED HEALTH CARE EDUCATION/TRAINING PROGRAM

## 2023-05-10 PROCEDURE — 1160F PR REVIEW ALL MEDS BY PRESCRIBER/CLIN PHARMACIST DOCUMENTED: ICD-10-PCS | Mod: CPTII,S$GLB,, | Performed by: STUDENT IN AN ORGANIZED HEALTH CARE EDUCATION/TRAINING PROGRAM

## 2023-05-10 PROCEDURE — 3074F PR MOST RECENT SYSTOLIC BLOOD PRESSURE < 130 MM HG: ICD-10-PCS | Mod: CPTII,S$GLB,, | Performed by: STUDENT IN AN ORGANIZED HEALTH CARE EDUCATION/TRAINING PROGRAM

## 2023-05-10 PROCEDURE — 1159F PR MEDICATION LIST DOCUMENTED IN MEDICAL RECORD: ICD-10-PCS | Mod: CPTII,S$GLB,, | Performed by: STUDENT IN AN ORGANIZED HEALTH CARE EDUCATION/TRAINING PROGRAM

## 2023-05-10 PROCEDURE — 3008F PR BODY MASS INDEX (BMI) DOCUMENTED: ICD-10-PCS | Mod: CPTII,S$GLB,, | Performed by: STUDENT IN AN ORGANIZED HEALTH CARE EDUCATION/TRAINING PROGRAM

## 2023-05-10 PROCEDURE — 1160F RVW MEDS BY RX/DR IN RCRD: CPT | Mod: CPTII,S$GLB,, | Performed by: STUDENT IN AN ORGANIZED HEALTH CARE EDUCATION/TRAINING PROGRAM

## 2023-05-10 PROCEDURE — 3074F SYST BP LT 130 MM HG: CPT | Mod: CPTII,S$GLB,, | Performed by: STUDENT IN AN ORGANIZED HEALTH CARE EDUCATION/TRAINING PROGRAM

## 2023-05-10 PROCEDURE — 1159F MED LIST DOCD IN RCRD: CPT | Mod: CPTII,S$GLB,, | Performed by: STUDENT IN AN ORGANIZED HEALTH CARE EDUCATION/TRAINING PROGRAM

## 2023-05-10 PROCEDURE — 99999 PR PBB SHADOW E&M-EST. PATIENT-LVL IV: ICD-10-PCS | Mod: PBBFAC,,, | Performed by: STUDENT IN AN ORGANIZED HEALTH CARE EDUCATION/TRAINING PROGRAM

## 2023-05-10 RX ORDER — FLUTICASONE PROPIONATE AND SALMETEROL 250; 50 UG/1; UG/1
1 POWDER RESPIRATORY (INHALATION) DAILY
Qty: 30 EACH | Refills: 3 | Status: SHIPPED | OUTPATIENT
Start: 2023-05-10 | End: 2024-05-09

## 2023-05-10 NOTE — PROGRESS NOTES
Clinic Note  05/10/2023      Subjective:         Chief Complaint: Asthma    Patient ID: Jennifer Nguyen is a 32 y.o. female with seasonal allergies, ADHD, bruising, being seen for an established visit to discuss asthma diagnosis.     HPI    Patient using prn albuterol with exercise 2-4x a week. Has noticed a difference in exercise tolerance with use of albuterol. Occasional cough including at night. No wheezing. PFTs reviewed.     Has appointment scheduled with dermatology in July for ongoing atypical bruising, notes 3 of her toe nails fell off in the last few weeks and are re-growing. No associated trauma to toe nails.       Review of Systems   Constitutional:  Negative for activity change and unexpected weight change.   HENT:  Negative for hearing loss, rhinorrhea and trouble swallowing.    Eyes:  Negative for discharge and visual disturbance.   Respiratory:  Negative for chest tightness and wheezing.    Cardiovascular:  Negative for chest pain and palpitations.   Gastrointestinal:  Negative for blood in stool, constipation, diarrhea and vomiting.   Endocrine: Negative for polydipsia and polyuria.   Genitourinary:  Negative for difficulty urinating, dysuria, hematuria and menstrual problem.   Musculoskeletal:  Negative for arthralgias, joint swelling and neck pain.   Neurological:  Negative for weakness and headaches.   Psychiatric/Behavioral:  Negative for confusion and dysphoric mood.      Patient's Medications   New Prescriptions    No medications on file   Previous Medications    ALBUTEROL (VENTOLIN HFA) 90 MCG/ACTUATION INHALER    Inhale 1-2 puffs into the lungs 2 (two) times daily as needed for Wheezing or Shortness of Breath. Rescue    AZELASTINE (ASTELIN) 137 MCG (0.1 %) NASAL SPRAY    1 spray (137 mcg total) by Nasal route 2 (two) times daily.    FLUTICASONE PROPIONATE (CUTIVATE) 0.05 % CREAM    Apply to affected areas of body daily prn eczema.    FLUTICASONE PROPIONATE (FLONASE) 50 MCG/ACTUATION NASAL  "SPRAY    1 spray by Each Nostril route once daily.    LISDEXAMFETAMINE (VYVANSE) 30 MG CAPSULE    Take 1 capsule (30 mg total) by mouth every morning.    LISDEXAMFETAMINE (VYVANSE) 30 MG CAPSULE    Take 1 capsule (30 mg total) by mouth every morning.    LISDEXAMFETAMINE (VYVANSE) 30 MG CAPSULE    Take 1 capsule (30 mg total) by mouth every morning.    LORATADINE (CLARITIN) 10 MG TABLET    Take 10 mg by mouth once daily.    OLOPATADINE (PATADAY) 0.2 % DROP    Place 1 drop into both eyes daily as needed (itching eyes).   Modified Medications    No medications on file   Discontinued Medications    No medications on file       Patient Active Problem List    Diagnosis Date Noted    Wheezing 04/03/2023    Hymenoptera allergy 10/11/2022    Allergic rhinitis due to dust mite 10/11/2022    Allergic rhinitis due to pollen 10/11/2022    Allergic conjunctivitis, bilateral 10/11/2022    Easy bruisability 07/07/2022    Chronic fatigue 07/07/2022    Decreased  strength of right hand 01/11/2022    Chronic thumb pain, right 01/11/2022    Chronic instability of metacarpophalangeal joint of right thumb 01/10/2022    IUD (intrauterine device) in place- strings lost on 9/15 09/15/2021    Decreased range of motion of right thumb 03/02/2020    Thumb pain, right 03/02/2020    Thumb swelling 03/02/2020    Gamekeeper's thumb 01/06/2020    Generalized anxiety disorder 04/21/2016    Panic disorder 04/21/2016    Depression 04/21/2016    Anxiety 12/22/2015    Celiac disease 12/22/2015    ADHD (attention deficit hyperactivity disorder) 04/28/2014    Insomnia 02/11/2014           Objective:      /79 (BP Location: Right arm, Patient Position: Sitting, BP Method: Medium (Automatic))   Pulse 82   Ht 5' 4" (1.626 m)   Wt 63.1 kg (139 lb 1.8 oz)   SpO2 96%   BMI 23.88 kg/m²   Estimated body mass index is 23.88 kg/m² as calculated from the following:    Height as of this encounter: 5' 4" (1.626 m).    Weight as of this encounter: 63.1 kg " (139 lb 1.8 oz).    Physical Exam  Constitutional:       General: She is not in acute distress.     Appearance: Normal appearance. She is not ill-appearing.   HENT:      Head: Normocephalic and atraumatic.      Nose: Nose normal. No congestion.      Mouth/Throat:      Mouth: Mucous membranes are moist.      Pharynx: Oropharynx is clear.   Eyes:      General: No scleral icterus.     Conjunctiva/sclera: Conjunctivae normal.   Cardiovascular:      Rate and Rhythm: Normal rate and regular rhythm.      Pulses: Normal pulses.      Heart sounds: Normal heart sounds.   Pulmonary:      Effort: Pulmonary effort is normal. No respiratory distress.      Breath sounds: Normal breath sounds. No wheezing or rales.   Abdominal:      General: Abdomen is flat.      Palpations: Abdomen is soft.   Musculoskeletal:      Cervical back: Normal range of motion. No rigidity.      Right lower leg: No edema.      Left lower leg: No edema.   Skin:     General: Skin is warm and dry.      Comments: Growth of 3 new toe nails, no erythema, swelling   Neurological:      General: No focal deficit present.      Mental Status: She is alert and oriented to person, place, and time. Mental status is at baseline.      Gait: Gait normal.   Psychiatric:         Behavior: Behavior normal.         Thought Content: Thought content normal.         Judgment: Judgment normal.       Assessment & Plan:   Jennifer was seen today for asthma.    Diagnoses and all orders for this visit:    Mild intermittent asthma without complication  Asthma diagnosis discussed and PFTs reviewed with patient.   Given symptoms and frequency of albuterol use, reasonable to start ICS below.   -     fluticasone-salmeterol diskus inhaler 250-50 mcg; Inhale 1 puff into the lungs once daily. Controller  -  Continue prn rescue albuterol inhaler  - Printed albuterol resources provided.     Patient seen and plan of care discussed with Dr. Strong.     RTC in 6 months.     MD Mahesh KilgoreDignity [facility]  [facility]

## 2023-06-05 ENCOUNTER — CLINICAL SUPPORT (OUTPATIENT)
Dept: ALLERGY | Facility: CLINIC | Age: 33
End: 2023-06-05
Payer: COMMERCIAL

## 2023-06-05 DIAGNOSIS — J45.909 ASTHMA, UNSPECIFIED ASTHMA SEVERITY, UNSPECIFIED WHETHER COMPLICATED, UNSPECIFIED WHETHER PERSISTENT: ICD-10-CM

## 2023-06-05 DIAGNOSIS — Z91.038 ALLERGY TO INSECT BITES AND STINGS: Primary | ICD-10-CM

## 2023-06-05 PROCEDURE — 95115 PR IMMUNOTHERAPY, ONE INJECTION: ICD-10-PCS | Mod: S$GLB,,, | Performed by: ALLERGY & IMMUNOLOGY

## 2023-06-05 PROCEDURE — 99999 PR PBB SHADOW E&M-EST. PATIENT-LVL I: CPT | Mod: PBBFAC,,,

## 2023-06-05 PROCEDURE — 99999 PR PBB SHADOW E&M-EST. PATIENT-LVL I: ICD-10-PCS | Mod: PBBFAC,,,

## 2023-06-05 PROCEDURE — 95115 IMMUNOTHERAPY ONE INJECTION: CPT | Mod: S$GLB,,, | Performed by: ALLERGY & IMMUNOLOGY

## 2023-06-14 ENCOUNTER — PATIENT MESSAGE (OUTPATIENT)
Dept: PRIMARY CARE CLINIC | Facility: CLINIC | Age: 33
End: 2023-06-14
Payer: COMMERCIAL

## 2023-06-14 ENCOUNTER — OFFICE VISIT (OUTPATIENT)
Dept: PSYCHIATRY | Facility: CLINIC | Age: 33
End: 2023-06-14
Payer: COMMERCIAL

## 2023-06-14 DIAGNOSIS — F90.0 ATTENTION DEFICIT HYPERACTIVITY DISORDER (ADHD), PREDOMINANTLY INATTENTIVE TYPE: Primary | ICD-10-CM

## 2023-06-14 PROCEDURE — 99214 PR OFFICE/OUTPT VISIT, EST, LEVL IV, 30-39 MIN: ICD-10-PCS | Mod: 95,,, | Performed by: STUDENT IN AN ORGANIZED HEALTH CARE EDUCATION/TRAINING PROGRAM

## 2023-06-14 PROCEDURE — 99214 OFFICE O/P EST MOD 30 MIN: CPT | Mod: 95,,, | Performed by: STUDENT IN AN ORGANIZED HEALTH CARE EDUCATION/TRAINING PROGRAM

## 2023-06-14 RX ORDER — LISDEXAMFETAMINE DIMESYLATE 30 MG/1
30 CAPSULE ORAL EVERY MORNING
Qty: 30 CAPSULE | Refills: 0 | Status: SHIPPED | OUTPATIENT
Start: 2023-08-21 | End: 2023-08-04 | Stop reason: SDUPTHER

## 2023-06-14 RX ORDER — LISDEXAMFETAMINE DIMESYLATE 30 MG/1
30 CAPSULE ORAL EVERY MORNING
Qty: 30 CAPSULE | Refills: 0 | Status: SHIPPED | OUTPATIENT
Start: 2023-06-21 | End: 2023-06-27 | Stop reason: SDUPTHER

## 2023-06-14 RX ORDER — LISDEXAMFETAMINE DIMESYLATE 30 MG/1
30 CAPSULE ORAL EVERY MORNING
Qty: 30 CAPSULE | Refills: 0 | Status: SHIPPED | OUTPATIENT
Start: 2023-07-21 | End: 2023-09-13 | Stop reason: SDUPTHER

## 2023-06-14 NOTE — PROGRESS NOTES
"Outpatient Psychiatry Follow-Up Visit (MD/NP)    6/14/2023        Chief Complaint:  Jennifer Nguyen is a 33 y.o. female who presents today for follow-up of attention problems.  Met with patient.      Interval History and Content of Current Session:  Interim Events/Subjective Report/Content of Current Session:   Jennifer Nguyen checked in on time for her visit- pt takes 30 mg vyvanse daily. She states work has been a little stressful, but she is becoming more secure and confident in herself at work. She does have some anxiety at work, but feels like having her focus problems resolved has helped considerably.   Generally, she still feels like the vyvanse has been working well for her. Her weight has been totally stable, no further weight loss. She feels like she is not as distractible, she is able to focus on her work. However, she states she has been dealing with some undetermined health issues. Pt has had some abnormal bruising, wheezing, and nail cupping. She does not believe this is 2/2 to medication, but feels like the vyvanse masks it.   Pt states she did have a negative JERILYN, but she is still having symptoms. Discussed whether or not she should see a rheumatologist, and she does think maybe it would be worthwhile for them to discuss it with her.   Sleep has been "ok" recently, sometimes is worrying at night about personal issues. Relatively recent change. She is still following with a therapist. She denies chest pain or palpitations. She gets lightheaded quickly- discussed lifestyle stuff. She denies SI, HI, AVH.      Initial history with me:  Had tried wellbutrin, it made her irritable, which she was not a fan of. She likes the vyvanse, but she is concerned about the cost. She is able to sit still, sit in silence. She is no longer as distracted by extraneous stimuli. 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 She feels like her skin may be drier from the medication- she does have a history of eczema. She is ok with it. " She denies chest pain, palpitations. NO hx of heart murmur, no hx of heart problems.   She states her anxiety has been OK. She is also in therapy and saw her therapist yesterday. A lot of her anxiety was secondary to problems with focus. She feels like things are much better and her focus and functioning is much better. She had tried anxiety, depression medications and had not seen benefit.   She has had weird bruising, fatigue- saw GP, saw a hematologist.     Pt is from [place], [place]. Appetite has decreased, she has lost about 10 lbs.   Denies SI, HI, AVH. She is very accident prone.   No thoughts about suicide.     No gun in her house. Did attempt suicide in 9th grade. Has a history of self harm-  was picking scabs during the pandemic, but hasn't cut in a long time. She lives alone, and was very isolated in the pandemic. IT was hard for her to cope with the loneliness.     Review of Systems   PSYCHIATRIC: Pertinant items are noted in the narrative.  CONSTITUTIONAL: No weight gain or loss.   MUSCULOSKELETAL: No pain or stiffness of the joints.  ENT: See above.  RESPIRATORY: See above.  CARDIOVASCULAR: No tachycardia or chest pain.  GASTROINTESTINAL: No nausea, vomiting, pain, constipation or diarrhea.    Past Medical, Family and Social History: The patient's past medical, family and social history have been reviewed and updated as appropriate within the electronic medical record - see encounter notes.    Compliance: yes    Side effects: see above    Risk Parameters:  Patient reports no suicidal ideation  Patient reports no homicidal ideation  Patient reports no self-injurious behavior  Patient reports no violent behavior    Exam (detailed: at least 9 elements; comprehensive: all 15 elements)   Constitutional  Vitals:  Most recent vital signs, dated less than 90 days prior to this appointment, were reviewed.   From 12/21/22:  BP:111/75  HR:96     General:  unremarkable, age appropriate      Musculoskeletal  Muscle Strength/Tone:  not examined   Gait & Station:  non-ataxic     Psychiatric  Speech:  no latency; no press   Mood & Affect:  steady  congruent and appropriate   Thought Process:  normal and logical   Associations:  intact   Thought Content:  normal, no suicidality, no homicidality, delusions, or paranoia   Insight:  intact   Judgement: behavior is adequate to circumstances   Orientation:  grossly intact   Memory: intact for content of interview   Language: grossly intact   Attention Span & Concentration:  able to focus   Fund of Knowledge:  intact and appropriate to age and level of education     Assessment and Diagnosis   Status/Progress: Based on the examination today, the patient's problem(s) is/are adequately but not ideally controlled.  New problems have not been presented today.   Co-morbidities, Diagnostic uncertainty, and Lack of compliance are not complicating management of the primary condition.  There are no active rule-out diagnoses for this patient at this time.     General Impression: Jennifer Nguyen, a 33 y.o.  female, presenting for follow-up visit for management of ADHD symptoms. Presents 12/22/22 - did not tolerate Wellbutrin; Vyvanse started. Presents at 3/21/22 for F/u, vvyanse has been helping and she denies SE, feels great benefit.     ADHD, inattentive type     Intervention/Counseling/Treatment Plan   Medication Management: Continue current medications.    Continue Vyvanse 30 mg daily  Patient has no contraindications: no h/o allergic rxn, agitation, anxiety, tourette's, arrythmia, cardiovascular disease, cardiac structural abnormalities, hyperthyroidism, glaucoma, Other psychiatric illness, etc.   Reviewed possible side effects 3 times. Discussed diagnosis, risks and benefits of proposed treatment vs alternative treatments vs no treatment, and potential side effects of these treatments (death, dependency, psychosis, alisson, aggression, HTN, tics, MI, stroke,  arrythmia, seizure, anaphylaxis or other allergic reactions, leukopenia, nervousness, anorexia, insomnia, tachycardia, palpitations, dizziness, BP changes, HR changes, visual disturbance, etc.). The patient expresses understanding of the above and displays the capacity to agree with this treatment given said understanding. Patient also agrees that, currently, the benefits outweigh the risks and would like to pursue treatment at this time.  The risks and benefits of medication were discussed with the patient.  Discussed diagnosis, risks and benefits of proposed treatment above vs alternative treatments vs no treatment, and potential side effects of these treatments. The patient expresses understanding of the above and displays the capacity to agree with this treatment given said understanding. Patient also agrees that, currently, the benefits outweigh the risks and would like to pursue treatment at this time.  Discussed inherent unpredictability of medications in each individual.   Encouraged Patient to keep future appointments.   Take medications as prescribed and abstain from substance abuse.   In the event of an emergency patient was advised to go to the emergency room  Time level : 35    Return to Clinic: as scheduled  F/u 3 months  Lien Kessler MD

## 2023-06-27 ENCOUNTER — TELEPHONE (OUTPATIENT)
Dept: PSYCHIATRY | Facility: HOSPITAL | Age: 33
End: 2023-06-27
Payer: COMMERCIAL

## 2023-06-27 ENCOUNTER — PATIENT MESSAGE (OUTPATIENT)
Dept: DERMATOLOGY | Facility: CLINIC | Age: 33
End: 2023-06-27

## 2023-06-27 ENCOUNTER — OFFICE VISIT (OUTPATIENT)
Dept: DERMATOLOGY | Facility: CLINIC | Age: 33
End: 2023-06-27
Payer: COMMERCIAL

## 2023-06-27 DIAGNOSIS — R53.82 CHRONIC FATIGUE: ICD-10-CM

## 2023-06-27 DIAGNOSIS — K90.0 CELIAC DISEASE: ICD-10-CM

## 2023-06-27 DIAGNOSIS — L20.84 INTRINSIC ATOPIC DERMATITIS: ICD-10-CM

## 2023-06-27 DIAGNOSIS — R23.3 EASY BRUISING: Primary | ICD-10-CM

## 2023-06-27 PROCEDURE — 1160F RVW MEDS BY RX/DR IN RCRD: CPT | Mod: CPTII,S$GLB,, | Performed by: DERMATOLOGY

## 2023-06-27 PROCEDURE — 1160F PR REVIEW ALL MEDS BY PRESCRIBER/CLIN PHARMACIST DOCUMENTED: ICD-10-PCS | Mod: CPTII,S$GLB,, | Performed by: DERMATOLOGY

## 2023-06-27 PROCEDURE — 1159F PR MEDICATION LIST DOCUMENTED IN MEDICAL RECORD: ICD-10-PCS | Mod: CPTII,S$GLB,, | Performed by: DERMATOLOGY

## 2023-06-27 PROCEDURE — 99214 OFFICE O/P EST MOD 30 MIN: CPT | Mod: S$GLB,,, | Performed by: DERMATOLOGY

## 2023-06-27 PROCEDURE — 99214 PR OFFICE/OUTPT VISIT, EST, LEVL IV, 30-39 MIN: ICD-10-PCS | Mod: S$GLB,,, | Performed by: DERMATOLOGY

## 2023-06-27 PROCEDURE — 1159F MED LIST DOCD IN RCRD: CPT | Mod: CPTII,S$GLB,, | Performed by: DERMATOLOGY

## 2023-06-27 RX ORDER — FLUTICASONE PROPIONATE 0.5 MG/G
CREAM TOPICAL
Qty: 60 G | Refills: 3 | Status: SHIPPED | OUTPATIENT
Start: 2023-06-27

## 2023-06-27 RX ORDER — LISDEXAMFETAMINE DIMESYLATE 30 MG/1
30 CAPSULE ORAL EVERY MORNING
Qty: 30 CAPSULE | Refills: 0 | Status: SHIPPED | OUTPATIENT
Start: 2023-06-27 | End: 2023-09-11

## 2023-06-27 NOTE — TELEPHONE ENCOUNTER
----- Message from Mona Lees sent at 6/27/2023 12:13 PM CDT -----  Regarding: Medication  Pt requests her prescription for lisdexamfetamine (VYVANSE) 30 MG capsule is resent to [facility]/pharmacy #[id number] - [place], [place] - [address], Phone: [phone]  Fax: [phone].  Pt says she was notified earlier it was available, but didn't need it at the time.  She is requesting you resend it.  Last seen on 6/14/23 with no future scheduled appts.    She can be reached at [phone].    Thank you.

## 2023-06-27 NOTE — PROGRESS NOTES
Patient Information  Name: Jennifer Nguyen  : 1990  MRN: [id number]     Referring Physician:  Raymond   Primary Care Physician:  Missy Andrade MD   Date of Visit: 2023      Subjective:     History of Present lllness:    Jennifer Nguyen is a 33 y.o. female with history of atopic dermatitis and celiac disease who presents with a chief complaint of easy bruising.    Bruising  Location: mostly on thighs, little on upper arms  Duration: 1 year +  Signs/Symptoms: easy bruising; she scratched her thigh once which initially showed petechiae then became bruise out of proportion for the level of trauma; she does not recall any preceding stinging/burning/tingling prior to bruising (but maybe itching prior); she did notice that she would rub the top of her thighs while at work; easy bruising started during a stressful time working 3 jobs, now works from home and is less stress but still bruising   Relieving factors/Prior treatments: none  She had a thorough lab work up and was seen by hematologist last year.  No significant bleeding after any surgeries, procedures, or dental extractions. No menorrhagia.  No significant flares of celiac. Eats a well-rounded diet.  No family history of bleeding disorder.     She also complains of having an oily [employer].    Patient was last seen: 2022.  Prior notes by myself reviewed.   Clinical documentation obtained by nursing staff reviewed.    Review of Systems   Constitutional:  Positive for fatigue and malaise.   Respiratory:  Positive for shortness of breath (responds to albuterol). Negative for cough.    Gastrointestinal:  Negative for diarrhea.   Genitourinary:  Negative for hematuria.   Musculoskeletal:  Positive for arthralgias (right ankle). Negative for joint swelling.   Skin:  Positive for itching and rash (eczema).   Hematologic/Lymphatic: Bruises/bleeds easily (bruise only).     Objective:   Physical Exam   Constitutional: She appears well-developed and  well-nourished. No distress.   Neurological: She is alert and oriented to person, place, and time. She is not disoriented.   Psychiatric: She has a normal mood and affect.   Skin:   Areas Examined (abnormalities noted in diagram):   Head / Face Inspection Performed  RUE Inspected  LUE Inspection Performed  RLE Inspected  LLE Inspection Performed          Diagram Legend     Erythematous scaling macule/papule c/w actinic keratosis       Vascular papule c/w angioma      Pigmented verrucoid papule/plaque c/w seborrheic keratosis      Yellow umbilicated papule c/w sebaceous hyperplasia      Irregularly shaped tan macule c/w lentigo     1-2 mm smooth white papules consistent with Milia      Movable subcutaneous cyst with punctum c/w epidermal inclusion cyst      Subcutaneous movable cyst c/w pilar cyst      Firm pink to brown papule c/w dermatofibroma      Pedunculated fleshy papule(s) c/w skin tag(s)      Evenly pigmented macule c/w junctional nevus     Mildly variegated pigmented, slightly irregular-bordered macule c/w mildly atypical nevus      Flesh colored to evenly pigmented papule c/w intradermal nevus       Pink pearly papule/plaque c/w basal cell carcinoma      Erythematous hyperkeratotic cursted plaque c/w SCC      Surgical scar with no sign of skin cancer recurrence      Open and closed comedones      Inflammatory papules and pustules      Verrucoid papule consistent consistent with wart     Erythematous eczematous patches and plaques     Dystrophic onycholytic nail with subungual debris c/w onychomycosis     Umbilicated papule    Erythematous-base heme-crusted tan verrucoid plaque consistent with inflamed seborrheic keratosis     Erythematous Silvery Scaling Plaque c/w Psoriasis     See annotation    No images are attached to the encounter or orders placed in the encounter.      [x] Data reviewed  [x] Prior external notes reviewed  [] Independent review of test  [] Management discussed with another  provider  [] Independent historian    Assessment / Plan:        Easy bruising  Chronic fatigue  Celiac disease  - chronic problem, not at treatment goal  Pt has had thorough workup last year and no underlying causes were identified.   Will check vit K and C as was not done in past, as well as recheck some labs to monitor trends as labs have not been done in about a year.  -     CBC Auto Differential; Future; Expected date: 06/27/2023  -     Ferritin; Future; Expected date: 06/27/2023  -     Comprehensive Metabolic Panel; Future; Expected date: 06/27/2023  -     APTT; Future; Expected date: 06/27/2023  -     PROTIME-INR; Future; Expected date: 06/27/2023  -     VITAMIN K 1; Future; Expected date: 06/27/2023  -     VITAMIN C; Future; Expected date: 06/27/2023  Pt was asked to keep a journal of her bruising, any triggers or sensations preceding the bruises, and any associations with stress levels.    Intrinsic atopic dermatitis   - stable and chronic  -     fluticasone propionate (CUTIVATE) 0.05 % cream; Apply to affected areas of body daily prn eczema.  Dispense: 60 g; Refill: 3  Side effects from the overuse of topical steroids include thinning of skin, easy tearing/bruising of skin, stretch marks, spider veins, etc. Use the topical steroid no more than 2 days per week if used long-term and/or take breaks from the topical steroid, especially if any of the above side effects are noticed.      Follow up in about 2 months (around 8/27/2023).      Janett Raygoza MD, FAAD  [facility]

## 2023-06-28 ENCOUNTER — LAB VISIT (OUTPATIENT)
Dept: LAB | Facility: OTHER | Age: 33
End: 2023-06-28
Attending: DERMATOLOGY
Payer: COMMERCIAL

## 2023-06-28 DIAGNOSIS — K90.0 CELIAC DISEASE: ICD-10-CM

## 2023-06-28 DIAGNOSIS — R53.82 CHRONIC FATIGUE: ICD-10-CM

## 2023-06-28 DIAGNOSIS — R23.3 EASY BRUISING: ICD-10-CM

## 2023-06-28 LAB
ALBUMIN SERPL BCP-MCNC: 4.2 G/DL (ref 3.5–5.2)
ALP SERPL-CCNC: 57 U/L (ref 55–135)
ALT SERPL W/O P-5'-P-CCNC: 18 U/L (ref 10–44)
ANION GAP SERPL CALC-SCNC: 9 MMOL/L (ref 8–16)
APTT PPP: 26 SEC (ref 21–32)
AST SERPL-CCNC: 19 U/L (ref 10–40)
BASOPHILS # BLD AUTO: 0.03 K/UL (ref 0–0.2)
BASOPHILS NFR BLD: 0.6 % (ref 0–1.9)
BILIRUB SERPL-MCNC: 1.5 MG/DL (ref 0.1–1)
BUN SERPL-MCNC: 6 MG/DL (ref 6–20)
CALCIUM SERPL-MCNC: 9.4 MG/DL (ref 8.7–10.5)
CHLORIDE SERPL-SCNC: 108 MMOL/L (ref 95–110)
CO2 SERPL-SCNC: 23 MMOL/L (ref 23–29)
CREAT SERPL-MCNC: 0.7 MG/DL (ref 0.5–1.4)
DIFFERENTIAL METHOD: ABNORMAL
EOSINOPHIL # BLD AUTO: 0.3 K/UL (ref 0–0.5)
EOSINOPHIL NFR BLD: 5.9 % (ref 0–8)
ERYTHROCYTE [DISTWIDTH] IN BLOOD BY AUTOMATED COUNT: 12.8 % (ref 11.5–14.5)
EST. GFR  (NO RACE VARIABLE): >60 ML/MIN/1.73 M^2
FERRITIN SERPL-MCNC: 116 NG/ML (ref 20–300)
GLUCOSE SERPL-MCNC: 89 MG/DL (ref 70–110)
HCT VFR BLD AUTO: 39 % (ref 37–48.5)
HGB BLD-MCNC: 13.1 G/DL (ref 12–16)
IMM GRANULOCYTES # BLD AUTO: 0.02 K/UL (ref 0–0.04)
IMM GRANULOCYTES NFR BLD AUTO: 0.4 % (ref 0–0.5)
INR PPP: 0.9 (ref 0.8–1.2)
LYMPHOCYTES # BLD AUTO: 1.6 K/UL (ref 1–4.8)
LYMPHOCYTES NFR BLD: 34.5 % (ref 18–48)
MCH RBC QN AUTO: 29.5 PG (ref 27–31)
MCHC RBC AUTO-ENTMCNC: 33.6 G/DL (ref 32–36)
MCV RBC AUTO: 88 FL (ref 82–98)
MONOCYTES # BLD AUTO: 0.3 K/UL (ref 0.3–1)
MONOCYTES NFR BLD: 6.5 % (ref 4–15)
NEUTROPHILS # BLD AUTO: 2.5 K/UL (ref 1.8–7.7)
NEUTROPHILS NFR BLD: 52.1 % (ref 38–73)
NRBC BLD-RTO: 0 /100 WBC
PLATELET # BLD AUTO: 311 K/UL (ref 150–450)
PMV BLD AUTO: 9 FL (ref 9.2–12.9)
POTASSIUM SERPL-SCNC: 4.1 MMOL/L (ref 3.5–5.1)
PROT SERPL-MCNC: 6.8 G/DL (ref 6–8.4)
PROTHROMBIN TIME: 10.1 SEC (ref 9–12.5)
RBC # BLD AUTO: 4.44 M/UL (ref 4–5.4)
SODIUM SERPL-SCNC: 140 MMOL/L (ref 136–145)
WBC # BLD AUTO: 4.75 K/UL (ref 3.9–12.7)

## 2023-06-28 PROCEDURE — 84597 ASSAY OF VITAMIN K: CPT | Performed by: DERMATOLOGY

## 2023-06-28 PROCEDURE — 82180 ASSAY OF ASCORBIC ACID: CPT | Performed by: DERMATOLOGY

## 2023-06-28 PROCEDURE — 85025 COMPLETE CBC W/AUTO DIFF WBC: CPT | Performed by: DERMATOLOGY

## 2023-06-28 PROCEDURE — 36415 COLL VENOUS BLD VENIPUNCTURE: CPT | Performed by: DERMATOLOGY

## 2023-06-28 PROCEDURE — 80053 COMPREHEN METABOLIC PANEL: CPT | Performed by: DERMATOLOGY

## 2023-06-28 PROCEDURE — 85610 PROTHROMBIN TIME: CPT | Performed by: DERMATOLOGY

## 2023-06-28 PROCEDURE — 85730 THROMBOPLASTIN TIME PARTIAL: CPT | Performed by: DERMATOLOGY

## 2023-06-28 PROCEDURE — 82728 ASSAY OF FERRITIN: CPT | Performed by: DERMATOLOGY

## 2023-06-30 ENCOUNTER — PATIENT MESSAGE (OUTPATIENT)
Dept: DERMATOLOGY | Facility: CLINIC | Age: 33
End: 2023-06-30
Payer: COMMERCIAL

## 2023-07-03 ENCOUNTER — CLINICAL SUPPORT (OUTPATIENT)
Dept: ALLERGY | Facility: CLINIC | Age: 33
End: 2023-07-03
Payer: COMMERCIAL

## 2023-07-03 DIAGNOSIS — Z91.038 ALLERGY TO INSECT BITES AND STINGS: Primary | ICD-10-CM

## 2023-07-03 DIAGNOSIS — J45.909 ASTHMA, UNSPECIFIED ASTHMA SEVERITY, UNSPECIFIED WHETHER COMPLICATED, UNSPECIFIED WHETHER PERSISTENT: ICD-10-CM

## 2023-07-03 LAB — VIT C SERPL-MCNC: 4 MG/L (ref 2–19)

## 2023-07-03 PROCEDURE — 95115 PR IMMUNOTHERAPY, ONE INJECTION: ICD-10-PCS | Mod: S$GLB,,, | Performed by: ALLERGY & IMMUNOLOGY

## 2023-07-03 PROCEDURE — 95115 IMMUNOTHERAPY ONE INJECTION: CPT | Mod: S$GLB,,, | Performed by: ALLERGY & IMMUNOLOGY

## 2023-07-03 PROCEDURE — 99999 PR PBB SHADOW E&M-EST. PATIENT-LVL I: ICD-10-PCS | Mod: PBBFAC,,,

## 2023-07-03 PROCEDURE — 99999 PR PBB SHADOW E&M-EST. PATIENT-LVL I: CPT | Mod: PBBFAC,,,

## 2023-07-05 LAB — PHYTONADIONE SERPL-MCNC: 0.81 NMOL/L (ref 0.22–4.88)

## 2023-07-23 ENCOUNTER — OFFICE VISIT (OUTPATIENT)
Dept: URGENT CARE | Facility: CLINIC | Age: 33
End: 2023-07-23
Payer: COMMERCIAL

## 2023-07-23 VITALS
BODY MASS INDEX: 23.73 KG/M2 | WEIGHT: 139 LBS | DIASTOLIC BLOOD PRESSURE: 75 MMHG | HEART RATE: 90 BPM | HEIGHT: 64 IN | OXYGEN SATURATION: 96 % | TEMPERATURE: 98 F | RESPIRATION RATE: 20 BRPM | SYSTOLIC BLOOD PRESSURE: 115 MMHG

## 2023-07-23 DIAGNOSIS — R35.0 FREQUENT URINATION: ICD-10-CM

## 2023-07-23 DIAGNOSIS — N30.00 ACUTE CYSTITIS WITHOUT HEMATURIA: Primary | ICD-10-CM

## 2023-07-23 LAB
B-HCG UR QL: NEGATIVE
BILIRUB UR QL STRIP: POSITIVE
CTP QC/QA: YES
GLUCOSE UR QL STRIP: NEGATIVE
KETONES UR QL STRIP: NEGATIVE
LEUKOCYTE ESTERASE UR QL STRIP: NEGATIVE
PH, POC UA: 7 (ref 5–8)
POC BLOOD, URINE: NEGATIVE
POC NITRATES, URINE: POSITIVE
PROT UR QL STRIP: NEGATIVE
SP GR UR STRIP: 1.01 (ref 1–1.03)
UROBILINOGEN UR STRIP-ACNC: 4 (ref 0.1–1.1)

## 2023-07-23 PROCEDURE — 99213 PR OFFICE/OUTPT VISIT, EST, LEVL III, 20-29 MIN: ICD-10-PCS | Mod: S$GLB,,,

## 2023-07-23 PROCEDURE — 81003 URINALYSIS AUTO W/O SCOPE: CPT | Mod: QW,S$GLB,,

## 2023-07-23 PROCEDURE — 87086 URINE CULTURE/COLONY COUNT: CPT

## 2023-07-23 PROCEDURE — 99213 OFFICE O/P EST LOW 20 MIN: CPT | Mod: S$GLB,,,

## 2023-07-23 PROCEDURE — 81025 URINE PREGNANCY TEST: CPT | Mod: S$GLB,,,

## 2023-07-23 PROCEDURE — 81003 POCT URINALYSIS, DIPSTICK, MANUAL, W/O SCOPE: ICD-10-PCS | Mod: QW,S$GLB,,

## 2023-07-23 PROCEDURE — 81025 POCT URINE PREGNANCY: ICD-10-PCS | Mod: S$GLB,,,

## 2023-07-23 RX ORDER — SULFAMETHOXAZOLE AND TRIMETHOPRIM 800; 160 MG/1; MG/1
1 TABLET ORAL 2 TIMES DAILY
Qty: 14 TABLET | Refills: 0 | Status: SHIPPED | OUTPATIENT
Start: 2023-07-23 | End: 2023-07-30

## 2023-07-23 NOTE — PATIENT INSTRUCTIONS
Take Bactrim twice a day for 7 days.  We will call you in a few days regarding your urine culture.    What care is needed at home?     Take your drugs as ordered by your doctor.  Unless your doctor has told you otherwise, drink at least 8 to 10 glasses of water or water-based drinks each day. Do not include drinks with caffeine, like coffee or tea.  Do not hold back your urine. Go to the bathroom every 2 to 3 hours.  What follow-up care is needed?   Your doctor may ask you to make visits to the office to check on your progress. Be sure to keep these visits.  What drugs may be needed?   The doctor may order drugs to:  Fight an infection  Help with pain  Numb your bladder  Help you pass your urine more easily  Be sure to talk to your doctor about all of your drugs if you are pregnant.  Will physical activity be limited?   Physical activities will not be limited. You may have to pass urine more often.  What changes to diet are needed?   Do not drink beer, wine, and mixed drinks (alcohol) or caffeine. These can bother the bladder.  Talk to your doctor about drinking cranberry juice.  What can be done to prevent this health problem?   Gently cleanse your genital area each day. Wipe from front to back to keep germs from going in your body.  If your penis is uncircumcised, retract the foreskin and gently clean around the head of the penis each day.  Consider other methods of birth control instead of a spermicide.  Empty your bladder after having sex.  Empty your bladder before going to sleep.  When do I need to call the doctor?   Signs of infection. These include a fever of 100.4°F (38°C) or higher, chills, back pain, nausea, throwing up, or bloody urine.  Signs come back after treatment ends  You notice more blood in your urine.  Your signs get worse or do not improve within 24 hours of starting treatment.  You are not able to urinate for more than 8 hours.  Your signs come back after treatment has stopped.  - Rest.    -  Drink plenty of fluids.    - Acetaminophen (tylenol) or Ibuprofen (advil,motrin) as directed as needed for fever/pain. Avoid tylenol if you have a history of liver disease. Do not take ibuprofen if you have a history of GI bleeding, kidney disease, or if you take blood thinners.     - Follow up with your PCP or specialty clinic as directed in the next 1-2 weeks if not improved or as needed.  You can call [phone] to schedule an appointment with the appropriate provider.    - Go to the ER or seek medical attention immediately if you develop new or worsening symptoms.     - You must understand that you have received an Urgent Care treatment only and that you may be released before all of your medical problems are known or treated.   - You, the patient, will arrange for follow up care as instructed.   - If your condition worsens or fails to improve we recommend that you receive another evaluation at the ER immediately or contact your PCP to discuss your concerns or return here.

## 2023-07-23 NOTE — PROGRESS NOTES
"Subjective:      Patient ID: Jennifer Nguyen is a 33 y.o. female.    Vitals:  height is 5' 4" (1.626 m) and weight is 63 kg (139 lb). Her tympanic temperature is 98.4 °F (36.9 °C). Her blood pressure is 115/75 and her pulse is 90. Her respiration is 20 and oxygen saturation is 96%.     Chief Complaint: Urinary Tract Infection    33-year-old female patient presents with dysuria, urinary frequency, urgency for the last 2 days.  Patient reports she has been taken over-the-counter Azo's for her symptoms with moderate relief.  Patient denies any fever, CVA tenderness, abdominal pain, tenderness, pelvic pain.  Patient denies any blood or discharge.        Urinary Tract Infection   This is a new problem. The current episode started yesterday. The problem occurs every urination. The problem has been gradually worsening. The quality of the pain is described as aching. The pain is at a severity of 7/10. The pain is moderate. There has been no fever. She is Sexually active. There is No history of pyelonephritis. Associated symptoms include frequency and urgency. Pertinent negatives include no chills, flank pain, hematuria, nausea, vomiting, constipation or rash. She has tried acetaminophen (AZO) for the symptoms. The treatment provided moderate relief.     Constitution: Negative for activity change, appetite change, chills, sweating, fatigue and fever.   HENT:  Negative for ear pain, ear discharge, foreign body in ear, tinnitus, hearing loss, dental problem, drooling and mouth sores.    Neck: Negative for neck pain, neck stiffness, painful lymph nodes, neck swelling and degenerative disc disease.   Cardiovascular:  Negative for chest trauma, chest pain, leg swelling, palpitations and sob on exertion.   Eyes:  Negative for eye trauma, foreign body in eye, eye discharge, eye itching, eye pain and eye redness.   Respiratory:  Negative for sleep apnea, chest tightness, cough, sputum production, bloody sputum, COPD, shortness of " breath and asthma.    Gastrointestinal:  Negative for abdominal trauma, abdominal pain, abdominal bloating, history of abdominal surgery, nausea, vomiting, constipation, diarrhea, bright red blood in stool, dark colored stools, rectal bleeding and rectal pain.   Endocrine: hair loss, cold intolerance, heat intolerance and excessive thirst.   Genitourinary:  Positive for dysuria, frequency and urgency. Negative for urine decreased, flank pain, bladder incontinence, bed wetting, hematuria, history of kidney stones, painful menstruation, irregular menstruation, missed menses, heavy menstrual bleeding, ovarian cysts, genital trauma, vaginal pain, vaginal discharge, vaginal bleeding, vaginal odor and genital sore.   Musculoskeletal:  Negative for pain, trauma, joint pain, joint swelling, abnormal ROM of joint, arthritis, gout and back pain.   Skin:  Negative for color change, pale, rash, wound, abrasion, laceration and lesion.   Allergic/Immunologic: Negative for environmental allergies, seasonal allergies, food allergies, eczema, asthma, immunocompromised state, recurrent sinus infections and chronic cough.   Neurological:  Negative for dizziness, history of vertigo, light-headedness, passing out, facial drooping, disorientation and altered mental status.   Hematologic/Lymphatic: Negative for swollen lymph nodes, easy bruising/bleeding, trouble clotting and history of blood clots. Does not bruise/bleed easily.   Psychiatric/Behavioral:  Negative for altered mental status, disorientation, agitation, nervous/anxious and sleep disturbance. The patient is not nervous/anxious.     Objective:     Physical Exam   Constitutional: She is oriented to person, place, and time. She appears well-developed.   HENT:   Head: Normocephalic and atraumatic.   Ears:   Right Ear: External ear normal.   Left Ear: External ear normal.   Nose: Nose normal.   Mouth/Throat: Mucous membranes are normal.   Eyes: Conjunctivae and lids are normal.    Neck: Trachea normal. Neck supple.   Cardiovascular: Normal rate, regular rhythm and normal heart sounds.   Pulmonary/Chest: Effort normal and breath sounds normal. No respiratory distress.   Abdominal: Normal appearance and bowel sounds are normal. She exhibits no distension and no mass. Soft. There is no abdominal tenderness. There is no rebound, no guarding, no left CVA tenderness and no right CVA tenderness. No hernia.   Musculoskeletal: Normal range of motion.         General: No tenderness. Normal range of motion.   Neurological: She is alert and oriented to person, place, and time. She has normal strength.   Skin: Skin is warm, dry, intact, not diaphoretic and not pale.   Psychiatric: Her speech is normal and behavior is normal. Judgment and thought content normal.   Nursing note and vitals reviewed.  Results for orders placed or performed in visit on 07/23/23   POCT Urinalysis, Dipstick, Manual, w/o Scope   Result Value Ref Range    POC Blood, Urine Negative Negative    POC Bilirubin, Urine Positive (A) Negative    POC Urobilinogen, Urine 4.0 (A) 0.1 - 1.1    POC Ketones, Urine Negative Negative    POC Protein, Urine Negative Negative    POC Nitrates, Urine Positive (A) Negative    POC Glucose, Urine Negative Negative    pH, UA 7.0 5 - 8    POC Specific Gravity, Urine 1.010 1.003 - 1.029    POC Leukocytes, Urine Negative Negative   POCT urine pregnancy   Result Value Ref Range    POC Preg Test, Ur Negative Negative     Acceptable Yes         Assessment:     1. Acute cystitis without hematuria    2. Frequent urination        Plan:       Acute cystitis without hematuria  -     CULTURE, URINE  -     sulfamethoxazole-trimethoprim 800-160mg (BACTRIM DS) 800-160 mg Tab; Take 1 tablet by mouth 2 (two) times daily. for 7 days  Dispense: 14 tablet; Refill: 0    Frequent urination  -     POCT Urinalysis, Dipstick, Manual, w/o Scope  -     POCT urine pregnancy             Additional MDM:     Heart  Failure Score:   COPD = No

## 2023-07-25 ENCOUNTER — TELEPHONE (OUTPATIENT)
Dept: URGENT CARE | Facility: CLINIC | Age: 33
End: 2023-07-25
Payer: COMMERCIAL

## 2023-07-25 LAB — BACTERIA UR CULT: NO GROWTH

## 2023-07-26 NOTE — TELEPHONE ENCOUNTER
patient notified of urine culture result no growth, states she is still taking azo and bactrim and is not certain if her symptoms are improving. She also indicated she has a urologist and will follow-up if symptoms not resolved

## 2023-07-31 ENCOUNTER — OFFICE VISIT (OUTPATIENT)
Dept: URGENT CARE | Facility: CLINIC | Age: 33
End: 2023-07-31
Payer: COMMERCIAL

## 2023-07-31 VITALS
HEART RATE: 79 BPM | SYSTOLIC BLOOD PRESSURE: 118 MMHG | DIASTOLIC BLOOD PRESSURE: 80 MMHG | RESPIRATION RATE: 16 BRPM | WEIGHT: 138.88 LBS | TEMPERATURE: 98 F | HEIGHT: 64 IN | BODY MASS INDEX: 23.71 KG/M2 | OXYGEN SATURATION: 100 %

## 2023-07-31 DIAGNOSIS — N20.0 NEPHROLITHIASIS: Primary | ICD-10-CM

## 2023-07-31 DIAGNOSIS — S39.012A STRAIN OF MUSCLE, FASCIA AND TENDON OF LOWER BACK, INITIAL ENCOUNTER: ICD-10-CM

## 2023-07-31 DIAGNOSIS — M54.50 LOW BACK PAIN, UNSPECIFIED BACK PAIN LATERALITY, UNSPECIFIED CHRONICITY, UNSPECIFIED WHETHER SCIATICA PRESENT: ICD-10-CM

## 2023-07-31 LAB
B-HCG UR QL: NEGATIVE
BILIRUB UR QL STRIP: NEGATIVE
CTP QC/QA: YES
GLUCOSE UR QL STRIP: NEGATIVE
KETONES UR QL STRIP: NEGATIVE
LEUKOCYTE ESTERASE UR QL STRIP: NEGATIVE
PH, POC UA: 5
POC BLOOD, URINE: POSITIVE
POC NITRATES, URINE: NEGATIVE
PROT UR QL STRIP: NEGATIVE
SP GR UR STRIP: 1 (ref 1–1.03)
UROBILINOGEN UR STRIP-ACNC: NORMAL (ref 0.1–1.1)

## 2023-07-31 PROCEDURE — 99214 OFFICE O/P EST MOD 30 MIN: CPT | Mod: 25,S$GLB,,

## 2023-07-31 PROCEDURE — 96372 THER/PROPH/DIAG INJ SC/IM: CPT | Mod: S$GLB,,,

## 2023-07-31 PROCEDURE — 81025 URINE PREGNANCY TEST: CPT | Mod: S$GLB,,,

## 2023-07-31 PROCEDURE — 81025 POCT URINE PREGNANCY: ICD-10-PCS | Mod: S$GLB,,,

## 2023-07-31 PROCEDURE — 81003 URINALYSIS AUTO W/O SCOPE: CPT | Mod: QW,S$GLB,,

## 2023-07-31 PROCEDURE — 81003 POCT URINALYSIS, DIPSTICK, AUTOMATED, W/O SCOPE: ICD-10-PCS | Mod: QW,S$GLB,,

## 2023-07-31 PROCEDURE — 99214 PR OFFICE/OUTPT VISIT, EST, LEVL IV, 30-39 MIN: ICD-10-PCS | Mod: 25,S$GLB,,

## 2023-07-31 PROCEDURE — 96372 PR INJECTION,THERAP/PROPH/DIAG2ST, IM OR SUBCUT: ICD-10-PCS | Mod: S$GLB,,,

## 2023-07-31 RX ORDER — CYCLOBENZAPRINE HCL 10 MG
10 TABLET ORAL 3 TIMES DAILY PRN
Qty: 28 TABLET | Refills: 0 | Status: SHIPPED | OUTPATIENT
Start: 2023-07-31 | End: 2023-08-03

## 2023-07-31 RX ORDER — MELOXICAM 15 MG/1
15 TABLET ORAL DAILY
Qty: 7 TABLET | Refills: 0 | Status: SHIPPED | OUTPATIENT
Start: 2023-07-31 | End: 2023-08-07

## 2023-07-31 RX ORDER — KETOROLAC TROMETHAMINE 30 MG/ML
30 INJECTION, SOLUTION INTRAMUSCULAR; INTRAVENOUS
Status: COMPLETED | OUTPATIENT
Start: 2023-07-31 | End: 2023-07-31

## 2023-07-31 RX ORDER — TAMSULOSIN HYDROCHLORIDE 0.4 MG/1
0.4 CAPSULE ORAL DAILY
Qty: 14 CAPSULE | Refills: 0 | Status: SHIPPED | OUTPATIENT
Start: 2023-07-31 | End: 2023-09-11

## 2023-07-31 RX ADMIN — KETOROLAC TROMETHAMINE 30 MG: 30 INJECTION, SOLUTION INTRAMUSCULAR; INTRAVENOUS at 03:07

## 2023-07-31 NOTE — PROGRESS NOTES
"Subjective:      Patient ID: Jennifer Nguyen is a 33 y.o. female.    Vitals:  height is 5' 4" (1.626 m) and weight is 63 kg (138 lb 14.2 oz). Her oral temperature is 98.3 °F (36.8 °C). Her blood pressure is 118/80 and her pulse is 79. Her respiration is 16 and oxygen saturation is 100%.     Chief Complaint: Back Pain    Pt has been having lower back pain since Saturday. Pt recently had a UTI last week and noticed the pain on Saturday. Pt reports a shooting pain in her lower back to her gluteal region. Pt has been having an aching pain at times. Pt reports that she isn't able to sit for too long. Pt has been taking ibuprofen for her symptoms     Back Pain  This is a new problem. The current episode started in the past 7 days (friday). The problem has been gradually worsening since onset. The pain is present in the gluteal and lumbar spine. The quality of the pain is described as shooting and aching (sharp). The pain radiates to the right knee. The pain is at a severity of 6/10. The pain is moderate. The pain is The same all the time. The symptoms are aggravated by standing and lying down. Associated symptoms include leg pain. Pertinent negatives include no abdominal pain, bladder incontinence, bowel incontinence, chest pain, dysuria, fever, headaches, numbness, paresis, paresthesias, pelvic pain, perianal numbness, tingling or weakness. She has tried NSAIDs (ibuprofen) for the symptoms. The treatment provided mild relief.       Constitution: Negative for fever.   Cardiovascular:  Negative for chest pain.   Gastrointestinal:  Negative for abdominal pain and bowel incontinence.   Genitourinary:  Negative for dysuria, frequency, urgency, bladder incontinence and pelvic pain.   Musculoskeletal:  Positive for pain and back pain.   Neurological:  Negative for headaches, disorientation, altered mental status and numbness.   Psychiatric/Behavioral:  Negative for altered mental status and disorientation.       Objective: "     Physical Exam   Constitutional: She is oriented to person, place, and time. She appears well-developed. She is cooperative. No distress.      Comments:Patient sits comfortably in exam chair. Answers questions in complete sentences. Does not show any signs of distress or discoloration.        HENT:   Head: Normocephalic and atraumatic.   Ears:   Right Ear: External ear normal.   Left Ear: External ear normal.   Nose: Nose normal.   Mouth/Throat: Oropharynx is clear and moist and mucous membranes are normal.   Eyes: Conjunctivae and lids are normal.   Neck: Trachea normal and phonation normal. Neck supple.   Cardiovascular: Normal rate, regular rhythm, normal heart sounds and normal pulses.   Pulmonary/Chest: Effort normal and breath sounds normal. No respiratory distress.   Abdominal: Normal appearance and bowel sounds are normal. She exhibits no distension and no mass. Soft. There is abdominal tenderness in the right upper quadrant. There is right CVA tenderness. There is no rebound, no guarding, no tenderness at McBurney's point, negative Ewing's sign, no left CVA tenderness, negative Rovsing's sign and negative psoas sign.      Comments: Mild tenderness to palpation over the right side of the abdomen. No rebound or guarding noted on exam.      Musculoskeletal: Normal range of motion.         General: No deformity. Normal range of motion.      Lumbar back: She exhibits normal range of motion, no tenderness, no bony tenderness, no swelling, no edema, no deformity, no laceration and no spasm.        Back:       Comments: Tenderness to palpation over the sacrum. No bony step offs palpated. Full ROM of the spine with pain on twisting and lateral bending. 5/5 lower extremity strength bilaterally. Negative straight leg raise bilaterally. Full ROM of the hips bilaterally without pain. Normal gait.    Neurological: She is alert and oriented to person, place, and time. She has normal strength and normal reflexes. No  sensory deficit.   Skin: Skin is warm, dry, intact, not diaphoretic and not pale.   Psychiatric: Her speech is normal and behavior is normal. Judgment and thought content normal.   Nursing note and vitals reviewed.    Results for orders placed or performed in visit on 07/31/23   POCT Urinalysis, Dipstick, Automated, W/O Scope   Result Value Ref Range    POC Blood, Urine Positive (A) Negative    POC Bilirubin, Urine Negative Negative    POC Urobilinogen, Urine NORMAL 0.1 - 1.1    POC Ketones, Urine Negative Negative    POC Protein, Urine Negative Negative    POC Nitrates, Urine Negative Negative    POC Glucose, Urine Negative Negative    pH, UA 5.0     POC Specific Gravity, Urine 1.005 1.003 - 1.029    POC Leukocytes, Urine Negative Negative   POCT urine pregnancy   Result Value Ref Range    POC Preg Test, Ur Negative Negative     Acceptable Yes        Assessment:     1. Nephrolithiasis    2. Low back pain, unspecified back pain laterality, unspecified chronicity, unspecified whether sciatica present    3. Strain of muscle, fascia and tendon of lower back, initial encounter        Plan:   Will treat for kidney stone vs muscle strain.     Nephrolithiasis  -     ketorolac injection 30 mg  -     tamsulosin (FLOMAX) 0.4 mg Cap; Take 1 capsule (0.4 mg total) by mouth once daily. for 14 days  Dispense: 14 capsule; Refill: 0  -     Ambulatory referral/consult to Urology    Low back pain, unspecified back pain laterality, unspecified chronicity, unspecified whether sciatica present  -     POCT Urinalysis, Dipstick, Automated, W/O Scope  -     POCT urine pregnancy    Strain of muscle, fascia and tendon of lower back, initial encounter  -     ketorolac injection 30 mg  -     cyclobenzaprine (FLEXERIL) 10 MG tablet; Take 1 tablet (10 mg total) by mouth 3 (three) times daily as needed for Muscle spasms.  Dispense: 28 tablet; Refill: 0  -     meloxicam (MOBIC) 15 MG tablet; Take 1 tablet (15 mg total) by mouth once  daily. for 7 days  Dispense: 7 tablet; Refill: 0                Patient Instructions   A referral for Urology has been placed. Call [phone] to schedule an appointment.     - Today you have received a Toradol injection. DO NOT TAKE ANY NSAIDs (Ibuprofen, Motrin, Advil, Naproxen, etc.) for 24 hours after receiving the injection.     - You have been prescribed a muscle relaxer. DO NOT operate heavy machinery while taking this medication.     - Rest.    - Drink plenty of fluids.      Back Pain, Conservative Therapy and Prevention:  - Use a warm heating pad to help ease the pain.  - Do lower back stretches to help work out your lower back muscles. See attached exercises.   - Make sure to do abdominal muscle strengthening exercises. A weak abdomen can cause the back muscles to compensate and cause back pain.   - When lifting heavy objects from the floor, make sure to lift up with your legs, keep your back straight, and do not hinge at the hips.   - Make sure to use good posture when sitting and standing.    - You must understand that you have received an Urgent Care treatment only and that you may be released before all of your medical problems are known or treated.   - You, the patient, will arrange for follow up care as instructed.   - If your condition worsens or fails to improve we recommend that you receive another evaluation at the ER immediately or contact your PCP to discuss your concerns or return here.   - Follow up with your PCP or specialty clinic as directed in the next 1-2 weeks if not improved or as needed.  You can call [phone] to schedule an appointment with the appropriate provider.    If your symptoms do not improve or worsen, go to the emergency room immediately.    Dictation #1  MRN:[id number]  [facility]:[id number]

## 2023-07-31 NOTE — PATIENT INSTRUCTIONS
A referral for Urology has been placed. Call [phone] to schedule an appointment.     - Today you have received a Toradol injection. DO NOT TAKE ANY NSAIDs (Ibuprofen, Motrin, Advil, Naproxen, etc.) for 24 hours after receiving the injection.     - You have been prescribed a muscle relaxer. DO NOT operate heavy machinery while taking this medication.     - Rest.    - Drink plenty of fluids.      Back Pain, Conservative Therapy and Prevention:  - Use a warm heating pad to help ease the pain.  - Do lower back stretches to help work out your lower back muscles. See attached exercises.   - Make sure to do abdominal muscle strengthening exercises. A weak abdomen can cause the back muscles to compensate and cause back pain.   - When lifting heavy objects from the floor, make sure to lift up with your legs, keep your back straight, and do not hinge at the hips.   - Make sure to use good posture when sitting and standing.    - You must understand that you have received an Urgent Care treatment only and that you may be released before all of your medical problems are known or treated.   - You, the patient, will arrange for follow up care as instructed.   - If your condition worsens or fails to improve we recommend that you receive another evaluation at the ER immediately or contact your PCP to discuss your concerns or return here.   - Follow up with your PCP or specialty clinic as directed in the next 1-2 weeks if not improved or as needed.  You can call [phone] to schedule an appointment with the appropriate provider.    If your symptoms do not improve or worsen, go to the emergency room immediately.

## 2023-08-03 ENCOUNTER — HOSPITAL ENCOUNTER (OUTPATIENT)
Dept: RADIOLOGY | Facility: OTHER | Age: 33
Discharge: HOME | End: 2023-08-03
Attending: NURSE PRACTITIONER
Payer: COMMERCIAL

## 2023-08-03 ENCOUNTER — OFFICE VISIT (OUTPATIENT)
Dept: SPINE | Facility: CLINIC | Age: 33
End: 2023-08-03
Attending: PHYSICAL MEDICINE & REHABILITATION
Payer: COMMERCIAL

## 2023-08-03 ENCOUNTER — OFFICE VISIT (OUTPATIENT)
Dept: UROLOGY | Facility: CLINIC | Age: 33
End: 2023-08-03
Payer: COMMERCIAL

## 2023-08-03 VITALS
SYSTOLIC BLOOD PRESSURE: 120 MMHG | WEIGHT: 136.13 LBS | BODY MASS INDEX: 23.24 KG/M2 | HEART RATE: 88 BPM | DIASTOLIC BLOOD PRESSURE: 75 MMHG | OXYGEN SATURATION: 100 % | HEIGHT: 64 IN

## 2023-08-03 VITALS
HEIGHT: 64 IN | RESPIRATION RATE: 18 BRPM | OXYGEN SATURATION: 100 % | BODY MASS INDEX: 23.6 KG/M2 | TEMPERATURE: 99 F | SYSTOLIC BLOOD PRESSURE: 97 MMHG | WEIGHT: 138.25 LBS | HEART RATE: 133 BPM | DIASTOLIC BLOOD PRESSURE: 73 MMHG

## 2023-08-03 DIAGNOSIS — M47.817 SPONDYLOSIS WITHOUT MYELOPATHY OR RADICULOPATHY, LUMBOSACRAL REGION: ICD-10-CM

## 2023-08-03 DIAGNOSIS — M54.41 ACUTE RIGHT-SIDED LOW BACK PAIN WITH RIGHT-SIDED SCIATICA: Primary | ICD-10-CM

## 2023-08-03 DIAGNOSIS — M54.9 DORSALGIA, UNSPECIFIED: ICD-10-CM

## 2023-08-03 DIAGNOSIS — M79.18 MYOFASCIAL PAIN: Primary | ICD-10-CM

## 2023-08-03 DIAGNOSIS — M54.41 ACUTE RIGHT-SIDED LOW BACK PAIN WITH RIGHT-SIDED SCIATICA: ICD-10-CM

## 2023-08-03 PROCEDURE — 3008F PR BODY MASS INDEX (BMI) DOCUMENTED: ICD-10-PCS | Mod: CPTII,S$GLB,, | Performed by: NURSE PRACTITIONER

## 2023-08-03 PROCEDURE — 99999 PR PBB SHADOW E&M-EST. PATIENT-LVL V: CPT | Mod: PBBFAC,,, | Performed by: NURSE PRACTITIONER

## 2023-08-03 PROCEDURE — 99214 PR OFFICE/OUTPT VISIT, EST, LEVL IV, 30-39 MIN: ICD-10-PCS | Mod: 25,S$GLB,, | Performed by: NURSE PRACTITIONER

## 2023-08-03 PROCEDURE — 1160F PR REVIEW ALL MEDS BY PRESCRIBER/CLIN PHARMACIST DOCUMENTED: ICD-10-PCS | Mod: CPTII,S$GLB,, | Performed by: NURSE PRACTITIONER

## 2023-08-03 PROCEDURE — 1160F RVW MEDS BY RX/DR IN RCRD: CPT | Mod: CPTII,S$GLB,, | Performed by: NURSE PRACTITIONER

## 2023-08-03 PROCEDURE — 3008F BODY MASS INDEX DOCD: CPT | Mod: CPTII,S$GLB,, | Performed by: NURSE PRACTITIONER

## 2023-08-03 PROCEDURE — 3074F SYST BP LT 130 MM HG: CPT | Mod: CPTII,S$GLB,, | Performed by: NURSE PRACTITIONER

## 2023-08-03 PROCEDURE — 99999 PR PBB SHADOW E&M-EST. PATIENT-LVL V: ICD-10-PCS | Mod: PBBFAC,,, | Performed by: NURSE PRACTITIONER

## 2023-08-03 PROCEDURE — 3074F PR MOST RECENT SYSTOLIC BLOOD PRESSURE < 130 MM HG: ICD-10-PCS | Mod: CPTII,S$GLB,, | Performed by: NURSE PRACTITIONER

## 2023-08-03 PROCEDURE — 3078F DIAST BP <80 MM HG: CPT | Mod: CPTII,S$GLB,, | Performed by: NURSE PRACTITIONER

## 2023-08-03 PROCEDURE — 72114 X-RAY EXAM L-S SPINE BENDING: CPT | Mod: 26,,, | Performed by: RADIOLOGY

## 2023-08-03 PROCEDURE — 3078F PR MOST RECENT DIASTOLIC BLOOD PRESSURE < 80 MM HG: ICD-10-PCS | Mod: CPTII,S$GLB,, | Performed by: NURSE PRACTITIONER

## 2023-08-03 PROCEDURE — 72114 X-RAY EXAM L-S SPINE BENDING: CPT | Mod: TC,FY

## 2023-08-03 PROCEDURE — 87086 URINE CULTURE/COLONY COUNT: CPT | Performed by: NURSE PRACTITIONER

## 2023-08-03 PROCEDURE — 99214 OFFICE O/P EST MOD 30 MIN: CPT | Mod: 25,S$GLB,, | Performed by: NURSE PRACTITIONER

## 2023-08-03 PROCEDURE — 1159F PR MEDICATION LIST DOCUMENTED IN MEDICAL RECORD: ICD-10-PCS | Mod: CPTII,S$GLB,, | Performed by: NURSE PRACTITIONER

## 2023-08-03 PROCEDURE — 99213 PR OFFICE/OUTPT VISIT, EST, LEVL III, 20-29 MIN: ICD-10-PCS | Mod: S$GLB,,, | Performed by: NURSE PRACTITIONER

## 2023-08-03 PROCEDURE — 1159F MED LIST DOCD IN RCRD: CPT | Mod: CPTII,S$GLB,, | Performed by: NURSE PRACTITIONER

## 2023-08-03 PROCEDURE — 72114 XR LUMBAR SPINE 5 VIEW WITH FLEX AND EXT: ICD-10-PCS | Mod: 26,,, | Performed by: RADIOLOGY

## 2023-08-03 PROCEDURE — 99213 OFFICE O/P EST LOW 20 MIN: CPT | Mod: S$GLB,,, | Performed by: NURSE PRACTITIONER

## 2023-08-03 RX ORDER — METHOCARBAMOL 750 MG/1
750 TABLET, FILM COATED ORAL 3 TIMES DAILY PRN
Qty: 90 TABLET | Refills: 1 | Status: SHIPPED | OUTPATIENT
Start: 2023-08-03 | End: 2023-09-28 | Stop reason: SDUPTHER

## 2023-08-03 RX ORDER — METHYLPREDNISOLONE 4 MG/1
TABLET ORAL
Qty: 21 EACH | Refills: 0 | Status: SHIPPED | OUTPATIENT
Start: 2023-08-03 | End: 2023-08-24

## 2023-08-03 RX ORDER — NABUMETONE 500 MG/1
500 TABLET, FILM COATED ORAL 2 TIMES DAILY PRN
Qty: 60 TABLET | Refills: 1 | Status: SHIPPED | OUTPATIENT
Start: 2023-08-03 | End: 2023-09-28 | Stop reason: SDUPTHER

## 2023-08-03 NOTE — PROGRESS NOTES
"Subjective:      Jennifer Nguyen is a 33 y.o. female who returns today regarding her flank pain.    The patient presented to urgent care on 7/23 with urinary frequency and urgency. Treated empirically for UTI with bactrim x 7 days. Culture with no growth.     Returned to urgent care on 7/31 with "shooting pain in her lower back to her gluteal region." Diagnosed with a "kidney stone"- no imaging.     Component      Latest Ref Rng & Units 7/23/2023   Urine Culture, Routine No growth     CTRSS (11/22)- Bilateral kidneys are normal in size and position.  No hydronephrosis or nephrolithiasis.  Urinary bladder is decompressed.  There is an IUD in place within the uterus.    Today she reports severe right lower back pain with radiation down her leg not relieved by medication. Denies dysuria and gross hematuria. More urinary frequency however she has been drinking lots of water.     The following portions of the patient's history were reviewed and updated as appropriate: allergies, current medications, past family history, past medical history, past social history, past surgical history and problem list.    Review of Systems  Constitutional: no fever or chills  ENT: no nasal congestion or sore throat  Respiratory: no cough or shortness of breath  Cardiovascular: no chest pain or palpitations  Gastrointestinal: no nausea or vomiting, tolerating diet  Genitourinary: as per HPI  Hematologic/Lymphatic: no easy bruising or lymphadenopathy  Musculoskeletal: no arthralgias or myalgias  Neurological: no seizures or tremors  Behavioral/Psych: no auditory or visual hallucinations     Objective:   Vital Signs:  Vitals:    08/03/23 1011   BP: 120/75   Pulse: 88     Physical Exam   General: alert and oriented, no acute distress  Head: normocephalic, atraumatic  Neck: supple, normal ROM  Respiratory: Symmetric expansion, non-labored breathing  Cardiovascular: regular rate and rhythm  Abdomen: soft, non tender, non distended  Pelvic: " deferred  Skin: normal coloration and turgor, no rashes, no suspicious skin lesions noted  Neuro: alert and oriented x3, no gross deficits  Psych: normal judgment and insight, normal mood/affect, and non-anxious    Lab Review   Urinalysis demonstrates negative for all components  Lab Results   Component Value Date    WBC 4.75 06/28/2023    HGB 13.1 06/28/2023    HCT 39.0 06/28/2023    MCV 88 06/28/2023     06/28/2023     Lab Results   Component Value Date    CREATININE 0.7 06/28/2023    BUN 6 06/28/2023       Imaging   See above    Assessment:     1. Acute right-sided low back pain with right-sided sciatica      Plan:   Jennifer was seen today for nephrolithiasis.    Diagnoses and all orders for this visit:    Acute right-sided low back pain with right-sided sciatica  -     US Retroperitoneal Complete; Future  -     Urine culture  -     Ambulatory referral/consult to Back & Spine Clinic; Future    Plan:  --Low suspicion of stone given negative CTRSS last year  --HAILE to r/o stones or hydro  --Repeat urine culture  --Referral to back and spine for further eval

## 2023-08-03 NOTE — PROGRESS NOTES
Subjective:     Patient ID: Jennifer Nguyen is a 33 y.o. female.    Chief Complaint: No chief complaint on file.    HPI Ms. Nguyen is a 33 year old female here for evaluation of right back pain.     C/o low back pain and right leg pain that started 2 weeks ago  Thought it was a UTI, treated with abx without resolution of pain  Pain radiates down the leg to the knee, R>L  No Hx similar pain in the past  No PT or injections in the past    Past Medical History:   Diagnosis Date    Allergy     Celiac disease        Past Surgical History:   Procedure Laterality Date    BREAST SURGERY      REDUCTION    INTRAUTERINE DEVICE INSERTION  2015    KJB---MIRENA    REPAIR OF COLLATERAL LIGAMENT OF THUMB Right 2020    Procedure: REPAIR, LIGAMENT, COLLATERAL, THUMB right;  Surgeon: Lisette Zaman MD;  Location: [facility];  Service: Orthopedics;  Laterality: Right;  regional MAC    WISDOM TOOTH EXTRACTION         Family History   Problem Relation Age of Onset    Breast cancer Paternal Grandmother     Colon cancer Neg Hx     Ovarian cancer Neg Hx     Esophageal cancer Neg Hx        Social History     Socioeconomic History    Marital status: Single   Tobacco Use    Smoking status: Former     Current packs/day: 0.00     Types: Cigarettes     Start date:      Quit date:      Years since quittin.5     Passive exposure: Past    Smokeless tobacco: Never    Tobacco comments:     1-2 cig daily   Substance and Sexual Activity    Alcohol use: Not Currently     Comment: ~10 drinks/week. Seltzers, occasional wine    Drug use: No    Sexual activity: Yes     Partners: Male     Birth control/protection: I.U.D.     Comment: no condoms       Current Outpatient Medications   Medication Sig Dispense Refill    albuterol (VENTOLIN HFA) 90 mcg/actuation inhaler Inhale 1-2 puffs into the lungs 2 (two) times daily as needed for Wheezing or Shortness of Breath. Rescue 18 g 0    azelastine (ASTELIN) 137 mcg (0.1 %) nasal spray 1  spray (137 mcg total) by Nasal route 2 (two) times daily. (Patient not taking: Reported on 7/31/2023) 30 mL 0    cyclobenzaprine (FLEXERIL) 10 MG tablet Take 1 tablet (10 mg total) by mouth 3 (three) times daily as needed for Muscle spasms. 28 tablet 0    fluticasone propionate (CUTIVATE) 0.05 % cream Apply to affected areas of body daily prn eczema. 60 g 3    fluticasone propionate (FLONASE) 50 mcg/actuation nasal spray 1 spray by Each Nostril route once daily.      fluticasone-salmeterol diskus inhaler 250-50 mcg Inhale 1 puff into the lungs once daily. Controller 30 each 3    [START ON 8/21/2023] lisdexamfetamine (VYVANSE) 30 MG capsule Take 1 capsule (30 mg total) by mouth every morning. 30 capsule 0    lisdexamfetamine (VYVANSE) 30 MG capsule Take 1 capsule (30 mg total) by mouth every morning. 30 capsule 0    lisdexamfetamine (VYVANSE) 30 MG capsule Take 1 capsule (30 mg total) by mouth every morning. 30 capsule 0    loratadine (CLARITIN) 10 mg tablet Take 10 mg by mouth once daily.      meloxicam (MOBIC) 15 MG tablet Take 1 tablet (15 mg total) by mouth once daily. for 7 days 7 tablet 0    olopatadine (PATADAY) 0.2 % Drop Place 1 drop into both eyes daily as needed (itching eyes). 5 mL 0    tamsulosin (FLOMAX) 0.4 mg Cap Take 1 capsule (0.4 mg total) by mouth once daily. for 14 days 14 capsule 0     Current Facility-Administered Medications   Medication Dose Route Frequency Provider Last Rate Last Admin    levonorgestreL (MIRENA) 20 mcg/24 hours (6 yrs) 52 mg IUD 1 Intra Uterine Device  1 Intra Uterine Device Intrauterine  Jemma Cochran MD   1 Intra Uterine Device at 10/01/21 0900       Review of patient's allergies indicates:   Allergen Reactions    Insect venom Anxiety, Rash and Shortness Of Breath    Gluten Nausea Only         Review of Systems   Constitutional: Negative for fever.   Cardiovascular:  Negative for chest pain.   Respiratory:  Negative for shortness of breath.    Musculoskeletal:   Positive for back pain.   Gastrointestinal:  Negative for abdominal pain, bowel incontinence, nausea and vomiting.   Genitourinary:  Negative for bladder incontinence.   Neurological:  Negative for numbness and paresthesias.        Objective:     General: Jennifer is well-developed, well-nourished, appears stated age, in no acute distress, alert and oriented to time, place and person.     General    Vitals reviewed.  Constitutional: She is oriented to person, place, and time. She appears well-developed and well-nourished.   HENT:   Head: Atraumatic.   Nose: Nose normal.   Eyes: Conjunctivae are normal.   Cardiovascular:  Normal rate.            Pulmonary/Chest: Effort normal.   Neurological: She is alert and oriented to person, place, and time.   Psychiatric: She has a normal mood and affect. Her behavior is normal. Judgment and thought content normal.     General Musculoskeletal Exam   Gait: normal     Back (L-Spine & T-Spine) / Neck (C-Spine) Exam     Tenderness Right paramedian tenderness of the Lower L-Spine.     Back (L-Spine & T-Spine) Range of Motion   Extension:  10   Flexion:  90   Lateral bend right:  10   Lateral bend left:  10     Spinal Sensation   Right Side Sensation  L-Spine Level: normal  S-Spine Level: normal  Left Side Sensation  L-Spine Level: normal  S-Spine Level: normal    Other   She has no scoliosis .  Spinal Kyphosis:  Absent      Muscle Strength   Right Upper Extremity   Biceps: 5/5   Deltoid:  5/5  Triceps:  5/5  Left Upper Extremity  Biceps: 5/5   Deltoid:  5/5  Triceps:  5/5  Right Lower Extremity   Hip Flexion: 5/5   Quadriceps:  5/5   Ankle Dorsiflexion:  5/5   Anterior tibial:  5/5   EHL:  5/5  Left Lower Extremity   Hip Flexion: 5/5   Quadriceps:  5/5   Ankle Dorsiflexion:  5/5   Anterior tibial:  5/5   EHL:  5/5    Reflexes     Left Side  Biceps:  2+  Brachioradialis:  2+  Achilles:  2+    Right Side   Biceps:  2+  Brachioradialis:  2+  Achilles:  2+    Vascular Exam     Right  Pulses        Carotid:                  2+    Left Pulses        Carotid:                  2+          Assessment:     1. Myofascial pain    2. Acute right-sided low back pain with right-sided sciatica    3. Dorsalgia, unspecified    4. Spondylosis without myelopathy or radiculopathy, lumbosacral region         Plan:        Prior records reviewed today  We discussed back pain and the nature of back pain.  We discussed that it is not one thing that causes the pain but an accumulation of multiple things that we do.    Order lumbar xray  Referral to physical therapy for evaluation and treatment of low back pain  IM methylprednisolone 40mg injection today to right ventrogluteal  Start medrol dose pack tomorrow  Start relafen 500mg as needed for pain, start after steroid complete  Start robaxin 750mg TID as needed for muscle pain  We discussed posture sitting and the importance of trying to sit better.    We discussed the benefits of therapy and exercise and continuing to move.   RTC for followup in 8 weeks    More than 50% of the total time  of 45 minutes was spent face to face in counseling on diagnosis and treatment options. I also counseled patient  on common and most usual side effect of prescribed medications.  I reviewed Primary care , and other specialty's notes to better coordinate patient's care. All questions were answered, and patient voiced understanding.      Follow-up: No follow-ups on file. If there are any questions prior to this, the patient was instructed to contact the office.

## 2023-08-04 DIAGNOSIS — F90.0 ATTENTION DEFICIT HYPERACTIVITY DISORDER (ADHD), PREDOMINANTLY INATTENTIVE TYPE: ICD-10-CM

## 2023-08-04 LAB — BACTERIA UR CULT: NORMAL

## 2023-08-07 RX ORDER — LISDEXAMFETAMINE DIMESYLATE 30 MG/1
30 CAPSULE ORAL EVERY MORNING
Qty: 30 CAPSULE | Refills: 0 | Status: SHIPPED | OUTPATIENT
Start: 2023-08-21 | End: 2023-09-11 | Stop reason: SDUPTHER

## 2023-08-09 ENCOUNTER — CLINICAL SUPPORT (OUTPATIENT)
Dept: ALLERGY | Facility: CLINIC | Age: 33
End: 2023-08-09
Payer: COMMERCIAL

## 2023-08-09 ENCOUNTER — CLINICAL SUPPORT (OUTPATIENT)
Dept: REHABILITATION | Facility: HOSPITAL | Age: 33
End: 2023-08-09
Payer: COMMERCIAL

## 2023-08-09 ENCOUNTER — OFFICE VISIT (OUTPATIENT)
Dept: ALLERGY | Facility: CLINIC | Age: 33
End: 2023-08-09
Payer: COMMERCIAL

## 2023-08-09 VITALS — BODY MASS INDEX: 23.07 KG/M2 | HEIGHT: 64 IN | WEIGHT: 135.13 LBS

## 2023-08-09 DIAGNOSIS — Z91.038 HYMENOPTERA ALLERGY: Primary | ICD-10-CM

## 2023-08-09 DIAGNOSIS — M54.9 DORSALGIA, UNSPECIFIED: ICD-10-CM

## 2023-08-09 DIAGNOSIS — M54.10 BACK PAIN WITH RIGHT-SIDED RADICULOPATHY: ICD-10-CM

## 2023-08-09 DIAGNOSIS — R29.898 DECREASED STRENGTH OF TRUNK AND BACK: ICD-10-CM

## 2023-08-09 DIAGNOSIS — R74.8 ELEVATED SERUM TRYPTASE: ICD-10-CM

## 2023-08-09 DIAGNOSIS — M54.50 ACUTE BILATERAL LOW BACK PAIN, UNSPECIFIED WHETHER SCIATICA PRESENT: ICD-10-CM

## 2023-08-09 DIAGNOSIS — Z91.038 ALLERGY TO INSECT BITES AND STINGS: Primary | ICD-10-CM

## 2023-08-09 DIAGNOSIS — J45.909 ASTHMA, UNSPECIFIED ASTHMA SEVERITY, UNSPECIFIED WHETHER COMPLICATED, UNSPECIFIED WHETHER PERSISTENT: ICD-10-CM

## 2023-08-09 DIAGNOSIS — M47.817 SPONDYLOSIS WITHOUT MYELOPATHY OR RADICULOPATHY, LUMBOSACRAL REGION: ICD-10-CM

## 2023-08-09 DIAGNOSIS — M79.18 MYOFASCIAL PAIN: ICD-10-CM

## 2023-08-09 DIAGNOSIS — R29.898 IMPAIRED STRENGTH OF HIP MUSCLES: ICD-10-CM

## 2023-08-09 DIAGNOSIS — J30.9 CHRONIC ALLERGIC RHINITIS: ICD-10-CM

## 2023-08-09 PROCEDURE — 97110 THERAPEUTIC EXERCISES: CPT | Mod: PO

## 2023-08-09 PROCEDURE — 99999 PR PBB SHADOW E&M-EST. PATIENT-LVL I: ICD-10-PCS | Mod: PBBFAC,,,

## 2023-08-09 PROCEDURE — 95115 IMMUNOTHERAPY ONE INJECTION: CPT | Mod: S$GLB,,, | Performed by: ALLERGY & IMMUNOLOGY

## 2023-08-09 PROCEDURE — 99214 OFFICE O/P EST MOD 30 MIN: CPT | Mod: 25,S$GLB,, | Performed by: ALLERGY & IMMUNOLOGY

## 2023-08-09 PROCEDURE — 99214 PR OFFICE/OUTPT VISIT, EST, LEVL IV, 30-39 MIN: ICD-10-PCS | Mod: 25,S$GLB,, | Performed by: ALLERGY & IMMUNOLOGY

## 2023-08-09 PROCEDURE — 95115 PR IMMUNOTHERAPY, ONE INJECTION: ICD-10-PCS | Mod: S$GLB,,, | Performed by: ALLERGY & IMMUNOLOGY

## 2023-08-09 PROCEDURE — 99999 PR PBB SHADOW E&M-EST. PATIENT-LVL II: CPT | Mod: PBBFAC,,, | Performed by: ALLERGY & IMMUNOLOGY

## 2023-08-09 PROCEDURE — 3008F PR BODY MASS INDEX (BMI) DOCUMENTED: ICD-10-PCS | Mod: CPTII,S$GLB,, | Performed by: ALLERGY & IMMUNOLOGY

## 2023-08-09 PROCEDURE — 97161 PT EVAL LOW COMPLEX 20 MIN: CPT | Mod: PO

## 2023-08-09 PROCEDURE — 3008F BODY MASS INDEX DOCD: CPT | Mod: CPTII,S$GLB,, | Performed by: ALLERGY & IMMUNOLOGY

## 2023-08-09 PROCEDURE — 99999 PR PBB SHADOW E&M-EST. PATIENT-LVL II: ICD-10-PCS | Mod: PBBFAC,,, | Performed by: ALLERGY & IMMUNOLOGY

## 2023-08-09 PROCEDURE — 99999 PR PBB SHADOW E&M-EST. PATIENT-LVL I: CPT | Mod: PBBFAC,,,

## 2023-08-09 NOTE — PLAN OF CARE
[facility]   Physical Therapy Initial Evaluation      Name: Jennifer Nguyen  Clinic Number: [id number]    Therapy Diagnosis:   Encounter Diagnoses   Name Primary?    Dorsalgia, unspecified     Spondylosis without myelopathy or radiculopathy, lumbosacral region     Myofascial pain     Acute bilateral low back pain, unspecified whether sciatica present     Back pain with right-sided radiculopathy     Decreased strength of trunk and back     Impaired strength of hip muscles         Physician: Martha Dasilva, NP    Physician Orders: PT Eval and Treat   Medical Diagnosis from Referral:   M54.9 (ICD-10-CM) - Dorsalgia, unspecified   M47.817 (ICD-10-CM) - Spondylosis without myelopathy or radiculopathy, lumbosacral region   M79.18 (ICD-10-CM) - Myofascial pain     Evaluation Date: 8/9/2023  Authorization Period Expiration: 12/31/23  Plan of Care Expiration: 10/4/23  Progress Note Due: 9/6/23  Visit # / Visits authorized: 1/ 1   FOTO: 1/ 3    Precautions: Standard     Time In: 746 AM  Time Out: 846 AM  Total Billable Time: 60 minutes    Subjective     Date of onset: 3 weeks     History of current condition - Jennifer reports: three weekends ago her back started to get achey. She thought it was a UTI and got put on a round of antibiotics. There was no culture but she finished her antibiotics. The pain got worse, and she thought it was a kidney stone. It was not a kidney stone. Then she went to back & spine doctor who did an x-ray and it found a pinched nerve. She is now finishing her steroid pack and is on an anti-inflammatory. The pain is the lower back more so on the right side, pain will go down the leg but stops at the knee. She is having difficulty with bending and twisting. She sometimes experiences numbness going down the right leg to the outer two toes, but this has been ongoing for about a year.     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   Prior Level of Function: independent   Current Level of Function: pain limited her ability to work, limited in ability for prolonged walking, stair navigation, pain with ADLs and recreation    Pain:  Current 2/10, worst 10/10, best 2/10   Location: bilateral low back pain (mostly on the right)   Description: dull ache and sometimes sharp or shooting   Aggravating Factors: Bending and twisting, getting in and out of bed/car, stairs, prolonged sitting, walking   Easing Factors: heat, ice, medication    Patients goals: to not be in pain and to go back to normal life      Medical History:   Past Medical History:   Diagnosis Date    Allergy     Celiac disease        Surgical History:   Jennifer Nguyen  has a past surgical history that includes Breast surgery; [place] tooth extraction; Intrauterine device insertion (04/01/2015); and Repair of collateral ligament of thumb (Right, 01/06/2020).    Medications:   Jennifer has a current medication list which includes the following prescription(s): albuterol, azelastine, fluticasone propionate, fluticasone propionate, fluticasone-salmeterol 250-50 mcg/dose, lisdexamfetamine, lisdexamfetamine, [START ON 8/21/2023] lisdexamfetamine, loratadine, methocarbamol, methylprednisolone, nabumetone, olopatadine, and tamsulosin, and the following Facility-Administered Medications: levonorgestrel.    Allergies:   Review of patient's allergies indicates:   Allergen Reactions    Insect venom Anxiety, Rash and Shortness Of Breath    Gluten Nausea Only        Objective      Observation: enters clinic independently without assistive device     Posture: WNL      Lumbar ROM: %AROM   % Pain   Flexion   50% Yes   Extension >50%   yes   Left Side Bending 50% yes   Right Side Bending 50% yes   Left rotation >/=50% Yes    Right Rotation >/=50% yes       Sensation: impaired  "to light touch on right side in L5-S1 dermatomal pattern        Lower Extremity Strength (graded 0-5 out of 5)   RLE LLE   Hip flexion: 4/5* 4+/5   Knee extension: 4+/5 5/5   Ankle dorsiflexion: 5/5 5/5   Posterior fibers of Gluteus medius 4+/5 5/5   Knee flexion 5/5 5/5   Hip extension: 4-/5 4-/5   *=pain     Special Tests: ((+): pos.; (-): neg.)   Slump Test: - bilaterally   SLR Test: +R  Bridge Test: +pain and core weakness       Palpation for condition: TTP @ lumbosacral region globally, right sided QL and lumbar paraspinals, piriformis L>R        Intake Outcome Measure for FOTO Lumbar Spine Survey    Therapist reviewed FOTO scores for Jennifer Nguyen on 8/9/2023.   FOTO documents entered into [facility] - see Media section.    Intake Score: 38       Treatment     Total Treatment time (time-based codes) separate from Evaluation: 15 minutes     Jennifer received the treatments listed below:      therapeutic exercises to develop strength, flexibility, posture, and core stabilization for 15 minutes including:  LTR x10 x3" holds   PPT 10x5"  Sciatic nerve glides x20 supine RLE   Education on PT POC and condition      Patient Education and Home Exercises     Education provided:   - PT role and POC  - HEP    Written Home Exercises Provided: yes. Exercises were reviewed and Jennifer was able to demonstrate them prior to the end of the session.  Jennifer demonstrated good  understanding of the education provided. See EMR under Patient Instructions for exercises provided during therapy sessions.    Assessment     Jennifer is a 33 y.o. female referred to outpatient Physical Therapy with a medical diagnosis of   M54.9 (ICD-10-CM) - Dorsalgia, unspecified   M47.817 (ICD-10-CM) - Spondylosis without myelopathy or radiculopathy, lumbosacral region   M79.18 (ICD-10-CM) - Myofascial pain   . Patient presents with acute right sided myofascial low back pain with right sided radiculopathy. Patient with right sided lumbar muscular pain that is " acute in nature beginning about 3 weeks ago insidiously. Patient denotes right leg numbness that has been ongoing about a year intermittently. Sensation is dulled in L5-S1 dermatomal pattern on right side. Patient with pain as well as hip and core weakness affecting ADLs and functional/recreational capacity.  Patient prognosis is Good.   Patient will benefit from skilled outpatient Physical Therapy to address the deficits stated above and in the chart below, provide patient /family education, and to maximize patientt's level of independence.     Plan of care discussed with patient: Yes  Patient's spiritual, cultural and educational needs considered and patient is agreeable to the plan of care and goals as stated below:     Anticipated Barriers for therapy: none     Medical Necessity is demonstrated by the following  History  Co-morbidities and personal factors that may impact the plan of care [x] LOW: no personal factors / co-morbidities  [] MODERATE: 1-2 personal factors / co-morbidities  [] HIGH: 3+ personal factors / co-morbidities    Moderate / High Support Documentation:   Co-morbidities affecting plan of care:     Personal Factors:   no deficits     Examination  Body Structures and Functions, activity limitations and participation restrictions that may impact the plan of care [] LOW: addressing 1-2 elements  [x] MODERATE: 3+ elements  [] HIGH: 4+ elements (please support below)    Moderate / High Support Documentation: ADLs and recreation     Clinical Presentation [x] LOW: stable  [] MODERATE: Evolving  [] HIGH: Unstable     Decision Making/ Complexity Score: low       Goals:  Short Term Goals (4 Weeks):  1. Pt will be compliant with HEP to supplement PT in decreasing pain with functional mobility.  2. Pt will perform pallof press with good control to demonstrate improved core strength.  3. Pt will improve lumbar ROM to </=minimal loss in all planes to improve functional mobility.  4. Pt will improve impaired  LE MMTs by 1/2 muscle grade to improve strength for functional tasks.  5. Pt will report 50% improvement in return to ADLs and recreation to improve return to prior level of function.    Long Term Goals (8 Weeks):   1. Pt will endorse full return to yoga and all recreational activities without limitation by back pain to return to prior level of function.  2. Pt will perform bridge with good control to demonstrate improved core strength.  3. Pt will improve impaired LE MMTs by 1 muscle grade to improve strength for functional tasks.  4. Pt will report no pain during lumbar ROM to promote functional mobility.    Plan     Plan of care Certification: 8/9/2023 to 10/4/23.    Outpatient Physical Therapy 2 times weekly for 8 weeks to include the following interventions: Manual Therapy, Moist Heat/ Ice, Neuromuscular Re-ed, Patient Education, Self Care, Therapeutic Activities, Therapeutic Exercise, Ultrasound, and modalities as appropriate , dry needling if appropriate    Lizzy Saini PT, DPT         Physician's Signature: _________________________________________ Date: ________________

## 2023-08-09 NOTE — PROGRESS NOTES
ALLERGY & IMMUNOLOGY CLINIC     HISTORY OF PRESENT ILLNESS     Referral from: No ref. provider found    HPI: Jennifer Nguyen is a 33 y.o. 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 Allergy shots are well tolerated with only a small amount of local swelling. No reactions to the immunotherapy.     Since her initial presentation she was diagnosed with asthma. 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 On return to [place] she had spirometry ordered by her PCP, found to have mild obstruction with reversibility on lung function testing this past spring. A combination inhaler was prescribed but not covered by her insurance. Albuterol has been prescribed and has been very helpful. This is most helpful when used prior to exercise. She is not using it very often prn, and really has no notable limitations in activities over the past few months.       MEDICAL HISTORY   MedHx:   Patient Active Problem List   Diagnosis    Anxiety    Celiac disease    Generalized anxiety disorder    Panic disorder    Depression    Gamekeeper's thumb    Decreased range of motion of right thumb    Thumb pain, right    Thumb swelling    IUD (intrauterine device) in place- strings lost on 9/15    Chronic instability of metacarpophalangeal joint of right thumb    Decreased  strength of right hand    Chronic thumb pain, right    ADHD (attention deficit hyperactivity disorder)    Insomnia    Easy bruisability    Chronic fatigue    Hymenoptera allergy    Allergic rhinitis due to dust mite    Allergic rhinitis due to pollen    Allergic conjunctivitis, bilateral    Wheezing    Mild intermittent asthma without complication    Acute bilateral low back pain    Back pain with right-sided radiculopathy    Decreased strength of  trunk and back    Impaired strength of hip muscles       Medications:   Current Outpatient Medications on File Prior to Visit   Medication Sig Dispense Refill    albuterol (VENTOLIN HFA) 90 mcg/actuation inhaler Inhale 1-2 puffs into the lungs 2 (two) times daily as needed for Wheezing or Shortness of Breath. Rescue 18 g 0    azelastine (ASTELIN) 137 mcg (0.1 %) nasal spray 1 spray (137 mcg total) by Nasal route 2 (two) times daily. 30 mL 0    fluticasone propionate (CUTIVATE) 0.05 % cream Apply to affected areas of body daily prn eczema. 60 g 3    fluticasone propionate (FLONASE) 50 mcg/actuation nasal spray 1 spray by Each Nostril route once daily.      fluticasone-salmeterol diskus inhaler 250-50 mcg Inhale 1 puff into the lungs once daily. Controller (Patient not taking: Reported on 8/3/2023) 30 each 3    lisdexamfetamine (VYVANSE) 30 MG capsule Take 1 capsule (30 mg total) by mouth every morning. 30 capsule 0    lisdexamfetamine (VYVANSE) 30 MG capsule Take 1 capsule (30 mg total) by mouth every morning. 30 capsule 0    [START ON 8/21/2023] lisdexamfetamine (VYVANSE) 30 MG capsule Take 1 capsule (30 mg total) by mouth every morning. 30 capsule 0    loratadine (CLARITIN) 10 mg tablet Take 10 mg by mouth once daily.      methocarbamoL (ROBAXIN) 750 MG Tab Take 1 tablet (750 mg total) by mouth 3 (three) times daily as needed (muscle pain). 90 tablet 1    methylPREDNISolone (MEDROL DOSEPACK) 4 mg tablet use as directed 21 each 0    nabumetone (RELAFEN) 500 MG tablet Take 1 tablet (500 mg total) by mouth 2 (two) times daily as needed for Pain. 60 tablet 1    olopatadine (PATADAY) 0.2 % Drop Place 1 drop into both eyes daily as needed (itching eyes). 5 mL 0    tamsulosin (FLOMAX) 0.4 mg Cap Take 1 capsule (0.4 mg total) by mouth once daily. for 14 days 14 capsule 0     Current Facility-Administered Medications on File Prior to Visit   Medication Dose Route Frequency Provider Last Rate Last Admin    levonorgestreL  "(MIRENA) 20 mcg/24 hours (6 yrs) 52 mg IUD 1 Intra Uterine Device  1 Intra Uterine Device Intrauterine  Jemma Cochran MD   1 Intra Uterine Device at 10/01/21 0900       SurgHx:  Past Surgical History:   Procedure Laterality Date    BREAST SURGERY      REDUCTION    INTRAUTERINE DEVICE INSERTION  04/01/2015    KJB---MIRENA    REPAIR OF COLLATERAL LIGAMENT OF THUMB Right 01/06/2020    Procedure: REPAIR, LIGAMENT, COLLATERAL, THUMB right;  Surgeon: Lisette Zaman MD;  Location: [facility];  Service: Orthopedics;  Laterality: Right;  regional MAC    WISDOM TOOTH EXTRACTION        PHYSICAL EXAM   VS: Ht 5' 4" (1.626 m)   Wt 61.3 kg (135 lb 2.3 oz)   LMP  (LMP Unknown)   BMI 23.20 kg/m²   GENERAL: Alert, NAD, well-appearing, cooperative  EYES: no conjunctival injection, no infraorbital shiners  LUNGS: no increased WOB  EXTREMITIES: No edema, no cyanosis, no clubbing  DERM: no rashes, no skin breaks      TESTING     Tryptase 11.6 on 9/28/2021  ImmunoCAP panel all positive except for cedar with a notable >100 for both house dust mite species.      ASSESSMENT & PLAN     Jennifer Nguyen is a 33 y.o. female with     Hymenoptera allergy    Asthma, unspecified asthma severity, unspecified whether complicated, unspecified whether persistent    Chronic allergic rhinitis    Elevated serum tryptase      We discussed the new research on fire ant immunotherapy where spacing injections to every 3 months (versus every 6 weeks) was safe and effective. The limitation of this data is a lack of long term follow-up meaning that we don't know the cure rate of this spacing. After reviewing the pros and cons, the patient would like to go ahead and space the injections to every three months now. She will report back on the result of any stings.Mildly elevated baseline tryptase increase the reactivity potential although the effect size is not really known.     She would also like to transfer care of her asthma to me. She was " diagnosed with mild intermittent asthma but could not get insurance to cover her controller medication. By symptoms she is well controled at this point, although this is a time that her asthma should generally be well controlled. Will repeat spirometry in October when risk of reaction is higher and follow up at that time to discuss whether a controller medication is needed. Will also review medications for rhinitis controlled at that time if worsened with the onset of weed pollen season. Given the house dust mite sensitization, consider Odactra if this seems to drive symptoms in the fall.

## 2023-08-11 ENCOUNTER — PATIENT MESSAGE (OUTPATIENT)
Dept: RESEARCH | Facility: HOSPITAL | Age: 33
End: 2023-08-11
Payer: COMMERCIAL

## 2023-08-11 PROCEDURE — 96372 PR INJECTION,THERAP/PROPH/DIAG2ST, IM OR SUBCUT: ICD-10-PCS | Mod: S$GLB,,, | Performed by: NURSE PRACTITIONER

## 2023-08-11 PROCEDURE — 96372 THER/PROPH/DIAG INJ SC/IM: CPT | Mod: S$GLB,,, | Performed by: NURSE PRACTITIONER

## 2023-08-11 RX ADMIN — METHYLPREDNISOLONE ACETATE 40 MG: 40 INJECTION, SUSPENSION INTRA-ARTICULAR; INTRALESIONAL; INTRAMUSCULAR; SOFT TISSUE at 02:08

## 2023-08-11 NOTE — PROGRESS NOTES
"[facility]   Physical Therapy Treatment Note      Name: Jennifer Nguyen  Clinic Number: [id number]    Therapy Diagnosis:   Encounter Diagnoses   Name Primary?    Back pain with right-sided radiculopathy Yes    Decreased strength of trunk and back     Impaired strength of hip muscles      Physician: Martha Dasilva NP    Visit Date: 8/14/2023    Physician Orders: PT Eval and Treat   Medical Diagnosis from Referral:   M54.9 (ICD-10-CM) - Dorsalgia, unspecified   M47.817 (ICD-10-CM) - Spondylosis without myelopathy or radiculopathy, lumbosacral region   M79.18 (ICD-10-CM) - Myofascial pain      Evaluation Date: 8/9/2023  Authorization Period Expiration: 12/31/23  Plan of Care Expiration: 10/4/23  Progress Note Due: 9/6/23  Visit # / Visits authorized: 1/ 20  FOTO: 1/ 3     Precautions: Standard      Time In: 749 AM  Time Out: 829 AM  Total Billable Time: 40 minutes (TE-1, MT-1, NMR-1)      Subjective     Pt reports: the nerve glides were painful.  She was compliant with home exercise program.  Response to previous treatment: eval  Functional change: none noted     Pain: 4/10  Location: bilateral low back and upper glutes  Objective      Objective Measures updated at progress report unless specified.     Treatment     Jennifer received the treatments listed below:      Bold=performed     therapeutic exercises to develop strength, flexibility, posture, and core stabilization for 15 minutes including:    PPT 10x5"  +hip abd/add isometrics 3 mins, 3 " holds each direction  +piriformis stretch 3x30"  Education on PT POC and condition       manual therapy techniques: Joint mobilizations and Soft tissue Mobilization were applied to the: low back for 15 minutes, including:  Central PA glides Gr II to lumbosacral spine  STM to bilateral lumbar paraspinals   MET to correct left anterior rotation of innominate     neuromuscular re-education activities to improve: Posture and core control for 10 " minutes. The following activities were included:  +90/90 heel taps 2x10   +bent knee fall out with GTB 2x10       10 minutes moist heat pack to low back upon completion of session.     Patient Education and Home Exercises       Education provided:   - Pt educated on all interventions performed today.  - HEP    Written Home Exercises Provided: yes. Exercises were reviewed and Jennifer was able to demonstrate them prior to the end of the session.  Jennifer demonstrated good  understanding of the education provided. See EMR under Patient Instructions for exercises provided during therapy sessions    Assessment     Patient is educated on self pelvic push/pull MET to correct innominate rotation. Educated on new exercises today to improve lumbar mobility and flexibility as well as core strength. Attempted dead bugs today but this is discontinued as patient reported feeling popping in the low back. Otherwise, patient is appropriately challenged by today's interventions. HEP updated and reviewed with patient. Progress as tolerated.     Jennifer Is progressing well towards her goals.   Pt prognosis is Good.     Pt will continue to benefit from skilled outpatient physical therapy to address the deficits listed in the problem list box on initial evaluation, provide pt/family education and to maximize pt's level of independence in the home and community environment.     Pt's spiritual, cultural and educational needs considered and pt agreeable to plan of care and goals.     Anticipated barriers to physical therapy: none    Goals: Progressing to all.       Short Term Goals (4 Weeks):  1. Pt will be compliant with HEP to supplement PT in decreasing pain with functional mobility.  2. Pt will perform pallof press with good control to demonstrate improved core strength.  3. Pt will improve lumbar ROM to </=minimal loss in all planes to improve functional mobility.  4. Pt will improve impaired LE MMTs by 1/2 muscle grade to improve strength  for functional tasks.  5. Pt will report 50% improvement in return to ADLs and recreation to improve return to prior level of function.     Long Term Goals (8 Weeks):   1. Pt will endorse full return to yoga and all recreational activities without limitation by back pain to return to prior level of function.  2. Pt will perform bridge with good control to demonstrate improved core strength.  3. Pt will improve impaired LE MMTs by 1 muscle grade to improve strength for functional tasks.  4. Pt will report no pain during lumbar ROM to promote functional mobility.    Plan     Continue PT POC    Lizzy Saini, PT

## 2023-08-14 ENCOUNTER — CLINICAL SUPPORT (OUTPATIENT)
Dept: REHABILITATION | Facility: HOSPITAL | Age: 33
End: 2023-08-14
Payer: COMMERCIAL

## 2023-08-14 DIAGNOSIS — R29.898 DECREASED STRENGTH OF TRUNK AND BACK: ICD-10-CM

## 2023-08-14 DIAGNOSIS — M54.10 BACK PAIN WITH RIGHT-SIDED RADICULOPATHY: Primary | ICD-10-CM

## 2023-08-14 DIAGNOSIS — R29.898 IMPAIRED STRENGTH OF HIP MUSCLES: ICD-10-CM

## 2023-08-14 PROCEDURE — 97110 THERAPEUTIC EXERCISES: CPT | Mod: PO

## 2023-08-14 PROCEDURE — 97112 NEUROMUSCULAR REEDUCATION: CPT | Mod: PO

## 2023-08-14 PROCEDURE — 97140 MANUAL THERAPY 1/> REGIONS: CPT | Mod: PO

## 2023-08-14 RX ORDER — METHYLPREDNISOLONE ACETATE 40 MG/ML
40 INJECTION, SUSPENSION INTRA-ARTICULAR; INTRALESIONAL; INTRAMUSCULAR; SOFT TISSUE ONCE
Status: COMPLETED | OUTPATIENT
Start: 2023-08-14 | End: 2023-08-11

## 2023-08-15 ENCOUNTER — OFFICE VISIT (OUTPATIENT)
Dept: DERMATOLOGY | Facility: CLINIC | Age: 33
End: 2023-08-15
Payer: COMMERCIAL

## 2023-08-15 ENCOUNTER — PATIENT MESSAGE (OUTPATIENT)
Dept: DERMATOLOGY | Facility: CLINIC | Age: 33
End: 2023-08-15

## 2023-08-15 DIAGNOSIS — L30.9 HAND ECZEMA: ICD-10-CM

## 2023-08-15 DIAGNOSIS — L20.84 INTRINSIC ATOPIC DERMATITIS: Primary | ICD-10-CM

## 2023-08-15 DIAGNOSIS — L50.3 DERMATOGRAPHISM: ICD-10-CM

## 2023-08-15 DIAGNOSIS — R23.3 EASY BRUISING: ICD-10-CM

## 2023-08-15 PROCEDURE — 1159F PR MEDICATION LIST DOCUMENTED IN MEDICAL RECORD: ICD-10-PCS | Mod: CPTII,S$GLB,, | Performed by: DERMATOLOGY

## 2023-08-15 PROCEDURE — 99214 OFFICE O/P EST MOD 30 MIN: CPT | Mod: S$GLB,,, | Performed by: DERMATOLOGY

## 2023-08-15 PROCEDURE — 1159F MED LIST DOCD IN RCRD: CPT | Mod: CPTII,S$GLB,, | Performed by: DERMATOLOGY

## 2023-08-15 PROCEDURE — 1160F PR REVIEW ALL MEDS BY PRESCRIBER/CLIN PHARMACIST DOCUMENTED: ICD-10-PCS | Mod: CPTII,S$GLB,, | Performed by: DERMATOLOGY

## 2023-08-15 PROCEDURE — 1160F RVW MEDS BY RX/DR IN RCRD: CPT | Mod: CPTII,S$GLB,, | Performed by: DERMATOLOGY

## 2023-08-15 PROCEDURE — 99214 PR OFFICE/OUTPT VISIT, EST, LEVL IV, 30-39 MIN: ICD-10-PCS | Mod: S$GLB,,, | Performed by: DERMATOLOGY

## 2023-08-15 RX ORDER — FLUOCINONIDE 0.5 MG/G
CREAM TOPICAL
Qty: 60 G | Refills: 3 | Status: SHIPPED | OUTPATIENT
Start: 2023-08-15

## 2023-08-15 NOTE — PROGRESS NOTES
Patient Information  Name: Jennifer Nguyen  : 1990  MRN: [id number]     Referring Physician:  No ref. provider found   Primary Care Physician:  Misys Andrade MD   Date of Visit: 08/15/2023      Subjective:     History of Present lllness:    Jennifer Nguyen is a 33 y.o. female who presents with a chief complaint of eczema and brusing.    Diagnosis: easy bruising  Location: thighs  Signs/Symptoms: bruising (has not had much bruising since last visit, last episodes was ~5 weeks ago, but she has not been doing anything lately due to being bed bound due to back pain  Symptom course: unchanged  Current treatment: none  She has been more stressed than usual lately but no recent bruising. She has been taking zyrtec more frequently lately due to seasonal allergies.    Diagnosis: intrinsic atopic dermatitis  Location: creases of arms, and posterior knees  Signs/Symptoms: flare up, today but has gotten better   Symptom course: improving  Current treatment: fluticasone cream    Patient was last seen: 2023.  Prior notes by myself reviewed.   Clinical documentation obtained by nursing staff reviewed.    Review of Systems   Constitutional:  Negative for fever.   HENT:  Negative for congestion, rhinorrhea and postnasal drip.    Eyes:  Negative for itching.   Skin:  Positive for itching, rash and dry skin.   Allergic/Immunologic: Positive for environmental allergies.       Objective:   Physical Exam   Constitutional: She appears well-developed and well-nourished. No distress.   Neurological: She is alert and oriented to person, place, and time. She is not disoriented.   Psychiatric: She has a normal mood and affect.   Skin:   Areas Examined (abnormalities noted in diagram):   RUE Inspected  LUE Inspection Performed  RLE Inspected  LLE Inspection Performed            Diagram Legend     Erythematous scaling macule/papule c/w actinic keratosis       Vascular papule c/w angioma      Pigmented verrucoid papule/plaque  c/w seborrheic keratosis      Yellow umbilicated papule c/w sebaceous hyperplasia      Irregularly shaped tan macule c/w lentigo     1-2 mm smooth white papules consistent with Milia      Movable subcutaneous cyst with punctum c/w epidermal inclusion cyst      Subcutaneous movable cyst c/w pilar cyst      Firm pink to brown papule c/w dermatofibroma      Pedunculated fleshy papule(s) c/w skin tag(s)      Evenly pigmented macule c/w junctional nevus     Mildly variegated pigmented, slightly irregular-bordered macule c/w mildly atypical nevus      Flesh colored to evenly pigmented papule c/w intradermal nevus       Pink pearly papule/plaque c/w basal cell carcinoma      Erythematous hyperkeratotic cursted plaque c/w SCC      Surgical scar with no sign of skin cancer recurrence      Open and closed comedones      Inflammatory papules and pustules      Verrucoid papule consistent consistent with wart     Erythematous eczematous patches and plaques     Dystrophic onycholytic nail with subungual debris c/w onychomycosis     Umbilicated papule    Erythematous-base heme-crusted tan verrucoid plaque consistent with inflamed seborrheic keratosis     Erythematous Silvery Scaling Plaque c/w Psoriasis     See annotation    Pt submitted photo from 5 weeks ago:      No images are attached to the encounter or orders placed in the encounter.      [] Data reviewed  [] Prior external notes reviewed  [] Independent review of test  [] Management discussed with another provider  [] Independent historian    Assessment / Plan:        Intrinsic atopic dermatitis   - stable and chronic  Continue fluticasone cream prn flares.    Hand eczema  - chronic problem, not at treatment goal  - Discussed the importance of frequent hand moisturization every hour and after every hand washing. Wash hands with Dove Sensitive Skin Beauty Bar or other fragrance-free cleanser and moisturize with OTC CeraVe healing ointment or Neutrogena Norwegian Formula Hand  Cream.   - Minimize hand washing unless visibly dirty, and use an alcohol-based hand  that contains moisturizers and lacks common allergens, such as Hello Craig, SupplyAID, SanitizeRx, or Hydra Betina.  - Can apply the prescribed topical steroid followed by white cotton gloves or a warm, damp towel for severe flares.   -     fluocinonide 0.05% (LIDEX) 0.05 % cream; AAA of hands bid prn flares.  Dispense: 60 g; Refill: 3    Dermatographism  Recommended Zyrtec (cetirizine) or Allegra (fexofenadine) at least 1x/day.    Easy bruising  Seems to have improved. Possibly improved with Zyrtec. Will continue Zyrtec daily until follow up.         Follow up in about 3 months (around 11/15/2023) for follow up, or sooner if symptoms worsening or not improving.      Janett Raygoza MD, FAAD  [facility]

## 2023-08-16 ENCOUNTER — CLINICAL SUPPORT (OUTPATIENT)
Dept: REHABILITATION | Facility: HOSPITAL | Age: 33
End: 2023-08-16
Payer: COMMERCIAL

## 2023-08-16 DIAGNOSIS — M54.10 BACK PAIN WITH RIGHT-SIDED RADICULOPATHY: ICD-10-CM

## 2023-08-16 DIAGNOSIS — R29.898 DECREASED STRENGTH OF TRUNK AND BACK: ICD-10-CM

## 2023-08-16 DIAGNOSIS — M54.50 ACUTE BILATERAL LOW BACK PAIN, UNSPECIFIED WHETHER SCIATICA PRESENT: Primary | ICD-10-CM

## 2023-08-16 DIAGNOSIS — R29.898 IMPAIRED STRENGTH OF HIP MUSCLES: ICD-10-CM

## 2023-08-16 PROCEDURE — 97140 MANUAL THERAPY 1/> REGIONS: CPT | Mod: PO,CQ

## 2023-08-16 PROCEDURE — 97110 THERAPEUTIC EXERCISES: CPT | Mod: PO,CQ

## 2023-08-16 PROCEDURE — 97112 NEUROMUSCULAR REEDUCATION: CPT | Mod: PO,CQ

## 2023-08-16 NOTE — PROGRESS NOTES
"[facility]   Physical Therapy Treatment Note      Name: Jennifer Nguyen  Clinic Number: [id number]    Therapy Diagnosis:   Encounter Diagnoses   Name Primary?    Acute bilateral low back pain, unspecified whether sciatica present Yes    Back pain with right-sided radiculopathy     Decreased strength of trunk and back     Impaired strength of hip muscles      Physician: Martha Dasilva NP    Visit Date: 8/16/2023    Physician Orders: PT Eval and Treat   Medical Diagnosis from Referral:   M54.9 (ICD-10-CM) - Dorsalgia, unspecified   M47.817 (ICD-10-CM) - Spondylosis without myelopathy or radiculopathy, lumbosacral region   M79.18 (ICD-10-CM) - Myofascial pain      Evaluation Date: 8/9/2023  Authorization Period Expiration: 12/31/23  Plan of Care Expiration: 10/4/23  Progress Note Due: 9/6/23  Visit # / Visits authorized: 2/20  FOTO: 1/ 3     Precautions: Standard   PTA visits: 1/5     Time In: 2:30 PM  Time Out: 3:20 PM  Total Billable Time: 50 minutes    Subjective     Pt reports: "I did the exercises I was given Monday, and I was sore after."  She was compliant with home exercise program.  Response to previous treatment: okay   Functional change: ongoing     Pain: not rated/10  Location: bilateral low back and upper glutes    Objective      Objective Measures updated at progress report unless specified.     Treatment     Jennifer received the treatments listed below:      Bold=performed     therapeutic exercises to develop strength, flexibility, posture, and core stabilization for 32 minutes including:  Lumbar flexion with blue physioball 3' (deferred to increased pain)  SKTC 20x5 "hold aguila   LTR 2x10 3" hold   Piriformis stretch 3x30"  Cat camel 20x5" hold   Repeated lumbar extension prone on elbows 2x10 5 "hold  Prone hip extension 2x10 aguila    PPT 20x5" (Painful today)  Hip abd/add isometrics 20x5 " holds each direction    manual therapy techniques: Joint mobilizations and Soft " tissue Mobilization were applied to the: low back for 8 minutes, including:  Long axis distraction     Not performed:  Central PA glides Gr II to lumbosacral spine  STM to bilateral lumbar paraspinals   MET to correct left anterior rotation of innominate     neuromuscular re-education activities to improve: Posture and core control for 10 minutes. The following activities were included:  90/90 heel taps 2x10   Bent knee fall out with GTB 2x10     10 minutes moist heat pack to low back upon completion of session.     Patient Education and Home Exercises       Education provided:   - Pt educated on all interventions performed today.  - HEP    Written Home Exercises Provided: yes. Exercises were reviewed and Jennifer was able to demonstrate them prior to the end of the session.  Jennifer demonstrated good  understanding of the education provided. See EMR under Patient Instructions for exercises provided during therapy sessions    Assessment     Jennifer is unable to sit in a chair without increased pain, however supine position was more tolerable. Lumbar flexion and PPT was deferred secondary to pt reporting increased low back pain, which seemed to subside with cease of activity. Will continue with core and hip strengthening to reduce radiculopathy and low pain.    Jennifer Is progressing well towards her goals.   Pt prognosis is Good.     Pt will continue to benefit from skilled outpatient physical therapy to address the deficits listed in the problem list box on initial evaluation, provide pt/family education and to maximize pt's level of independence in the home and community environment.     Pt's spiritual, cultural and educational needs considered and pt agreeable to plan of care and goals.     Anticipated barriers to physical therapy: none    Goals: Progressing to all.       Short Term Goals (4 Weeks):  1. Pt will be compliant with HEP to supplement PT in decreasing pain with functional mobility.  2. Pt will perform pallof  press with good control to demonstrate improved core strength.  3. Pt will improve lumbar ROM to </=minimal loss in all planes to improve functional mobility.  4. Pt will improve impaired LE MMTs by 1/2 muscle grade to improve strength for functional tasks.  5. Pt will report 50% improvement in return to ADLs and recreation to improve return to prior level of function.     Long Term Goals (8 Weeks):   1. Pt will endorse full return to yoga and all recreational activities without limitation by back pain to return to prior level of function.  2. Pt will perform bridge with good control to demonstrate improved core strength.  3. Pt will improve impaired LE MMTs by 1 muscle grade to improve strength for functional tasks.  4. Pt will report no pain during lumbar ROM to promote functional mobility.    Plan     Continue PT POC    Nirmala Myles, PTA

## 2023-08-22 ENCOUNTER — CLINICAL SUPPORT (OUTPATIENT)
Dept: REHABILITATION | Facility: HOSPITAL | Age: 33
End: 2023-08-22
Payer: COMMERCIAL

## 2023-08-22 DIAGNOSIS — R29.898 IMPAIRED STRENGTH OF HIP MUSCLES: ICD-10-CM

## 2023-08-22 DIAGNOSIS — M54.50 ACUTE BILATERAL LOW BACK PAIN, UNSPECIFIED WHETHER SCIATICA PRESENT: Primary | ICD-10-CM

## 2023-08-22 DIAGNOSIS — M54.10 BACK PAIN WITH RIGHT-SIDED RADICULOPATHY: ICD-10-CM

## 2023-08-22 DIAGNOSIS — R29.898 DECREASED STRENGTH OF TRUNK AND BACK: ICD-10-CM

## 2023-08-22 PROCEDURE — 97110 THERAPEUTIC EXERCISES: CPT | Mod: PO,CQ

## 2023-08-22 PROCEDURE — 97112 NEUROMUSCULAR REEDUCATION: CPT | Mod: PO,CQ

## 2023-08-22 PROCEDURE — 97140 MANUAL THERAPY 1/> REGIONS: CPT | Mod: PO,CQ

## 2023-08-22 NOTE — PROGRESS NOTES
"[facility]   Physical Therapy Treatment Note      Name: Jennifer Nguyen  Clinic Number: [id number]    Therapy Diagnosis:   Encounter Diagnoses   Name Primary?    Acute bilateral low back pain, unspecified whether sciatica present Yes    Back pain with right-sided radiculopathy     Decreased strength of trunk and back     Impaired strength of hip muscles      Physician: Martha Dasilva NP    Visit Date: 8/22/2023    Physician Orders: PT Eval and Treat   Medical Diagnosis from Referral:   M54.9 (ICD-10-CM) - Dorsalgia, unspecified   M47.817 (ICD-10-CM) - Spondylosis without myelopathy or radiculopathy, lumbosacral region   M79.18 (ICD-10-CM) - Myofascial pain      Evaluation Date: 8/9/2023  Authorization Period Expiration: 12/31/23  Plan of Care Expiration: 10/4/23  Progress Note Due: 9/6/23  Visit # / Visits authorized: 3/20  FOTO: 1/ 3     Precautions: Standard   PTA visits: 2/5     Time In: 7:31 AM  Time Out: 8:24 AM  Total Billable Time: 43 minutes + 10 minutes on moist heat     Subjective     Pt reports: "PPT feels more like a stretch than pain."  She was compliant with home exercise program.  Response to previous treatment: ongoing   Functional change: ongoing     Pain: not rated/10  Location: bilateral low back and upper glutes    Objective      Objective Measures updated at progress report unless specified.     Treatment     Jennifer received the treatments listed below:    Bold=performed     therapeutic exercises to develop strength, flexibility, posture, and core stabilization for 23 minutes including:  Cat camel 20x5" hold  Prayer stretch 15x5" hold   Bridges 3x10 3" hold  SL clams RTB 2x10 aguila     Not performed:  SKTC 20x5 "hold aguila   LTR 2x10 3" hold   Piriformis stretch 3x30"  Repeated lumbar extension prone on elbows 2x10 5 "hold  Prone hip extension 2x10 aguila   Hip abd/add isometrics 20x5 " holds each direction    manual therapy techniques: Joint mobilizations and Soft " "tissue Mobilization were applied to the: low back for 11 minutes, including:  Long axis distraction in prone and supine position     Not performed:  Central PA glides Gr II to lumbosacral spine  STM to bilateral lumbar paraspinals   MET to correct left anterior rotation of innominate     neuromuscular re-education activities to improve: Posture and core control for 9 minutes. The following activities were included:  PPT 30x5"  PPT with knee fallouts RTB 2x10   90/90 heel taps 2x10      10 minutes moist heat pack to low back upon completion of session.     Patient Education and Home Exercises       Education provided:   - Pt educated on all interventions performed today.  - HEP    Written Home Exercises Provided: yes. Exercises were reviewed and Jennifer was able to demonstrate them prior to the end of the session.  Jennifer demonstrated good  understanding of the education provided. See EMR under Patient Instructions for exercises provided during therapy sessions    Assessment     Jennifer did well today. Pt demonstrated improved tolerance to PPT today than previous treatment. Added SL clams to address hip ABD weakness which pt tolerated well with cueing to prevent lumbar rotation. Will continue to focus to core and hip strengthening to improve lumbar posture and pain to pt's tolerance.      Jennifer Is progressing well towards her goals.   Pt prognosis is Good.     Pt will continue to benefit from skilled outpatient physical therapy to address the deficits listed in the problem list box on initial evaluation, provider pt/family education and to maximize pt's level of independence in the home and community environment.     Pt's spiritual, cultural and educational needs considered and pt agreeable to plan of care and goals.     Anticipated barriers to physical therapy: none    Goals: Progressing to all.       Short Term Goals (4 Weeks):  1. Pt will be compliant with HEP to supplement PT in decreasing pain with functional " mobility.  2. Pt will perform pallof press with good control to demonstrate improved core strength.  3. Pt will improve lumbar ROM to </=minimal loss in all planes to improve functional mobility.  4. Pt will improve impaired LE MMTs by 1/2 muscle grade to improve strength for functional tasks.  5. Pt will report 50% improvement in return to ADLs and recreation to improve return to prior level of function.     Long Term Goals (8 Weeks):   1. Pt will endorse full return to yoga and all recreational activities without limitation by back pain to return to prior level of function.  2. Pt will perform bridge with good control to demonstrate improved core strength.  3. Pt will improve impaired LE MMTs by 1 muscle grade to improve strength for functional tasks.  4. Pt will report no pain during lumbar ROM to promote functional mobility.    Plan     Continue PT POC    Nirmala Myles, PTA

## 2023-08-23 ENCOUNTER — PATIENT MESSAGE (OUTPATIENT)
Dept: PSYCHIATRY | Facility: CLINIC | Age: 33
End: 2023-08-23
Payer: COMMERCIAL

## 2023-08-24 ENCOUNTER — CLINICAL SUPPORT (OUTPATIENT)
Dept: REHABILITATION | Facility: HOSPITAL | Age: 33
End: 2023-08-24
Payer: COMMERCIAL

## 2023-08-24 DIAGNOSIS — M54.50 ACUTE BILATERAL LOW BACK PAIN, UNSPECIFIED WHETHER SCIATICA PRESENT: Primary | ICD-10-CM

## 2023-08-24 DIAGNOSIS — R29.898 DECREASED STRENGTH OF TRUNK AND BACK: ICD-10-CM

## 2023-08-24 DIAGNOSIS — M54.10 BACK PAIN WITH RIGHT-SIDED RADICULOPATHY: ICD-10-CM

## 2023-08-24 DIAGNOSIS — R29.898 IMPAIRED STRENGTH OF HIP MUSCLES: ICD-10-CM

## 2023-08-24 PROCEDURE — 97110 THERAPEUTIC EXERCISES: CPT | Mod: PO

## 2023-08-24 PROCEDURE — 97140 MANUAL THERAPY 1/> REGIONS: CPT | Mod: PO

## 2023-08-24 PROCEDURE — 97112 NEUROMUSCULAR REEDUCATION: CPT | Mod: PO

## 2023-08-24 NOTE — PROGRESS NOTES
"[facility]   Physical Therapy Treatment Note      Name: Jennifer Nguyen  Clinic Number: [id number]    Therapy Diagnosis:   Encounter Diagnoses   Name Primary?    Acute bilateral low back pain, unspecified whether sciatica present Yes    Back pain with right-sided radiculopathy     Decreased strength of trunk and back     Impaired strength of hip muscles      Physician: Martha Dasilva NP    Visit Date: 8/24/2023    Physician Orders: PT Eval and Treat   Medical Diagnosis from Referral:   M54.9 (ICD-10-CM) - Dorsalgia, unspecified   M47.817 (ICD-10-CM) - Spondylosis without myelopathy or radiculopathy, lumbosacral region   M79.18 (ICD-10-CM) - Myofascial pain      Evaluation Date: 8/9/2023  Authorization Period Expiration: 12/31/23  Plan of Care Expiration: 10/4/23  Progress Note Due: 9/6/23  Visit # / Visits authorized: 4/20 + eval  FOTO: 1/ 3     Precautions: Standard   PTA visits: 2/5     Time In: 800 AM  Time Out: 8:40 AM  Total Billable Time: 40 minutes + 10 minutes on moist heat     Subjective     Pt reports: reports the pain is changing, less sharpness down the leg is better but the pain going down the leg remains   She was compliant with home exercise program.  Response to previous treatment: ongoing   Functional change: ongoing     Pain: not rated/10  Location: bilateral low back and upper glutes    Objective      Objective Measures updated at progress report unless specified.     Treatment     Jennifer received the treatments listed below:    Bold=performed     therapeutic exercises to develop strength, flexibility, posture, and core stabilization for 12 minutes including:  Cat camel 20x5" hold  Prayer stretch 15x5" hold   Bridges 3x10 3" hold RTB  SL clams RTB 2x10 aguila     Not performed:  SKTC 20x5 "hold aguila   LTR 2x10 3" hold   Piriformis stretch 3x30"  Repeated lumbar extension prone on elbows 2x10 5 "hold  Prone hip extension 2x10 aguila   Hip abd/add isometrics 20x5 " holds " "each direction    manual therapy techniques: Joint mobilizations and Soft tissue Mobilization were applied to the: low back for 15 minutes, including:  Long axis distraction in prone and supine position (20 sec each - inc pain)  STM to R paraspinals, quadratus lumborum, piriformis on the right    Not performed:  Central PA glides Gr II to lumbosacral spine  STM to bilateral lumbar paraspinals   MET to correct left anterior rotation of innominate     neuromuscular re-education activities to improve: Posture and core control for 9 minutes. The following activities were included:  PPT 20x5" - with legs over swiss ball today  PPT with knee fallouts RTB 3 x10   90/90 heel taps 2x10    Standing pallof press X 30 each side RTB    10 minutes moist heat pack to low back upon completion of session.     Patient Education and Home Exercises       Education provided:   - Pt educated on all interventions performed today.  - HEP    Written Home Exercises Provided: yes. Exercises were reviewed and Jennifer was able to demonstrate them prior to the end of the session.  Jennifer demonstrated good  understanding of the education provided. See EMR under Patient Instructions for exercises provided during therapy sessions    Assessment     Jennifer did well with exercises today and responded well to new standing exercises. She has challenges with maintaining position due to discomfort but was able to properly engage core muscles with better control. Needed increased cueing for proper lateral hip muscle engagement and positioning for clams. Progress as able.    Jennifer Is progressing well towards her goals.   Pt prognosis is Good.     Pt will continue to benefit from skilled outpatient physical therapy to address the deficits listed in the problem list box on initial evaluation, provider pt/family education and to maximize pt's level of independence in the home and community environment.     Pt's spiritual, cultural and educational needs considered " and pt agreeable to plan of care and goals.     Anticipated barriers to physical therapy: none    Goals: Progressing to all.       Short Term Goals (4 Weeks):  1. Pt will be compliant with HEP to supplement PT in decreasing pain with functional mobility.  2. Pt will perform pallof press with good control to demonstrate improved core strength.  3. Pt will improve lumbar ROM to </=minimal loss in all planes to improve functional mobility.  4. Pt will improve impaired LE MMTs by 1/2 muscle grade to improve strength for functional tasks.  5. Pt will report 50% improvement in return to ADLs and recreation to improve return to prior level of function.     Long Term Goals (8 Weeks):   1. Pt will endorse full return to yoga and all recreational activities without limitation by back pain to return to prior level of function.  2. Pt will perform bridge with good control to demonstrate improved core strength.  3. Pt will improve impaired LE MMTs by 1 muscle grade to improve strength for functional tasks.  4. Pt will report no pain during lumbar ROM to promote functional mobility.    Plan     Continue PT POC    Millie Hall, PT

## 2023-08-30 ENCOUNTER — CLINICAL SUPPORT (OUTPATIENT)
Dept: REHABILITATION | Facility: HOSPITAL | Age: 33
End: 2023-08-30
Payer: COMMERCIAL

## 2023-08-30 DIAGNOSIS — M54.10 BACK PAIN WITH RIGHT-SIDED RADICULOPATHY: ICD-10-CM

## 2023-08-30 DIAGNOSIS — R29.898 DECREASED STRENGTH OF TRUNK AND BACK: ICD-10-CM

## 2023-08-30 DIAGNOSIS — R29.898 IMPAIRED STRENGTH OF HIP MUSCLES: ICD-10-CM

## 2023-08-30 DIAGNOSIS — M54.50 ACUTE BILATERAL LOW BACK PAIN, UNSPECIFIED WHETHER SCIATICA PRESENT: Primary | ICD-10-CM

## 2023-08-30 PROCEDURE — 97110 THERAPEUTIC EXERCISES: CPT | Mod: PO,CQ

## 2023-08-30 PROCEDURE — 97112 NEUROMUSCULAR REEDUCATION: CPT | Mod: PO,CQ

## 2023-08-30 PROCEDURE — 97140 MANUAL THERAPY 1/> REGIONS: CPT | Mod: PO,CQ

## 2023-08-30 NOTE — PROGRESS NOTES
"[facility]   Physical Therapy Treatment Note      Name: Jennifer Nguyen  Clinic Number: [id number]    Therapy Diagnosis:   Encounter Diagnoses   Name Primary?    Acute bilateral low back pain, unspecified whether sciatica present Yes    Back pain with right-sided radiculopathy     Decreased strength of trunk and back     Impaired strength of hip muscles      Physician: Martha Dasilva NP    Visit Date: 8/30/2023    Physician Orders: PT Eval and Treat   Medical Diagnosis from Referral:   M54.9 (ICD-10-CM) - Dorsalgia, unspecified   M47.817 (ICD-10-CM) - Spondylosis without myelopathy or radiculopathy, lumbosacral region   M79.18 (ICD-10-CM) - Myofascial pain      Evaluation Date: 8/9/2023  Authorization Period Expiration: 12/31/23  Plan of Care Expiration: 10/4/23  Progress Note Due: 9/6/23  Visit # / Visits authorized: 5/20 + eval  FOTO: 1/ 3     Precautions: Standard   PTA visits: 1/5     Time In: 7:47 AM   Time Out: 7:50 AM  Total Billable Time: 53 minutes + 10 minutes on moist heat     Subjective     Pt reports: "I did a lot of standing over the week so I been kind of sore and in pain more than usual."   She was compliant with home exercise program.  Response to previous treatment: ongoing   Functional change: ongoing     Pain: not rated/10  Location: bilateral low back and upper glutes    Objective      Objective Measures updated at progress report unless specified.     Treatment     Jennifer received the treatments listed below:    Bold=performed     therapeutic exercises to develop strength, flexibility, posture, and core stabilization for 14 minutes including:  LTR (did not feel appropriate stretch)  SKTC 20x5" hold   Piriformis stretch 3x30"  Cat camel 15x5" hold  Kneeling hip hinge with dowel x15    Not performed:  Bridges 3x10 3" hold RTB  SL clams RTB 2x10 aguila   Prayer stretch 15x5" hold   Repeated lumbar extension prone on elbows 2x10 5 "hold  Prone hip extension 2x10 aguila " "  Hip abd/add isometrics 20x5 " holds each direction    manual therapy techniques: Joint mobilizations and Soft tissue Mobilization were applied to the: low back for 15 minutes, including:  Long axis distraction in prone and supine position   STM to R paraspinals, quadratus lumborum, piriformis on the right    Not performed:  Central PA glides Gr II to lumbosacral spine  STM to bilateral lumbar paraspinals   MET to correct left anterior rotation of innominate     neuromuscular re-education activities to improve: Posture and core control for 24 minutes. The following activities were included:  PPT 20x5" - with legs over swiss ball today  PPT with marching 2x10   PPT with knee fallouts RTB 3 x10   Dead bud 2x8  90/90 heel taps 2x10    Standing pallof press X 30 each side RTB    10 minutes moist heat pack to low back upon completion of session.     Patient Education and Home Exercises       Education provided:   - Pt educated on all interventions performed today.  - HEP    Written Home Exercises Provided: yes. Exercises were reviewed and Jennifer was able to demonstrate them prior to the end of the session.  Jennifer demonstrated good  understanding of the education provided. See EMR under Patient Instructions for exercises provided during therapy sessions    Assessment     Added hip hinge to encourage fundamental movement pattern which is essential tasks for lifting from floor level without experiencing increased back pain. Jennifer is progressing well towards goals, however pt educated on the importance of understanding her limitation to not exacerbating her low back symptoms.     Jennifer Is progressing well towards her goals.   Pt prognosis is Good.     Pt will continue to benefit from skilled outpatient physical therapy to address the deficits listed in the problem list box on initial evaluation, provider pt/family education and to maximize pt's level of independence in the home and community environment.     Pt's spiritual, " cultural and educational needs considered and pt agreeable to plan of care and goals.     Anticipated barriers to physical therapy: none    Goals: Progressing to all.   Short Term Goals (4 Weeks):  1. Pt will be compliant with HEP to supplement PT in decreasing pain with functional mobility.  2. Pt will perform pallof press with good control to demonstrate improved core strength.  3. Pt will improve lumbar ROM to </=minimal loss in all planes to improve functional mobility.  4. Pt will improve impaired LE MMTs by 1/2 muscle grade to improve strength for functional tasks.  5. Pt will report 50% improvement in return to ADLs and recreation to improve return to prior level of function.     Long Term Goals (8 Weeks):   1. Pt will endorse full return to yoga and all recreational activities without limitation by back pain to return to prior level of function.  2. Pt will perform bridge with good control to demonstrate improved core strength.  3. Pt will improve impaired LE MMTs by 1 muscle grade to improve strength for functional tasks.  4. Pt will report no pain during lumbar ROM to promote functional mobility.    Plan     Continue PT POC    Nirmala Myles, PTA

## 2023-09-01 ENCOUNTER — CLINICAL SUPPORT (OUTPATIENT)
Dept: REHABILITATION | Facility: HOSPITAL | Age: 33
End: 2023-09-01
Payer: COMMERCIAL

## 2023-09-01 DIAGNOSIS — M54.10 BACK PAIN WITH RIGHT-SIDED RADICULOPATHY: ICD-10-CM

## 2023-09-01 DIAGNOSIS — R29.898 IMPAIRED STRENGTH OF HIP MUSCLES: ICD-10-CM

## 2023-09-01 DIAGNOSIS — R29.898 DECREASED STRENGTH OF TRUNK AND BACK: ICD-10-CM

## 2023-09-01 DIAGNOSIS — M54.50 ACUTE BILATERAL LOW BACK PAIN, UNSPECIFIED WHETHER SCIATICA PRESENT: Primary | ICD-10-CM

## 2023-09-01 PROCEDURE — 97110 THERAPEUTIC EXERCISES: CPT | Mod: PO

## 2023-09-01 PROCEDURE — 97112 NEUROMUSCULAR REEDUCATION: CPT | Mod: PO

## 2023-09-01 PROCEDURE — 97140 MANUAL THERAPY 1/> REGIONS: CPT | Mod: PO

## 2023-09-01 NOTE — PROGRESS NOTES
"[facility]   Physical Therapy Treatment Note      Name: Jennifer Nguyen  Clinic Number: [id number]    Therapy Diagnosis:   Encounter Diagnoses   Name Primary?    Acute bilateral low back pain, unspecified whether sciatica present Yes    Back pain with right-sided radiculopathy     Decreased strength of trunk and back     Impaired strength of hip muscles      Physician: Martha Dasilva NP    Visit Date: 9/1/2023    Physician Orders: PT Eval and Treat   Medical Diagnosis from Referral:   M54.9 (ICD-10-CM) - Dorsalgia, unspecified   M47.817 (ICD-10-CM) - Spondylosis without myelopathy or radiculopathy, lumbosacral region   M79.18 (ICD-10-CM) - Myofascial pain      Evaluation Date: 8/9/2023  Authorization Period Expiration: 12/31/23  Plan of Care Expiration: 10/4/23  Progress Note Due: 9/6/23  Visit # / Visits authorized: 6/20 + eval  FOTO: 1/ 3     Precautions: Standard   PTA visits: 1/5     Time In: 820 AM   Time Out: 900 AM  Total Billable Time: 40 minutes + heat    Subjective     Pt reports: can tell her range of motion is better but still have right side weakness remains, feels like it keeps spreading out into the leg  She was compliant with home exercise program.  Response to previous treatment: ongoing   Functional change: ongoing     Pain: not rated/10  Location: bilateral low back and upper glutes    Objective      Objective Measures updated at progress report unless specified.     Treatment     Jennifer received the treatments listed below:    Bold=performed     therapeutic exercises to develop strength, flexibility, posture, and core stabilization for 14 minutes including:  LTR (added add ball) X 20  Bridge with RTB X 30  SKTC 20x5" hold   Piriformis stretch 3x30"  Cat camel 15x5" hold  Kneeling hip hinge with dowel x15    Not performed:  Bridges 3x10 3" hold RTB  SL clams RTB 2x10 aguila   Prayer stretch 15x5" hold   Repeated lumbar extension prone on elbows 2x10 5 "hold  Prone " "hip extension 2x10 aguila   Hip abd/add isometrics 20x5 " holds each direction    manual therapy techniques: Joint mobilizations and Soft tissue Mobilization were applied to the: low back for 15 minutes, including:  Long axis distraction in prone and supine position   STM to R paraspinals, quadratus lumborum, piriformis on the right    Not performed:  Central PA glides Gr II to lumbosacral spine  STM to bilateral lumbar paraspinals   MET to correct left anterior rotation of innominate     neuromuscular re-education activities to improve: Posture and core control for 24 minutes. The following activities were included:  PPT 20x5" - with legs over swiss ball today  PPT with marching 2x10   PPT with knee fallouts RTB 3 x10   Dead bud 2x8  90/90 heel taps 2x10    Standing pallof press X 30 each side RTB    10 minutes moist heat pack to low back upon completion of session.     Patient Education and Home Exercises       Education provided:   - Pt educated on all interventions performed today.  - HEP    Written Home Exercises Provided: yes. Exercises were reviewed and Jennifer was able to demonstrate them prior to the end of the session.  Jennifer demonstrated good  understanding of the education provided. See EMR under Patient Instructions for exercises provided during therapy sessions    Assessment     Jennifer continues to struggle with proper core muscle engagement and has mod hip stiffness remaining. She is functionally progressing well with core muscle engagement and she will benefit frmo progressive strengthening in a weight bearing position    Jennifer Is progressing well towards her goals.   Pt prognosis is Good.     Pt will continue to benefit from skilled outpatient physical therapy to address the deficits listed in the problem list box on initial evaluation, provider pt/family education and to maximize pt's level of independence in the home and community environment.     Pt's spiritual, cultural and educational needs " considered and pt agreeable to plan of care and goals.     Anticipated barriers to physical therapy: none    Goals: Progressing to all.   Short Term Goals (4 Weeks):  1. Pt will be compliant with HEP to supplement PT in decreasing pain with functional mobility.  2. Pt will perform pallof press with good control to demonstrate improved core strength.  3. Pt will improve lumbar ROM to </=minimal loss in all planes to improve functional mobility.  4. Pt will improve impaired LE MMTs by 1/2 muscle grade to improve strength for functional tasks.  5. Pt will report 50% improvement in return to ADLs and recreation to improve return to prior level of function.     Long Term Goals (8 Weeks):   1. Pt will endorse full return to yoga and all recreational activities without limitation by back pain to return to prior level of function.  2. Pt will perform bridge with good control to demonstrate improved core strength.  3. Pt will improve impaired LE MMTs by 1 muscle grade to improve strength for functional tasks.  4. Pt will report no pain during lumbar ROM to promote functional mobility.    Plan     Continue PT POC    Millie Hall, PT

## 2023-09-05 NOTE — PROGRESS NOTES
"[facility]   Physical Therapy Treatment Note / Progress Note      Name: Jennifer Nguyen  Clinic Number: [id number]    Therapy Diagnosis:   Encounter Diagnoses   Name Primary?    Back pain with right-sided radiculopathy Yes    Decreased strength of trunk and back     Impaired strength of hip muscles        Physician: Martha Dasilva NP    Visit Date: 9/6/2023    Physician Orders: PT Eval and Treat   Medical Diagnosis from Referral:   M54.9 (ICD-10-CM) - Dorsalgia, unspecified   M47.817 (ICD-10-CM) - Spondylosis without myelopathy or radiculopathy, lumbosacral region   M79.18 (ICD-10-CM) - Myofascial pain      Evaluation Date: 8/9/2023  Authorization Period Expiration: 12/31/23  Plan of Care Expiration: 10/4/23  Progress Note Due: 9/6/23  Visit # / Visits authorized: 7/20 + eval  FOTO: 2/ 3     Precautions: Standard   PTA visits: 1/5     Time In: 705 AM  Time Out: 747 AM  Total Billable Time: 42 minutes (TE-2, NMR-1)    Subjective     Pt reports: "my mobility is definitely better, I think I'm moving around more" "general pain is better." The weakness in the right leg feels that it has moved around  She was compliant with home exercise program.  Response to previous treatment: ongoing   Functional change: reduced pain and improved mobility     Pain: 2-3/10  Location: bilateral low back and upper glutes    Objective         Sensation: diffused dulled sensation in L5-S1      Lumbar ROM: %AROM    % Pain   Flexion    75% Yes   Extension >50%    yes   Left Side Bending WFL no   Right Side Bending 75% no   Left rotation >/=50% Yes    Right Rotation 75% yes           Lower Extremity Strength (graded 0-5 out of 5)    RLE LLE   Hip flexion: 4+/5 4+/5   Knee extension: 4+/5 5/5   Ankle dorsiflexion: 5/5 5/5   Posterior fibers of Gluteus medius 4+/5 5/5    Knee flexion 5/5 5/5   Hip extension: 5/5 4+/5       Intake Outcome Measure for FOTO Lumbar Spine Survey     Therapist reviewed FOTO scores " "for Jennifer Nguyen  FOTO documents entered into [facility] - see Media section.     Intake Score: 47        Treatment     Jennifer received the treatments listed below:    Bold=performed     therapeutic exercises to develop strength, flexibility, posture, and core stabilization for 23 minutes including:  Reassessment  FOTO  Bridge x20  Sciatic nerve glides   LTR (added add ball) X 20  SKTC 20x5" hold   Piriformis stretch 3x30"  Cat camel 15x5" hold  Kneeling hip hinge with dowel x15  Bridges 3x10 3" hold RTB  SL clams RTB 2x10 aguila   Prayer stretch 15x5" hold   Repeated lumbar extension prone on elbows 2x10 5 "hold  Prone hip extension 2x10 aguila   Hip abd/add isometrics 20x5 " holds each direction    manual therapy techniques: Joint mobilizations and Soft tissue Mobilization were applied to the: low back for 00 minutes, including:- not performed today  Long axis distraction in prone and supine position   STM to R paraspinals, quadratus lumborum, piriformis on the right  Central PA glides Gr II to lumbosacral spine  STM to bilateral lumbar paraspinals   MET to correct left anterior rotation of innominate     neuromuscular re-education activities to improve: Posture and core control for 19 minutes. The following activities were included:  Standing pallof press X 30 each side RTB +lift   +Half kneeling antirotational chop 3# on free motion  Dead bugs 2x10  PPT 20x5" - with legs over swiss ball today  PPT with marching 2x10   PPT with knee fallouts RTB 3 x10   90/90 heel taps 2x10        Patient Education and Home Exercises       Education provided:   - Pt educated on all interventions performed today.  - HEP    Written Home Exercises Provided: Patient instructed to cont prior HEP. Exercises were reviewed and Jennifer was able to demonstrate them prior to the end of the session.  Jennifer demonstrated good  understanding of the education provided. See EMR under Patient Instructions for exercises provided during therapy " sessions    Assessment     Jennifer Osullivan is responding well to plan of care at this time. She demos improved lumbar range of motion, hip strength, and core strength. She demonstrates improved core muscle engagement and is able to progress to palloff press with lift and half kneeling chops today. Her biggest complaint at this time is more diffuse dulled sensation into RLE that she feels has worsened since PT. She is educated on peripheralization vs centralization of symptoms. Will continue with current PT POC as patient has an appt with her doctor at the end of this month.   Jennifer Is progressing well towards her goals.   Pt prognosis is Good.     Pt will continue to benefit from skilled outpatient physical therapy to address the deficits listed in the problem list box on initial evaluation, provider pt/family education and to maximize pt's level of independence in the home and community environment.     Pt's spiritual, cultural and educational needs considered and pt agreeable to plan of care and goals.     Anticipated barriers to physical therapy: none    Goals:   Short Term Goals (4 Weeks):  1. Pt will be compliant with HEP to supplement PT in decreasing pain with functional mobility.- MET  2. Pt will perform pallof press with good control to demonstrate improved core strength.- MET  3. Pt will improve lumbar ROM to </=minimal loss in all planes to improve functional mobility.  4. Pt will improve impaired LE MMTs by 1/2 muscle grade to improve strength for functional tasks.- progressing   5. Pt will report 50% improvement in return to ADLs and recreation to improve return to prior level of function.- MET     Long Term Goals (8 Weeks):   1. Pt will endorse full return to yoga and all recreational activities without limitation by back pain to return to prior level of function.   2. Pt will perform bridge with good control to demonstrate improved core strength.- MET  3. Pt will improve impaired LE MMTs by 1 muscle grade  to improve strength for functional tasks.  4. Pt will report no pain during lumbar ROM to promote functional mobility.    Plan     Continue PT POC    Lizzy Saini PT

## 2023-09-06 ENCOUNTER — CLINICAL SUPPORT (OUTPATIENT)
Dept: REHABILITATION | Facility: HOSPITAL | Age: 33
End: 2023-09-06
Payer: COMMERCIAL

## 2023-09-06 DIAGNOSIS — R29.898 IMPAIRED STRENGTH OF HIP MUSCLES: ICD-10-CM

## 2023-09-06 DIAGNOSIS — R29.898 DECREASED STRENGTH OF TRUNK AND BACK: ICD-10-CM

## 2023-09-06 DIAGNOSIS — M54.10 BACK PAIN WITH RIGHT-SIDED RADICULOPATHY: Primary | ICD-10-CM

## 2023-09-06 PROBLEM — R06.2 WHEEZING: Status: RESOLVED | Noted: 2023-04-03 | Resolved: 2023-09-06

## 2023-09-06 PROBLEM — R23.3 EASY BRUISABILITY: Status: RESOLVED | Noted: 2022-07-07 | Resolved: 2023-09-06

## 2023-09-06 PROBLEM — H10.13 ALLERGIC CONJUNCTIVITIS, BILATERAL: Status: RESOLVED | Noted: 2022-10-11 | Resolved: 2023-09-06

## 2023-09-06 PROCEDURE — 97112 NEUROMUSCULAR REEDUCATION: CPT | Mod: PO

## 2023-09-06 PROCEDURE — 97110 THERAPEUTIC EXERCISES: CPT | Mod: PO

## 2023-09-06 NOTE — PROGRESS NOTES
FAMILY MEDICINE  [facility]    Reason for visit:   Chief Complaint   Patient presents with    Establish Care    Annual Exam         SUBJECTIVE: Jennifer Nguyen is a 33 y.o. female  - former smoker (quit 2009) with anxiety, ADHD, asthma and celiac disease presents as a new patient to establish care and for routine annual physical.  Last PCP:  Missy Reed MD    Dermatology Janett Ferguson MD  Back and spine:Martha Dasilva NP  Allergies: Reji Frederick MD  Psychiatry: Lien Ochoa MD    Today Jennifer Nguyen reports that overall she is doing well. She has seen Dermatology and Hematology for recurrent bruising and benign. She also follows with her allergist for mild intermittent asthma that is primarily exercise induced.     She does have a family history of Grave's disease with her mother              Review of Systems   All other systems reviewed and are negative.      HEALTH MAINTENANCE:   Health Maintenance   Topic Date Due    TETANUS VACCINE  03/06/2033    Hepatitis C Screening  Completed    Lipid Panel  Completed     Health Maintenance Topics with due status: Not Due       Topic Last Completion Date    Cervical Cancer Screening 09/15/2021    TETANUS VACCINE 03/06/2023     Health Maintenance Due   Topic Date Due    COVID-19 Vaccine (1) 11/12/2021    Influenza Vaccine (1) Never done       HISTORY:   Past Medical History:   Diagnosis Date    ADHD     Allergy     Anxiety disorder, unspecified     Celiac disease     Mild intermittent asthma, uncomplicated        Past Surgical History:   Procedure Laterality Date    BREAST SURGERY      REDUCTION    INTRAUTERINE DEVICE INSERTION  04/01/2015    KJB---MIRENA    REPAIR OF COLLATERAL LIGAMENT OF THUMB Right 01/06/2020    Procedure: REPAIR, LIGAMENT, COLLATERAL, THUMB right;  Surgeon: Lisette Zaman MD;  Location: [facility];  Service: Orthopedics;  Laterality: Right;  regional MAC    WISDOM TOOTH  EXTRACTION         Family History   Problem Relation Age of Onset    Graves' disease Mother     Hypertension Father     Diabetes Father     No Known Problems Sister     No Known Problems Brother     No Known Problems Brother     Cancer Maternal Grandmother         unknown etiology    Heart disease Maternal Grandfather     Breast cancer Paternal Grandmother     Dementia Paternal Grandmother     Colon cancer Neg Hx     Ovarian cancer Neg Hx     Esophageal cancer Neg Hx        Social History     Tobacco Use    Smoking status: Former     Current packs/day: 0.00     Types: Cigarettes     Start date:      Quit date:      Years since quittin.7     Passive exposure: Past    Smokeless tobacco: Never    Tobacco comments:     1-2 cig daily   Substance Use Topics    Alcohol use: Not Currently     Comment: ~10 drinks/week. Seltzers, occasional wine    Drug use: No       Social History     Social History Narrative    Significant other. No children. 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 30 mL 0 Taking    cetirizine HCl (ZYRTEC ORAL) Take by mouth.   Taking    lisdexamfetamine (VYVANSE) 30 MG capsule Take 1 capsule (30 mg total) by mouth every morning. 30 capsule 0 Taking    loratadine (CLARITIN) 10 mg tablet Take 10 mg by mouth once daily.   Taking    methocarbamoL (ROBAXIN) 750 MG Tab Take 1 tablet (750 mg total) by mouth 3 (three) times daily as needed (muscle pain). 90 tablet 1 Taking    nabumetone (RELAFEN) 500 MG tablet Take 1 tablet (500 mg total) by mouth 2 (two) times daily as needed for Pain. 60 tablet 1  "Taking    olopatadine (PATADAY) 0.2 % Drop Place 1 drop into both eyes daily as needed (itching eyes). 5 mL 0 Taking    albuterol (VENTOLIN HFA) 90 mcg/actuation inhaler Inhale 1-2 puffs into the lungs 2 (two) times daily as needed for Wheezing or Shortness of Breath. Rescue (Patient not taking: Reported on 9/11/2023) 18 g 0 Not Taking    fluocinonide 0.05% (LIDEX) 0.05 % cream AAA of hands bid prn flares. 60 g 3     fluticasone propionate (CUTIVATE) 0.05 % cream Apply to affected areas of body daily prn eczema. 60 g 3     fluticasone propionate (FLONASE) 50 mcg/actuation nasal spray 1 spray by Each Nostril route once daily.       fluticasone-salmeterol diskus inhaler 250-50 mcg Inhale 1 puff into the lungs once daily. Controller 30 each 3     [DISCONTINUED] lisdexamfetamine (VYVANSE) 30 MG capsule Take 1 capsule (30 mg total) by mouth every morning. 30 capsule 0     [DISCONTINUED] lisdexamfetamine (VYVANSE) 30 MG capsule Take 1 capsule (30 mg total) by mouth every morning. 30 capsule 0     [DISCONTINUED] tamsulosin (FLOMAX) 0.4 mg Cap Take 1 capsule (0.4 mg total) by mouth once daily. for 14 days 14 capsule 0          Vital signs:   Vitals:    09/11/23 0954   BP: 122/78   BP Location: Left arm   Patient Position: Sitting   Pulse: 87   SpO2: 99%   Weight: 61.3 kg (135 lb 3.7 oz)   Height: 5' 4" (1.626 m)     Body mass index is 23.21 kg/m².    PHYSICAL EXAM:     Physical Exam  Vitals reviewed.   Constitutional:       General: She is not in acute distress.  HENT:      Head: Normocephalic and atraumatic.      Right Ear: Tympanic membrane and ear canal normal.      Left Ear: Tympanic membrane and ear canal normal.   Eyes:      General: No scleral icterus.     Conjunctiva/sclera: Conjunctivae normal.   Neck:      Thyroid: No thyromegaly.      Vascular: No carotid bruit.   Cardiovascular:      Rate and Rhythm: Normal rate and regular rhythm.      Pulses: Normal pulses.      Heart sounds: Normal heart sounds. No murmur " heard.     No friction rub. No gallop.   Pulmonary:      Effort: Pulmonary effort is normal.      Breath sounds: Normal breath sounds. No wheezing, rhonchi or rales.   Abdominal:      General: Bowel sounds are normal. There is no distension.      Palpations: Abdomen is soft.      Tenderness: There is no abdominal tenderness.   Musculoskeletal:      Cervical back: Normal range of motion and neck supple.      Right lower leg: No edema.      Left lower leg: No edema.   Lymphadenopathy:      Cervical: No cervical adenopathy.   Skin:     General: Skin is warm.      Capillary Refill: Capillary refill takes less than 2 seconds.   Neurological:      Mental Status: She is alert.             PERTINENT RESULTS:   No visits with results within 1 Week(s) from this visit.   Latest known visit with results is:   Office Visit on 08/03/2023   Component Date Value Ref Range Status    Urine Culture, Routine 08/03/2023 No significant growth   Final     CMP  Sodium   Date Value Ref Range Status   06/28/2023 140 136 - 145 mmol/L Final     Potassium   Date Value Ref Range Status   06/28/2023 4.1 3.5 - 5.1 mmol/L Final     Chloride   Date Value Ref Range Status   06/28/2023 108 95 - 110 mmol/L Final     CO2   Date Value Ref Range Status   06/28/2023 23 23 - 29 mmol/L Final     Glucose   Date Value Ref Range Status   06/28/2023 89 70 - 110 mg/dL Final     BUN   Date Value Ref Range Status   06/28/2023 6 6 - 20 mg/dL Final     Creatinine   Date Value Ref Range Status   06/28/2023 0.7 0.5 - 1.4 mg/dL Final     Calcium   Date Value Ref Range Status   06/28/2023 9.4 8.7 - 10.5 mg/dL Final     Total Protein   Date Value Ref Range Status   06/28/2023 6.8 6.0 - 8.4 g/dL Final     Albumin   Date Value Ref Range Status   06/28/2023 4.2 3.5 - 5.2 g/dL Final     Total Bilirubin   Date Value Ref Range Status   06/28/2023 1.5 (H) 0.1 - 1.0 mg/dL Final     Comment:     For infants and newborns, interpretation of results should be based  on gestational  "age, weight and in agreement with clinical  observations.    Premature Infant recommended reference ranges:  Up to 24 hours.............<8.0 mg/dL  Up to 48 hours............<12.0 mg/dL  3-5 days..................<15.0 mg/dL  6-29 days.................<15.0 mg/dL       Alkaline Phosphatase   Date Value Ref Range Status   06/28/2023 57 55 - 135 U/L Final     AST   Date Value Ref Range Status   06/28/2023 19 10 - 40 U/L Final     ALT   Date Value Ref Range Status   06/28/2023 18 10 - 44 U/L Final     Anion Gap   Date Value Ref Range Status   06/28/2023 9 8 - 16 mmol/L Final     eGFR   Date Value Ref Range Status   06/28/2023 >60 >60 mL/min/1.73 m^2 Final     Lab Results   Component Value Date    WBC 4.75 06/28/2023    HGB 13.1 06/28/2023    HCT 39.0 06/28/2023    MCV 88 06/28/2023     06/28/2023       Lab Results   Component Value Date    CHOL 164 01/05/2016     Lab Results   Component Value Date    HDL 52 01/05/2016     Lab Results   Component Value Date    LDLCALC 97.2 01/05/2016     No results found for: "DLDL"  Lab Results   Component Value Date    TRIG 74 01/05/2016       f1   Lab Results   Component Value Date    CHOLHDL 31.7 01/05/2016     Lab Results   Component Value Date    HGBA1C 5.0 09/27/2022     Lab Results   Component Value Date    TSH 1.858 05/09/2022       ASSESSMENT/PLAN:    1. Routine medical exam  Overview:  - preventative health counseling  - counseling on current recommendations for breast cancer screening. Paternal grandmother breast cancer > 64 yo  - counseling on current recommendations for cervical cancer screening. Followed by Gynecology and up to date  - counseling on current recommendations for colon cancer screening. Average risk      Orders:  -     Pneumococcal Conjugate Vaccine (20 Valent) (IM)  -     TSH; Future; Expected date: 09/11/2023  -     Lipid Panel; Future; Expected date: 09/11/2023  -     Hemoglobin A1C; Future; Expected date: 09/11/2023    2. Mild intermittent asthma " without complication  Overview:  - well controlled  - continue current management plan   - patient encouraged to notify me with any changes  - recommend PCV 20     Orders:  -     Pneumococcal Conjugate Vaccine (20 Valent) (IM)    3. Need for vaccination against Streptococcus pneumoniae  -     Pneumococcal Conjugate Vaccine (20 Valent) (IM)          ORDERS:   Orders Placed This Encounter    Pneumococcal Conjugate Vaccine (20 Valent) (IM)    TSH    Lipid Panel    Hemoglobin A1C       Vaccines recommended: PCV 20, Covid-19 and flu vaccine    Follow-up in 1 year pending results or sooner if any concerns.      This note is dictated using the M*Modal Fluency Direct word recognition program. There are word recognition mistakes that are occasionally missed on review.    Dr. She Oleary D.O.   Family Medicine

## 2023-09-08 ENCOUNTER — CLINICAL SUPPORT (OUTPATIENT)
Dept: REHABILITATION | Facility: HOSPITAL | Age: 33
End: 2023-09-08
Payer: COMMERCIAL

## 2023-09-08 DIAGNOSIS — M54.10 BACK PAIN WITH RIGHT-SIDED RADICULOPATHY: ICD-10-CM

## 2023-09-08 DIAGNOSIS — M54.50 ACUTE BILATERAL LOW BACK PAIN, UNSPECIFIED WHETHER SCIATICA PRESENT: Primary | ICD-10-CM

## 2023-09-08 DIAGNOSIS — R29.898 IMPAIRED STRENGTH OF HIP MUSCLES: ICD-10-CM

## 2023-09-08 DIAGNOSIS — R29.898 DECREASED STRENGTH OF TRUNK AND BACK: ICD-10-CM

## 2023-09-08 PROCEDURE — 97110 THERAPEUTIC EXERCISES: CPT | Mod: PO

## 2023-09-08 PROCEDURE — 97140 MANUAL THERAPY 1/> REGIONS: CPT | Mod: PO

## 2023-09-08 PROCEDURE — 97112 NEUROMUSCULAR REEDUCATION: CPT | Mod: PO

## 2023-09-08 NOTE — PROGRESS NOTES
"[facility]   Physical Therapy Treatment Note / Progress Note      Name: Jennifer Nguyen  Clinic Number: [id number]    Therapy Diagnosis:   Encounter Diagnoses   Name Primary?    Acute bilateral low back pain, unspecified whether sciatica present Yes    Back pain with right-sided radiculopathy     Decreased strength of trunk and back     Impaired strength of hip muscles        Physician: Martha Dasilva NP    Visit Date: 9/8/2023    Physician Orders: PT Eval and Treat   Medical Diagnosis from Referral:   M54.9 (ICD-10-CM) - Dorsalgia, unspecified   M47.817 (ICD-10-CM) - Spondylosis without myelopathy or radiculopathy, lumbosacral region   M79.18 (ICD-10-CM) - Myofascial pain      Evaluation Date: 8/9/2023  Authorization Period Expiration: 12/31/23  Plan of Care Expiration: 10/4/23  Progress Note Due: 9/6/23  Visit # / Visits authorized: 7/20 + eval  FOTO: 2/ 3     Precautions: Standard   PTA visits: 1/5     Time In:815 AM  Time Out: 900AM  Total Billable Time: 42 minutes (TE-2, NMR-1)    Subjective     Pt reports: "feeling ok, no better no worse, mobility still better, mostly achy  She was compliant with home exercise program.  Response to previous treatment: ongoing   Functional change: reduced pain and improved mobility     Pain: 2-3/10  Location: bilateral low back and upper glutes    Objective         Sensation: diffused dulled sensation in L5-S1      Lumbar ROM: %AROM    % Pain   Flexion    75% Yes   Extension >50%    yes   Left Side Bending WFL no   Right Side Bending 75% no   Left rotation >/=50% Yes    Right Rotation 75% yes           Lower Extremity Strength (graded 0-5 out of 5)    RLE LLE   Hip flexion: 4+/5 4+/5   Knee extension: 4+/5 5/5   Ankle dorsiflexion: 5/5 5/5   Posterior fibers of Gluteus medius 4+/5 5/5    Knee flexion 5/5 5/5   Hip extension: 5/5 4+/5       Intake Outcome Measure for FOTO Lumbar Spine Survey     Therapist reviewed FOTO scores for Jennifer Osullivan " "Patrick  FOMALICK documents entered into [facility] - see Media section.     Intake Score: 47        Treatment     Jennifer received the treatments listed below:    Bold=performed     therapeutic exercises to develop strength, flexibility, posture, and core stabilization for 23 minutes including:  Reassessment  FOTO  Bridge x20  Sciatic nerve glides   LTR (added add ball) X 20  Bent knee fall out 3 X 10  SKTC 20x5" hold   Piriformis stretch 3x30"  Cat camel 15x5" hold  Kneeling hip hinge with dowel x15  Bridges 3x10 3" hold RTB  SL clams RTB 2x10 aguila   Prayer stretch 15x5" hold   Repeated lumbar extension prone on elbows 2x10 5 "hold  Prone hip extension 2x10 aguila   Hip abd/add isometrics 20x5 " holds each direction    manual therapy techniques: Joint mobilizations and Soft tissue Mobilization were applied to the: low back for 10 minutes, including:-   Long axis distraction in prone and supine position   STM to R paraspinals, quadratus lumborum, piriformis on the right  Central PA glides Gr II to lumbosacral spine  STM to bilateral lumbar paraspinals   MET to correct left anterior rotation of innominate     neuromuscular re-education activities to improve: Posture and core control for 19 minutes. The following activities were included:  Standing pallof press X 30 each side RTB +lift   Half kneeling antirotational chop 3# on free motion  Dead bugs 2x10  PPT 20x5" - with legs over swiss ball today  PPT with marching 2x10   PPT with knee fallouts RTB 3 x10   90/90 heel taps 2x10        Patient Education and Home Exercises       Education provided:   - Pt educated on all interventions performed today.  - HEP    Written Home Exercises Provided: Patient instructed to cont prior HEP. Exercises were reviewed and Jennifer was able to demonstrate them prior to the end of the session.  Jennifer demonstrated good  understanding of the education provided. See EMR under Patient Instructions for exercises provided during therapy sessions    Assessment "     Jennifer continues to show good overall core strengthening in standing and half kneel position. WE will add in balance and ankle stability exercises at next session in order to assess proprioceptive deficits    Jennifer Is progressing well towards her goals.   Pt prognosis is Good.     Pt will continue to benefit from skilled outpatient physical therapy to address the deficits listed in the problem list box on initial evaluation, provider pt/family education and to maximize pt's level of independence in the home and community environment.     Pt's spiritual, cultural and educational needs considered and pt agreeable to plan of care and goals.     Anticipated barriers to physical therapy: none    Goals:   Short Term Goals (4 Weeks):  1. Pt will be compliant with HEP to supplement PT in decreasing pain with functional mobility.- MET  2. Pt will perform pallof press with good control to demonstrate improved core strength.- MET  3. Pt will improve lumbar ROM to </=minimal loss in all planes to improve functional mobility.  4. Pt will improve impaired LE MMTs by 1/2 muscle grade to improve strength for functional tasks.- progressing   5. Pt will report 50% improvement in return to ADLs and recreation to improve return to prior level of function.- MET     Long Term Goals (8 Weeks):   1. Pt will endorse full return to yoga and all recreational activities without limitation by back pain to return to prior level of function.   2. Pt will perform bridge with good control to demonstrate improved core strength.- MET  3. Pt will improve impaired LE MMTs by 1 muscle grade to improve strength for functional tasks.  4. Pt will report no pain during lumbar ROM to promote functional mobility.    Plan     Continue PT POC - add LE balance activity and varied surface training    Millie Hall, PT

## 2023-09-11 ENCOUNTER — OFFICE VISIT (OUTPATIENT)
Dept: PRIMARY CARE CLINIC | Facility: CLINIC | Age: 33
End: 2023-09-11
Attending: FAMILY MEDICINE
Payer: COMMERCIAL

## 2023-09-11 VITALS
HEIGHT: 64 IN | SYSTOLIC BLOOD PRESSURE: 122 MMHG | HEART RATE: 87 BPM | OXYGEN SATURATION: 99 % | BODY MASS INDEX: 23.09 KG/M2 | WEIGHT: 135.25 LBS | DIASTOLIC BLOOD PRESSURE: 78 MMHG

## 2023-09-11 DIAGNOSIS — Z00.00 ROUTINE MEDICAL EXAM: Primary | ICD-10-CM

## 2023-09-11 DIAGNOSIS — Z23 NEED FOR VACCINATION AGAINST STREPTOCOCCUS PNEUMONIAE: ICD-10-CM

## 2023-09-11 DIAGNOSIS — J45.20 MILD INTERMITTENT ASTHMA WITHOUT COMPLICATION: ICD-10-CM

## 2023-09-11 PROCEDURE — 99999 PR PBB SHADOW E&M-EST. PATIENT-LVL IV: ICD-10-PCS | Mod: PBBFAC,,, | Performed by: FAMILY MEDICINE

## 2023-09-11 PROCEDURE — 3078F DIAST BP <80 MM HG: CPT | Mod: CPTII,S$GLB,, | Performed by: FAMILY MEDICINE

## 2023-09-11 PROCEDURE — 3074F SYST BP LT 130 MM HG: CPT | Mod: CPTII,S$GLB,, | Performed by: FAMILY MEDICINE

## 2023-09-11 PROCEDURE — 99999 PR PBB SHADOW E&M-EST. PATIENT-LVL IV: CPT | Mod: PBBFAC,,, | Performed by: FAMILY MEDICINE

## 2023-09-11 PROCEDURE — 1159F MED LIST DOCD IN RCRD: CPT | Mod: CPTII,S$GLB,, | Performed by: FAMILY MEDICINE

## 2023-09-11 PROCEDURE — 3008F PR BODY MASS INDEX (BMI) DOCUMENTED: ICD-10-PCS | Mod: CPTII,S$GLB,, | Performed by: FAMILY MEDICINE

## 2023-09-11 PROCEDURE — 90677 PCV20 VACCINE IM: CPT | Mod: S$GLB,,, | Performed by: FAMILY MEDICINE

## 2023-09-11 PROCEDURE — 99395 PREV VISIT EST AGE 18-39: CPT | Mod: 25,S$GLB,, | Performed by: FAMILY MEDICINE

## 2023-09-11 PROCEDURE — 99395 PR PREVENTIVE VISIT,EST,18-39: ICD-10-PCS | Mod: 25,S$GLB,, | Performed by: FAMILY MEDICINE

## 2023-09-11 PROCEDURE — 90471 IMMUNIZATION ADMIN: CPT | Mod: S$GLB,,, | Performed by: FAMILY MEDICINE

## 2023-09-11 PROCEDURE — 3078F PR MOST RECENT DIASTOLIC BLOOD PRESSURE < 80 MM HG: ICD-10-PCS | Mod: CPTII,S$GLB,, | Performed by: FAMILY MEDICINE

## 2023-09-11 PROCEDURE — 90677 PNEUMOCOCCAL CONJUGATE VACCINE 20-VALENT: ICD-10-PCS | Mod: S$GLB,,, | Performed by: FAMILY MEDICINE

## 2023-09-11 PROCEDURE — 3074F PR MOST RECENT SYSTOLIC BLOOD PRESSURE < 130 MM HG: ICD-10-PCS | Mod: CPTII,S$GLB,, | Performed by: FAMILY MEDICINE

## 2023-09-11 PROCEDURE — 90471 PNEUMOCOCCAL CONJUGATE VACCINE 20-VALENT: ICD-10-PCS | Mod: S$GLB,,, | Performed by: FAMILY MEDICINE

## 2023-09-11 PROCEDURE — 1159F PR MEDICATION LIST DOCUMENTED IN MEDICAL RECORD: ICD-10-PCS | Mod: CPTII,S$GLB,, | Performed by: FAMILY MEDICINE

## 2023-09-11 PROCEDURE — 3008F BODY MASS INDEX DOCD: CPT | Mod: CPTII,S$GLB,, | Performed by: FAMILY MEDICINE

## 2023-09-11 PROCEDURE — 1160F PR REVIEW ALL MEDS BY PRESCRIBER/CLIN PHARMACIST DOCUMENTED: ICD-10-PCS | Mod: CPTII,S$GLB,, | Performed by: FAMILY MEDICINE

## 2023-09-11 PROCEDURE — 1160F RVW MEDS BY RX/DR IN RCRD: CPT | Mod: CPTII,S$GLB,, | Performed by: FAMILY MEDICINE

## 2023-09-12 ENCOUNTER — CLINICAL SUPPORT (OUTPATIENT)
Dept: REHABILITATION | Facility: HOSPITAL | Age: 33
End: 2023-09-12
Payer: COMMERCIAL

## 2023-09-12 DIAGNOSIS — M54.50 ACUTE BILATERAL LOW BACK PAIN, UNSPECIFIED WHETHER SCIATICA PRESENT: Primary | ICD-10-CM

## 2023-09-12 DIAGNOSIS — M54.10 BACK PAIN WITH RIGHT-SIDED RADICULOPATHY: ICD-10-CM

## 2023-09-12 DIAGNOSIS — R29.898 IMPAIRED STRENGTH OF HIP MUSCLES: ICD-10-CM

## 2023-09-12 DIAGNOSIS — R29.898 DECREASED STRENGTH OF TRUNK AND BACK: ICD-10-CM

## 2023-09-12 PROCEDURE — 97112 NEUROMUSCULAR REEDUCATION: CPT | Mod: PO,CQ

## 2023-09-12 PROCEDURE — 97110 THERAPEUTIC EXERCISES: CPT | Mod: PO,CQ

## 2023-09-12 NOTE — PROGRESS NOTES
"[facility]   Physical Therapy Treatment Note / Progress Note      Name: Jennifer Nguyen  Clinic Number: [id number]    Therapy Diagnosis:   Encounter Diagnoses   Name Primary?    Acute bilateral low back pain, unspecified whether sciatica present Yes    Back pain with right-sided radiculopathy     Decreased strength of trunk and back     Impaired strength of hip muscles        Physician: Martha Dasilva NP    Visit Date: 9/12/2023    Physician Orders: PT Eval and Treat   Medical Diagnosis from Referral:   M54.9 (ICD-10-CM) - Dorsalgia, unspecified   M47.817 (ICD-10-CM) - Spondylosis without myelopathy or radiculopathy, lumbosacral region   M79.18 (ICD-10-CM) - Myofascial pain      Evaluation Date: 8/9/2023  Authorization Period Expiration: 12/31/23  Plan of Care Expiration: 10/4/23  Progress Note Due: 9/6/23  Visit # / Visits authorized: 9/20 + eval  FOTO: 2/ 3     Precautions: Standard   PTA visits: 1/5     Time In: 7:35 AM    Time Out: 8:20 AM  Total Billable Time: 45 minutes     Subjective     Pt reports: "I overdid it this weekend."  She was compliant with home exercise program.  Response to previous treatment: ongoing   Functional change: reduced pain and improved mobility     Pain: not rated/10  Location: bilateral low back and upper glutes    Objective      N/A     Treatment     Jennifer received the treatments listed below:    Bold=performed     therapeutic exercises to develop strength, flexibility, posture, and core stabilization for 35 minutes including:  LTR x20  Piriformis stretch 3x30" aguila  Sciatic nerve glides 20x3" hold  aguila   Bridge GTB 3x10 3" hold  +Lateral sides steps 2 laps GTB   +Kickback @ shuttle 1 red band 2x10 aguila   +Deadlift to 8 in box 5# KB 2x10  Upright bike 5' lvl 2    Not performed:  Bent knee fall out 3 X 10  SKTC 20x5" hold   Cat camel 15x5" hold  Kneeling hip hinge with dowel x15  SL clams RTB 2x10 aguila   Prayer stretch 15x5" hold   Repeated lumbar " "extension prone on elbows 2x10 5 "hold  Prone hip extension 2x10 aguila   Hip abd/add isometrics 20x5 " holds each direction    manual therapy techniques: Joint mobilizations and Soft tissue Mobilization were applied to the: low back for 00 minutes, including:  Long axis distraction in prone and supine position   STM to R paraspinals, quadratus lumborum, piriformis on the right  Central PA glides Gr II to lumbosacral spine  STM to bilateral lumbar paraspinals   MET to correct left anterior rotation of innominate     neuromuscular re-education activities to improve: Posture and core control for 10 minutes. The following activities were included:  PPT with marching 2x10   +Thomas 10x5" hold  Dead bugs 2x10    Not performed:  heel taps 2x10    Standing pallof press X 30 each side RTB +lift   Half kneeling antirotational chop 3# on free motion  PPT 20x5" - with legs over swiss ball today  PPT with knee fallouts RTB 3 x10     Patient Education and Home Exercises       Education provided:   - Pt educated on all interventions performed today.  - HEP    Written Home Exercises Provided: Patient instructed to cont prior HEP. Exercises were reviewed and Jennifer was able to demonstrate them prior to the end of the session.  Jennifer demonstrated good  understanding of the education provided. See EMR under Patient Instructions for exercises provided during therapy sessions    Assessment     Prior to treatment Jennifer reported increased activities over the weekend which caused low back symptoms to flare up. Pt was able to properly perform deadlift with 5# weights, simulating proper body mechanics for lifting from 8 in step. Jennifer continues to show good overall core strengthening, and we will add in balance and ankle stability exercises at next session in order to assess proprioceptive deficits    Jennifer Is progressing well towards her goals.   Pt prognosis is Good.     Pt will continue to benefit from skilled outpatient physical therapy to " address the deficits listed in the problem list box on initial evaluation, provider pt/family education and to maximize pt's level of independence in the home and community environment.     Pt's spiritual, cultural and educational needs considered and pt agreeable to plan of care and goals.     Anticipated barriers to physical therapy: none    Goals:   Short Term Goals (4 Weeks):  1. Pt will be compliant with HEP to supplement PT in decreasing pain with functional mobility.- MET  2. Pt will perform pallof press with good control to demonstrate improved core strength.- MET  3. Pt will improve lumbar ROM to </=minimal loss in all planes to improve functional mobility.  4. Pt will improve impaired LE MMTs by 1/2 muscle grade to improve strength for functional tasks.- progressing   5. Pt will report 50% improvement in return to ADLs and recreation to improve return to prior level of function.- MET     Long Term Goals (8 Weeks):   1. Pt will endorse full return to yoga and all recreational activities without limitation by back pain to return to prior level of function.   2. Pt will perform bridge with good control to demonstrate improved core strength.- MET  3. Pt will improve impaired LE MMTs by 1 muscle grade to improve strength for functional tasks.  4. Pt will report no pain during lumbar ROM to promote functional mobility.    Plan     Continue PT POC - add LE balance activity and varied surface training    Nirmala Myles, PTA

## 2023-09-13 ENCOUNTER — OFFICE VISIT (OUTPATIENT)
Dept: PSYCHIATRY | Facility: CLINIC | Age: 33
End: 2023-09-13
Payer: COMMERCIAL

## 2023-09-13 ENCOUNTER — LAB VISIT (OUTPATIENT)
Dept: LAB | Facility: HOSPITAL | Age: 33
End: 2023-09-13
Attending: FAMILY MEDICINE
Payer: COMMERCIAL

## 2023-09-13 ENCOUNTER — PATIENT MESSAGE (OUTPATIENT)
Dept: PSYCHIATRY | Facility: CLINIC | Age: 33
End: 2023-09-13
Payer: COMMERCIAL

## 2023-09-13 VITALS
BODY MASS INDEX: 23.16 KG/M2 | WEIGHT: 134.94 LBS | DIASTOLIC BLOOD PRESSURE: 79 MMHG | HEART RATE: 75 BPM | SYSTOLIC BLOOD PRESSURE: 131 MMHG

## 2023-09-13 DIAGNOSIS — F33.1 MODERATE EPISODE OF RECURRENT MAJOR DEPRESSIVE DISORDER: ICD-10-CM

## 2023-09-13 DIAGNOSIS — Z00.00 ROUTINE MEDICAL EXAM: ICD-10-CM

## 2023-09-13 DIAGNOSIS — F90.0 ATTENTION DEFICIT HYPERACTIVITY DISORDER (ADHD), PREDOMINANTLY INATTENTIVE TYPE: Primary | ICD-10-CM

## 2023-09-13 LAB
CHOLEST SERPL-MCNC: 184 MG/DL (ref 120–199)
CHOLEST/HDLC SERPL: 3 {RATIO} (ref 2–5)
ESTIMATED AVG GLUCOSE: 100 MG/DL (ref 68–131)
HBA1C MFR BLD: 5.1 % (ref 4–5.6)
HDLC SERPL-MCNC: 61 MG/DL (ref 40–75)
HDLC SERPL: 33.2 % (ref 20–50)
LDLC SERPL CALC-MCNC: 111.8 MG/DL (ref 63–159)
NONHDLC SERPL-MCNC: 123 MG/DL
TRIGL SERPL-MCNC: 56 MG/DL (ref 30–150)
TSH SERPL DL<=0.005 MIU/L-ACNC: 1.36 UIU/ML (ref 0.4–4)

## 2023-09-13 PROCEDURE — 99999 PR PBB SHADOW E&M-EST. PATIENT-LVL II: CPT | Mod: PBBFAC,,, | Performed by: STUDENT IN AN ORGANIZED HEALTH CARE EDUCATION/TRAINING PROGRAM

## 2023-09-13 PROCEDURE — 3044F PR MOST RECENT HEMOGLOBIN A1C LEVEL <7.0%: ICD-10-PCS | Mod: CPTII,S$GLB,, | Performed by: STUDENT IN AN ORGANIZED HEALTH CARE EDUCATION/TRAINING PROGRAM

## 2023-09-13 PROCEDURE — 3008F PR BODY MASS INDEX (BMI) DOCUMENTED: ICD-10-PCS | Mod: CPTII,S$GLB,, | Performed by: STUDENT IN AN ORGANIZED HEALTH CARE EDUCATION/TRAINING PROGRAM

## 2023-09-13 PROCEDURE — 83036 HEMOGLOBIN GLYCOSYLATED A1C: CPT | Performed by: FAMILY MEDICINE

## 2023-09-13 PROCEDURE — 80061 LIPID PANEL: CPT | Performed by: FAMILY MEDICINE

## 2023-09-13 PROCEDURE — 3044F HG A1C LEVEL LT 7.0%: CPT | Mod: CPTII,S$GLB,, | Performed by: STUDENT IN AN ORGANIZED HEALTH CARE EDUCATION/TRAINING PROGRAM

## 2023-09-13 PROCEDURE — 3075F SYST BP GE 130 - 139MM HG: CPT | Mod: CPTII,S$GLB,, | Performed by: STUDENT IN AN ORGANIZED HEALTH CARE EDUCATION/TRAINING PROGRAM

## 2023-09-13 PROCEDURE — 3008F BODY MASS INDEX DOCD: CPT | Mod: CPTII,S$GLB,, | Performed by: STUDENT IN AN ORGANIZED HEALTH CARE EDUCATION/TRAINING PROGRAM

## 2023-09-13 PROCEDURE — 99215 OFFICE O/P EST HI 40 MIN: CPT | Mod: S$GLB,,, | Performed by: STUDENT IN AN ORGANIZED HEALTH CARE EDUCATION/TRAINING PROGRAM

## 2023-09-13 PROCEDURE — 99215 PR OFFICE/OUTPT VISIT, EST, LEVL V, 40-54 MIN: ICD-10-PCS | Mod: S$GLB,,, | Performed by: STUDENT IN AN ORGANIZED HEALTH CARE EDUCATION/TRAINING PROGRAM

## 2023-09-13 PROCEDURE — 3075F PR MOST RECENT SYSTOLIC BLOOD PRESS GE 130-139MM HG: ICD-10-PCS | Mod: CPTII,S$GLB,, | Performed by: STUDENT IN AN ORGANIZED HEALTH CARE EDUCATION/TRAINING PROGRAM

## 2023-09-13 PROCEDURE — 84443 ASSAY THYROID STIM HORMONE: CPT | Performed by: FAMILY MEDICINE

## 2023-09-13 PROCEDURE — 3078F PR MOST RECENT DIASTOLIC BLOOD PRESSURE < 80 MM HG: ICD-10-PCS | Mod: CPTII,S$GLB,, | Performed by: STUDENT IN AN ORGANIZED HEALTH CARE EDUCATION/TRAINING PROGRAM

## 2023-09-13 PROCEDURE — 36415 COLL VENOUS BLD VENIPUNCTURE: CPT | Performed by: FAMILY MEDICINE

## 2023-09-13 PROCEDURE — 99999 PR PBB SHADOW E&M-EST. PATIENT-LVL II: ICD-10-PCS | Mod: PBBFAC,,, | Performed by: STUDENT IN AN ORGANIZED HEALTH CARE EDUCATION/TRAINING PROGRAM

## 2023-09-13 PROCEDURE — 3078F DIAST BP <80 MM HG: CPT | Mod: CPTII,S$GLB,, | Performed by: STUDENT IN AN ORGANIZED HEALTH CARE EDUCATION/TRAINING PROGRAM

## 2023-09-13 RX ORDER — LISDEXAMFETAMINE DIMESYLATE 30 MG/1
30 CAPSULE ORAL EVERY MORNING
Qty: 30 CAPSULE | Refills: 0 | Status: SHIPPED | OUTPATIENT
Start: 2023-10-13 | End: 2023-12-21 | Stop reason: SDUPTHER

## 2023-09-13 RX ORDER — LISDEXAMFETAMINE DIMESYLATE 30 MG/1
30 CAPSULE ORAL EVERY MORNING
Qty: 30 CAPSULE | Refills: 0 | Status: SHIPPED | OUTPATIENT
Start: 2023-09-13 | End: 2023-09-13

## 2023-09-13 RX ORDER — LISDEXAMFETAMINE DIMESYLATE 30 MG/1
30 CAPSULE ORAL EVERY MORNING
Qty: 30 CAPSULE | Refills: 0 | Status: SHIPPED | OUTPATIENT
Start: 2023-11-13 | End: 2023-12-21 | Stop reason: SDUPTHER

## 2023-09-13 RX ORDER — LORAZEPAM 0.5 MG/1
0.5 TABLET ORAL DAILY PRN
Qty: 5 TABLET | Refills: 0 | Status: SHIPPED | OUTPATIENT
Start: 2023-09-13 | End: 2023-10-25 | Stop reason: SDUPTHER

## 2023-09-13 RX ORDER — ESCITALOPRAM OXALATE 10 MG/1
TABLET ORAL
Qty: 30 TABLET | Refills: 0 | Status: SHIPPED | OUTPATIENT
Start: 2023-09-13 | End: 2023-10-15

## 2023-09-13 RX ORDER — LISDEXAMFETAMINE DIMESYLATE 30 MG/1
30 CAPSULE ORAL EVERY MORNING
Qty: 30 CAPSULE | Refills: 0 | Status: SHIPPED | OUTPATIENT
Start: 2023-09-13 | End: 2023-12-21 | Stop reason: SDUPTHER

## 2023-09-13 NOTE — PROGRESS NOTES
"Outpatient Psychiatry Follow-Up Visit (MD/NP)    9/13/2023    Chief Complaint:  Jennifer Nguyen is a 33 y.o. female who presents today for follow-up of attention problems.  Met with patient.      Interval History and Content of Current Session:  Interim Events/Subjective Report/Content of Current Session:   Jennifer Nguyen checked in on time for her visit- pt takes 30 mg vyvanse daily, last seen 6/2023.  Pt states that she "could be better." She has a pinched nerve and the pain has taken a toll on her- her pain was so severe she had issues with work- initially thought it was a kidney stone. She is in physical therapy. She has become more socially isolated due to her chronic pain. She is having neuropathy and some reduced sensation, which has made her less stable, and she has had one fall.   She is still in therapy. She feels like she is getting very frustrated, having issues with work. She does feel like she is depressed. She has had episodes of depression in the past. She has tried lexapro before at 20 mg. She felt like the lexapro was helpful. She also had taken ativan in the past. She tried zoloft, which messed up her stomach.Prozac made her irritable.   Reviewed risks, benefits, SE of lexapro.   She has been losing weight also, but she has been trying to recover and regain weight.   Vyvanse she believes is still helpful and is not having side effects. No cardiac SE.   No thoughts of suicide.   No HI, no AVH.       Initial history with me:  Had tried wellbutrin, it made her irritable, which she was not a fan of. She likes the vyvanse, but she is concerned about the cost. She is able to sit still, sit in silence. She is no longer as distracted by extraneous stimuli. 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 She feels like her skin may be drier from the medication- she does have a history of eczema. She is ok with it. She denies chest pain, palpitations. NO hx of heart murmur, no hx of heart problems.   She states her " "anxiety has been OK. She is also in therapy and saw her therapist yesterday. A lot of her anxiety was secondary to problems with focus. She feels like things are much better and her focus and functioning is much better. She had tried anxiety, depression medications and had not seen benefit.   She has had weird bruising, fatigue- saw GP, saw a hematologist.     Pt is from [place], [place]. Appetite has decreased, she has lost about 10 lbs.   Denies SI, HI, AVH. She is very accident prone.   No thoughts about suicide.     No gun in her house. Did attempt suicide in 9th grade. Has a history of self harm-  was picking scabs during the pandemic, but hasn't cut in a long time. She lives alone, and was very isolated in the pandemic. IT was hard for her to cope with the loneliness.     Review of Systems   PSYCHIATRIC: Pertinant items are noted in the narrative.  CONSTITUTIONAL: No weight gain or loss.   MUSCULOSKELETAL: No pain or stiffness of the joints.  ENT: See above.  RESPIRATORY: See above.  CARDIOVASCULAR: No tachycardia or chest pain.  GASTROINTESTINAL: No nausea, vomiting, pain, constipation or diarrhea.    Past Medical, Family and Social History: The patient's past medical, family and social history have been reviewed and updated as appropriate within the electronic medical record - see encounter notes.    Compliance: yes    Side effects: see above    Risk Parameters:  Patient reports no suicidal ideation  Patient reports no homicidal ideation  Patient reports no self-injurious behavior  Patient reports no violent behavior    Exam (detailed: at least 9 elements; comprehensive: all 15 elements)   Constitutional  Vitals:  Vitals:    09/13/23 1120   BP: 131/79   Pulse: 75            General:  unremarkable, age appropriate     Musculoskeletal  Muscle Strength/Tone:  not examined   Gait & Station:  non-ataxic     Psychiatric  Speech:  no latency; no press   Mood & Affect:  "Depressed, anxious"  congruent and " appropriate   Thought Process:  normal and logical   Associations:  intact   Thought Content:  normal, no suicidality, no homicidality, delusions, or paranoia   Insight:  intact   Judgement: behavior is adequate to circumstances   Orientation:  grossly intact   Memory: intact for content of interview   Language: grossly intact   Attention Span & Concentration:  able to focus   Fund of Knowledge:  intact and appropriate to age and level of education     Assessment and Diagnosis   Status/Progress: Based on the examination today, the patient's problem(s) is/are adequately but not ideally controlled.  New problems have not been presented today.   Co-morbidities, Diagnostic uncertainty, and Lack of compliance are not complicating management of the primary condition.  There are no active rule-out diagnoses for this patient at this time.     General Impression: Jennifer Nguyen, a 33 y.o.  female, presenting for follow-up visit for management of ADHD symptoms. Presents 12/22/22 - did not tolerate Wellbutrin; Vyvanse started. Presents at 3/21/22 for F/u, vvyanse has been helping and she denies SE, feels great benefit.   9/2023- suffering due to chronic pain, having depression and anxiety.     ADHD, inattentive type   Major depressive disorder, recurrent, moderate  Chronic pain     Intervention/Counseling/Treatment Plan   Medication Management: Continue current medications.    Continue Vyvanse 30 mg daily  Patient has no contraindications: no h/o allergic rxn, agitation, anxiety, tourette's, arrythmia, cardiovascular disease, cardiac structural abnormalities, hyperthyroidism, glaucoma, Other psychiatric illness, etc.   Reviewed possible side effects 3 times. Discussed diagnosis, risks and benefits of proposed treatment vs alternative treatments vs no treatment, and potential side effects of these treatments (death, dependency, psychosis, alisson, aggression, HTN, tics, MI, stroke, arrythmia, seizure, anaphylaxis or other  allergic reactions, leukopenia, nervousness, anorexia, insomnia, tachycardia, palpitations, dizziness, BP changes, HR changes, visual disturbance, etc.). The patient expresses understanding of the above and displays the capacity to agree with this treatment given said understanding. Patient also agrees that, currently, the benefits outweigh the risks and would like to pursue treatment at this time.  The risks and benefits of medication were discussed with the patient.    Discussed diagnosis, risks and benefits of proposed treatment above vs alternative treatments vs no treatment, and potential side effects of these treatments. The patient expresses understanding of the above and displays the capacity to agree with this treatment given said understanding. Patient also agrees that, currently, the benefits outweigh the risks and would like to pursue treatment at this time.  Pt is experiencing a recurrence of her depression. Has taken lexapro in the past for this with good results. Restart lexapro at 5 mg x 1 week, then increasing to 10 mg. Reviewed r/b/SE of medication including but not limited to GI distress, serotonin syndrome, manic switching, increased SI, sexual SE. All questions and concerns addressed. No SI.   Wrote #5 0.5 mg ativan for PRN anxiety, insomnia. Informed pt of the risks of continuous Benzodiazepine use including tolerance, dependence and withdrawals that may be life threatening upon abrupt cessation. Also advised not to take Benzodiazepines with Opiates or other sedatives and also not to drive or operate heavy machinery while using Benzodiazepines. Reminded never to mix with alcohol or opiates.  Continue with therapy.  Discussed upcoming maternity leave.   Discussed inherent unpredictability of medications in each individual.   Encouraged Patient to keep future appointments.   Take medications as prescribed and abstain from substance abuse.   In the event of an emergency patient was advised to go  to the emergency room  Time level : 45    Return to Clinic: as scheduled  F/u 4-6 weeks  Lien Kessler MD

## 2023-09-14 ENCOUNTER — CLINICAL SUPPORT (OUTPATIENT)
Dept: REHABILITATION | Facility: HOSPITAL | Age: 33
End: 2023-09-14
Payer: COMMERCIAL

## 2023-09-14 DIAGNOSIS — R29.898 IMPAIRED STRENGTH OF HIP MUSCLES: ICD-10-CM

## 2023-09-14 DIAGNOSIS — M54.10 BACK PAIN WITH RIGHT-SIDED RADICULOPATHY: ICD-10-CM

## 2023-09-14 DIAGNOSIS — R29.898 DECREASED STRENGTH OF TRUNK AND BACK: ICD-10-CM

## 2023-09-14 DIAGNOSIS — M54.50 ACUTE BILATERAL LOW BACK PAIN, UNSPECIFIED WHETHER SCIATICA PRESENT: Primary | ICD-10-CM

## 2023-09-14 PROCEDURE — 97112 NEUROMUSCULAR REEDUCATION: CPT | Mod: PO,CQ

## 2023-09-14 PROCEDURE — 97530 THERAPEUTIC ACTIVITIES: CPT | Mod: PO,CQ

## 2023-09-14 PROCEDURE — 97110 THERAPEUTIC EXERCISES: CPT | Mod: PO,CQ

## 2023-09-14 NOTE — PROGRESS NOTES
"[facility]   Physical Therapy Treatment Note / Progress Note      Name: Jennifer Nguyen  Clinic Number: [id number]    Therapy Diagnosis:   Encounter Diagnoses   Name Primary?    Acute bilateral low back pain, unspecified whether sciatica present Yes    Back pain with right-sided radiculopathy     Decreased strength of trunk and back     Impaired strength of hip muscles        Physician: Martha Dasilva NP    Visit Date: 9/14/2023    Physician Orders: PT Eval and Treat   Medical Diagnosis from Referral:   M54.9 (ICD-10-CM) - Dorsalgia, unspecified   M47.817 (ICD-10-CM) - Spondylosis without myelopathy or radiculopathy, lumbosacral region   M79.18 (ICD-10-CM) - Myofascial pain      Evaluation Date: 8/9/2023  Authorization Period Expiration: 12/31/23  Plan of Care Expiration: 10/4/23  Progress Note Due: 9/6/23  Visit # / Visits authorized: 9/20 + eval  FOTO: 2/ 3     Precautions: Standard   PTA visits: 1/5     Time In: 8:00 AM    Time Out: 8:48 AM  Total Billable Time: 48 minutes     Subjective     Pt reports: "I overdid it this weekend."  She was compliant with home exercise program.  Response to previous treatment: ongoing   Functional change: reduced pain and improved mobility     Pain: not rated/10  Location: bilateral low back and upper glutes    Objective      N/A     Treatment     Jennifer received the treatments listed below:    Bold=performed     therapeutic exercises to develop strength, flexibility, posture, and core stabilization for 23 minutes including:  LTR x20  Piriformis stretch 3x30" aguila  Sciatic nerve glides 20x3" hold aguila   Bridge GTB 3x10 3" hold  Lateral sides steps 2 laps GTB     Not performed:  Bent knee fall out 3 X 10  SKTC 20x5" hold   Cat camel 15x5" hold  Kneeling hip hinge with dowel x15  SL clams RTB 2x10 aguila   Prayer stretch 15x5" hold   Repeated lumbar extension prone on elbows 2x10 5 "hold  Prone hip extension 2x10 aguila   Hip abd/add isometrics 20x5 " " "holds each direction  Kickback @ shuttle 1 red band 2x10 aguila     manual therapy techniques: Joint mobilizations and Soft tissue Mobilization were applied to the: low back for 00 minutes, including:  Long axis distraction in prone and supine position   STM to R paraspinals, quadratus lumborum, piriformis on the right  Central PA glides Gr II to lumbosacral spine  STM to bilateral lumbar paraspinals   MET to correct left anterior rotation of innominate     neuromuscular re-education activities to improve: Posture and core control for 13 minutes. The following activities were included:  PPT with marching 2x10   Thomas curl ups 10x5" hold  Dead bugs 2x10    Not performed:  heel taps 2x10    Standing pallof press X 30 each side RTB +lift   Half kneeling antirotational chop 3# on free motion  PPT 20x5" - with legs over swiss ball today  PPT with knee fallouts RTB 3 x10     therapeutic activities to improve functional performance for 9 minutes, including:  Deadlift to 8 in box 5# KB 2x10  Elliptical lvl 1.0 5 minutes      Patient Education and Home Exercises       Education provided:   - Pt educated on all interventions performed today.  - HEP    Written Home Exercises Provided: Patient instructed to cont prior HEP. Exercises were reviewed and Jennifer was able to demonstrate them prior to the end of the session.  Jennifer demonstrated good  understanding of the education provided. See EMR under Patient Instructions for exercises provided during therapy sessions    Assessment     Jennifer reported she would like to participate in kickball game in two weeks, however pt was advise to discuss with her MD at her upcoming appointment for clearance. Added elliptical today to stimulate running activity which pt tolerated well, however pt did demonstrate fair endurance. Jennifer reported fatigue following todays session, but did not experience increased pain with the activity.    Jennifer Is progressing well towards her goals.   Pt prognosis is " Good.     Pt will continue to benefit from skilled outpatient physical therapy to address the deficits listed in the problem list box on initial evaluation, provider pt/family education and to maximize pt's level of independence in the home and community environment.     Pt's spiritual, cultural and educational needs considered and pt agreeable to plan of care and goals.     Anticipated barriers to physical therapy: none    Goals:   Short Term Goals (4 Weeks):  1. Pt will be compliant with HEP to supplement PT in decreasing pain with functional mobility.- MET  2. Pt will perform pallof press with good control to demonstrate improved core strength.- MET  3. Pt will improve lumbar ROM to </=minimal loss in all planes to improve functional mobility.  4. Pt will improve impaired LE MMTs by 1/2 muscle grade to improve strength for functional tasks.- progressing   5. Pt will report 50% improvement in return to ADLs and recreation to improve return to prior level of function.- MET     Long Term Goals (8 Weeks):   1. Pt will endorse full return to yoga and all recreational activities without limitation by back pain to return to prior level of function.   2. Pt will perform bridge with good control to demonstrate improved core strength.- MET  3. Pt will improve impaired LE MMTs by 1 muscle grade to improve strength for functional tasks.  4. Pt will report no pain during lumbar ROM to promote functional mobility.    Plan     Continue PT POC - add LE balance activity and varied surface training    Nirmala Myles, PTA

## 2023-09-18 NOTE — PROGRESS NOTES
"[facility]   Physical Therapy Treatment Note     Name: Jennifer Nguyen  Clinic Number: [id number]    Therapy Diagnosis:   Encounter Diagnoses   Name Primary?    Back pain with right-sided radiculopathy Yes    Decreased strength of trunk and back     Impaired strength of hip muscles          Physician: Martha Dasilva NP    Visit Date: 9/19/2023    Physician Orders: PT Eval and Treat   Medical Diagnosis from Referral:   M54.9 (ICD-10-CM) - Dorsalgia, unspecified   M47.817 (ICD-10-CM) - Spondylosis without myelopathy or radiculopathy, lumbosacral region   M79.18 (ICD-10-CM) - Myofascial pain      Evaluation Date: 8/9/2023  Authorization Period Expiration: 12/31/23  Plan of Care Expiration: 10/4/23  Progress Note Due: 10/4/23  Visit # / Visits authorized: 11/20 + eval  FOTO: 2/ 3     Precautions: Standard   PTA visits: 1/5     Time In: 731 AM  Time Out: 8:11 AM  Total Billable Time: 40 minutes  (TE-2, NMR-1, TA-1)    Subjective     Pt reports: sitting for a long time is painful but she does not get the burning pain now. Feels she is getting better- she can move, walk, function with less pain. Improved sensation going down the right leg.  She was compliant with home exercise program.  Response to previous treatment: ongoing   Functional change: reduced pain and improved mobility     Pain: 1-2/10, can go to 4-5/10 if seated for a while   Location: bilateral low back and upper glutes    Objective      N/A     Treatment     Jennifer received the treatments listed below:    Bold=performed     therapeutic exercises to develop strength, flexibility, posture, and core stabilization for 23 minutes including:  Elliptical lvl 1.0 5 minutes  for endurance   LTR x30  Piriformis stretch 3x30" aguila  Sciatic nerve glides 30x3" hold aguila   Bridges 2x10 3" hold  Lateral sides steps 2 laps GTB   Bent knee fall out 3 X 10  SKTC 20x5" hold   Cat camel 15x5" hold  Kneeling hip hinge with dowel x15  SL clams RTB " "2x10 aguila   Prayer stretch 15x5" hold   Repeated lumbar extension prone on elbows 2x10 5 "hold  Prone hip extension 2x10 aguila   Hip abd/add isometrics 20x5 " holds each direction  Kickback @ shuttle 1 red band 2x10 aguila     manual therapy techniques: Joint mobilizations and Soft tissue Mobilization were applied to the: low back for 00 minutes, including:  Long axis distraction in prone and supine position   STM to R paraspinals, quadratus lumborum, piriformis on the right  Central PA glides Gr II to lumbosacral spine  STM to bilateral lumbar paraspinals   MET to correct left anterior rotation of innominate     neuromuscular re-education activities to improve: Posture and core control for 8 minutes. The following activities were included:  PPT with marching 2x10   Thomas curl ups 20x5" hold  Dead bugs 2x10  heel taps 2x10    Standing pallof press X 30 each side RTB +lift   Half kneeling antirotational chop 3# on free motion  PPT 20x5" - with legs over swiss ball today  PPT with knee fallouts RTB 3 x10     therapeutic activities to improve functional performance for 9 minutes, including:  Deadlift to ground 5# KB 2x10  +TRX squats     Patient Education and Home Exercises       Education provided:   - Pt educated on all interventions performed today.  - HEP    Written Home Exercises Provided: Patient instructed to cont prior HEP. Exercises were reviewed and Jennifer was able to demonstrate them prior to the end of the session.  Jennifer demonstrated good  understanding of the education provided. See EMR under Patient Instructions for exercises provided during therapy sessions    Assessment     Jennifer Osullivan demonstrates great carryover of all activities and interventions today. She is improving in core strength at this time. She is returning to yoga and will be participating in recreational kickball tomorrow. She endorses improved right leg radicular symptoms as well. She can perform dead lifts with 5 lb kettlebell to the ground " instead of to 8 inch box today demonstrating good progress. Also able to perform good squat form using TRX bands. Continue to progress as james.     Jennifer Is progressing well towards her goals.   Pt prognosis is Good.     Pt will continue to benefit from skilled outpatient physical therapy to address the deficits listed in the problem list box on initial evaluation, provider pt/family education and to maximize pt's level of independence in the home and community environment.     Pt's spiritual, cultural and educational needs considered and pt agreeable to plan of care and goals.     Anticipated barriers to physical therapy: none    Goals:   Short Term Goals (4 Weeks):  1. Pt will be compliant with HEP to supplement PT in decreasing pain with functional mobility.- MET  2. Pt will perform pallof press with good control to demonstrate improved core strength.- MET  3. Pt will improve lumbar ROM to </=minimal loss in all planes to improve functional mobility.  4. Pt will improve impaired LE MMTs by 1/2 muscle grade to improve strength for functional tasks.- progressing   5. Pt will report 50% improvement in return to ADLs and recreation to improve return to prior level of function.- MET     Long Term Goals (8 Weeks):   1. Pt will endorse full return to yoga and all recreational activities without limitation by back pain to return to prior level of function.   2. Pt will perform bridge with good control to demonstrate improved core strength.- MET  3. Pt will improve impaired LE MMTs by 1 muscle grade to improve strength for functional tasks.  4. Pt will report no pain during lumbar ROM to promote functional mobility.    Plan     Continue PT POC - add LE balance activity and varied surface training    Lizzy Saini, PT

## 2023-09-19 ENCOUNTER — CLINICAL SUPPORT (OUTPATIENT)
Dept: REHABILITATION | Facility: HOSPITAL | Age: 33
End: 2023-09-19
Payer: COMMERCIAL

## 2023-09-19 DIAGNOSIS — R29.898 DECREASED STRENGTH OF TRUNK AND BACK: ICD-10-CM

## 2023-09-19 DIAGNOSIS — M54.10 BACK PAIN WITH RIGHT-SIDED RADICULOPATHY: Primary | ICD-10-CM

## 2023-09-19 DIAGNOSIS — R29.898 IMPAIRED STRENGTH OF HIP MUSCLES: ICD-10-CM

## 2023-09-19 PROCEDURE — 97110 THERAPEUTIC EXERCISES: CPT | Mod: PO

## 2023-09-19 PROCEDURE — 97530 THERAPEUTIC ACTIVITIES: CPT | Mod: PO

## 2023-09-19 PROCEDURE — 97112 NEUROMUSCULAR REEDUCATION: CPT | Mod: PO

## 2023-09-20 NOTE — PROGRESS NOTES
"[facility]   Physical Therapy Treatment Note     Name: Jennifer Nguyen  Clinic Number: [id number]    Therapy Diagnosis:   Encounter Diagnoses   Name Primary?    Back pain with right-sided radiculopathy Yes    Decreased strength of trunk and back     Impaired strength of hip muscles            Physician: Martha Dasilva NP    Visit Date: 9/21/2023    Physician Orders: PT Eval and Treat   Medical Diagnosis from Referral:   M54.9 (ICD-10-CM) - Dorsalgia, unspecified   M47.817 (ICD-10-CM) - Spondylosis without myelopathy or radiculopathy, lumbosacral region   M79.18 (ICD-10-CM) - Myofascial pain      Evaluation Date: 8/9/2023  Authorization Period Expiration: 12/31/23  Plan of Care Expiration: 10/4/23  Progress Note Due: 10/4/23  Visit # / Visits authorized: 12/20 + eval  FOTO: 2/ 3     Precautions: Standard   PTA visits: 1/5     Time In: 930 AM  Time Out: 10:05 AM  Total Billable Time: 35 minutes  (TE-1, NMR-1, TA-1)    Subjective     Pt reports: feeling achey today but also did yoga and kickball yesterday  She was compliant with home exercise program.  Response to previous treatment: ongoing   Functional change: reduced pain and improved mobility     Pain: 2-3/10  Location: bilateral low back and upper glutes    Objective      N/A     Treatment     Jennifer Osullivan received the treatments listed below:    Bold=performed     therapeutic exercises to develop strength, flexibility, posture, and core stabilization for 12 minutes including:  Elliptical lvl 1.0 5 minutes  for endurance   LTR x30  Piriformis stretch 3x30" aguila  Cat camel 20x5" hold  Sciatic nerve glides 30x3" hold aguila   Bridges 2x10 3" hold  Lateral sides steps 2 laps GTB   Bent knee fall out 3 X 10  SKTC 20x5" hold   Kneeling hip hinge with dowel x15  SL clams RTB 2x10 aguila   Prayer stretch 15x5" hold   Repeated lumbar extension prone on elbows 2x10 5 "hold  Prone hip extension 2x10 aguila   Hip abd/add isometrics 20x5 " holds each " "direction  Kickback @ shuttle 1 red band 2x10 aguila     manual therapy techniques: Joint mobilizations and Soft tissue Mobilization were applied to the: low back for 00 minutes, including:  Long axis distraction in prone and supine position   STM to R paraspinals, quadratus lumborum, piriformis on the right  Central PA glides Gr II to lumbosacral spine  STM to bilateral lumbar paraspinals   MET to correct left anterior rotation of innominate     neuromuscular re-education activities to improve: Posture and core control, balance for 15 minutes. The following activities were included:  PPT with marching 2x10   Thomas curl ups 20x5" hold  Dead bugs 2x10  Balance:    +Tandem stance each foot in front 3x20"   +SLS 3x15" on each leg   +SLS rebounder ball throws x10 each leg  heel taps 2x10    Standing pallof press X 30 each side RTB +lift   Half kneeling antirotational chop 3# on free motion  PPT 20x5" - with legs over swiss ball today  PPT with knee fallouts RTB 3 x10     therapeutic activities to improve functional performance for 8 minutes, including:  Deadlift to ground 5# KB 2x10  TRX squats 2x10    Patient Education and Home Exercises       Education provided:   - Pt educated on all interventions performed today.  - HEP    Written Home Exercises Provided: Patient instructed to cont prior HEP. Exercises were reviewed and Jennifer Osullivan was able to demonstrate them prior to the end of the session.  Jennifer Osullivan demonstrated good  understanding of the education provided. See EMR under Patient Instructions for exercises provided during therapy sessions    Assessment     Jennifer sOullivan demonstrates great carryover of all activities and interventions today. She was able to play recreational kickball and participate in yoga yesterday with increased soreness/aching but no increase in pain or increase in symptoms. Initiated balance interventions today with appropriate challenge from patient. Pt is progressing very well with PT " POC.      Jennifer Osullivan Is progressing well towards her goals.   Pt prognosis is Good.     Pt will continue to benefit from skilled outpatient physical therapy to address the deficits listed in the problem list box on initial evaluation, provider pt/family education and to maximize pt's level of independence in the home and community environment.     Pt's spiritual, cultural and educational needs considered and pt agreeable to plan of care and goals.     Anticipated barriers to physical therapy: none    Goals:   Short Term Goals (4 Weeks):  1. Pt will be compliant with HEP to supplement PT in decreasing pain with functional mobility.- MET  2. Pt will perform pallof press with good control to demonstrate improved core strength.- MET  3. Pt will improve lumbar ROM to </=minimal loss in all planes to improve functional mobility.  4. Pt will improve impaired LE MMTs by 1/2 muscle grade to improve strength for functional tasks.- progressing   5. Pt will report 50% improvement in return to ADLs and recreation to improve return to prior level of function.- MET     Long Term Goals (8 Weeks):   1. Pt will endorse full return to yoga and all recreational activities without limitation by back pain to return to prior level of function.   2. Pt will perform bridge with good control to demonstrate improved core strength.- MET  3. Pt will improve impaired LE MMTs by 1 muscle grade to improve strength for functional tasks.  4. Pt will report no pain during lumbar ROM to promote functional mobility.    Plan     Continue PT ERASMO Saini, PT

## 2023-09-21 ENCOUNTER — CLINICAL SUPPORT (OUTPATIENT)
Dept: REHABILITATION | Facility: HOSPITAL | Age: 33
End: 2023-09-21
Payer: COMMERCIAL

## 2023-09-21 DIAGNOSIS — R29.898 IMPAIRED STRENGTH OF HIP MUSCLES: ICD-10-CM

## 2023-09-21 DIAGNOSIS — M54.10 BACK PAIN WITH RIGHT-SIDED RADICULOPATHY: Primary | ICD-10-CM

## 2023-09-21 DIAGNOSIS — R29.898 DECREASED STRENGTH OF TRUNK AND BACK: ICD-10-CM

## 2023-09-21 PROCEDURE — 97112 NEUROMUSCULAR REEDUCATION: CPT | Mod: PO

## 2023-09-21 PROCEDURE — 97530 THERAPEUTIC ACTIVITIES: CPT | Mod: PO

## 2023-09-21 PROCEDURE — 97110 THERAPEUTIC EXERCISES: CPT | Mod: PO

## 2023-09-26 ENCOUNTER — CLINICAL SUPPORT (OUTPATIENT)
Dept: REHABILITATION | Facility: HOSPITAL | Age: 33
End: 2023-09-26
Payer: COMMERCIAL

## 2023-09-26 DIAGNOSIS — R29.898 IMPAIRED STRENGTH OF HIP MUSCLES: ICD-10-CM

## 2023-09-26 DIAGNOSIS — M54.50 ACUTE BILATERAL LOW BACK PAIN, UNSPECIFIED WHETHER SCIATICA PRESENT: Primary | ICD-10-CM

## 2023-09-26 DIAGNOSIS — R29.898 DECREASED STRENGTH OF TRUNK AND BACK: ICD-10-CM

## 2023-09-26 DIAGNOSIS — M54.10 BACK PAIN WITH RIGHT-SIDED RADICULOPATHY: ICD-10-CM

## 2023-09-26 PROCEDURE — 97112 NEUROMUSCULAR REEDUCATION: CPT | Mod: PO

## 2023-09-26 PROCEDURE — 97110 THERAPEUTIC EXERCISES: CPT | Mod: PO

## 2023-09-26 NOTE — PROGRESS NOTES
"[facility]   Physical Therapy Treatment Note     Name: Jennifer Osullivan Nguyen  Clinic Number: [id number]    Therapy Diagnosis:   Encounter Diagnoses   Name Primary?    Acute bilateral low back pain, unspecified whether sciatica present Yes    Back pain with right-sided radiculopathy     Decreased strength of trunk and back     Impaired strength of hip muscles            Physician: Martha Dasilva NP    Visit Date: 9/26/2023    Physician Orders: PT Eval and Treat   Medical Diagnosis from Referral:   M54.9 (ICD-10-CM) - Dorsalgia, unspecified   M47.817 (ICD-10-CM) - Spondylosis without myelopathy or radiculopathy, lumbosacral region   M79.18 (ICD-10-CM) - Myofascial pain      Evaluation Date: 8/9/2023  Authorization Period Expiration: 12/31/23  Plan of Care Expiration: 10/4/23  Progress Note Due: 10/4/23  Visit # / Visits authorized: 13/20 + eval  FOTO: 2/ 3     Precautions: Standard   PTA visits: 1/5     Time In: 700 AM  Time Out: 740 AM  Total Billable Time: 40 minutes  (TE-1, NMR-1, TA-1)    Subjective     Pt reports: she did Yoga yesterday and was challenged with returning from a FB position and still needs arm support to get to the ground in a FB position  She was compliant with home exercise program.  Response to previous treatment: ongoing   Functional change: reduced pain and improved mobility     Pain: 2-3/10  Location: bilateral low back and upper glutes    Objective      N/A     Treatment     Jennifer Osullivan received the treatments listed below:    Bold=performed     therapeutic exercises to develop strength, flexibility, posture, and core stabilization for 08 minutes including:  Elliptical lvl 1.0 5 minutes  for endurance   LTR x30  Piriformis stretch 3x30" aguila  Cat camel 20x5" hold  Sciatic nerve glides 30x3" hold aguila   Bridges 2x10 3" hold  Lateral sides steps 2 laps GTB   Bent knee fall out 3 X 10  SKTC 20x5" hold   Kneeling hip hinge with dowel x15  SL clams RTB 2x10 aguila   Prayer " "stretch 15x5" hold   Repeated lumbar extension prone on elbows 2x10 5 "hold  Prone hip extension 2x10 aguila   Hip abd/add isometrics 20x5 " holds each direction  Kickback @ shuttle 1 red band 2x10 aguila     manual therapy techniques: Joint mobilizations and Soft tissue Mobilization were applied to the: low back for 00 minutes, including:  Long axis distraction in prone and supine position   STM to R paraspinals, quadratus lumborum, piriformis on the right  Central PA glides Gr II to lumbosacral spine  STM to bilateral lumbar paraspinals   MET to correct left anterior rotation of innominate     neuromuscular re-education activities to improve: Posture and core control, balance for 30 minutes. The following activities were included:  PPT with marching 2x10   Bridge with lat pull down X 30  Thomas curl ups 20x5" hold - attempted but inc pain with weight  Attempted juan curls but increased pain  Deadlifts to 12 inch target with good control and only muscle soreness  Dead bugs 2x10 - with swiss ball  Balance:    +Tandem stance each foot in front 3x30"   +SLS 3x15" on each leg   +SLS rebounder ball throws x10 each leg  heel taps 2x10    Standing pallof press X 30 each side RTB +lift   Half kneeling antirotational chop 3# on free motion  PPT 20x5" - with legs over swiss ball today  PPT with knee fallouts RTB 3 x10     therapeutic activities to improve functional performance for 00 minutes, including:  Deadlift to ground 5# KB 2x10  TRX squats 2x10    Patient Education and Home Exercises       Education provided:   - Pt educated on all interventions performed today.  - HEP    Written Home Exercises Provided: Patient instructed to cont prior HEP. Exercises were reviewed and Jennifer Osullivan was able to demonstrate them prior to the end of the session.  Jennifer Osullivan demonstrated good  understanding of the education provided. See EMR under Patient Instructions for exercises provided during therapy sessions    Assessment     Jennifer" Shi had some discomfort with her exercises today but she was able to maintain good overall control with dynamic balance exercises today. She did report some ITB tightness with single leg stance and balance activity but has had this happen in the past with hip and ankle weakness. Her balance is progressing well and she had fewer losses of balance today      Jennifer Osullivan Is progressing well towards her goals.   Pt prognosis is Good.     Pt will continue to benefit from skilled outpatient physical therapy to address the deficits listed in the problem list box on initial evaluation, provider pt/family education and to maximize pt's level of independence in the home and community environment.     Pt's spiritual, cultural and educational needs considered and pt agreeable to plan of care and goals.     Anticipated barriers to physical therapy: none    Goals:   Short Term Goals (4 Weeks):  1. Pt will be compliant with HEP to supplement PT in decreasing pain with functional mobility.- MET  2. Pt will perform pallof press with good control to demonstrate improved core strength.- MET  3. Pt will improve lumbar ROM to </=minimal loss in all planes to improve functional mobility.  4. Pt will improve impaired LE MMTs by 1/2 muscle grade to improve strength for functional tasks.- progressing   5. Pt will report 50% improvement in return to ADLs and recreation to improve return to prior level of function.- MET     Long Term Goals (8 Weeks):   1. Pt will endorse full return to yoga and all recreational activities without limitation by back pain to return to prior level of function.   2. Pt will perform bridge with good control to demonstrate improved core strength.- MET  3. Pt will improve impaired LE MMTs by 1 muscle grade to improve strength for functional tasks.  4. Pt will report no pain during lumbar ROM to promote functional mobility.    Plan     Continue PT POC     Millie Hall, PT

## 2023-09-26 NOTE — PROGRESS NOTES
"[facility]   Physical Therapy Treatment Note     Name: Jennifer Nguyen  Clinic Number: [id number]    Therapy Diagnosis:   Encounter Diagnoses   Name Primary?    Back pain with right-sided radiculopathy Yes    Decreased strength of trunk and back     Impaired strength of hip muscles              Physician: Martha Dasilva NP    Visit Date: 9/27/2023    Physician Orders: PT Eval and Treat   Medical Diagnosis from Referral:   M54.9 (ICD-10-CM) - Dorsalgia, unspecified   M47.817 (ICD-10-CM) - Spondylosis without myelopathy or radiculopathy, lumbosacral region   M79.18 (ICD-10-CM) - Myofascial pain      Evaluation Date: 8/9/2023  Authorization Period Expiration: 12/31/23  Plan of Care Expiration: 10/4/23  Progress Note Due: 10/4/23  Visit # / Visits authorized: 14/20 + eval  FOTO: 2/ 3     Precautions: Standard   PTA visits: 1/5     Time In: 704 AM  Time Out: 743 AM  Total Billable Time: 39 minutes  (TE-1, NMR-2, TA-1)    Subjective     Pt reports: did yoga Monday morning and felt fine after but says she still has limitations with bending down and getting back up  She was compliant with home exercise program.  Response to previous treatment: ongoing   Functional change: reduced pain and improved mobility     Pain: 2-3/10  Location: bilateral low back and upper glutes    Objective      N/A     Treatment     Jennifer Osullivan received the treatments listed below:    Bold=performed     therapeutic exercises to develop strength, flexibility, posture, and core stabilization for 8 minutes including:  Elliptical lvl 1.0 5 minutes  for endurance   LTR x30  Piriformis stretch 3x30" aguila  Cat camel 20x5" hold  Sciatic nerve glides 30x3" hold aguila   Bridges 2x10 3" hold  Lateral sides steps 2 laps GTB   Bent knee fall out 3 X 10  SKTC 20x5" hold   Kneeling hip hinge with dowel x15  SL clams RTB 2x10 aguila   Prayer stretch 15x5" hold   Repeated lumbar extension prone on elbows 2x10 5 "hold  Prone hip extension " "2x10 aguila   Hip abd/add isometrics 20x5 " holds each direction  Kickback @ shuttle 1 red band 2x10 aguila     manual therapy techniques: Joint mobilizations and Soft tissue Mobilization were applied to the: low back for 00 minutes, including:  Long axis distraction in prone and supine position   STM to R paraspinals, quadratus lumborum, piriformis on the right  Central PA glides Gr II to lumbosacral spine  STM to bilateral lumbar paraspinals   MET to correct left anterior rotation of innominate     neuromuscular re-education activities to improve: Posture and core control, balance for 23 minutes. The following activities were included:  PPT with marching 2x10   Bridge with lat pull down X 30  Thomas curl ups 20x5" hold -  Dead bugs 2x10 - with swiss ball  Balance:    Tandem stance each foot in front 3x30"   SLS 3x15" on each leg   SLS rebounder ball throws x10 each leg  heel taps 2x10    Standing pallof press X 30 each side RTB +lift   Half kneeling antirotational chop 3# on free motion  PPT 20x5" - with legs over swiss ball today  PPT with knee fallouts RTB 3 x10     therapeutic activities to improve functional performance for 08 minutes, including:  +practice of hip hinge for FB yoga poses  Attempted downward dog walk out to plank and back up but pt has pain with this so this is d/c  Deadlift to ground 5# KB 2x10  TRX squats 2x10    Patient Education and Home Exercises       Education provided:   - Pt educated on all interventions performed today.  - HEP    Written Home Exercises Provided: Patient instructed to cont prior HEP. Exercises were reviewed and Jennifer Osullivan was able to demonstrate them prior to the end of the session.  Jennifer Osullivan demonstrated good  understanding of the education provided. See EMR under Patient Instructions for exercises provided during therapy sessions    Assessment     Jennifer Osullivan did well with previously established exercises and interventions. Her balance continues to improve. However, she " complains of pain with forward bend position when performing yoga. We attempted to perform this today but pt has pain. She is educated on proper hip hinging and core facilitation to decrease pain with this movement. Progress as james.      Jennifer Osullivan Is progressing well towards her goals.   Pt prognosis is Good.     Pt will continue to benefit from skilled outpatient physical therapy to address the deficits listed in the problem list box on initial evaluation, provider pt/family education and to maximize pt's level of independence in the home and community environment.     Pt's spiritual, cultural and educational needs considered and pt agreeable to plan of care and goals.     Anticipated barriers to physical therapy: none    Goals:   Short Term Goals (4 Weeks):  1. Pt will be compliant with HEP to supplement PT in decreasing pain with functional mobility.- MET  2. Pt will perform pallof press with good control to demonstrate improved core strength.- MET  3. Pt will improve lumbar ROM to </=minimal loss in all planes to improve functional mobility.  4. Pt will improve impaired LE MMTs by 1/2 muscle grade to improve strength for functional tasks.- progressing   5. Pt will report 50% improvement in return to ADLs and recreation to improve return to prior level of function.- MET     Long Term Goals (8 Weeks):   1. Pt will endorse full return to yoga and all recreational activities without limitation by back pain to return to prior level of function.   2. Pt will perform bridge with good control to demonstrate improved core strength.- MET  3. Pt will improve impaired LE MMTs by 1 muscle grade to improve strength for functional tasks.  4. Pt will report no pain during lumbar ROM to promote functional mobility.    Plan     Continue PT POC     Lizzy Saini, PT

## 2023-09-27 ENCOUNTER — CLINICAL SUPPORT (OUTPATIENT)
Dept: REHABILITATION | Facility: HOSPITAL | Age: 33
End: 2023-09-27
Payer: COMMERCIAL

## 2023-09-27 DIAGNOSIS — R29.898 DECREASED STRENGTH OF TRUNK AND BACK: ICD-10-CM

## 2023-09-27 DIAGNOSIS — M54.10 BACK PAIN WITH RIGHT-SIDED RADICULOPATHY: Primary | ICD-10-CM

## 2023-09-27 DIAGNOSIS — R29.898 IMPAIRED STRENGTH OF HIP MUSCLES: ICD-10-CM

## 2023-09-27 PROCEDURE — 97112 NEUROMUSCULAR REEDUCATION: CPT | Mod: PO

## 2023-09-27 PROCEDURE — 97110 THERAPEUTIC EXERCISES: CPT | Mod: PO

## 2023-09-27 PROCEDURE — 97530 THERAPEUTIC ACTIVITIES: CPT | Mod: PO

## 2023-09-28 ENCOUNTER — OFFICE VISIT (OUTPATIENT)
Dept: SPINE | Facility: CLINIC | Age: 33
End: 2023-09-28
Payer: COMMERCIAL

## 2023-09-28 VITALS
DIASTOLIC BLOOD PRESSURE: 78 MMHG | HEIGHT: 64 IN | TEMPERATURE: 99 F | BODY MASS INDEX: 23.04 KG/M2 | OXYGEN SATURATION: 100 % | RESPIRATION RATE: 18 BRPM | HEART RATE: 81 BPM | SYSTOLIC BLOOD PRESSURE: 114 MMHG | WEIGHT: 134.94 LBS

## 2023-09-28 DIAGNOSIS — M54.41 ACUTE RIGHT-SIDED LOW BACK PAIN WITH RIGHT-SIDED SCIATICA: Primary | ICD-10-CM

## 2023-09-28 DIAGNOSIS — M54.9 DORSALGIA, UNSPECIFIED: ICD-10-CM

## 2023-09-28 DIAGNOSIS — M47.817 SPONDYLOSIS WITHOUT MYELOPATHY OR RADICULOPATHY, LUMBOSACRAL REGION: ICD-10-CM

## 2023-09-28 DIAGNOSIS — M79.18 MYOFASCIAL PAIN: ICD-10-CM

## 2023-09-28 PROCEDURE — 1160F RVW MEDS BY RX/DR IN RCRD: CPT | Mod: CPTII,S$GLB,, | Performed by: NURSE PRACTITIONER

## 2023-09-28 PROCEDURE — 1159F MED LIST DOCD IN RCRD: CPT | Mod: CPTII,S$GLB,, | Performed by: NURSE PRACTITIONER

## 2023-09-28 PROCEDURE — 99214 PR OFFICE/OUTPT VISIT, EST, LEVL IV, 30-39 MIN: ICD-10-PCS | Mod: S$GLB,,, | Performed by: NURSE PRACTITIONER

## 2023-09-28 PROCEDURE — 3044F HG A1C LEVEL LT 7.0%: CPT | Mod: CPTII,S$GLB,, | Performed by: NURSE PRACTITIONER

## 2023-09-28 PROCEDURE — 1159F PR MEDICATION LIST DOCUMENTED IN MEDICAL RECORD: ICD-10-PCS | Mod: CPTII,S$GLB,, | Performed by: NURSE PRACTITIONER

## 2023-09-28 PROCEDURE — 99999 PR PBB SHADOW E&M-EST. PATIENT-LVL V: ICD-10-PCS | Mod: PBBFAC,,, | Performed by: NURSE PRACTITIONER

## 2023-09-28 PROCEDURE — 3008F BODY MASS INDEX DOCD: CPT | Mod: CPTII,S$GLB,, | Performed by: NURSE PRACTITIONER

## 2023-09-28 PROCEDURE — 3078F PR MOST RECENT DIASTOLIC BLOOD PRESSURE < 80 MM HG: ICD-10-PCS | Mod: CPTII,S$GLB,, | Performed by: NURSE PRACTITIONER

## 2023-09-28 PROCEDURE — 99999 PR PBB SHADOW E&M-EST. PATIENT-LVL V: CPT | Mod: PBBFAC,,, | Performed by: NURSE PRACTITIONER

## 2023-09-28 PROCEDURE — 99214 OFFICE O/P EST MOD 30 MIN: CPT | Mod: S$GLB,,, | Performed by: NURSE PRACTITIONER

## 2023-09-28 PROCEDURE — 3078F DIAST BP <80 MM HG: CPT | Mod: CPTII,S$GLB,, | Performed by: NURSE PRACTITIONER

## 2023-09-28 PROCEDURE — 3074F SYST BP LT 130 MM HG: CPT | Mod: CPTII,S$GLB,, | Performed by: NURSE PRACTITIONER

## 2023-09-28 PROCEDURE — 3008F PR BODY MASS INDEX (BMI) DOCUMENTED: ICD-10-PCS | Mod: CPTII,S$GLB,, | Performed by: NURSE PRACTITIONER

## 2023-09-28 PROCEDURE — 3074F PR MOST RECENT SYSTOLIC BLOOD PRESSURE < 130 MM HG: ICD-10-PCS | Mod: CPTII,S$GLB,, | Performed by: NURSE PRACTITIONER

## 2023-09-28 PROCEDURE — 1160F PR REVIEW ALL MEDS BY PRESCRIBER/CLIN PHARMACIST DOCUMENTED: ICD-10-PCS | Mod: CPTII,S$GLB,, | Performed by: NURSE PRACTITIONER

## 2023-09-28 PROCEDURE — 3044F PR MOST RECENT HEMOGLOBIN A1C LEVEL <7.0%: ICD-10-PCS | Mod: CPTII,S$GLB,, | Performed by: NURSE PRACTITIONER

## 2023-09-28 RX ORDER — NABUMETONE 500 MG/1
500 TABLET, FILM COATED ORAL 2 TIMES DAILY PRN
Qty: 60 TABLET | Refills: 3 | Status: SHIPPED | OUTPATIENT
Start: 2023-09-28 | End: 2024-02-12

## 2023-09-28 RX ORDER — METHOCARBAMOL 750 MG/1
750 TABLET, FILM COATED ORAL 3 TIMES DAILY PRN
Qty: 90 TABLET | Refills: 3 | Status: SHIPPED | OUTPATIENT
Start: 2023-09-28 | End: 2024-02-14

## 2023-09-28 NOTE — PROGRESS NOTES
Subjective:     Patient ID: Jennifer Nguyen is a 33 y.o. female.    Chief Complaint: No chief complaint on file.    Interval History 9/28/2023:  Ms. Nguyen presents today for follow-up of low back pain. She is currently in PT with improvement in her back pain. She continues taking robaxin and relafen when needed with relief.     HPI Ms. Nguyen is a 33 year old female here for evaluation of right back pain.     C/o low back pain and right leg pain that started 2 weeks ago  Thought it was a UTI, treated with abx without resolution of pain  Pain radiates down the leg to the knee, R>L  No Hx similar pain in the past  No PT or injections in the past    Lumbar xray 2023    FINDINGS:  Lumbar vertebral body heights are maintained.     Disc space narrowing and endplate changes L5-S1..     AP alignment is anatomic.     Impression:     No acute osseous abnormality seen.  Degenerative change L5-S1.    Past Medical History:   Diagnosis Date    ADHD     Allergy     Anxiety disorder, unspecified     Celiac disease     Mild intermittent asthma, uncomplicated        Past Surgical History:   Procedure Laterality Date    BREAST SURGERY      REDUCTION    INTRAUTERINE DEVICE INSERTION  04/01/2015    KJB---MIRENA    REPAIR OF COLLATERAL LIGAMENT OF THUMB Right 01/06/2020    Procedure: REPAIR, LIGAMENT, COLLATERAL, THUMB right;  Surgeon: Lisette Zaman MD;  Location: [facility];  Service: Orthopedics;  Laterality: Right;  regional MAC    WISDOM TOOTH EXTRACTION         Family History   Problem Relation Age of Onset    Graves' disease Mother     Hypertension Father     Diabetes Father     No Known Problems Sister     No Known Problems Brother     No Known Problems Brother     Cancer Maternal Grandmother         unknown etiology    Heart disease Maternal Grandfather     Breast cancer Paternal Grandmother     Dementia Paternal Grandmother     Colon cancer Neg Hx     Ovarian cancer Neg Hx     Esophageal cancer Neg Hx        Social  History     Socioeconomic History    Marital status: Single   Tobacco Use    Smoking status: Former     Current packs/day: 0.00     Types: Cigarettes     Start date:      Quit date:      Years since quittin.7     Passive exposure: Past    Smokeless tobacco: Never    Tobacco comments:     1-2 cig daily   Substance and Sexual Activity    Alcohol use: Not Currently     Comment: ~10 drinks/week. Seltzers, occasional wine    Drug use: No    Sexual activity: Yes     Partners: Male     Birth control/protection: I.U.D.   Social History Narrative    Significant other. No children. 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 Rescue (Patient not taking: Reported on 2023) 18 g 0    azelastine (ASTELIN) 137 mcg (0.1 %) nasal spray 1 spray (137 mcg total) by Nasal route 2 (two) times daily. 30 mL 0    cetirizine HCl (ZYRTEC ORAL) Take by mouth.      EScitalopram oxalate (LEXAPRO) 10 MG tablet Take 0.5 tablets (5 mg total) by mouth once daily for 7 days, THEN 1 tablet (10 mg total) once daily for 26 days. 30 tablet 0    fluocinonide 0.05% (LIDEX) 0.05 % cream AAA of hands bid prn flares. 60 g 3    fluticasone propionate (CUTIVATE) 0.05 % cream Apply to affected areas of body daily prn eczema. 60 g 3    fluticasone propionate (FLONASE) 50 mcg/actuation nasal spray 1 spray by Each Nostril route once daily.      fluticasone-salmeterol diskus inhaler 250-50 mcg Inhale 1 puff into the lungs once daily. Controller 30 each 3    lisdexamfetamine (VYVANSE) 30 MG capsule Take 1 capsule (30 mg total) by mouth every morning. 30 capsule 0    [START ON 2023] lisdexamfetamine (VYVANSE) 30 MG capsule Take 1 capsule (30 mg total) by mouth every morning. 30 capsule 0    [START ON  10/13/2023] lisdexamfetamine (VYVANSE) 30 MG capsule Take 1 capsule (30 mg total) by mouth every morning. 30 capsule 0    loratadine (CLARITIN) 10 mg tablet Take 10 mg by mouth once daily.      LORazepam (ATIVAN) 0.5 MG tablet Take 1 tablet (0.5 mg total) by mouth daily as needed for Anxiety. 5 tablet 0    methocarbamoL (ROBAXIN) 750 MG Tab Take 1 tablet (750 mg total) by mouth 3 (three) times daily as needed (muscle pain). 90 tablet 1    nabumetone (RELAFEN) 500 MG tablet Take 1 tablet (500 mg total) by mouth 2 (two) times daily as needed for Pain. 60 tablet 1    olopatadine (PATADAY) 0.2 % Drop Place 1 drop into both eyes daily as needed (itching eyes). 5 mL 0     Current Facility-Administered Medications   Medication Dose Route Frequency Provider Last Rate Last Admin    levonorgestreL (MIRENA) 20 mcg/24 hours (6 yrs) 52 mg IUD 1 Intra Uterine Device  1 Intra Uterine Device Intrauterine  Jemma Cochran MD   1 Intra Uterine Device at 10/01/21 0900       Review of patient's allergies indicates:   Allergen Reactions    Insect venom Anxiety, Rash and Shortness Of Breath    Gluten Nausea Only         Review of Systems   Constitutional: Negative for fever.   Cardiovascular:  Negative for chest pain.   Respiratory:  Negative for shortness of breath.    Musculoskeletal:  Positive for back pain.   Gastrointestinal:  Negative for abdominal pain, bowel incontinence, nausea and vomiting.   Genitourinary:  Negative for bladder incontinence.   Neurological:  Negative for numbness and paresthesias.        Objective:     General: Jennifer is well-developed, well-nourished, appears stated age, in no acute distress, alert and oriented to time, place and person.     General    Vitals reviewed.  Constitutional: She is oriented to person, place, and time. She appears well-developed and well-nourished.   HENT:   Head: Atraumatic.   Nose: Nose normal.   Eyes: Conjunctivae are normal.   Cardiovascular:  Normal rate.             Pulmonary/Chest: Effort normal.   Neurological: She is alert and oriented to person, place, and time.   Psychiatric: She has a normal mood and affect. Her behavior is normal. Judgment and thought content normal.     General Musculoskeletal Exam   Gait: normal     Back (L-Spine & T-Spine) / Neck (C-Spine) Exam     Tenderness Right paramedian tenderness of the Lower L-Spine.     Back (L-Spine & T-Spine) Range of Motion   Extension:  10   Flexion:  90   Lateral bend right:  10   Lateral bend left:  10     Spinal Sensation   Right Side Sensation  L-Spine Level: normal  S-Spine Level: normal  Left Side Sensation  L-Spine Level: normal  S-Spine Level: normal    Other   She has no scoliosis .  Spinal Kyphosis:  Absent      Muscle Strength   Right Upper Extremity   Biceps: 5/5   Deltoid:  5/5  Triceps:  5/5  Left Upper Extremity  Biceps: 5/5   Deltoid:  5/5  Triceps:  5/5  Right Lower Extremity   Hip Flexion: 5/5   Quadriceps:  5/5   Ankle Dorsiflexion:  5/5   Anterior tibial:  5/5   EHL:  5/5  Left Lower Extremity   Hip Flexion: 5/5   Quadriceps:  5/5   Ankle Dorsiflexion:  5/5   Anterior tibial:  5/5   EHL:  5/5    Reflexes     Left Side  Biceps:  2+  Brachioradialis:  2+  Achilles:  2+    Right Side   Biceps:  2+  Brachioradialis:  2+  Achilles:  2+    Vascular Exam     Right Pulses        Carotid:                  2+    Left Pulses        Carotid:                  2+          Assessment:     1. Acute right-sided low back pain with right-sided sciatica    2. Dorsalgia, unspecified    3. Spondylosis without myelopathy or radiculopathy, lumbosacral region           Plan:        Prior records reviewed today  Overall improvement in back pan  Continue PT  Continue relafen 500mg as needed for pain  Continue robaxin 750mg TID as needed for muscle pain  We discussed posture sitting and the importance of trying to sit better.    We discussed the benefits of therapy and exercise and continuing to move.   RTC for followup as  needed    Follow-up: No follow-ups on file. If there are any questions prior to this, the patient was instructed to contact the office.

## 2023-10-03 ENCOUNTER — CLINICAL SUPPORT (OUTPATIENT)
Dept: REHABILITATION | Facility: HOSPITAL | Age: 33
End: 2023-10-03
Payer: COMMERCIAL

## 2023-10-03 DIAGNOSIS — M54.10 BACK PAIN WITH RIGHT-SIDED RADICULOPATHY: ICD-10-CM

## 2023-10-03 DIAGNOSIS — R29.898 IMPAIRED STRENGTH OF HIP MUSCLES: ICD-10-CM

## 2023-10-03 DIAGNOSIS — R29.898 DECREASED STRENGTH OF TRUNK AND BACK: ICD-10-CM

## 2023-10-03 DIAGNOSIS — M54.50 ACUTE BILATERAL LOW BACK PAIN, UNSPECIFIED WHETHER SCIATICA PRESENT: Primary | ICD-10-CM

## 2023-10-03 PROCEDURE — 97110 THERAPEUTIC EXERCISES: CPT | Mod: PO

## 2023-10-03 PROCEDURE — 97112 NEUROMUSCULAR REEDUCATION: CPT | Mod: PO

## 2023-10-03 NOTE — PROGRESS NOTES
[facility]   Physical Therapy Treatment Note     Name: Jennifer Nguyen  Clinic Number: [id number]    Therapy Diagnosis:   Encounter Diagnoses   Name Primary?    Acute bilateral low back pain, unspecified whether sciatica present Yes    Back pain with right-sided radiculopathy     Decreased strength of trunk and back     Impaired strength of hip muscles        Physician: Martha Dasilva NP    Visit Date: 10/3/2023    Physician Orders: PT Eval and Treat   Medical Diagnosis from Referral:   M54.9 (ICD-10-CM) - Dorsalgia, unspecified   M47.817 (ICD-10-CM) - Spondylosis without myelopathy or radiculopathy, lumbosacral region   M79.18 (ICD-10-CM) - Myofascial pain      Evaluation Date: 8/9/2023  Authorization Period Expiration: 12/31/23  Plan of Care Expiration: 10/4/23  Progress Note Due: 10/4/23  Visit # / Visits authorized: 15/20 + eval  FOTO: 2/ 3     Precautions: Standard   PTA visits: 1/5     Time In: 704 AM  Time Out: 750 AM  Total Billable Time: 39 minutes  (TE-1, NMR-2)    Subjective     Pt reports: had a celiac flare up this weekend so her gut has not given her the ability to perform as many core exercises and she feels tight, still feeling some after affects but recovering ok, back did not flare up, only felt more tight, wants to go through stretches for pre-kickball in the future  She was compliant with home exercise program.  Response to previous treatment: ongoing   Functional change: reduced pain and improved mobility     Pain: 2-3/10  Location: bilateral low back and upper glutes    Objective      N/A     Treatment     Jennifer Osullivan received the treatments listed below:    Bold=performed     therapeutic exercises to develop strength, flexibility, posture, and core stabilization for 15 minutes including:  Elliptical lvl 1.0 5 minutes  for endurance   Reviewed and discussed stretching routine and discussed core muscle engagement activity pre kickball  LTR x30- knees over  "ball  Piriformis stretch 3x30" aguila  Hip internal rotation stretch  Cat camel 20x5" hold  Sciatic nerve glides 30x3" hold aguila   Bridges 2x10 3" hold  Lateral sides steps 2 laps GTB   Bent knee fall out 3 X 10  SKTC 20x5" hold   Kneeling hip hinge with dowel x15  SL clams RTB 2x10 aguila   Prayer stretch 15x5" hold   Repeated lumbar extension prone on elbows 2x10 5 "hold  Prone hip extension 2x10 aguila   Hip abd/add isometrics 20x5 " holds each direction  Kickback @ shuttle 1 red band 2x10 aguila     manual therapy techniques: Joint mobilizations and Soft tissue Mobilization were applied to the: low back for 00 minutes, including:  Long axis distraction in prone and supine position   STM to R paraspinals, quadratus lumborum, piriformis on the right  Central PA glides Gr II to lumbosacral spine  STM to bilateral lumbar paraspinals   MET to correct left anterior rotation of innominate     neuromuscular re-education activities to improve: Posture and core control, balance for 23 minutes. The following activities were included:  PPT with marching 2x10   Bridge with lat pull down X 30  Thomas curl ups 20x5" hold -  Dead bugs 2x10 - with swiss ball  Bird dogs X 20 each side-  bar on back for stability  Palloff press  Balance:    Tandem stance each foot in front 3x30" - airex today   SLS 2X 30 secondson each leg - airex today   SLS rebounder ball throws x10 each leg  heel taps 2x10    Standing pallof press X 30 each side RTB +lift   Half kneeling antirotational chop 3# on free motion  PPT 20x5" - with legs over swiss ball today  PPT with knee fallouts RTB 3 x10     therapeutic activities to improve functional performance for 00 minutes, including: not tested at this time due to abdominal discomfort with celiac flare up  practice of hip hinge for FB yoga poses  Attempted downward dog walk out to plank and back up but pt has pain with this so this is d/c  Deadlift to ground 5# KB 2x10  TRX squats 2x10    Patient Education and Home " Exercises       Education provided:   - Pt educated on all interventions performed today.  - HEP    Written Home Exercises Provided: Patient instructed to cont prior HEP. Exercises were reviewed and Jennifer Osullivan was able to demonstrate them prior to the end of the session.  Jennifer Osullivan demonstrated good  understanding of the education provided. See EMR under Patient Instructions for exercises provided during therapy sessions    Assessment     Jennifer Osullivan had non MSK challenges today caused from a celiac issue this past weekend that has not yet resolved. She Was able to perform all core engagement exercises with good control but was challenged with general core fatigue after her celiac issue over the weekend      Jennifer Osullivan Is progressing well towards her goals.   Pt prognosis is Good.     Pt will continue to benefit from skilled outpatient physical therapy to address the deficits listed in the problem list box on initial evaluation, provider pt/family education and to maximize pt's level of independence in the home and community environment.     Pt's spiritual, cultural and educational needs considered and pt agreeable to plan of care and goals.     Anticipated barriers to physical therapy: none    Goals:   Short Term Goals (4 Weeks):  1. Pt will be compliant with HEP to supplement PT in decreasing pain with functional mobility.- MET  2. Pt will perform pallof press with good control to demonstrate improved core strength.- MET  3. Pt will improve lumbar ROM to </=minimal loss in all planes to improve functional mobility.  4. Pt will improve impaired LE MMTs by 1/2 muscle grade to improve strength for functional tasks.- progressing   5. Pt will report 50% improvement in return to ADLs and recreation to improve return to prior level of function.- MET     Long Term Goals (8 Weeks):   1. Pt will endorse full return to yoga and all recreational activities without limitation by back pain to return to prior level of function.    2. Pt will perform bridge with good control to demonstrate improved core strength.- MET  3. Pt will improve impaired LE MMTs by 1 muscle grade to improve strength for functional tasks.  4. Pt will report no pain during lumbar ROM to promote functional mobility.    Plan     Continue PT POC     Millie Hall, PT

## 2023-10-04 ENCOUNTER — PATIENT MESSAGE (OUTPATIENT)
Dept: PRIMARY CARE CLINIC | Facility: CLINIC | Age: 33
End: 2023-10-04
Payer: COMMERCIAL

## 2023-10-04 ENCOUNTER — PATIENT MESSAGE (OUTPATIENT)
Dept: ALLERGY | Facility: CLINIC | Age: 33
End: 2023-10-04
Payer: COMMERCIAL

## 2023-10-04 ENCOUNTER — HOSPITAL ENCOUNTER (OUTPATIENT)
Dept: PULMONOLOGY | Facility: CLINIC | Age: 33
Discharge: HOME | End: 2023-10-04
Payer: COMMERCIAL

## 2023-10-04 DIAGNOSIS — J45.909 ASTHMA, UNSPECIFIED ASTHMA SEVERITY, UNSPECIFIED WHETHER COMPLICATED, UNSPECIFIED WHETHER PERSISTENT: ICD-10-CM

## 2023-10-04 LAB
DLCO SINGLE BREATH LLN: 20.96
DLCO SINGLE BREATH PRE REF: 84.9 %
DLCO SINGLE BREATH REF: 26.7
DLCOC SBVA LLN: 3.82
DLCOC SBVA REF: 5.41
DLCOC SINGLE BREATH LLN: 20.96
DLCOC SINGLE BREATH REF: 26.7
DLCOCSBVAULN: 6.99
DLCOCSINGLEBREATHULN: 32.43
DLCOSINGLEBREATHULN: 32.43
DLCOVA LLN: 3.82
DLCOVA PRE REF: 90.8 %
DLCOVA PRE: 4.91 ML/(MIN*MMHG*L) (ref 3.82–6.99)
DLCOVA REF: 5.41
DLCOVAULN: 6.99
ERV LLN: -16448.8
ERV PRE REF: 144.6 %
ERV REF: 1.2
ERVULN: ABNORMAL
FEF 25 75 LLN: 2.19
FEF 25 75 PRE REF: 98.3 %
FEF 25 75 REF: 3.45
FET100 CHG: 3.8 %
FEV05 LLN: 1.37
FEV05 REF: 2.22
FEV1 CHG: 4.9 %
FEV1 FVC LLN: 73
FEV1 FVC PRE REF: 100.8 %
FEV1 FVC REF: 84
FEV1 LLN: 2.53
FEV1 PRE REF: 102.9 %
FEV1 REF: 3.16
FEV1 VOL CHG: 0.16
FRCPLETH LLN: 1.85
FRCPLETH PREREF: 107.6 %
FRCPLETH REF: 2.67
FRCPLETHULN: 3.5
FVC CHG: 0.7 %
FVC LLN: 3.02
FVC PRE REF: 101.6 %
FVC REF: 3.78
FVC VOL CHG: 0.03
IVC PRE: 3.56 L (ref 3.02–4.56)
IVC SINGLE BREATH LLN: 3.02
IVC SINGLE BREATH PRE REF: 94.4 %
IVC SINGLE BREATH REF: 3.78
IVCSINGLEBREATHULN: 4.56
LLN IC: -16447.7
PEF LLN: 5.3
PEF PRE REF: 91 %
PEF REF: 7.01
PHYSICIAN COMMENT: ABNORMAL
POST FEF 25 75: 4.23 L/S (ref 2.19–4.93)
POST FET 100: 6.41 SEC
POST FEV1 FVC: 88.31 % (ref 72.71–93.18)
POST FEV1: 3.41 L (ref 2.53–3.77)
POST FEV5: 2.65 L (ref 1.37–3.08)
POST FVC: 3.86 L (ref 3.02–4.56)
POST PEF: 7.07 L/S (ref 5.3–8.72)
PRE DLCO: 22.67 ML/(MIN*MMHG) (ref 20.96–32.43)
PRE ERV: 1.74 L (ref -16448.8–16451.2)
PRE FEF 25 75: 3.39 L/S (ref 2.19–4.93)
PRE FET 100: 6.17 SEC
PRE FEV05 REF: 110.2 %
PRE FEV1 FVC: 84.79 % (ref 72.71–93.18)
PRE FEV1: 3.25 L (ref 2.53–3.77)
PRE FEV5: 2.45 L (ref 1.37–3.08)
PRE FRC PL: 2.88 L (ref 1.85–3.5)
PRE FVC: 3.83 L (ref 3.02–4.56)
PRE IC: 2.09 L (ref -16447.7–16452.3)
PRE PEF: 6.38 L/S (ref 5.3–8.72)
PRE REF IC: 91 %
PRE RV: 1.14 L (ref 0.89–2.05)
PRE TLC: 4.97 L (ref 3.95–5.93)
RAW PRE REF: 80.5 %
RAW PRE: 2.46 CMH2O*S/L (ref 3.06–3.06)
RAW REF: 3.06
REF IC: 2.3
RV LLN: 0.89
RV PRE REF: 77.3 %
RV REF: 1.47
RVTLC LLN: 21
RVTLC PRE REF: 75.8 %
RVTLC PRE: 22.87 % (ref 20.59–39.77)
RVTLC REF: 30
RVTLCULN: 40
RVULN: 2.05
SGAW PRE REF: 115.7 %
SGAW PRE: 0.12 1/(CMH2O*S) (ref 0.1–0.1)
SGAW REF: 0.1
TLC LLN: 3.95
TLC PRE REF: 100.6 %
TLC REF: 4.94
TLC ULN: 5.93
ULN IC: ABNORMAL
VA PRE: 4.62 L (ref 4.79–4.79)
VA SINGLE BREATH LLN: 4.79
VA SINGLE BREATH PRE REF: 96.4 %
VA SINGLE BREATH REF: 4.79
VASINGLEBREATHULN: 4.79
VC LLN: 3.02
VC PRE REF: 101.6 %
VC PRE: 3.83 L (ref 3.02–4.56)
VC REF: 3.78
VC ULN: 4.56

## 2023-10-04 PROCEDURE — 94729 PR C02/MEMBANE DIFFUSE CAPACITY: ICD-10-PCS | Mod: S$GLB,,, | Performed by: INTERNAL MEDICINE

## 2023-10-04 PROCEDURE — 94726 PLETHYSMOGRAPHY LUNG VOLUMES: CPT | Mod: S$GLB,,, | Performed by: INTERNAL MEDICINE

## 2023-10-04 PROCEDURE — 94060 PR EVAL OF BRONCHOSPASM: ICD-10-PCS | Mod: S$GLB,,, | Performed by: INTERNAL MEDICINE

## 2023-10-04 PROCEDURE — 94060 EVALUATION OF WHEEZING: CPT | Mod: S$GLB,,, | Performed by: INTERNAL MEDICINE

## 2023-10-04 PROCEDURE — 94729 DIFFUSING CAPACITY: CPT | Mod: S$GLB,,, | Performed by: INTERNAL MEDICINE

## 2023-10-04 PROCEDURE — 94726 PULM FUNCT TST PLETHYSMOGRAP: ICD-10-PCS | Mod: S$GLB,,, | Performed by: INTERNAL MEDICINE

## 2023-10-04 RX ORDER — ONDANSETRON 4 MG/1
4 TABLET, FILM COATED ORAL EVERY 6 HOURS PRN
Qty: 30 TABLET | Refills: 0 | Status: ON HOLD | OUTPATIENT
Start: 2023-10-04 | End: 2024-04-02 | Stop reason: HOSPADM

## 2023-10-04 NOTE — TELEPHONE ENCOUNTER
No care due was identified.  [facility] Embedded Care Due Messages. Reference number: [id number].   10/04/2023 2:15:48 PM CDT

## 2023-10-04 NOTE — TELEPHONE ENCOUNTER
FYI    I have pt scheduled for Tuesday for an injection but I read your note where pt would like to space out injections q 3 months.

## 2023-10-04 NOTE — PROGRESS NOTES
[facility]   Physical Therapy Treatment Note/ Progress Note/Updated Plan of Care     Name: Jennifer Nguyen  Clinic Number: [id number]    Therapy Diagnosis:   Encounter Diagnoses   Name Primary?    Back pain with right-sided radiculopathy Yes    Decreased strength of trunk and back     Impaired strength of hip muscles          Physician: Martha Dasilva NP    Visit Date: 10/5/2023    Physician Orders: PT Eval and Treat   Medical Diagnosis from Referral:   M54.9 (ICD-10-CM) - Dorsalgia, unspecified   M47.817 (ICD-10-CM) - Spondylosis without myelopathy or radiculopathy, lumbosacral region   M79.18 (ICD-10-CM) - Myofascial pain      Evaluation Date: 8/9/2023  Authorization Period Expiration: 12/31/23  Plan of Care Expiration: 11/3/23  Progress Note Due:11/3/23  Visit # / Visits authorized: 16/20 + eval  FOTO: 3/3     Precautions: Standard   PTA visits: 1/5     Time In: 10:01 AM  Time Out: 10:56 AM  Total Billable Time: 55 minutes  (TE-2, NMR-2, TA-1)    Subjective     Pt reports: did yoga and kick ball yesterday so she is sore     She was compliant with home exercise program.  Response to previous treatment: ongoing   Functional change: reduced pain and improved mobility     Pain: 4/10  Location: bilateral low back and upper glutes    Objective    Bold=updated reassess     Lumbar ROM: %AROM    % Pain   Flexion    75%  75% Yes   Extension >50% 75%    no   Left Side Bending WFL WFL no   Right Side Bending WFL WFL no   Left rotation >/=50% 75% no   Right Rotation 75% 75% Minimal pain            Lower Extremity Strength (graded 0-5 out of 5)    RLE LLE   Hip flexion: 4+/5 5/5 4+/5 4+/5   Knee extension: 4+/5 5/5 5/5 5/5   Ankle dorsiflexion: 5/5 5/5   Posterior fibers of Gluteus medius 4+/5 >/=4+/5 5/5 5/5   Knee flexion 5/5 5/5 5/55/5   Hip extension: 5/5 5/5 4+/5 5/5         Intake Outcome Measure for FOTO Lumbar Spine Survey     Therapist reviewed FOTO scores for Krystyna  "Nguyen  FOTO documents entered into [facility] - see Media section.     Intake Score: 63        Treatment     Jennifer Osullivan received the treatments listed below:    Bold=performed     therapeutic exercises to develop strength, flexibility, posture, and core stabilization for 23 minutes including:  Elliptical lvl 1.0 5 minutes  for endurance   Discussion of PT POC, HEP  Reassessment  FOTO  LTR x30- knees over ball  Piriformis stretch 3x30" aguila  Hip internal rotation stretch 3x30"  Cat camel 20x5" hold  Sciatic nerve glides 30x3" hold aguila   Bridges 2x10 3" hold- attempted bridge but painful today  Lateral sides steps 2 laps GTB   Bent knee fall out 3 X 10  SKTC 20x5" hold   Kneeling hip hinge with dowel x15  SL clams RTB 2x10 aguila   Prayer stretch 15x5" hold   Repeated lumbar extension prone on elbows 2x10 5 "hold  Prone hip extension 2x10 aguila   Hip abd/add isometrics 20x5 " holds each direction  Kickback @ shuttle 1 red band 2x10 aguila     manual therapy techniques: Joint mobilizations and Soft tissue Mobilization were applied to the: low back for 00 minutes, including:- not performed   Long axis distraction in prone and supine position   STM to R paraspinals, quadratus lumborum, piriformis on the right  Central PA glides Gr II to lumbosacral spine  STM to bilateral lumbar paraspinals   MET to correct left anterior rotation of innominate     neuromuscular re-education activities to improve: Posture and core control, balance for 23 minutes. The following activities were included:  PPT with marching 2x10   Bridge with lat pull down X 30  Thomas curl ups 20x5" hold   Dead bugs 2x10 - with swiss ball  Bird dogs X 20 each side  Palloff press  Balance:    Tandem stance each foot in front 3x30" - airex today   SLS 3X 30 secondson each leg - airex today   SLS rebounder ball throws x10 each leg  heel taps 2x10    Standing pallof press X 30 each side RTB +lift   Half kneeling antirotational chop 3# on free motion  PPT 20x5" - with legs " over swiss ball today  PPT with knee fallouts RTB 3 x10     therapeutic activities to improve functional performance for 09 minutes, including:  practice of hip hinge for FB yoga poses  Attempted downward dog walk out to plank and back up but pt has pain with this so this is d/c  Deadlift to ground 5# KB 2x10  TRX squats 2x10  Hip hinge sit <> stand w/ dowel 2x10    Patient Education and Home Exercises       Education provided:   - PT POC  - continue HEP    Written Home Exercises Provided: Patient instructed to cont prior HEP. Exercises were reviewed and Jennifer Osullivan was able to demonstrate them prior to the end of the session.  Jennifer Osullivan demonstrated good  understanding of the education provided. See EMR under Patient Instructions for exercises provided during therapy sessions    Assessment     Jennifer Osullivan demos good improvement in lumbar ROM and LE strength since start of care. She also is able to demonstrate improved core engagement with activities and exercises. She has returned to yoga and is participating in kickball. Sometimes when performing these on the same day, she endorses pain/soreness. Patient educated on importance of slow progression of returning to activities as to not flare up symptoms. Overall, pt has progressed very well with current PT POC. We discussed adding one follow up visit to assess for independent symptom management with use of home exercise program. Visit scheduled for 11/3/23 and pt is agreeable.    Jennifer Osullivan Is progressing well towards her goals.   Pt prognosis is Good.     Pt will continue to benefit from skilled outpatient physical therapy to address the deficits listed in the problem list box on initial evaluation, provider pt/family education and to maximize pt's level of independence in the home and community environment.     Pt's spiritual, cultural and educational needs considered and pt agreeable to plan of care and goals.     Anticipated barriers to physical therapy:  none    Goals:   Short Term Goals (4 Weeks):  1. Pt will be compliant with HEP to supplement PT in decreasing pain with functional mobility.- MET  2. Pt will perform pallof press with good control to demonstrate improved core strength.- MET  3. Pt will improve lumbar ROM to </=minimal loss in all planes to improve functional mobility.- MET  4. Pt will improve impaired LE MMTs by 1/2 muscle grade to improve strength for functional tasks.- progressing   5. Pt will report 50% improvement in return to ADLs and recreation to improve return to prior level of function.- MET     Long Term Goals (8 Weeks):   1. Pt will endorse full return to yoga and all recreational activities without limitation by back pain to return to prior level of function. - almost met  2. Pt will perform bridge with good control to demonstrate improved core strength.- MET  3. Pt will improve impaired LE MMTs by 1 muscle grade to improve strength for functional tasks.  4. Pt will report no pain during lumbar ROM to promote functional mobility.- progressing     Plan     Patient will be seen for 1 follow up visit on 11/3/23 to assess for independent symptom management with HEP     Lizzy Saini, PT

## 2023-10-05 ENCOUNTER — CLINICAL SUPPORT (OUTPATIENT)
Dept: REHABILITATION | Facility: HOSPITAL | Age: 33
End: 2023-10-05
Payer: COMMERCIAL

## 2023-10-05 DIAGNOSIS — R29.898 DECREASED STRENGTH OF TRUNK AND BACK: ICD-10-CM

## 2023-10-05 DIAGNOSIS — R29.898 IMPAIRED STRENGTH OF HIP MUSCLES: ICD-10-CM

## 2023-10-05 DIAGNOSIS — M54.10 BACK PAIN WITH RIGHT-SIDED RADICULOPATHY: Primary | ICD-10-CM

## 2023-10-05 PROCEDURE — 97110 THERAPEUTIC EXERCISES: CPT | Mod: PO

## 2023-10-05 PROCEDURE — 97112 NEUROMUSCULAR REEDUCATION: CPT | Mod: PO

## 2023-10-05 PROCEDURE — 97530 THERAPEUTIC ACTIVITIES: CPT | Mod: PO

## 2023-10-05 NOTE — PLAN OF CARE
[facility]   Physical Therapy Treatment Note/ Progress Note/Updated Plan of Care     Name: Jennifer Nguyen  Clinic Number: [id number]    Therapy Diagnosis:   Encounter Diagnoses   Name Primary?    Back pain with right-sided radiculopathy Yes    Decreased strength of trunk and back     Impaired strength of hip muscles          Physician: Martha Dasilva NP    Visit Date: 10/5/2023    Physician Orders: PT Eval and Treat   Medical Diagnosis from Referral:   M54.9 (ICD-10-CM) - Dorsalgia, unspecified   M47.817 (ICD-10-CM) - Spondylosis without myelopathy or radiculopathy, lumbosacral region   M79.18 (ICD-10-CM) - Myofascial pain      Evaluation Date: 8/9/2023  Authorization Period Expiration: 12/31/23  Plan of Care Expiration: 11/3/23  Progress Note Due:11/3/23  Visit # / Visits authorized: 16/20 + eval  FOTO: 3/3     Precautions: Standard   PTA visits: 1/5     Time In: 10:01 AM  Time Out: 10:56 AM  Total Billable Time: 55 minutes  (TE-2, NMR-2, TA-1)    Subjective     Pt reports: did yoga and kick ball yesterday so she is sore     She was compliant with home exercise program.  Response to previous treatment: ongoing   Functional change: reduced pain and improved mobility     Pain: 4/10  Location: bilateral low back and upper glutes    Objective    Bold=updated reassess     Lumbar ROM: %AROM    % Pain   Flexion    75%  75% Yes   Extension >50% 75%    no   Left Side Bending WFL WFL no   Right Side Bending WFL WFL no   Left rotation >/=50% 75% no   Right Rotation 75% 75% Minimal pain            Lower Extremity Strength (graded 0-5 out of 5)    RLE LLE   Hip flexion: 4+/5 5/5 4+/5 4+/5   Knee extension: 4+/5 5/5 5/5 5/5   Ankle dorsiflexion: 5/5 5/5   Posterior fibers of Gluteus medius 4+/5 >/=4+/5 5/5 5/5   Knee flexion 5/5 5/5 5/55/5   Hip extension: 5/5 5/5 4+/5 5/5         Intake Outcome Measure for FOTO Lumbar Spine Survey     Therapist reviewed FOTO scores for Krystyna  "Nguyen  FOTO documents entered into [facility] - see Media section.     Intake Score: 63        Treatment     Jennifer Osullivan received the treatments listed below:    Bold=performed     therapeutic exercises to develop strength, flexibility, posture, and core stabilization for 23 minutes including:  Elliptical lvl 1.0 5 minutes  for endurance   Discussion of PT POC, HEP  Reassessment  FOTO  LTR x30- knees over ball  Piriformis stretch 3x30" aguila  Hip internal rotation stretch 3x30"  Cat camel 20x5" hold  Sciatic nerve glides 30x3" hold aguila   Bridges 2x10 3" hold- attempted bridge but painful today  Lateral sides steps 2 laps GTB   Bent knee fall out 3 X 10  SKTC 20x5" hold   Kneeling hip hinge with dowel x15  SL clams RTB 2x10 aguila   Prayer stretch 15x5" hold   Repeated lumbar extension prone on elbows 2x10 5 "hold  Prone hip extension 2x10 aguila   Hip abd/add isometrics 20x5 " holds each direction  Kickback @ shuttle 1 red band 2x10 aguila     manual therapy techniques: Joint mobilizations and Soft tissue Mobilization were applied to the: low back for 00 minutes, including:- not performed   Long axis distraction in prone and supine position   STM to R paraspinals, quadratus lumborum, piriformis on the right  Central PA glides Gr II to lumbosacral spine  STM to bilateral lumbar paraspinals   MET to correct left anterior rotation of innominate     neuromuscular re-education activities to improve: Posture and core control, balance for 23 minutes. The following activities were included:  PPT with marching 2x10   Bridge with lat pull down X 30  Thomas curl ups 20x5" hold   Dead bugs 2x10 - with swiss ball  Bird dogs X 20 each side  Palloff press  Balance:    Tandem stance each foot in front 3x30" - airex today   SLS 3X 30 secondson each leg - airex today   SLS rebounder ball throws x10 each leg  heel taps 2x10    Standing pallof press X 30 each side RTB +lift   Half kneeling antirotational chop 3# on free motion  PPT 20x5" - with legs " over swiss ball today  PPT with knee fallouts RTB 3 x10     therapeutic activities to improve functional performance for 09 minutes, including:  practice of hip hinge for FB yoga poses  Attempted downward dog walk out to plank and back up but pt has pain with this so this is d/c  Deadlift to ground 5# KB 2x10  TRX squats 2x10  Hip hinge sit <> stand w/ dowel 2x10    Patient Education and Home Exercises       Education provided:   - PT POC  - continue HEP    Written Home Exercises Provided: Patient instructed to cont prior HEP. Exercises were reviewed and Jennifer Osullivan was able to demonstrate them prior to the end of the session.  Jennifer Osullivan demonstrated good  understanding of the education provided. See EMR under Patient Instructions for exercises provided during therapy sessions    Assessment     Jennifer Osullivan demos good improvement in lumbar ROM and LE strength since start of care. She also is able to demonstrate improved core engagement with activities and exercises. She has returned to yoga and is participating in kickball. Sometimes when performing these on the same day, she endorses pain/soreness. Patient educated on importance of slow progression of returning to activities as to not flare up symptoms. Overall, pt has progressed very well with current PT POC. We discussed adding one follow up visit to assess for independent symptom management with use of home exercise program. Visit scheduled for 11/3/23 and pt is agreeable.    Jennifer Osullivan Is progressing well towards her goals.   Pt prognosis is Good.     Pt will continue to benefit from skilled outpatient physical therapy to address the deficits listed in the problem list box on initial evaluation, provider pt/family education and to maximize pt's level of independence in the home and community environment.     Pt's spiritual, cultural and educational needs considered and pt agreeable to plan of care and goals.     Anticipated barriers to physical therapy:  none    Goals:   Short Term Goals (4 Weeks):  1. Pt will be compliant with HEP to supplement PT in decreasing pain with functional mobility.- MET  2. Pt will perform pallof press with good control to demonstrate improved core strength.- MET  3. Pt will improve lumbar ROM to </=minimal loss in all planes to improve functional mobility.- MET  4. Pt will improve impaired LE MMTs by 1/2 muscle grade to improve strength for functional tasks.- progressing   5. Pt will report 50% improvement in return to ADLs and recreation to improve return to prior level of function.- MET     Long Term Goals (8 Weeks):   1. Pt will endorse full return to yoga and all recreational activities without limitation by back pain to return to prior level of function. - almost met  2. Pt will perform bridge with good control to demonstrate improved core strength.- MET  3. Pt will improve impaired LE MMTs by 1 muscle grade to improve strength for functional tasks.  4. Pt will report no pain during lumbar ROM to promote functional mobility.- progressing     Plan     Patient will be seen for 1 follow up visit on 11/3/23 to assess for independent symptom management with HEP     Lizzy Saini, PT      none

## 2023-10-10 ENCOUNTER — CLINICAL SUPPORT (OUTPATIENT)
Dept: ALLERGY | Facility: CLINIC | Age: 33
End: 2023-10-10
Payer: COMMERCIAL

## 2023-10-10 DIAGNOSIS — Z91.038 ALLERGY TO INSECT STINGS: Primary | ICD-10-CM

## 2023-10-10 PROCEDURE — 95115 PR IMMUNOTHERAPY, ONE INJECTION: ICD-10-PCS | Mod: S$GLB,,, | Performed by: ALLERGY & IMMUNOLOGY

## 2023-10-10 PROCEDURE — 99999 PR PBB SHADOW E&M-EST. PATIENT-LVL I: ICD-10-PCS | Mod: PBBFAC,,,

## 2023-10-10 PROCEDURE — 95115 IMMUNOTHERAPY ONE INJECTION: CPT | Mod: S$GLB,,, | Performed by: ALLERGY & IMMUNOLOGY

## 2023-10-10 PROCEDURE — 95170 ANTIGEN THERAPY SERVICES: CPT | Mod: S$GLB,,, | Performed by: ALLERGY & IMMUNOLOGY

## 2023-10-10 PROCEDURE — 99999 PR PBB SHADOW E&M-EST. PATIENT-LVL I: CPT | Mod: PBBFAC,,,

## 2023-10-10 PROCEDURE — 95170 PR ANTIGEN RX SERV,WHOLE EXTRACT INSECT: ICD-10-PCS | Mod: S$GLB,,, | Performed by: ALLERGY & IMMUNOLOGY

## 2023-10-15 ENCOUNTER — TELEPHONE (OUTPATIENT)
Dept: PSYCHIATRY | Facility: CLINIC | Age: 33
End: 2023-10-15
Payer: COMMERCIAL

## 2023-10-15 DIAGNOSIS — F90.0 ATTENTION DEFICIT HYPERACTIVITY DISORDER (ADHD), PREDOMINANTLY INATTENTIVE TYPE: Primary | ICD-10-CM

## 2023-10-15 RX ORDER — ESCITALOPRAM OXALATE 10 MG/1
10 TABLET ORAL DAILY
Qty: 30 TABLET | Refills: 11 | Status: SHIPPED | OUTPATIENT
Start: 2023-10-15 | End: 2023-10-25

## 2023-10-15 NOTE — TELEPHONE ENCOUNTER
----- Message from Nikki Avilez MA sent at 10/13/2023  2:27 PM CDT -----  Regarding: Refill  Contact: [facility]  Patient called and stated that [facility] told her to call us regarding her refill. Pt need a refill of the following :    Lexapro 10mg. Pt will be on her last pill this Santana Oct 15th.    Please call [facility] @ [phone];      Regards,  Nikki Avilez

## 2023-10-25 ENCOUNTER — OFFICE VISIT (OUTPATIENT)
Dept: PSYCHIATRY | Facility: CLINIC | Age: 33
End: 2023-10-25
Payer: COMMERCIAL

## 2023-10-25 DIAGNOSIS — F33.1 MODERATE EPISODE OF RECURRENT MAJOR DEPRESSIVE DISORDER: Primary | ICD-10-CM

## 2023-10-25 DIAGNOSIS — F90.0 ATTENTION DEFICIT HYPERACTIVITY DISORDER (ADHD), PREDOMINANTLY INATTENTIVE TYPE: ICD-10-CM

## 2023-10-25 PROCEDURE — 99214 PR OFFICE/OUTPT VISIT, EST, LEVL IV, 30-39 MIN: ICD-10-PCS | Mod: 95,,, | Performed by: STUDENT IN AN ORGANIZED HEALTH CARE EDUCATION/TRAINING PROGRAM

## 2023-10-25 PROCEDURE — 3044F HG A1C LEVEL LT 7.0%: CPT | Mod: CPTII,95,, | Performed by: STUDENT IN AN ORGANIZED HEALTH CARE EDUCATION/TRAINING PROGRAM

## 2023-10-25 PROCEDURE — 99214 OFFICE O/P EST MOD 30 MIN: CPT | Mod: 95,,, | Performed by: STUDENT IN AN ORGANIZED HEALTH CARE EDUCATION/TRAINING PROGRAM

## 2023-10-25 PROCEDURE — 3044F PR MOST RECENT HEMOGLOBIN A1C LEVEL <7.0%: ICD-10-PCS | Mod: CPTII,95,, | Performed by: STUDENT IN AN ORGANIZED HEALTH CARE EDUCATION/TRAINING PROGRAM

## 2023-10-25 RX ORDER — ESCITALOPRAM OXALATE 20 MG/1
20 TABLET ORAL DAILY
Qty: 30 TABLET | Refills: 11 | Status: SHIPPED | OUTPATIENT
Start: 2023-10-25 | End: 2024-10-24

## 2023-10-25 RX ORDER — LORAZEPAM 0.5 MG/1
0.5 TABLET ORAL DAILY PRN
Qty: 10 TABLET | Refills: 0 | Status: ON HOLD | OUTPATIENT
Start: 2023-10-25 | End: 2024-04-02 | Stop reason: HOSPADM

## 2023-10-25 RX ORDER — LISDEXAMFETAMINE DIMESYLATE 30 MG/1
30 CAPSULE ORAL EVERY MORNING
Qty: 30 CAPSULE | Refills: 0 | Status: SHIPPED | OUTPATIENT
Start: 2024-01-12 | End: 2024-01-25 | Stop reason: SDUPTHER

## 2023-10-25 NOTE — PROGRESS NOTES
Outpatient Psychiatry Follow-Up Visit (MD/NP)    10/25/2023    Chief Complaint:  Jennifer Nguyen is a 33 y.o. female who presents today for follow-up of attention problems.  Met with patient.      Interval History and Content of Current Session:  Interim Events/Subjective Report/Content of Current Session:   Jennifer Nguyen checked in on time for her visit- pt takes 30 mg vyvanse daily, last seen 2023. Pt states she has not been great.  The lexapro is working- it helped her realize she was having intrusive thoughts. Her back is slightly better- she is going to PT. A friend of hers  by suicide. She has only taken the ativan very sparingly. She is about go visit a friend in [place]. She has taken the ativan twice- she did feel it was helpful. She denies any side effects.  Vyvanse she believes is still helpful and is not having side effects. No cardiac SE.   No thoughts of suicide.   No HI, no AVH.       Initial history with me:  Had tried wellbutrin, it made her irritable, which she was not a fan of. She likes the vyvanse, but she is concerned about the cost. She is able to sit still, sit in silence. She is no longer as distracted by extraneous stimuli. 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 She feels like her skin may be drier from the medication- she does have a history of eczema. She is ok with it. She denies chest pain, palpitations. NO hx of heart murmur, no hx of heart problems.   She states her anxiety has been OK. She is also in therapy and saw her therapist yesterday. A lot of her anxiety was secondary to problems with focus. She feels like things are much better and her focus and functioning is much better. She had tried anxiety, depression medications and had not seen benefit.   She has had weird bruising, fatigue- saw GP, saw a hematologist.     Pt is from [place], [place]. Appetite has decreased, she has lost about 10 lbs.   Denies SI, HI, AVH. She is very accident prone.   No thoughts about  "suicide.     No gun in her house. Did attempt suicide in 9th grade. Has a history of self harm-  was picking scabs during the pandemic, but hasn't cut in a long time. She lives alone, and was very isolated in the pandemic. IT was hard for her to cope with the loneliness.     Review of Systems   PSYCHIATRIC: Pertinant items are noted in the narrative.  CONSTITUTIONAL: No weight gain or loss.   MUSCULOSKELETAL: No pain or stiffness of the joints.  ENT: See above.  RESPIRATORY: See above.  CARDIOVASCULAR: No tachycardia or chest pain.  GASTROINTESTINAL: No nausea, vomiting, pain, constipation or diarrhea.    Past Medical, Family and Social History: The patient's past medical, family and social history have been reviewed and updated as appropriate within the electronic medical record - see encounter notes.    Compliance: yes    Side effects: see above    Risk Parameters:  Patient reports no suicidal ideation  Patient reports no homicidal ideation  Patient reports no self-injurious behavior  Patient reports no violent behavior    Exam (detailed: at least 9 elements; comprehensive: all 15 elements)   Constitutional  Vitals:  There were no vitals filed for this visit.           General:  unremarkable, age appropriate     Musculoskeletal  Muscle Strength/Tone:  not examined   Gait & Station:  non-ataxic     Psychiatric  Speech:  no latency; no press   Mood & Affect:  "Depressed, anxious"  congruent and appropriate   Thought Process:  normal and logical   Associations:  intact   Thought Content:  normal, no suicidality, no homicidality, delusions, or paranoia   Insight:  intact   Judgement: behavior is adequate to circumstances   Orientation:  grossly intact   Memory: intact for content of interview   Language: grossly intact   Attention Span & Concentration:  able to focus   Fund of Knowledge:  intact and appropriate to age and level of education     Assessment and Diagnosis   Status/Progress: Based on the examination " today, the patient's problem(s) is/are adequately but not ideally controlled.  New problems have not been presented today.   Co-morbidities, Diagnostic uncertainty, and Lack of compliance are not complicating management of the primary condition.  There are no active rule-out diagnoses for this patient at this time.     General Impression: Jennifer Nguyen, a 33 y.o.  female, presenting for follow-up visit for management of ADHD symptoms. Presents 12/22/22 - did not tolerate Wellbutrin; Vyvanse started. Presents at 3/21/22 for F/u, vvyanse has been helping and she denies SE, feels great benefit.   9/2023- suffering due to chronic pain, having depression and anxiety.     ADHD, inattentive type   Major depressive disorder, recurrent, moderate  Chronic pain     Intervention/Counseling/Treatment Plan   Medication Management: Continue current medications.    Continue Vyvanse 30 mg daily  Patient has no contraindications: no h/o allergic rxn, agitation, anxiety, tourette's, arrythmia, cardiovascular disease, cardiac structural abnormalities, hyperthyroidism, glaucoma, Other psychiatric illness, etc.   Reviewed possible side effects 3 times. Discussed diagnosis, risks and benefits of proposed treatment vs alternative treatments vs no treatment, and potential side effects of these treatments (death, dependency, psychosis, alisson, aggression, HTN, tics, MI, stroke, arrythmia, seizure, anaphylaxis or other allergic reactions, leukopenia, nervousness, anorexia, insomnia, tachycardia, palpitations, dizziness, BP changes, HR changes, visual disturbance, etc.). The patient expresses understanding of the above and displays the capacity to agree with this treatment given said understanding. Patient also agrees that, currently, the benefits outweigh the risks and would like to pursue treatment at this time.  The risks and benefits of medication were discussed with the patient.    Discussed diagnosis, risks and benefits of proposed  treatment above vs alternative treatments vs no treatment, and potential side effects of these treatments. The patient expresses understanding of the above and displays the capacity to agree with this treatment given said understanding. Patient also agrees that, currently, the benefits outweigh the risks and would like to pursue treatment at this time.  Pt is experiencing a recurrence of her depression. Increase lexapro to 20 mg daily.  Reviewed r/b/SE of medication including but not limited to GI distress, serotonin syndrome, manic switching, increased SI, sexual SE. All questions and concerns addressed. No SI.   Wrote #10 0.5 mg ativan for PRN anxiety, insomnia. Informed pt of the risks of continuous Benzodiazepine use including tolerance, dependence and withdrawals that may be life threatening upon abrupt cessation. Also advised not to take Benzodiazepines with Opiates or other sedatives and also not to drive or operate heavy machinery while using Benzodiazepines. Reminded never to mix with alcohol or opiates.  Continue with therapy.  Discussed upcoming maternity leave.   Discussed inherent unpredictability of medications in each individual.   Encouraged Patient to keep future appointments.   Take medications as prescribed and abstain from substance abuse.   In the event of an emergency patient was advised to go to the emergency room    Return to Clinic:4 months or sooner ELSIE Kessler MD

## 2023-11-02 NOTE — PROGRESS NOTES
[facility]   Physical Therapy Treatment Note/ Discharge Note     Name: Jennifer Nguyen  Clinic Number: [id number]    Therapy Diagnosis:   Encounter Diagnoses   Name Primary?    Back pain with right-sided radiculopathy Yes    Decreased strength of trunk and back     Impaired strength of hip muscles            Physician: Martha Dasilva NP    Visit Date: 11/3/2023    Physician Orders: PT Eval and Treat   Medical Diagnosis from Referral:   M54.9 (ICD-10-CM) - Dorsalgia, unspecified   M47.817 (ICD-10-CM) - Spondylosis without myelopathy or radiculopathy, lumbosacral region   M79.18 (ICD-10-CM) - Myofascial pain      Evaluation Date: 8/9/2023  Authorization Period Expiration: 12/31/23  Plan of Care Expiration: 11/3/23  Progress Note Due:11/3/23  Visit # / Visits authorized: 17/20 + eval  FOTO: 4/     Precautions: Standard   PTA visits: 1/5     Time In: 8:08 AM  Time Out: 8:37 AM  Total Billable Time: 29 minutes  (TE-1, NMR-1)    Subjective     Pt reports: has been doing the exercises well and they are helping. She was able to travel and go to an amusement park without an increase in pain.    She was compliant with home exercise program.  Response to previous treatment: ongoing   Functional change: reduced pain and improved mobility,improved QoL    Pain: 1/10  Location: bilateral low back and upper glutes    Objective    Bold=updated reassess     Lumbar ROM: %AROM    % Pain   Flexion    75%  WFL Tightness    Extension >50% WFL    no   Left Side Bending WFL WFL no   Right Side Bending WFL WFL no   Left rotation >/=50% WFL no   Right Rotation 75% WFL no            Lower Extremity Strength (graded 0-5 out of 5)    RLE LLE   Hip flexion: 4+/5 5/5 4+/5 5/5   Knee extension: 4+/5 5/5 5/5 5/5   Ankle dorsiflexion: 5/5 5/5 5/5 5/5   Posterior fibers of Gluteus medius 4+/5 5/5 5/5 5/5   Knee flexion 5/5 5/5 5/55/5   Hip extension: 5/5 5/5 4+/5 5/5         Intake Outcome Measure for FOTO Lumbar  "Spine Survey     Therapist reviewed FOTO scores for Jennifer Nguyen  FOTO documents entered into [employer] - see Media section.     Intake Score: 63        Treatment     Jennifer Osullivan received the treatments listed below:    Bold=performed     therapeutic exercises to develop strength, flexibility, posture, and core stabilization for 20 minutes including:  Discussion of HEP and management of symptoms   Reassessment  FOTO  LTR x30  Piriformis stretch 3x30" aguila    neuromuscular re-education activities to improve: Posture and core control, balance for 9 minutes. The following   Bird dogs 2x10  Dead bugs 2x10 - with swiss ball    Patient Education and Home Exercises       Education provided:   - discharge plan and continuing HEP    Written Home Exercises Provided: Patient instructed to cont prior HEP. Exercises were reviewed and Jennifer Osullivan was able to demonstrate them prior to the end of the session.  Jennifer Osullivan demonstrated good  understanding of the education provided. See EMR under Patient Instructions for exercises provided during therapy sessions    Assessment     Jennifer Osullivan demos great improvement in lumbar ROM and LE strength since start of care. She has met mostly all goals at this time. Discussed importance of using home exercises as a maintenance program to manage her symptoms. Pt is discharged today to independent HEP.       Goals:   Short Term Goals (4 Weeks):  1. Pt will be compliant with HEP to supplement PT in decreasing pain with functional mobility.- MET  2. Pt will perform pallof press with good control to demonstrate improved core strength.- MET  3. Pt will improve lumbar ROM to </=minimal loss in all planes to improve functional mobility.- MET  4. Pt will improve impaired LE MMTs by 1/2 muscle grade to improve strength for functional tasks.-MET  5. Pt will report 50% improvement in return to ADLs and recreation to improve return to prior level of function.- MET     Long Term Goals (8 Weeks):   1. Pt will " endorse full return to yoga and all recreational activities without limitation by back pain to return to prior level of function. - almost met  2. Pt will perform bridge with good control to demonstrate improved core strength.- MET  3. Pt will improve impaired LE MMTs by 1 muscle grade to improve strength for functional tasks.- MET  4. Pt will report no pain during lumbar ROM to promote functional mobility.- progressing     Plan     Patient is discharged today    Lizzy Saini, PT

## 2023-11-03 ENCOUNTER — CLINICAL SUPPORT (OUTPATIENT)
Dept: REHABILITATION | Facility: HOSPITAL | Age: 33
End: 2023-11-03
Payer: COMMERCIAL

## 2023-11-03 DIAGNOSIS — R29.898 IMPAIRED STRENGTH OF HIP MUSCLES: ICD-10-CM

## 2023-11-03 DIAGNOSIS — R29.898 DECREASED STRENGTH OF TRUNK AND BACK: ICD-10-CM

## 2023-11-03 DIAGNOSIS — M54.10 BACK PAIN WITH RIGHT-SIDED RADICULOPATHY: Primary | ICD-10-CM

## 2023-11-03 PROCEDURE — 97112 NEUROMUSCULAR REEDUCATION: CPT | Mod: PO

## 2023-11-03 PROCEDURE — 97110 THERAPEUTIC EXERCISES: CPT | Mod: PO

## 2023-12-05 ENCOUNTER — ON-DEMAND VIRTUAL (OUTPATIENT)
Dept: URGENT CARE | Facility: CLINIC | Age: 33
End: 2023-12-05
Payer: COMMERCIAL

## 2023-12-05 DIAGNOSIS — U07.1 COVID-19: Primary | ICD-10-CM

## 2023-12-05 PROCEDURE — 99213 OFFICE O/P EST LOW 20 MIN: CPT | Mod: 95,,, | Performed by: NURSE PRACTITIONER

## 2023-12-05 PROCEDURE — 99213 PR OFFICE/OUTPT VISIT, EST, LEVL III, 20-29 MIN: ICD-10-PCS | Mod: 95,,, | Performed by: NURSE PRACTITIONER

## 2023-12-05 RX ORDER — NIRMATRELVIR AND RITONAVIR 300-100 MG
KIT ORAL
Qty: 30 TABLET | Refills: 0 | Status: ON HOLD | OUTPATIENT
Start: 2023-12-05 | End: 2024-04-02 | Stop reason: HOSPADM

## 2023-12-05 RX ORDER — BENZONATATE 100 MG/1
100 CAPSULE ORAL 3 TIMES DAILY PRN
Qty: 60 CAPSULE | Refills: 0 | Status: SHIPPED | OUTPATIENT
Start: 2023-12-05 | End: 2023-12-25

## 2023-12-05 RX ORDER — NABUMETONE 500 MG/1
500 TABLET, FILM COATED ORAL 2 TIMES DAILY
Status: ON HOLD | COMMUNITY
End: 2024-04-02 | Stop reason: HOSPADM

## 2023-12-05 NOTE — PROGRESS NOTES
Subjective:      Patient ID: Jennifer Nguyen is a 33 y.o. female.    Vitals:  vitals were not taken for this visit.     Chief Complaint: COVID-19 Concerns      Visit Type: TELE AUDIOVISUAL    Present with the patient at the time of consultation: TELEMED PRESENT WITH PATIENT: None    Past Medical History:   Diagnosis Date    ADHD     Allergy     Anxiety disorder, unspecified     Celiac disease     Mild intermittent asthma, uncomplicated      Past Surgical History:   Procedure Laterality Date    BREAST SURGERY      REDUCTION    INTRAUTERINE DEVICE INSERTION  04/01/2015    KJB---MIRENA    REPAIR OF COLLATERAL LIGAMENT OF THUMB Right 01/06/2020    Procedure: REPAIR, LIGAMENT, COLLATERAL, THUMB right;  Surgeon: Listete Zaman MD;  Location: [facility];  Service: Orthopedics;  Laterality: Right;  regional MAC    WISDOM TOOTH EXTRACTION       Review of patient's allergies indicates:   Allergen Reactions    Insect venom Anxiety, Rash and Shortness Of Breath    Gluten Nausea Only     Current Outpatient Medications on File Prior to Visit   Medication Sig Dispense Refill    albuterol (VENTOLIN HFA) 90 mcg/actuation inhaler Inhale 1-2 puffs into the lungs 2 (two) times daily as needed for Wheezing or Shortness of Breath. Rescue 18 g 0    azelastine (ASTELIN) 137 mcg (0.1 %) nasal spray 1 spray (137 mcg total) by Nasal route 2 (two) times daily. 30 mL 0    cetirizine HCl (ZYRTEC ORAL) Take by mouth.      EScitalopram oxalate (LEXAPRO) 20 MG tablet Take 1 tablet (20 mg total) by mouth once daily. 30 tablet 11    fluocinonide 0.05% (LIDEX) 0.05 % cream AAA of hands bid prn flares. 60 g 3    fluticasone propionate (CUTIVATE) 0.05 % cream Apply to affected areas of body daily prn eczema. 60 g 3    fluticasone propionate (FLONASE) 50 mcg/actuation nasal spray 1 spray by Each Nostril route once daily.      fluticasone-salmeterol diskus inhaler 250-50 mcg Inhale 1 puff into the lungs once daily. Controller 30 each 3     lisdexamfetamine (VYVANSE) 30 MG capsule Take 1 capsule (30 mg total) by mouth every morning. 30 capsule 0    lisdexamfetamine (VYVANSE) 30 MG capsule Take 1 capsule (30 mg total) by mouth every morning. 30 capsule 0    lisdexamfetamine (VYVANSE) 30 MG capsule Take 1 capsule (30 mg total) by mouth every morning. 30 capsule 0    [START ON 1/12/2024] lisdexamfetamine (VYVANSE) 30 MG capsule Take 1 capsule (30 mg total) by mouth every morning. 30 capsule 0    loratadine (CLARITIN) 10 mg tablet Take 10 mg by mouth once daily.      LORazepam (ATIVAN) 0.5 MG tablet Take 1 tablet (0.5 mg total) by mouth daily as needed for Anxiety. 10 tablet 0    methocarbamoL (ROBAXIN) 750 MG Tab Take 1 tablet (750 mg total) by mouth 3 (three) times daily as needed (muscle pain). 90 tablet 3    nabumetone (RELAFEN) 500 MG tablet Take 1 tablet (500 mg total) by mouth 2 (two) times daily as needed for Pain. 60 tablet 3    nabumetone (RELAFEN) 500 MG tablet Take 500 mg by mouth 2 (two) times daily.      olopatadine (PATADAY) 0.2 % Drop Place 1 drop into both eyes daily as needed (itching eyes). 5 mL 0    ondansetron (ZOFRAN) 4 MG tablet Take 1 tablet (4 mg total) by mouth every 6 (six) hours as needed for Nausea. 30 tablet 0     Current Facility-Administered Medications on File Prior to Visit   Medication Dose Route Frequency Provider Last Rate Last Admin    levonorgestreL (MIRENA) 20 mcg/24 hours (6 yrs) 52 mg IUD 1 Intra Uterine Device  1 Intra Uterine Device Intrauterine  Jemma Cochran MD   1 Intra Uterine Device at 10/01/21 0900     Family History   Problem Relation Age of Onset    Graves' disease Mother     Hypertension Father     Diabetes Father     No Known Problems Sister     No Known Problems Brother     No Known Problems Brother     Cancer Maternal Grandmother         unknown etiology    Heart disease Maternal Grandfather     Breast cancer Paternal Grandmother     Dementia Paternal Grandmother     Colon cancer Neg Hx      Ovarian cancer Neg Hx     Esophageal cancer Neg Hx        Medications Ordered                [id number] - [place], [place] - [address]   [address] [address]    Telephone: [phone]   Fax: [phone]   Hours: Not open 24 hours                         E-Prescribed (2 of 2)              benzonatate (TESSALON) 100 MG capsule    Sig: Take 1 capsule (100 mg total) by mouth 3 (three) times daily as needed for Cough. May take 1-2 caps 3 times daily as needed.       Start: 12/5/23     Quantity: 60 capsule Refills: 0                         nirmatrelvir-ritonavir (PAXLOVID) 300 mg (150 mg x 2)-100 mg copackaged tablets (EUA)    Sig: Take 3 tablets by mouth 2 (two) times daily. Each dose contains 2 nirmatrelvir (pink tablets) and 1 ritonavir (white tablet). Take all 3 tablets together       Start: 12/5/23     Quantity: 30 tablet Refills: 0                           Ohs Peq Odvv Intake    12/5/2023 10:59 AM CST - Filed by Patient   Describe your reason for todays visit Got my sterling covid early this year   What is your current physical address in the event of a medical emergency? [address]   Are you able to take your vital signs? No   Please attach any relevant images or files          COVID symptoms started yesterday. Tested positive this AM. Not taking anything currently.        Constitution: Positive for chills. Negative for fever.   HENT:  Positive for congestion and sore throat. Negative for ear pain.    Respiratory:  Positive for cough.    Gastrointestinal:  Negative for abdominal pain, nausea, vomiting and diarrhea.   Musculoskeletal:  Positive for muscle ache.   Allergic/Immunologic: Positive for sneezing.        Objective:   The physical exam was conducted virtually.  Physical Exam   Constitutional: She is oriented to person, place, and time. She does not appear ill. No distress.   HENT:   Head: Normocephalic and atraumatic.   Nose: Nose normal.    Eyes: Extraocular movement intact   Pulmonary/Chest: Effort normal.   Abdominal: Normal appearance.   Musculoskeletal: Normal range of motion.         General: Normal range of motion.   Neurological: no focal deficit. She is alert and oriented to person, place, and time.   Psychiatric: Her behavior is normal. Mood normal.   Vitals reviewed.      Assessment:     1. COVID-19        Plan:     Patient encouraged to monitor symptoms closely and instructed to follow-up for new or worsening symptoms. Further, in-person, evaluation may be necessary for continued treatment. Please follow up with your primary care doctor or specialist as needed. Verbally discussed plan. Patient confirms understanding and is in agreement with treatment and plan.     You must understand that you've received a Virtual Care evaluation only and that you may be released before all your medical problems are known or treated. You, the patient, will arrange for follow up care as instructed.    COVID-19  -     nirmatrelvir-ritonavir (PAXLOVID) 300 mg (150 mg x 2)-100 mg copackaged tablets (EUA); Take 3 tablets by mouth 2 (two) times daily. Each dose contains 2 nirmatrelvir (pink tablets) and 1 ritonavir (white tablet). Take all 3 tablets together  Dispense: 30 tablet; Refill: 0  -     benzonatate (TESSALON) 100 MG capsule; Take 1 capsule (100 mg total) by mouth 3 (three) times daily as needed for Cough. May take 1-2 caps 3 times daily as needed.  Dispense: 60 capsule; Refill: 0      Patient Instructions     Antiviral medication discussed as alternate treatment. Risks and benefits discussed. Side effects discussed. Medical history reviewed with patient, and medications reviewed for interactions. Potential interactions with Vyvanse, patient aware. Patient has no other contraindications to taking this medication. Patient has agreed to taking Paxlovid and prescription was sent. You have 5 days from symptom onset to start the medication for maximum  benefit. Recommend follow-up with Primary Care for continued care.      OVER THE COUNTER RECOMMENDATIONS/SUGGESTIONS.      ·         Make sure to stay well hydrated.     ·         Use Nasal Saline to mechanically move any post nasal drip from your eustachian tube or from the back of your throat.     ·         Use warm saltwater gargles to ease your throat pain. Warm saltwater gargles as needed for sore throat-  1/2 tsp salt to 1 cup warm water, gargle as desired.     ·         Use an antihistamine such as Claritin, Zyrtec or Allegra to dry you out.     ·         Use pseudoephedrine (behind the counter) to decongest. Pseudoephedrine  30 mg up to 240 mg /day. It can raise your blood pressure and give you palpitations.     ·         Use Mucinex (guaifenisin) to break up mucous up to 2400mg/day to loosen any mucous.     ·         The Mucinex DM pill has a cough suppressant that can be sedating. It can be used at night to stop the tickle at the back of your throat.     ·         You can use Mucinex D (it has guaifenesin and a high dose of pseudoephedrine) in the mornings to help decongest.     ·         Use Afrin (oxymetazoline) in each nare for no longer than 3 days, as it is addictive. It can also dry out your mucous membranes and cause elevated blood pressure. This is especially useful if you are flying.     ·         Use Flonase 1-2 sprays/nostril per day. It is a local acting steroid nasal spray, if you develop a bloody nose, stop using the medication immediately.     ·         Sometimes Nyquil at night is beneficial to help you get some rest, however it is sedating, and it does have an antihistamine, and Tylenol.     ·         Honey is a natural cough suppressant that can be used.     ·         Tylenol up to 4,000 mg a day is safe for short periods and can be used for body aches, pain, and fever. However, in high doses and prolonged use it can cause liver irritation.     ·         Ibuprofen is a non-steroidal  anti-inflammatory that can be used for body aches, pain, and fever. However, it can also cause stomach irritation if overused.     Here are some useful tips:     COVID-19 Self Care and Symptom Monitoring Program Now Available in [facility]     For community members seeking a COVID-19 test, we strongly recommend at-home testing, whether they are experiencing symptoms or want peace of mind knowing their COVID status. At-home tests can be purchased at major retailers and some local health departments are also giving away at-home testing kits to residents. The rapid at-home test kits are reliable, and many are the same tests [facility] utilizes in various healthcare settings.     If a patient recently tested positive for COVID-19, they can easily enroll in [facility] free self-care and symptom monitoring program. By completing a quick form in [facility], they will be automatically registered into our COVID-19 Self Care and Symptom Monitoring Program. If symptoms worsen, our care team will be there to guide them on next steps and treatment options.     Click on this tip sheet for the COVID-19 Self Care and Symptom Monitoring Program.     Community Testing     Teams are still hosting drive-thru community testing sites throughout the system, so you can refer community members and patients to these sites as well but please note that many of these locations are administering PCR tests and results can take up to 72 hours. Dates and hours of operations for our community testing sites can be found on our website.      The Urgent cares have stopped asymptomatic testing due to the volumes of symptomatic patients - we strongly recommend at-home testing.     Patient Advice     Schedule your booster shot now to protect yourself, your friends and your family.     Assess your individual personal risk before attending any event-outdoor gatherings are best!     Wear your mask unless you are actively eating or drinking.     Wash your hands  frequently, cough or sneeze into your elbow or tissue.     Following potential exposure from travel or holiday parties, test for infection 3 to 5 days later with an at home test.     International travelers should be tested regardless of symptoms, vaccination status or history of COVID-19 infection 3 to 5 days after return.     If you test positive for COVID-19 while you are traveling, view this website to learn about treatments available to you where you are     Quarantine Guidelines:     - If you tested positive and do not have symptoms, you must isolate for 5 days starting on the day of the positive test.      - If you tested positive and have symptoms, you must isolate for 5 days starting on the day of the first symptoms, not the day of the positive test. ** This is the most important part, both the CDC and the [facility] emphasize that you do not test out of isolation. **     - After 5 days, if your symptoms have improved and you have not had fever on day 5, you can return to the community on day 6- NO TESTING REQUIRED!      - In fact, we do not retest if you were positive in the last 90 days.      - After your 5 days of isolation are completed, the CDC recommends strict mask use for the first 5 days that you come out of isolation.      CDC Testing and Quarantine Guidelines for patients with exposure to a known-positive COVID-19 person:         - A 'close exposure' is defined as anyone who has had an exposure (masked or unmasked) to a known COVID -19 positive person within 6 feet of someone for a cumulative total of 15 minutes or more over a 24-hour period.         - Vaccinated, Have been boosted or completed the primary series of Pfizer or Moderna vaccine within the last 6 months or completed the primary series of J&J vaccine within the last 2 months and/or had a positive test within 90 days:      - do NOT need to quarantine after contact with someone who had COVID-19 unless they have symptoms.      - fully  vaccinated people who have not had a positive test within 90 days, should get tested 3-5 days after their exposure, even if they don't have symptoms and wear a mask indoors in public for 10 days following exposure or until their test result is negative on day 5.      - If you develop symptoms test and quarantine.      - Unvaccinated, or are more than six months out from their second mRNA dose (or more than 2 months after the J&J vaccine) and not yet boosted, and/or NOT had a positive test within 90 days and meet 'close exposure':       - you are required by CDC guidelines to quarantine for at least 5 days from time of exposure followed by 5 days of strict masking. It is recommended, but not required to test after 5 days, unless you develop symptoms, in which case you should test at that time.      - If you do decide to test at 5 days and are asymptomatic, the risk is that if you test without symptoms on Day 5 for example and you are positive, your 5 day isolation begins on that day, and you turned your 5 day quarantine into 10 days.      - If your exposure does not meet the above definition, you can return to your normal daily activities to include social distancing, wearing a mask and frequent handwashing.      Alternatively, if a 5-day quarantine is not feasible, it is imperative that an exposed person wear a well-fitting mask at all times when around others for 10 days after exposure.      Sites for community testing     https://ldh.[place].gov/index.cfm/page/3934?clearCache=1        https://www.[facility].org/testing     ·         As you know the urgent cares, emergency rooms and primary care offices are starting to get busier with the rapid rise of COVID and as such [facility] is taking walk ups for testing:      §  Check-in via [facility] account or by calling [phone] upon arrival to be checked in remotely      §  Testing Available: PCR Testing (NOT Rapid Test)      Dexamethasone/Corticosteroids     Recommendation  Against the Use of Corticosteroids to Treat Outpatients with COVID-19 and Recommendations for Vaccination of Persons with Known or Previous COVID-19 Infection           https://www.gvebp57ctgsqdnynbvehwlonkl.nih.gov/management/clinical-management/nonhospitalized-adults--therapeutic-management/      ·         COVID-19 vaccination and SARS-CoV-2 infection     https://www.cdc.gov/vaccines/covid-19/clinical-considerations/covid-19-vaccines-[place].html#CoV-19-vaccination

## 2023-12-05 NOTE — PATIENT INSTRUCTIONS
Antiviral medication discussed as alternate treatment. Risks and benefits discussed. Side effects discussed. Medical history reviewed with patient, and medications reviewed for interactions. Potential interactions with Vyvanse, patient aware. Patient has no other contraindications to taking this medication. Patient has agreed to taking Paxlovid and prescription was sent. You have 5 days from symptom onset to start the medication for maximum benefit. Recommend follow-up with Primary Care for continued care.      OVER THE COUNTER RECOMMENDATIONS/SUGGESTIONS.      ·         Make sure to stay well hydrated.     ·         Use Nasal Saline to mechanically move any post nasal drip from your eustachian tube or from the back of your throat.     ·         Use warm saltwater gargles to ease your throat pain. Warm saltwater gargles as needed for sore throat-  1/2 tsp salt to 1 cup warm water, gargle as desired.     ·         Use an antihistamine such as Claritin, Zyrtec or Allegra to dry you out.     ·         Use pseudoephedrine (behind the counter) to decongest. Pseudoephedrine  30 mg up to 240 mg /day. It can raise your blood pressure and give you palpitations.     ·         Use Mucinex (guaifenisin) to break up mucous up to 2400mg/day to loosen any mucous.     ·         The Mucinex DM pill has a cough suppressant that can be sedating. It can be used at night to stop the tickle at the back of your throat.     ·         You can use Mucinex D (it has guaifenesin and a high dose of pseudoephedrine) in the mornings to help decongest.     ·         Use Afrin (oxymetazoline) in each nare for no longer than 3 days, as it is addictive. It can also dry out your mucous membranes and cause elevated blood pressure. This is especially useful if you are flying.     ·         Use Flonase 1-2 sprays/nostril per day. It is a local acting steroid nasal spray, if you develop a bloody nose, stop using the medication immediately.     ·          Sometimes Nyquil at night is beneficial to help you get some rest, however it is sedating, and it does have an antihistamine, and Tylenol.     ·         Honey is a natural cough suppressant that can be used.     ·         Tylenol up to 4,000 mg a day is safe for short periods and can be used for body aches, pain, and fever. However, in high doses and prolonged use it can cause liver irritation.     ·         Ibuprofen is a non-steroidal anti-inflammatory that can be used for body aches, pain, and fever. However, it can also cause stomach irritation if overused.     Here are some useful tips:     COVID-19 Self Care and Symptom Monitoring Program Now Available in [facility]     For community members seeking a COVID-19 test, we strongly recommend at-home testing, whether they are experiencing symptoms or want peace of mind knowing their COVID status. At-home tests can be purchased at major retailers and some local health departments are also giving away at-home testing kits to residents. The rapid at-home test kits are reliable, and many are the same tests [facility] utilizes in various healthcare settings.     If a patient recently tested positive for COVID-19, they can easily enroll in [facility]s free self-care and symptom monitoring program. By completing a quick form in [facility], they will be automatically registered into our COVID-19 Self Care and Symptom Monitoring Program. If symptoms worsen, our care team will be there to guide them on next steps and treatment options.     Click on this tip sheet for the COVID-19 Self Care and Symptom Monitoring Program.     Community Testing     Teams are still hosting drive-thru community testing sites throughout the system, so you can refer community members and patients to these sites as well but please note that many of these locations are administering PCR tests and results can take up to 72 hours. Dates and hours of operations for our community testing sites can be found on  our website.      The Urgent cares have stopped asymptomatic testing due to the volumes of symptomatic patients - we strongly recommend at-home testing.     Patient Advice     Schedule your booster shot now to protect yourself, your friends and your family.     Assess your individual personal risk before attending any event-outdoor gatherings are best!     Wear your mask unless you are actively eating or drinking.     Wash your hands frequently, cough or sneeze into your elbow or tissue.     Following potential exposure from travel or holiday parties, test for infection 3 to 5 days later with an at home test.     International travelers should be tested regardless of symptoms, vaccination status or history of COVID-19 infection 3 to 5 days after return.     If you test positive for COVID-19 while you are traveling, view this website to learn about treatments available to you where you are     Quarantine Guidelines:     - If you tested positive and do not have symptoms, you must isolate for 5 days starting on the day of the positive test.      - If you tested positive and have symptoms, you must isolate for 5 days starting on the day of the first symptoms, not the day of the positive test. ** This is the most important part, both the CDC and the [facility] emphasize that you do not test out of isolation. **     - After 5 days, if your symptoms have improved and you have not had fever on day 5, you can return to the community on day 6- NO TESTING REQUIRED!      - In fact, we do not retest if you were positive in the last 90 days.      - After your 5 days of isolation are completed, the CDC recommends strict mask use for the first 5 days that you come out of isolation.      CDC Testing and Quarantine Guidelines for patients with exposure to a known-positive COVID-19 person:         - A 'close exposure' is defined as anyone who has had an exposure (masked or unmasked) to a known COVID -19 positive person within 6 feet of  someone for a cumulative total of 15 minutes or more over a 24-hour period.         - Vaccinated, Have been boosted or completed the primary series of Pfizer or Moderna vaccine within the last 6 months or completed the primary series of J&J vaccine within the last 2 months and/or had a positive test within 90 days:      - do NOT need to quarantine after contact with someone who had COVID-19 unless they have symptoms.      - fully vaccinated people who have not had a positive test within 90 days, should get tested 3-5 days after their exposure, even if they don't have symptoms and wear a mask indoors in public for 10 days following exposure or until their test result is negative on day 5.      - If you develop symptoms test and quarantine.      - Unvaccinated, or are more than six months out from their second mRNA dose (or more than 2 months after the J&J vaccine) and not yet boosted, and/or NOT had a positive test within 90 days and meet 'close exposure':       - you are required by CDC guidelines to quarantine for at least 5 days from time of exposure followed by 5 days of strict masking. It is recommended, but not required to test after 5 days, unless you develop symptoms, in which case you should test at that time.      - If you do decide to test at 5 days and are asymptomatic, the risk is that if you test without symptoms on Day 5 for example and you are positive, your 5 day isolation begins on that day, and you turned your 5 day quarantine into 10 days.      - If your exposure does not meet the above definition, you can return to your normal daily activities to include social distancing, wearing a mask and frequent handwashing.      Alternatively, if a 5-day quarantine is not feasible, it is imperative that an exposed person wear a well-fitting mask at all times when around others for 10 days after exposure.      Sites for community testing     https://ldh.[place].gov/index.cfm/page/5090?clearCache=1         https://www[facility].org/testing     ·         As you know the urgent cares, emergency rooms and primary care offices are starting to get busier with the rapid rise of COVID and as such [facility] is taking walk ups for testing:      §  Check-in via [facility] account or by calling [phone] upon arrival to be checked in remotely      §  Testing Available: PCR Testing (NOT Rapid Test)      Dexamethasone/Corticosteroids     Recommendation Against the Use of Corticosteroids to Treat Outpatients with COVID-19 and Recommendations for Vaccination of Persons with Known or Previous COVID-19 Infection           https://www.csmix20eomeymetwfuoqnowhns.nih.gov/management/clinical-management/nonhospitalized-adults--therapeutic-management/      ·         COVID-19 vaccination and SARS-CoV-2 infection     https://www.cdc.gov/vaccines/covid-19/clinical-considerations/covid-19-vaccines-[place].html#CoV-19-vaccination

## 2023-12-11 PROBLEM — Z00.00 ROUTINE MEDICAL EXAM: Status: RESOLVED | Noted: 2023-09-11 | Resolved: 2023-12-11

## 2023-12-19 ENCOUNTER — PATIENT MESSAGE (OUTPATIENT)
Dept: PSYCHIATRY | Facility: CLINIC | Age: 33
End: 2023-12-19
Payer: COMMERCIAL

## 2023-12-21 DIAGNOSIS — F90.0 ATTENTION DEFICIT HYPERACTIVITY DISORDER (ADHD), PREDOMINANTLY INATTENTIVE TYPE: Primary | ICD-10-CM

## 2023-12-21 RX ORDER — LISDEXAMFETAMINE DIMESYLATE 30 MG/1
30 CAPSULE ORAL EVERY MORNING
Qty: 30 CAPSULE | Refills: 0 | Status: SHIPPED | OUTPATIENT
Start: 2023-12-21 | End: 2024-01-25 | Stop reason: SDUPTHER

## 2023-12-21 NOTE — TELEPHONE ENCOUNTER
Patient of Dr. Kessler (currently on extended leave) requesting December fill of Vyvanse. L/s in October when plan made to continue meds unchanged. Was issued script that day but fill date set for January. [place] PDMP reviewed- no irregularities noted, last filled 11/14/23. Will consult with Dr. Melendez for review/approval, as deemed appropriate, of on [facility] supply of requested med.

## 2024-01-10 ENCOUNTER — CLINICAL SUPPORT (OUTPATIENT)
Dept: ALLERGY | Facility: CLINIC | Age: 34
End: 2024-01-10
Payer: COMMERCIAL

## 2024-01-10 DIAGNOSIS — Z91.038 ALLERGY TO INSECT STINGS: Primary | ICD-10-CM

## 2024-01-10 PROCEDURE — 99999 PR PBB SHADOW E&M-EST. PATIENT-LVL I: CPT | Mod: PBBFAC,,,

## 2024-01-10 PROCEDURE — 95115 IMMUNOTHERAPY ONE INJECTION: CPT | Mod: S$GLB,,, | Performed by: ALLERGY & IMMUNOLOGY

## 2024-01-24 ENCOUNTER — PATIENT MESSAGE (OUTPATIENT)
Dept: PSYCHIATRY | Facility: CLINIC | Age: 34
End: 2024-01-24
Payer: COMMERCIAL

## 2024-01-25 DIAGNOSIS — F90.0 ATTENTION DEFICIT HYPERACTIVITY DISORDER (ADHD), PREDOMINANTLY INATTENTIVE TYPE: Primary | ICD-10-CM

## 2024-01-25 RX ORDER — LISDEXAMFETAMINE DIMESYLATE 30 MG/1
30 CAPSULE ORAL EVERY MORNING
Qty: 30 CAPSULE | Refills: 0 | Status: ON HOLD | OUTPATIENT
Start: 2024-01-25 | End: 2024-04-02 | Stop reason: HOSPADM

## 2024-01-25 NOTE — TELEPHONE ENCOUNTER
Patient of Dr. Kessler (currently on extended leave) requesting Vyvanse refill. L/s Oct 2023 when plan made to continue meds unchanged. Pt working on arranging f/u with Dr. Kessler upon her return. [place] PDMP reviewed- no irregularities noted, last filled 12/27/23. Will consult with Dr. Melendez for review/approval, as deemed appropriate, of one month supply of requested med.

## 2024-02-11 DIAGNOSIS — M47.817 SPONDYLOSIS WITHOUT MYELOPATHY OR RADICULOPATHY, LUMBOSACRAL REGION: Primary | ICD-10-CM

## 2024-02-11 DIAGNOSIS — M79.18 MYOFASCIAL PAIN: ICD-10-CM

## 2024-02-11 DIAGNOSIS — M54.9 DORSALGIA, UNSPECIFIED: ICD-10-CM

## 2024-02-12 RX ORDER — NABUMETONE 500 MG/1
500 TABLET, FILM COATED ORAL 2 TIMES DAILY PRN
Qty: 60 TABLET | Refills: 3 | Status: ON HOLD | OUTPATIENT
Start: 2024-02-12 | End: 2024-04-02 | Stop reason: HOSPADM

## 2024-02-14 DIAGNOSIS — M54.9 DORSALGIA, UNSPECIFIED: Primary | ICD-10-CM

## 2024-02-14 DIAGNOSIS — M47.817 SPONDYLOSIS WITHOUT MYELOPATHY OR RADICULOPATHY, LUMBOSACRAL REGION: ICD-10-CM

## 2024-02-14 DIAGNOSIS — M79.18 MYOFASCIAL PAIN: ICD-10-CM

## 2024-02-14 RX ORDER — METHOCARBAMOL 750 MG/1
750 TABLET, FILM COATED ORAL 3 TIMES DAILY PRN
Qty: 90 TABLET | Refills: 3 | Status: ON HOLD | OUTPATIENT
Start: 2024-02-14 | End: 2024-04-02 | Stop reason: HOSPADM

## 2024-02-24 ENCOUNTER — PATIENT MESSAGE (OUTPATIENT)
Dept: ALLERGY | Facility: CLINIC | Age: 34
End: 2024-02-24
Payer: COMMERCIAL

## 2024-03-05 ENCOUNTER — OFFICE VISIT (OUTPATIENT)
Dept: PSYCHIATRY | Facility: CLINIC | Age: 34
End: 2024-03-05
Payer: COMMERCIAL

## 2024-03-05 DIAGNOSIS — F33.1 MODERATE EPISODE OF RECURRENT MAJOR DEPRESSIVE DISORDER: Primary | ICD-10-CM

## 2024-03-05 DIAGNOSIS — J45.909 ASTHMA, UNSPECIFIED ASTHMA SEVERITY, UNSPECIFIED WHETHER COMPLICATED, UNSPECIFIED WHETHER PERSISTENT: Primary | ICD-10-CM

## 2024-03-05 DIAGNOSIS — F90.0 ATTENTION DEFICIT HYPERACTIVITY DISORDER (ADHD), PREDOMINANTLY INATTENTIVE TYPE: ICD-10-CM

## 2024-03-05 PROCEDURE — 99214 OFFICE O/P EST MOD 30 MIN: CPT | Mod: 95,,, | Performed by: STUDENT IN AN ORGANIZED HEALTH CARE EDUCATION/TRAINING PROGRAM

## 2024-03-05 RX ORDER — ESCITALOPRAM OXALATE 20 MG/1
20 TABLET ORAL DAILY
Qty: 30 TABLET | Refills: 11 | Status: ON HOLD | OUTPATIENT
Start: 2024-03-05 | End: 2024-04-02 | Stop reason: HOSPADM

## 2024-03-05 RX ORDER — LISDEXAMFETAMINE DIMESYLATE 30 MG/1
30 CAPSULE ORAL EVERY MORNING
Qty: 30 CAPSULE | Refills: 0 | Status: SHIPPED | OUTPATIENT
Start: 2024-03-05 | End: 2024-04-24

## 2024-03-05 RX ORDER — DEXTROAMPHETAMINE SACCHARATE, AMPHETAMINE ASPARTATE, DEXTROAMPHETAMINE SULFATE AND AMPHETAMINE SULFATE 1.25; 1.25; 1.25; 1.25 MG/1; MG/1; MG/1; MG/1
5 TABLET ORAL DAILY PRN
Qty: 30 TABLET | Refills: 0 | Status: SHIPPED | OUTPATIENT
Start: 2024-03-05

## 2024-03-05 NOTE — PROGRESS NOTES
FeNO 97 while asymptomatic (tested at exhibit at allergy conference); concerned about asymptomatic worsening of her asthma.   Will order spirometry to evaluate

## 2024-03-05 NOTE — PROGRESS NOTES
"Outpatient Psychiatry Follow-Up Visit (MD/NP)    3/5/2024    Chief Complaint:  Jennifer Nguyen is a 33 y.o. female who presents today for follow-up of attention problems.  Met with patient.      Interval History and Content of Current Session:  Interim Events/Subjective Report/Content of Current Session:   Jennifer Nguyen checked in on time for her visit- pt takes 30 mg vyvanse daily, 10/25. Her back is better, she had a pinched nerve. Mood has been "up and down."  She feels like her vyvanse wears off around 2-3. Talked about taking an IR adderall 5 mg in the afternoon. Talked about the issues with her pharmacy. She wants to be sure she can maintain her appetite- she is trying to be active and healthy. She has no history of eating disorder.  She feels like the lexapro is still working well for her. Has taken very few ativan- sometimes finds it makes her feel sedated or loopy, so she breaks the pill in half.       She denies any side effects from her medication.    No thoughts of suicide.   No HI, no AVH.       Initial history with me:  Had tried wellbutrin, it made her irritable, which she was not a fan of. She likes the vyvanse, but she is concerned about the cost. She is able to sit still, sit in silence. She is no longer as distracted by extraneous stimuli. 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 She feels like her skin may be drier from the medication- she does have a history of eczema. She is ok with it. She denies chest pain, palpitations. NO hx of heart murmur, no hx of heart problems.   She states her anxiety has been OK. She is also in therapy and saw her therapist yesterday. A lot of her anxiety was secondary to problems with focus. She feels like things are much better and her focus and functioning is much better. She had tried anxiety, depression medications and had not seen benefit.   She has had weird bruising, fatigue- saw GP, saw a hematologist.     Pt is from [place], [place]. Appetite has decreased, " "she has lost about 10 lbs.   Denies SI, HI, AVH. She is very accident prone.   No thoughts about suicide.     No gun in her house. Did attempt suicide in 9th grade. Has a history of self harm-  was picking scabs during the pandemic, but hasn't cut in a long time. She lives alone, and was very isolated in the pandemic. IT was hard for her to cope with the loneliness.     Review of Systems   PSYCHIATRIC: Pertinant items are noted in the narrative.  CONSTITUTIONAL: No weight gain or loss.   MUSCULOSKELETAL: No pain or stiffness of the joints.  ENT: See above.  RESPIRATORY: See above.  CARDIOVASCULAR: No tachycardia or chest pain.  GASTROINTESTINAL: No nausea, vomiting, pain, constipation or diarrhea.    Past Medical, Family and Social History: The patient's past medical, family and social history have been reviewed and updated as appropriate within the electronic medical record - see encounter notes.    Compliance: yes    Side effects: see above    Risk Parameters:  Patient reports no suicidal ideation  Patient reports no homicidal ideation  Patient reports no self-injurious behavior  Patient reports no violent behavior    Exam (detailed: at least 9 elements; comprehensive: all 15 elements)   Constitutional  Vitals:  There were no vitals filed for this visit.           General:  unremarkable, age appropriate     Musculoskeletal  Muscle Strength/Tone:  not examined   Gait & Station:  non-ataxic     Psychiatric  Speech:  no latency; no press   Mood & Affect:  "Depressed, anxious"  congruent and appropriate   Thought Process:  normal and logical   Associations:  intact   Thought Content:  normal, no suicidality, no homicidality, delusions, or paranoia   Insight:  intact   Judgement: behavior is adequate to circumstances   Orientation:  grossly intact   Memory: intact for content of interview   Language: grossly intact   Attention Span & Concentration:  able to focus   Fund of Knowledge:  intact and appropriate to age and " level of education     Assessment and Diagnosis   Status/Progress: Based on the examination today, the patient's problem(s) is/are adequately but not ideally controlled.  New problems have not been presented today.   Co-morbidities, Diagnostic uncertainty, and Lack of compliance are not complicating management of the primary condition.  There are no active rule-out diagnoses for this patient at this time.     General Impression: Jennifer Nguyen, a 33 y.o.  female, presenting for follow-up visit for management of ADHD symptoms. Presents 12/22/22 - did not tolerate Wellbutrin; Vyvanse started. Presents at 3/21/22 for F/u, vvyanse has been helping and she denies SE, feels great benefit.   9/2023- suffering due to chronic pain, having depression and anxiety.     ADHD, inattentive type   Major depressive disorder, recurrent, moderate  Chronic pain     Intervention/Counseling/Treatment Plan   Medication Management: Continue current medications.    Continue Vyvanse 30 mg daily. Can start adderall 5 mg IR in the afternoon PRN.   Patient has no contraindications: no h/o allergic rxn, agitation, anxiety, tourette's, arrythmia, cardiovascular disease, cardiac structural abnormalities, hyperthyroidism, glaucoma, Other psychiatric illness, etc.   Reviewed possible side effects 3 times. Discussed diagnosis, risks and benefits of proposed treatment vs alternative treatments vs no treatment, and potential side effects of these treatments (death, dependency, psychosis, alisson, aggression, HTN, tics, MI, stroke, arrythmia, seizure, anaphylaxis or other allergic reactions, leukopenia, nervousness, anorexia, insomnia, tachycardia, palpitations, dizziness, BP changes, HR changes, visual disturbance, etc.). The patient expresses understanding of the above and displays the capacity to agree with this treatment given said understanding. Patient also agrees that, currently, the benefits outweigh the risks and would like to pursue treatment  at this time.  The risks and benefits of medication were discussed with the patient.    Discussed diagnosis, risks and benefits of proposed treatment above vs alternative treatments vs no treatment, and potential side effects of these treatments. The patient expresses understanding of the above and displays the capacity to agree with this treatment given said understanding. Patient also agrees that, currently, the benefits outweigh the risks and would like to pursue treatment at this time.  Pt is experiencing a recurrence of her depression. Increase lexapro to 20 mg daily.  Reviewed r/b/SE of medication including but not limited to GI distress, serotonin syndrome, manic switching, increased SI, sexual SE. All questions and concerns addressed. No SI.   Wrote #10 0.5 mg ativan for PRN anxiety, insomnia. Informed pt of the risks of continuous She does not need a refill. Benzodiazepine use including tolerance, dependence and withdrawals that may be life threatening upon abrupt cessation. Also advised not to take Benzodiazepines with Opiates or other sedatives and also not to drive or operate heavy machinery while using Benzodiazepines. Reminded never to mix with alcohol or opiates.  Continue with therapy.  Discussed upcoming maternity leave.   Discussed inherent unpredictability of medications in each individual.   Encouraged Patient to keep future appointments.   Take medications as prescribed and abstain from substance abuse.   In the event of an emergency patient was advised to go to the emergency room    Return to Clinic:3 months or sooner ELSIE Kessler MD

## 2024-03-07 ENCOUNTER — PATIENT MESSAGE (OUTPATIENT)
Dept: SPINE | Facility: CLINIC | Age: 34
End: 2024-03-07
Payer: COMMERCIAL

## 2024-03-07 DIAGNOSIS — M47.817 SPONDYLOSIS WITHOUT MYELOPATHY OR RADICULOPATHY, LUMBOSACRAL REGION: ICD-10-CM

## 2024-03-07 DIAGNOSIS — M54.9 DORSALGIA, UNSPECIFIED: Primary | ICD-10-CM

## 2024-03-08 ENCOUNTER — CLINICAL SUPPORT (OUTPATIENT)
Dept: REHABILITATION | Facility: OTHER | Age: 34
End: 2024-03-08
Payer: COMMERCIAL

## 2024-03-08 ENCOUNTER — TELEPHONE (OUTPATIENT)
Dept: ALLERGY | Facility: CLINIC | Age: 34
End: 2024-03-08
Payer: COMMERCIAL

## 2024-03-08 DIAGNOSIS — M54.9 DORSALGIA, UNSPECIFIED: ICD-10-CM

## 2024-03-08 DIAGNOSIS — M47.817 SPONDYLOSIS WITHOUT MYELOPATHY OR RADICULOPATHY, LUMBOSACRAL REGION: ICD-10-CM

## 2024-03-08 PROCEDURE — 97162 PT EVAL MOD COMPLEX 30 MIN: CPT | Mod: PN

## 2024-03-08 NOTE — PROGRESS NOTES
"[facility]  Physical Therapy Initial Evaluation    Name: Jennifer Nguyen  Clinic Number: [id number]    Therapy Diagnosis:   Encounter Diagnoses   Name Primary?    Dorsalgia, unspecified     Spondylosis without myelopathy or radiculopathy, lumbosacral region      Physician: Martha Dasilva, NP    Physician Orders: PT Eval and Treat   Medical Diagnosis: M54.9 (ICD-10-CM) - Dorsalgia, unspecified   Evaluation Date: 3/8/2024  Authorization Period Expiration: 12/31/2024  Plan of Care Certification Period: 5/3/2024  Visit # / Visits authorized: 1/ 1    Time In: 1200  Time Out: 1247  Total Billable Time separate from evaluation: 5 minutes    Precautions: Standard      Subjective   Date of onset: chronic  History of current condition - Jennifer Osullivan reports: chronic low back pain, but recently "tweaked" her back and right hip. Requested outpatient physical therapy referral as previous home exercise program was not helpful for her new pain. States her pain is different this time, more hip and knee pain this time. Does yoga, but yesterday had increased pain with yoga class.       Past Medical History:   Diagnosis Date    ADHD     Allergy     Anxiety disorder, unspecified     Celiac disease     Mild intermittent asthma, uncomplicated      Jennifer Nguyen  has a past surgical history that includes Breast surgery; [place] tooth extraction; Intrauterine device insertion (04/01/2015); and Repair of collateral ligament of thumb (Right, 01/06/2020).    Jennifer has a current medication list which includes the following prescription(s): albuterol, azelastine, cetirizine hcl, dextroamphetamine-amphetamine, escitalopram oxalate, escitalopram oxalate, fluocinonide 0.05%, fluticasone propionate, fluticasone propionate, fluticasone-salmeterol 250-50 mcg/dose, lisdexamfetamine, lisdexamfetamine, loratadine, lorazepam, methocarbamol, nabumetone, nabumetone, paxlovid, olopatadine, and ondansetron, and the " "following Facility-Administered Medications: levonorgestrel.    Review of patient's allergies indicates:   Allergen Reactions    Insect venom Anxiety, Rash and Shortness Of Breath    Gluten Nausea Only        Falls:   Denies falls    Imaging:  X-ray  8/3/2023  FINDINGS:  Lumbar vertebral body heights are maintained.     Disc space narrowing and endplate changes L5-S1..     AP alignment is anatomic.    Prior Therapy: outpatient physical therapy   Social History:  lives alone. 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 Right knee current: 3-4/10, at worst 6-7/10, at best 0-1/10  Location: right hip and knee   Description: Aching, Dull, and Sharp wrapping, deep. Burning nerve sensation  Aggravating Factors: sitting, lifting right leg, and sit to stand.  Easing Factors: Ibuprofen, muscle relaxer    Radicular symptoms: radicular symptoms into right lower extremities   Bowel and Bladder incontinence: none    Sleep is not disturbed. Sleeping position: side, stomach. Pillow between knees    Pts goals: "To be able to get back to normal every day." Return to active lifestyle. Definitely not get worse.    Objective       Postural examination in standing:  - increased lumbar lordosis  - forward head  - forward shoulders  - right anterior rotated innominate, left posterior rotated innominate      Functional assessment:   - walking: Independent   - sit to stand: Independent   - sit to supine: Independent        - supine to sit: Independent   - supine to prone: Independent     Lumbar active range of motion in standing is:  Flexion 65 degrees with pain    Extension 30 degrees with pain    Left side bending 40 degrees    Right side bending 35 degrees  " "  Left rotation Decreased 25%   Right rotation Decreased 25%     Flexibility testing:  - hamstrings:         bilateral : tight, right: -40 degrees, left : -30 degrees          - gastrocnemius:   right: tight, right: neutral , left: within normal limits          - piriformis:            bilateral: within normal limits        - quadriceps:        bilateral: within normal limits            - hip flexors:         bilateral: within normal limits   - hip adductors:    bilateral: within normal limits   - IT Bands:            bilateral: tight, decreased 25%     MMT R L   Hip flexion 3+/5 4/5   Hip abduction 3+/5 5/5   Hip extension 4/5 5/5   Hip external rotation   4/5 5/5   Hip internal rotation  4/5 5/5   Knee extension 5/5 5/5   Knee flexion 5/5 5/5   Ankle dorsiflexion 5/5 5/5   Ankle plantar flexion 5/5 5/5   Ankle inversion 5/5 5/5   Ankle eversion 5/5 5/5       Endurance is good.      Lumbar Special tests    Straight Leg Raises  negative   Cross Straight Leg Raises  negative   HANSEL negative   Prone Instability Test positive      Sensation:  - Light Touch: diminished right lateral lower extremity  - Saddle: intact    Reflexes:   - Knee Jerk: 2+    Intake Outcome Measure for FOTO Low Back Pain Survey    Therapist reviewed FOTO scores for Jennifer Nguyen on 3/8/2024.   FOTO report - see Media section or FOTO account episode details.    Intake Score: 42%  Modified Oswestry: (100-0) 46.0. (higher score = greater disability)         TREATMENT   Treatment Time In: 1240  Treatment Time Out: 1245  Total Treatment time separate from Evaluation time: 5 minutes     Jennifer Osullivan participated in neuromuscular re-education activities to improve: Posture for 5 minutes. The following activities were included:  [x] Push/pull 3 x 3"  [x] Shotgun technique      Home Exercises Provided and Patient Education Provided     Education provided:   Discussed the role of the PTA on the Rehab Team. Discussed patient will be seen by a physical " therapist minimally every 6th visit or every 30 days prior to being seen by PTA. Also discussed the use of the [facility] for communication.    Push/pull    Written Home Exercises Provided: yes.  Exercises were reviewed and Jennifer Osullivan was able to demonstrate them prior to the end of the session.  Jennifer Osullivan demonstrated good  understanding of the education provided.     See Electronic Medical Record under Patient Instructions for exercises provided 3/8/2024.    Assessment   Jennifer is a 33 y.o. female referred to outpatient Physical Therapy with a medical diagnosis of M54.9 (ICD-10-CM) - Dorsalgia, unspecified . Patient presents with ural imbalance, decreased bilateral lower extremities strength and flexibility, pelvic obliquity contributing to right low bilateral, hip, and pain affecting performance of activities of daily living and participation in recreational activities. Push/pull promote proper pelvic alignment. Instructed patient MET to correct pelvic alignment. Will benefit from outpatient physical therapy for core strengthening, bilateral lower extremity strengthening and stretching, and core strengthening to progress to below listed goals    Patient prognosis is Excellent.   Patient will benefit from skilled outpatient Physical Therapy to address the deficits stated above and in the chart below, provide pt/family education, and to maximize pt's level of independence.     Plan of care discussed with patient: Yes  Patient's spiritual, cultural and educational needs considered and patient is agreeable to the plan of care and goals as stated below:     Anticipated Barriers for therapy: none    Medical Necessity is demonstrated by the following      Medical Necessity is demonstrated by the following  History  Co-morbidities and personal factors that may impact the plan of care [] LOW: no personal factors / co-morbidities  [x] MODERATE: 1-2 personal factors / co-morbidities  [] HIGH: 3+ personal factors /  co-morbidities    Moderate / High Support Documentation:   Co-morbidities affecting plan of care: ADHD, anxiety    Personal Factors:   no deficits     Examination  Body Structures and Functions, activity limitations and participation restrictions that may impact the plan of care [] LOW: addressing 1-2 elements  [] MODERATE: 3+ elements  [x] HIGH: 4+ elements (please support below)    Moderate / High Support Documentation: decreased strength, decreased participation in recreational activities, postal imbalance     Clinical Presentation [] LOW: stable  [x] MODERATE: Evolving  [] HIGH: Unstable     Decision Making/ Complexity Score: moderate         Goals:  Short Term Goals: 4 weeks   Independent with home exercise program .  Report decreased right lumbar right hip, and right knee pain < or =  6/10 with activities of daily living such as sitting, lifting right leg, and sit to stand.  Increased manual muscle test for bilateral lower extremities by 1/2 muscle grade to promote proper pelvic stability to decrease right lumbar right hip, and right knee pain < or =  6/10 with activities of daily living such as sitting, lifting right leg, and sit to stand.     Long Term Goals: 8 weeks   Report decreased right lumbar right hip, and right knee pain < or = 2 /10 with activities of daily living such as sitting, lifting right leg, and sit to stand.  Increased manual muscle test for bilateral lower extremities  by 1 muscle grade to promote proper pelvic stability to decrease right lumbar right hip, and right knee pain < or = 2 /10 with activities of daily living such as sitting, lifting right leg, and sit to stand.   Increased flexibility in bilateral  hamstrings to -20 deg with 90/90 test to promote proper pelvic stability to decrease right lumbar right hip, and right knee pain < or = 2 /10 with activities of daily living such as sitting, lifting right leg, and sit to stand.   Increased flexibility in bilateral  iliotibial band,  and bilateral quadriceps to promote proper pelvic stability to decrease right lumbar right hip, and right knee pain < or = 2 /10 with activities of daily living such as sitting, lifting right leg, and sit to stand.      Plan   Certification Period/Plan of care expiration: 3/8/2024 to 5/3/2024.    Outpatient Physical Therapy 2 times weekly for 8 weeks to include the following interventions: Manual Therapy, Moist Heat/ Ice, Neuromuscular Re-ed, Patient Education, Therapeutic Activities, and Therapeutic Exercise.     Medardo Alcocer, PT

## 2024-03-08 NOTE — PATIENT INSTRUCTIONS
SI joint Muscle energy for L Posterior/R anterior rotation              Bring both knees up towards your chest as shown in the picture.  Place your Left hand on top of your Left thigh, and your Right hand on the back of your Right thigh.  Push your legs into each hand with about 50% effort.      Perform 3 reps and hold for 3 seconds. Perform twice a day.    Copyright © 2024 [employer]

## 2024-03-08 NOTE — TELEPHONE ENCOUNTER
----- Message from Reji Stinson MD sent at 3/5/2024  8:32 AM CST -----  Colby Lgaos, would you please schedule this patient to get lung function testing (already ordered) and then a visit with me right afterwards? This can be on a day that she is already coming to get an allergy shot. Thanks!

## 2024-03-11 ENCOUNTER — CLINICAL SUPPORT (OUTPATIENT)
Dept: REHABILITATION | Facility: OTHER | Age: 34
End: 2024-03-11
Payer: COMMERCIAL

## 2024-03-11 DIAGNOSIS — M47.817 SPONDYLOSIS WITHOUT MYELOPATHY OR RADICULOPATHY, LUMBOSACRAL REGION: ICD-10-CM

## 2024-03-11 DIAGNOSIS — M54.9 DORSALGIA, UNSPECIFIED: Primary | ICD-10-CM

## 2024-03-11 PROCEDURE — 97110 THERAPEUTIC EXERCISES: CPT | Mod: PN,CQ

## 2024-03-11 NOTE — PROGRESS NOTES
Physical Therapy Daily Treatment Note     Name: Jennifer Osullivan [facility]  Clinic Number: [id number]    Therapy Diagnosis:   Encounter Diagnoses   Name Primary?    Dorsalgia, unspecified Yes    Spondylosis without myelopathy or radiculopathy, lumbosacral region      Physician: Martha Dasilva NP    Visit Date: 3/11/2024  Physician Orders: PT Eval and Treat   Medical Diagnosis: M54.9 (ICD-10-CM) - Dorsalgia, unspecified   Evaluation Date: 3/8/2024  Authorization Period Expiration: 12/31/2024  Plan of Care Certification Period: 5/3/2024  Visit # / Visits authorized: 2 (1/ 20)  FOTO:  1/5; 1 (3/8/2024)      PTA visits: 1/5   Time In: 1133  Time Out: 1215  Total Billable Time separate from evaluation: 42 minutes     Precautions: Standard     Subjective    Patient continues to have some right knee and hip pain.  Patient asked if she should participate in yoga and or kickball at this time.    Pt reports: she was compliant with home exercise program given last session.   Response to previous treatment:no adverse effects  Functional change: no change reported     Pain: not given   Location: right knee and hip     Objective     Jennifer Osullivan received therapeutic exercises to develop strength, endurance, ROM, flexibility, posture, and core stabilization for 35 minutes including:  [x]Push/Pull 3 x 5 second hold   [x]Shotgun technique  [x]TA activation in hook lying   [x]Posterior pelvic tilt   [x]Hooklying hip abduction with pilates ring x 10 ( stopped 2* increased tightness and pain in right buttock   [x]Hooklying hip adduction ball squeeze 2 x 10  [x]Hooklying clamshells 2 x 10  [x]Seated piriformis stretch   [x]Mini Bridge 2 x 10       Jennifer Osullivan received the following manual therapy techniques: Joint mobilizations, Manual traction, Myofacial release, Manual Lymphatic Drainage, Soft tissue Mobilization, and Friction Massage were applied to the: R IT band for 2 minutes, including:  Manual right IT band  stretch 3 x 20 seconds     Jennifer Osullivan participated in dynamic functional therapeutic activities to improve functional performance for 5  minutes, including:  Lateral walk 2 x 30 ft   Train Tracks 2 x 30 ft     Home Exercises Provided and Patient Education Provided     Education provided:   - patient educated on proper exercise technique.  Pt advised to avoid yoga and kickball until further notice.      Written Home Exercises Provided: Patient instructed to cont prior HEP. Exercises were reviewed and Jennifer Osullivan was able to demonstrate them prior to the end of the session.  Jennifer Osullivan demonstrated good  understanding of the education provided. See EMR under Patient Instructions for exercises provided during therapy sessions.      Assessment     Patient tolerated treatment well.  Pain level remained same prior to and after treatment session.  Patient displayed good effort during treatment.  Patient able to achieve proper pelvic alignment during session.  Patient continues to lack some core stability and glute strength.      Jennifer Osullivan Is progressing well towards her goals.   Pt prognosis is Good.     Pt will continue to benefit from skilled outpatient physical therapy to address the deficits listed in the problem list box on initial evaluation, provide pt/family education and to maximize pt's level of independence in the home and community environment.     Pt's spiritual, cultural and educational needs considered and pt agreeable to plan of care and goals.    Anticipated barriers to physical therapy: none    Goals: Short Term Goals: 4 weeks   Independent with home exercise program . (Progressing, not met)   Report decreased right lumbar right hip, and right knee pain < or =  6/10 with activities of daily living such as sitting, lifting right leg, and sit to stand.(Progressing, not met)  Increased manual muscle test for bilateral lower extremities by 1/2 muscle grade to promote proper pelvic stability to decrease right  lumbar right hip, and right knee pain < or =  6/10 with activities of daily living such as sitting, lifting right leg, and sit to stand. (Progressing, not met)     Long Term Goals: 8 weeks   Report decreased right lumbar right hip, and right knee pain < or = 2 /10 with activities of daily living such as sitting, lifting right leg, and sit to stand.(Progressing, not met)  Increased manual muscle test for bilateral lower extremities  by 1 muscle grade to promote proper pelvic stability to decrease right lumbar right hip, and right knee pain < or = 2 /10 with activities of daily living such as sitting, lifting right leg, and sit to stand. (Progressing, not met)  Increased flexibility in bilateral  hamstrings to -20 deg with 90/90 test to promote proper pelvic stability to decrease right lumbar right hip, and right knee pain < or = 2 /10 with activities of daily living such as sitting, lifting right leg, and sit to stand. (Progressing, not met)  Increased flexibility in bilateral  iliotibial band, and bilateral quadriceps to promote proper pelvic stability to decrease right lumbar right hip, and right knee pain < or = 2 /10 with activities of daily living such as sitting, lifting right leg, and sit to stand.(Progressing, not met)    Plan     Continue to progress towards goals set by Physical Therapist.  Work to improve core stability, pelvic alignment, posture and glute strength next visit.      Wilberto Johnson, PTA

## 2024-03-13 ENCOUNTER — DOCUMENTATION ONLY (OUTPATIENT)
Dept: REHABILITATION | Facility: OTHER | Age: 34
End: 2024-03-13

## 2024-03-13 ENCOUNTER — CLINICAL SUPPORT (OUTPATIENT)
Dept: REHABILITATION | Facility: OTHER | Age: 34
End: 2024-03-13
Payer: COMMERCIAL

## 2024-03-13 DIAGNOSIS — M47.817 SPONDYLOSIS WITHOUT MYELOPATHY OR RADICULOPATHY, LUMBOSACRAL REGION: ICD-10-CM

## 2024-03-13 DIAGNOSIS — M54.9 DORSALGIA, UNSPECIFIED: Primary | ICD-10-CM

## 2024-03-13 PROCEDURE — 97112 NEUROMUSCULAR REEDUCATION: CPT | Mod: PN,CQ

## 2024-03-13 PROCEDURE — 97140 MANUAL THERAPY 1/> REGIONS: CPT | Mod: PN,CQ

## 2024-03-13 NOTE — PROGRESS NOTES
"Physical Therapy Daily Treatment Note     Name: Jennifer Osullivan [facility]  Clinic Number: [id number]    Therapy Diagnosis:   Encounter Diagnoses   Name Primary?    Dorsalgia, unspecified Yes    Spondylosis without myelopathy or radiculopathy, lumbosacral region        Physician: Martha Dasilva NP    Visit Date: 3/13/2024  Physician Orders: PT Eval and Treat   Medical Diagnosis: M54.9 (ICD-10-CM) - Dorsalgia, unspecified   Evaluation Date: 3/8/2024  Authorization Period Expiration: 12/31/2024  Plan of Care Certification Period: 5/3/2024  Visit # / Visits authorized: 3 (2/ 20)  FOTO:  3/5; 1 (Last issued on 3/8/2024)     PTA visits: 1/5     Time In: 2:34 pm  Time Out: 3:20 pm  Total Billable Time separate from evaluation: 38 minutes     Precautions: Standard     Subjective   Patient reports: Was on the ground playing with a baby and had some burning in the right side of the back and it has since wrapped around to the front of the hip    she was compliant with home exercise program   Response to previous treatment: Had some increased cramping after   Functional change: no change reported     Pain: 2-3/10  Location: right knee and hip     Objective     Jennifer Osullivan received therapeutic exercises to develop strength, endurance, ROM, flexibility, posture, and core stabilization for 6 minutes including:  [x] +Piriformis stretch (Figure 4) 2 x 30"   [x] +Hamstring stretch on right x 30"  [] Push/Pull 3 x 5 second hold   [] Hooklying hip abduction with pilates ring x 10 ( stopped 2* increased tightness and pain in right buttock   [] Hooklying hip adduction ball squeeze 2 x 10   [] Hooklying clamshells 2 x 10   [x] +Seated 3 way ball rollouts x 5        Jennifer Osullivan received the following manual therapy techniques: Joint mobilizations, Manual traction, Myofacial release, Manual Lymphatic Drainage, Soft tissue Mobilization, and Friction Massage were applied to the: R IT band for 9 minutes including:  [] Manual right IT band stretch 3 " "x 20 seconds   [x] Shotgun technique  [x] +Long leg distraction  [x] +Lateral hip distraction      Patient received neuromuscular reeducation for 23 minutes for improved balance, coordination, kinesthetic, sense, proprioception, and posture including:  [x] Transverse activation   [x] Posterior pelvic tilt 20 x 3"  [x] Bridge 2 x 10   [x] +Supine sciatic nerve glide on right 3 x 8  [x] +Prone on elbows x 1 min  [x] +Prone press up 3 x 6"     Jennifer Osullivan participated in dynamic functional therapeutic activities to improve functional performance for 00 minutes, including:  [] Lateral walk 2 x 30 ft   [] Train Tracks 2 x 30 ft     Home Exercises Provided and Patient Education Provided     Education provided:   - Continuance of HEP  - Bridge  - Piriformis stretch  - Posterior pelvic tilt     Written Home Exercises Provided: Patient instructed to cont prior HEP. Exercises were reviewed and Jennifer Osullivan was able to demonstrate them prior to the end of the session.  Jennifer Osullivan demonstrated good  understanding of the education provided. See EMR under Patient Instructions for exercises provided during therapy sessions.      Assessment   Patient completed exercises, but reported pain and burning in back. Patient also noted numbness in foot, which changed from lateral foot to bottom of foot depending on the exercise. Attempted several exercises and manual therapies with no relief of symptoms. Provided patient with several new exercises for HEP. Will monitor patient symptoms and progress as tolerated.    Jennifer Osullivan Is progressing well towards her goals.   Pt prognosis is Good.     Pt will continue to benefit from skilled outpatient physical therapy to address the deficits listed in the problem list box on initial evaluation, provide pt/family education and to maximize pt's level of independence in the home and community environment.     Pt's spiritual, cultural and educational needs considered and pt agreeable to plan of care and " goals.    Anticipated barriers to physical therapy: none    Goals: Short Term Goals: 4 weeks   Independent with home exercise program . (Progressing, not met)   Report decreased right lumbar right hip, and right knee pain < or =  6/10 with activities of daily living such as sitting, lifting right leg, and sit to stand.(Progressing, not met)  Increased manual muscle test for bilateral lower extremities by 1/2 muscle grade to promote proper pelvic stability to decrease right lumbar right hip, and right knee pain < or =  6/10 with activities of daily living such as sitting, lifting right leg, and sit to stand. (Progressing, not met)     Long Term Goals: 8 weeks   Report decreased right lumbar right hip, and right knee pain < or = 2 /10 with activities of daily living such as sitting, lifting right leg, and sit to stand.(Progressing, not met)  Increased manual muscle test for bilateral lower extremities  by 1 muscle grade to promote proper pelvic stability to decrease right lumbar right hip, and right knee pain < or = 2 /10 with activities of daily living such as sitting, lifting right leg, and sit to stand. (Progressing, not met)  Increased flexibility in bilateral  hamstrings to -20 deg with 90/90 test to promote proper pelvic stability to decrease right lumbar right hip, and right knee pain < or = 2 /10 with activities of daily living such as sitting, lifting right leg, and sit to stand. (Progressing, not met)  Increased flexibility in bilateral  iliotibial band, and bilateral quadriceps to promote proper pelvic stability to decrease right lumbar right hip, and right knee pain < or = 2 /10 with activities of daily living such as sitting, lifting right leg, and sit to stand.(Progressing, not met)    Plan     Continue to progress towards goals set by Physical Therapist.  Work to improve core stability, pelvic alignment, posture and glute strength next visit.      Ilan Rodríguez III, PTA

## 2024-03-13 NOTE — PROGRESS NOTES
PT/PTA met face to face to discuss pt's treatment plan and progress towards established goals. Pt will be seen by a physical therapist minimally every 6th visit or every 30 days.    Ilan Rodríguez III, PTA      Meeting as noted above.     Medardo Alcocer, PT, CWS, Cert DN

## 2024-03-17 NOTE — PROGRESS NOTES
"Physical Therapy Daily Treatment Note     Name: Jennifer Osullivan [facility]  Clinic Number: [id number]    Therapy Diagnosis:   No diagnosis found.      Physician: Martha Dasilva NP    Visit Date: 3/20/2024  Physician Orders: PT Eval and Treat   Medical Diagnosis: M54.9 (ICD-10-CM) - Dorsalgia, unspecified   Evaluation Date: 3/8/2024  Authorization Period Expiration: 12/31/2024  Plan of Care Certification Period: 5/3/2024  Visit # / Visits authorized: 4 (3/ 20)  FOTO:  4/5; 1 (Last issued on 3/8/2024)     PTA visits: 0/5     Time In: ***  Time Out: ***  Total Billable Time separate from evaluation: *** minutes     Precautions: Standard     Subjective   Patient reports: ***    she was compliant with home exercise program   Response to previous treatment: Had some increased cramping after   Functional change: no change reported     Pain: 2-3/10  Location: right knee and hip     Objective     Jennifer Osullivan received therapeutic exercises to develop strength, endurance, ROM, flexibility, posture, and core stabilization for 6 minutes including:  [x] +Piriformis stretch (Figure 4) 2 x 30"   [x] +Hamstring stretch on right x 30"  [] Push/Pull 3 x 5 second hold   [] Hooklying hip abduction with pilates ring x 10 ( stopped 2* increased tightness and pain in right buttock   [] Hooklying hip adduction ball squeeze 2 x 10   [] Hooklying clamshells 2 x 10   [x] +Seated 3 way ball rollouts x 5        Jennifer Osullivan received the following manual therapy techniques: Joint mobilizations, Manual traction, Myofacial release, Manual Lymphatic Drainage, Soft tissue Mobilization, and Friction Massage were applied to the: R IT band for 9 minutes including:  [] Manual right IT band stretch 3 x 20 seconds   [x] Shotgun technique  [x] +Long leg distraction  [x] +Lateral hip distraction      Patient received neuromuscular reeducation for 23 minutes for improved balance, coordination, kinesthetic, sense, proprioception, and posture including:  [x] Transverse " "activation   [x] Posterior pelvic tilt 20 x 3"  [x] Bridge 2 x 10   [x] +Supine sciatic nerve glide on right 3 x 8  [x] +Prone on elbows x 1 min  [x] +Prone press up 3 x 6"     Jennifer Osullivan participated in dynamic functional therapeutic activities to improve functional performance for 00 minutes, including:  [] Lateral walk 2 x 30 ft   [] Train Tracks 2 x 30 ft     Home Exercises Provided and Patient Education Provided     Education provided:   - Continuance of HEP  - Bridge  - Piriformis stretch  - Posterior pelvic tilt     Written Home Exercises Provided: Patient instructed to cont prior HEP. Exercises were reviewed and Jennifer Osullivan was able to demonstrate them prior to the end of the session.  Jennifer Osullivan demonstrated good  understanding of the education provided. See EMR under Patient Instructions for exercises provided during therapy sessions.      Assessment   ***     Jennifer Osullivan Is progressing well towards her goals.   Pt prognosis is Good.     Pt will continue to benefit from skilled outpatient physical therapy to address the deficits listed in the problem list box on initial evaluation, provide pt/family education and to maximize pt's level of independence in the home and community environment.     Pt's spiritual, cultural and educational needs considered and pt agreeable to plan of care and goals.    Anticipated barriers to physical therapy: none    Goals: Short Term Goals: 4 weeks   Independent with home exercise program . (Progressing, not met)   Report decreased right lumbar right hip, and right knee pain < or =  6/10 with activities of daily living such as sitting, lifting right leg, and sit to stand.(Progressing, not met)  Increased manual muscle test for bilateral lower extremities by 1/2 muscle grade to promote proper pelvic stability to decrease right lumbar right hip, and right knee pain < or =  6/10 with activities of daily living such as sitting, lifting right leg, and sit to stand. (Progressing, not " met)     Long Term Goals: 8 weeks   Report decreased right lumbar right hip, and right knee pain < or = 2 /10 with activities of daily living such as sitting, lifting right leg, and sit to stand.(Progressing, not met)  Increased manual muscle test for bilateral lower extremities  by 1 muscle grade to promote proper pelvic stability to decrease right lumbar right hip, and right knee pain < or = 2 /10 with activities of daily living such as sitting, lifting right leg, and sit to stand. (Progressing, not met)  Increased flexibility in bilateral  hamstrings to -20 deg with 90/90 test to promote proper pelvic stability to decrease right lumbar right hip, and right knee pain < or = 2 /10 with activities of daily living such as sitting, lifting right leg, and sit to stand. (Progressing, not met)  Increased flexibility in bilateral  iliotibial band, and bilateral quadriceps to promote proper pelvic stability to decrease right lumbar right hip, and right knee pain < or = 2 /10 with activities of daily living such as sitting, lifting right leg, and sit to stand.(Progressing, not met)    Plan     Continue to progress towards goals set by Physical Therapist.  Work to improve core stability, pelvic alignment, posture and glute strength next visit.      Certification Period/Plan of care expiration: 3/8/2024 to 5/3/2024.     Outpatient Physical Therapy 2 times weekly for 8 weeks to include the following interventions: Manual Therapy, Moist Heat/ Ice, Neuromuscular Re-ed, Patient Education, Therapeutic Activities, and Therapeutic Exercise.     Pascale Mayberry, PT

## 2024-03-18 ENCOUNTER — CLINICAL SUPPORT (OUTPATIENT)
Dept: REHABILITATION | Facility: OTHER | Age: 34
End: 2024-03-18
Payer: COMMERCIAL

## 2024-03-18 DIAGNOSIS — M47.817 SPONDYLOSIS WITHOUT MYELOPATHY OR RADICULOPATHY, LUMBOSACRAL REGION: ICD-10-CM

## 2024-03-18 DIAGNOSIS — M54.9 DORSALGIA, UNSPECIFIED: Primary | ICD-10-CM

## 2024-03-18 PROCEDURE — 97110 THERAPEUTIC EXERCISES: CPT | Mod: PN,CQ

## 2024-03-18 PROCEDURE — 97112 NEUROMUSCULAR REEDUCATION: CPT | Mod: PN,CQ

## 2024-03-18 NOTE — PROGRESS NOTES
"Physical Therapy Daily Treatment Note     Name: Jennifer Osullivan [facility]  Clinic Number: [id number]    Therapy Diagnosis:   Encounter Diagnoses   Name Primary?    Dorsalgia, unspecified Yes    Spondylosis without myelopathy or radiculopathy, lumbosacral region          Physician: Martha Dasilva NP    Visit Date: 3/18/2024  Physician Orders: PT Eval and Treat   Medical Diagnosis: M54.9 (ICD-10-CM) - Dorsalgia, unspecified   Evaluation Date: 3/8/2024  Authorization Period Expiration: 12/31/2024  Plan of Care Certification Period: 5/3/2024  Visit # / Visits authorized: 4 (3/ 20)  FOTO:  4/5; 1 (Last issued on 3/8/2024)     PTA visits: 3/5     Time In: 1345  Time Out: 1430  Total Billable Time separate from evaluation: 45 minutes     Precautions: Standard     Subjective   Patient states feeling better but continues to have slightly heightened low back and right hip pain since last session.  Patient mentioned continuing to have some numbness in right foot as well    she was compliant with home exercise program   Response to previous treatment: some increased pain and numbness   Functional change: no change reported     Pain: 5/10  Location: right knee and hip     Objective     Jennifer Osullivan received therapeutic exercises to develop strength, endurance, ROM, flexibility, posture, and core stabilization for 32 minutes including:  [x] +Piriformis stretch (Figure 4) 2 x 30"   [x] +Hamstring stretch on right x 30"  [x] +Seated 3 way ball rollouts x 5    []Push/Pull 3 x 5 second hold   []Shotgun technique  [x]TA activation in hook lying   [x]Posterior pelvic tilt   [x]Hooklying hip abduction with pilates ring x 10 ( stopped 2* increased tightness and pain in right buttock   [x]Hooklying hip adduction ball squeeze 2 x 10  []Hooklying clamshells 2 x 10  []Seated piriformis stretch   [x]Mini Bridge 3 x 10   []Standing posterior pelvic tilt        Jennifer Osullivan received the following manual therapy techniques: Joint mobilizations, Manual " "traction, Myofacial release, Manual Lymphatic Drainage, Soft tissue Mobilization, and Friction Massage were applied to the: R IT band for 5 minutes including:  [] Manual right IT band stretch 3 x 20 seconds   [] Shotgun technique  [] +Long leg distraction  [] +Lateral hip distraction  [x]STM right piriformis and IT band ( Piriformis stopped due to increased numbness in right foot)       Patient received neuromuscular reeducation for 8 minutes for improved balance, coordination, kinesthetic, sense, proprioception, and posture including:  [x] Transverse activation 20 x 3 second hold   [x] Posterior pelvic tilt 20 x 3"  [] Bridge 2 x 10   [] +Supine sciatic nerve glide on right 3 x 8  [] +Prone on elbows 5 x 5 seconds   [x] +Prone press up 5 x 5"     Jennifer Osullivan participated in dynamic functional therapeutic activities to improve functional performance for 00 minutes, including:  [] Lateral walk 2 x 30 ft   [] Train Tracks 2 x 30 ft     Home Exercises Provided and Patient Education Provided     Education provided:   - patient educated on proper exercise technique.      Written Home Exercises Provided: Patient instructed to cont prior HEP. Exercises were reviewed and Jennifer Osullivan was able to demonstrate them prior to the end of the session.  Jennifer Osullivan demonstrated good  understanding of the education provided. See EMR under Patient Instructions for exercises provided during therapy sessions.      Assessment   Patient tolerance to treatment was fair.  Patient demonstrated good effort with appropriate training effect.  Patient continues to have glute and core weakness.  Trunk flexion limited due to increased pain and right lower extremity numbness.      Jennifer Osullivan Is progressing well towards her goals.   Pt prognosis is Good.     Pt will continue to benefit from skilled outpatient physical therapy to address the deficits listed in the problem list box on initial evaluation, provide pt/family education and to maximize pt's " level of independence in the home and community environment.     Pt's spiritual, cultural and educational needs considered and pt agreeable to plan of care and goals.    Anticipated barriers to physical therapy: none    Goals: Short Term Goals: 4 weeks   Independent with home exercise program . (Progressing, not met)   Report decreased right lumbar right hip, and right knee pain < or =  6/10 with activities of daily living such as sitting, lifting right leg, and sit to stand.(Progressing, not met)  Increased manual muscle test for bilateral lower extremities by 1/2 muscle grade to promote proper pelvic stability to decrease right lumbar right hip, and right knee pain < or =  6/10 with activities of daily living such as sitting, lifting right leg, and sit to stand. (Progressing, not met)     Long Term Goals: 8 weeks   Report decreased right lumbar right hip, and right knee pain < or = 2 /10 with activities of daily living such as sitting, lifting right leg, and sit to stand.(Progressing, not met)  Increased manual muscle test for bilateral lower extremities  by 1 muscle grade to promote proper pelvic stability to decrease right lumbar right hip, and right knee pain < or = 2 /10 with activities of daily living such as sitting, lifting right leg, and sit to stand. (Progressing, not met)  Increased flexibility in bilateral  hamstrings to -20 deg with 90/90 test to promote proper pelvic stability to decrease right lumbar right hip, and right knee pain < or = 2 /10 with activities of daily living such as sitting, lifting right leg, and sit to stand. (Progressing, not met)  Increased flexibility in bilateral  iliotibial band, and bilateral quadriceps to promote proper pelvic stability to decrease right lumbar right hip, and right knee pain < or = 2 /10 with activities of daily living such as sitting, lifting right leg, and sit to stand.(Progressing, not met)    Plan     Continue to progress towards goals set by Physical  Therapist.  Work to improve core stability, pelvic alignment, posture and glute strength next visit.      Wilberto Johnson, PTA

## 2024-03-19 ENCOUNTER — PATIENT MESSAGE (OUTPATIENT)
Dept: PSYCHIATRY | Facility: CLINIC | Age: 34
End: 2024-03-19
Payer: COMMERCIAL

## 2024-03-19 DIAGNOSIS — F90.0 ATTENTION DEFICIT HYPERACTIVITY DISORDER (ADHD), PREDOMINANTLY INATTENTIVE TYPE: Primary | ICD-10-CM

## 2024-03-19 NOTE — PROGRESS NOTES
"Physical Therapy Daily Treatment Note     Name: Jennifer Osullivan [facility]  Clinic Number: [id number]    Therapy Diagnosis:   Encounter Diagnoses   Name Primary?    Spondylosis without myelopathy or radiculopathy, lumbosacral region Yes    Dorsalgia, unspecified        Physician: Martha Dasilva NP    Visit Date: 3/20/2024  Physician Orders: PT Eval and Treat   Medical Diagnosis: M54.9 (ICD-10-CM) - Dorsalgia, unspecified   Evaluation Date: 3/8/2024  Authorization Period Expiration: 12/31/2024  Plan of Care Certification Period: 5/3/2024  Visit # / Visits authorized: 5 (3/ 20)  FOTO:  5/5; 1 (Last issued on 3/8/2024)     PTA visits: 3/5     Time In: 1435  Time Out: 1515  Total Billable Time separate from evaluation: 40 minutes     Precautions: Standard     Subjective   Patient reports her back is hurting because she was in a meeting and was sitting for the meeting. States the tingling in her right leg has been "on and off."    she was compliant with home exercise program   Response to previous treatment: "not that bad. Achy, but not as bad as last Wednesday."  Functional change: less pain with bridging. Increased pain still with prolonged sitting.    Pain: 2-3/10  Location: right knee and hip     Objective     Jennifer Osullivan received therapeutic exercises to develop strength, endurance, ROM, flexibility, posture, and core stabilization for 15 minutes including:  [x] Piriformis stretch (Figure 4) 2 x 30"   [] Hamstring stretch on right x 30"  [] Seated 3 way ball rollouts x 5    [x] Push/Pull 3 x 5 second hold   [x] Shotgun technique    [] Hooklying hip abduction with pilates ring x 10 ( stopped 2* increased tightness and pain in right buttock   [] Hooklying hip adduction ball squeeze 2 x 10  [x] Hooklying clam shells 20 repetitions with red band   [x]+side lying clams with red band x 20 repetitions each  [] Seated piriformis stretch   [x] Mini Bridge 3 x 10   [] Standing posterior pelvic tilt        Jennifer Osullivan received the " "following manual therapy techniques: Joint mobilizations, Manual traction, Myofacial release, Manual Lymphatic Drainage, Soft tissue Mobilization, and Friction Massage were applied to the: R IT band for 5 minutes including:  [] Manual right IT band stretch 3 x 20 seconds   [] Shotgun technique  [] Long leg distraction  [] Lateral hip distraction  [] STM right piriformis and IT band ( Piriformis stopped due to increased numbness in right foot)       Patient received neuromuscular reeducation for 25 minutes for improved balance, coordination, kinesthetic, sense, proprioception, and posture including:  [x] Transverse activation with press against yellow physioball 20 x 3 second hold   [x] Posterior pelvic tilt 20 x 3" pain with last repetition  [x] Bridge 2 x 10   [x] Supine sciatic nerve glide on right x 20 repetitions, did not decrease symptoms  [] Prone on elbows 5 x 5 seconds   [x] Prone press up 5 x 5"     Jennifer Osullivan participated in dynamic functional therapeutic activities to improve functional performance for 00 minutes, including:  [] Lateral walk 2 x 30 ft   [] Train Tracks 2 x 30 ft     Home Exercises Provided and Patient Education Provided     Education provided:   Bird dogs  Cat cow  Prone press ups  Bridging  Figure 4 piriformis stretches    Written Home Exercises Provided: yes and  Patient instructed to cont prior HEP. Exercises were reviewed and Jennifer Osullivan was able to demonstrate them prior to the end of the session.  Jennifer Osullivan demonstrated good  understanding of the education provided. See EMR under Patient Instructions for exercises provided during therapy sessions.      Assessment   Continues to present with sciatica pain in right leg to her right foot. May benefit from dry needling for sciatica protocol. Patient responds to extension more favorably than flexion.      Jennifer Osullivan Is progressing well towards her goals.   Pt prognosis is Good.     Pt will continue to benefit from skilled outpatient " physical therapy to address the deficits listed in the problem list box on initial evaluation, provide pt/family education and to maximize pt's level of independence in the home and community environment.     Pt's spiritual, cultural and educational needs considered and pt agreeable to plan of care and goals.    Anticipated barriers to physical therapy: none    Goals: Short Term Goals: 4 weeks   Independent with home exercise program . (Progressing, not met)   Report decreased right lumbar right hip, and right knee pain < or =  6/10 with activities of daily living such as sitting, lifting right leg, and sit to stand.(Progressing, not met)  Increased manual muscle test for bilateral lower extremities by 1/2 muscle grade to promote proper pelvic stability to decrease right lumbar right hip, and right knee pain < or =  6/10 with activities of daily living such as sitting, lifting right leg, and sit to stand. (Progressing, not met)     Long Term Goals: 8 weeks   Report decreased right lumbar right hip, and right knee pain < or = 2 /10 with activities of daily living such as sitting, lifting right leg, and sit to stand.(Progressing, not met)  Increased manual muscle test for bilateral lower extremities  by 1 muscle grade to promote proper pelvic stability to decrease right lumbar right hip, and right knee pain < or = 2 /10 with activities of daily living such as sitting, lifting right leg, and sit to stand. (Progressing, not met)  Increased flexibility in bilateral  hamstrings to -20 deg with 90/90 test to promote proper pelvic stability to decrease right lumbar right hip, and right knee pain < or = 2 /10 with activities of daily living such as sitting, lifting right leg, and sit to stand. (Progressing, not met)  Increased flexibility in bilateral  iliotibial band, and bilateral quadriceps to promote proper pelvic stability to decrease right lumbar right hip, and right knee pain < or = 2 /10 with activities of daily  living such as sitting, lifting right leg, and sit to stand.(Progressing, not met)    Plan     Continue to progress towards goals set by Physical Therapist.  Work to improve core stability, pelvic alignment, posture and glute strength next visit.      Medardo Alcocer, PT

## 2024-03-20 ENCOUNTER — CLINICAL SUPPORT (OUTPATIENT)
Dept: REHABILITATION | Facility: OTHER | Age: 34
End: 2024-03-20
Payer: COMMERCIAL

## 2024-03-20 DIAGNOSIS — M54.9 DORSALGIA, UNSPECIFIED: ICD-10-CM

## 2024-03-20 DIAGNOSIS — M47.817 SPONDYLOSIS WITHOUT MYELOPATHY OR RADICULOPATHY, LUMBOSACRAL REGION: Primary | ICD-10-CM

## 2024-03-20 PROCEDURE — 97110 THERAPEUTIC EXERCISES: CPT | Mod: PN

## 2024-03-20 PROCEDURE — 97112 NEUROMUSCULAR REEDUCATION: CPT | Mod: PN

## 2024-03-20 NOTE — PATIENT INSTRUCTIONS
"Figure Four - Piriformis         Begin by lying on your back with your knees bent, feet flat on the ground. Raise your right foot up to your left knee, pressing the back of your ankle against the top of your knee. Reach under your left knee with both hands. Using your arms assist in bringing your left knee towards your chest, lifting your left foot off of the ground. Pull only until you feel a stretch in your right glute and hip. Maintain this position for the recommended hold time and then switch legs.     Hold for 30 seconds. Perform 3 repetitions each leg.    Copyright © 2024 [employer]    BRIDGE - BRIDGING        While lying on your back with knees bent, tighten your lower abdominal muscles, squeeze your buttocks and then raise your buttocks off the floor/bed as creating a "Bridge" with your body. Hold and then lower yourself and repeat.    Perform 2 sets of 10 repetitions. Hold for 3 seconds.    Copyright © 2024 [employer]    BIRD DOG        QUADRUPED ALTERNATE ARM AND LEG:   While in a crawling position, tighten/brace at your abdominal muscles and then slowly lift a leg and opposite arm upwards. Your hip will move into hip extension on the way up. Lower leg and arm down and then repeat with opposite side.     Maintain a level and stable pelvis and spine the entire time.    Perform 10 repetitions. Hold for 3 seconds.    Copyright © 2024 [employer]    Cat Cow        Position yourself on your hands and knees with your hands placed under your shoulders and your knees directly under your hips. Slowly round your back up towards the ceiling and then arch your back down by pulling your abdomen towards the floor.     Perform 20 repetitions.    Copyright © 2024 [employer]    Prone Press ups         Start with your hands in push up position. Press your chest up as high as you can using only your arms. Try to keep your hips on the mat and relax your stomach.    Stop the exercise and return to prone on elbow position " if the pain or numbness moves further away from the lower back.             Perform 5 repetitions. Hold for 5 seconds.    Copyright © 2024 [employer]

## 2024-03-25 ENCOUNTER — CLINICAL SUPPORT (OUTPATIENT)
Dept: REHABILITATION | Facility: OTHER | Age: 34
End: 2024-03-25
Payer: COMMERCIAL

## 2024-03-25 ENCOUNTER — PATIENT MESSAGE (OUTPATIENT)
Dept: ALLERGY | Facility: CLINIC | Age: 34
End: 2024-03-25
Payer: COMMERCIAL

## 2024-03-25 DIAGNOSIS — M47.817 SPONDYLOSIS WITHOUT MYELOPATHY OR RADICULOPATHY, LUMBOSACRAL REGION: Primary | ICD-10-CM

## 2024-03-25 DIAGNOSIS — M54.9 DORSALGIA, UNSPECIFIED: ICD-10-CM

## 2024-03-25 DIAGNOSIS — R29.898 IMPAIRED STRENGTH OF HIP MUSCLES: ICD-10-CM

## 2024-03-25 PROCEDURE — 97140 MANUAL THERAPY 1/> REGIONS: CPT | Mod: PN

## 2024-03-25 NOTE — PROGRESS NOTES
"Physical Therapy Daily Treatment Note     Name: Jennifer Osullivan [facility]  Clinic Number: [id number]    Therapy Diagnosis:   Encounter Diagnoses   Name Primary?    Spondylosis without myelopathy or radiculopathy, lumbosacral region Yes    Dorsalgia, unspecified     Impaired strength of hip muscles        Physician: Martha Dasilva NP    Visit Date: 3/25/2024  Physician Orders: PT Eval and Treat   Medical Diagnosis: M54.9 (ICD-10-CM) - Dorsalgia, unspecified   Evaluation Date: 3/8/2024  Authorization Period Expiration: 12/31/2024  Plan of Care Certification Period: 5/3/2024  Visit # / Visits authorized: 5 (3/ 20)  FOTO:  5/5; 1 (Last issued on 3/8/2024)     PTA visits: 3/5     Time In: 1600  Time Out: 1642  Total Billable Time separate from evaluation: 30 minutes     Precautions: Standard     Subjective   Patient reports her radicular symptoms are better today, but still having low back pain. Reports she can have some pain into her right hip    she was compliant with home exercise program   Response to previous treatment: no adverse effects  Functional change: improvement in radicular symptoms    Pain: 4/10  Location: right knee and hip     Objective     Jennifer Osullivan received therapeutic exercises to develop strength, endurance, ROM, flexibility, posture, and core stabilization for 00 minutes including:  [] Piriformis stretch (Figure 4) 2 x 30"   [] Hamstring stretch on right x 30"  [] Seated 3 way ball rollouts x 5    [] Push/Pull 3 x 5 second hold   [] Shotgun technique    [] Hooklying hip abduction with pilates ring x 10 ( stopped 2* increased tightness and pain in right buttock   [] Hooklying hip adduction ball squeeze 2 x 10  [] Hooklying clam shells 20 repetitions with red band   [] side lying clams with red band x 20 repetitions each  [] Seated piriformis stretch   [] Mini Bridge 3 x 10   [] Standing posterior pelvic tilt        Jennifer Osullivan received the following manual therapy techniques: Joint mobilizations, Manual " "traction, Myofacial release, Manual Lymphatic Drainage, Soft tissue Mobilization, and Friction Massage were applied to the: R IT band for 30 minutes including:  [] Manual right IT band stretch 3 x 20 seconds   [] Shotgun technique  [] Long leg distraction  [] Lateral hip distraction  [] STM right piriformis and IT band ( Piriformis stopped due to increased numbness in right foot)     Application of TDN: Pt educated on benefits and potential side effects of dry needling. Educated pt on benefits, precautions, side effects following TDN. Educated pt to use heat following treatment sessions if pt is experiencing pain or soreness. Pt verbalized good understanding of education.  Pt signed written consent to dry needling. Pt gave verbal consent for DN    Pt received dry needling to the below listed muscles using 40 mm, 50 mm, and 60 mm needles.  [x]Right paraspinals L3, L4, L5  [x]Right gluteus medius, gluteus minimus  [x]Right piriformis x 2  [x]Right gluteal crease  [x]Right biceps femoris mid  [x]Right semimembranosus, semitendonosus    E-stim applied through 3 channels at 2.4 Hz and 4-5 mA to tolerance.         Patient received neuromuscular reeducation for  minutes for improved balance, coordination, kinesthetic, sense, proprioception, and posture including:  [] Transverse activation with press against yellow physioball 20 x 3 second hold   [] Posterior pelvic tilt 20 x 3" pain with last repetition  [] Bridge 2 x 10   [] Supine sciatic nerve glide on right x 20 repetitions, did not decrease symptoms  [] Prone on elbows 5 x 5 seconds   [] Prone press up 5 x 5"     Jennifer Osullivan participated in dynamic functional therapeutic activities to improve functional performance for 00 minutes, including:  [] Lateral walk 2 x 30 ft   [] Train Tracks 2 x 30 ft     Home Exercises Provided and Patient Education Provided     Education provided:   Reviewed benefits and risks of dry needling with patient. Patient signed consent form and " verbally agreed to proceed with dry needling. Discussed use of heating pain vs ice if she experienced needle site pain    Written Home Exercises Provided: yes and  Patient instructed to cont prior HEP. Exercises were reviewed and Jennifer Osullivan was able to demonstrate them prior to the end of the session.  Jennifer Osullivan demonstrated good  understanding of the education provided. See Electronic Medical Record  under Patient Instructions for exercises provided during therapy sessions.      Assessment   Initiated dry needling today. Good soft tissue response to dry needling evident by increased grasp with unilateral winding at all insertion points. Winding performed every 5 minutes during treatment. No adverse effects following treatment. Improvement of pain noted after dry needling today. Will continue with postural reeducation and core strengthening next session.     Jennifer Osullivan Is progressing well towards her goals.   Pt prognosis is Good.     Pt will continue to benefit from skilled outpatient physical therapy to address the deficits listed in the problem list box on initial evaluation, provide pt/family education and to maximize pt's level of independence in the home and community environment.     Pt's spiritual, cultural and educational needs considered and pt agreeable to plan of care and goals.    Anticipated barriers to physical therapy: none    Goals: Short Term Goals: 4 weeks   Independent with home exercise program . (Progressing, not met)   Report decreased right lumbar right hip, and right knee pain < or =  6/10 with activities of daily living such as sitting, lifting right leg, and sit to stand.(Progressing, not met)  Increased manual muscle test for bilateral lower extremities by 1/2 muscle grade to promote proper pelvic stability to decrease right lumbar right hip, and right knee pain < or =  6/10 with activities of daily living such as sitting, lifting right leg, and sit to stand. (Progressing, not met)      Long Term Goals: 8 weeks   Report decreased right lumbar right hip, and right knee pain < or = 2 /10 with activities of daily living such as sitting, lifting right leg, and sit to stand.(Progressing, not met)  Increased manual muscle test for bilateral lower extremities  by 1 muscle grade to promote proper pelvic stability to decrease right lumbar right hip, and right knee pain < or = 2 /10 with activities of daily living such as sitting, lifting right leg, and sit to stand. (Progressing, not met)  Increased flexibility in bilateral  hamstrings to -20 deg with 90/90 test to promote proper pelvic stability to decrease right lumbar right hip, and right knee pain < or = 2 /10 with activities of daily living such as sitting, lifting right leg, and sit to stand. (Progressing, not met)  Increased flexibility in bilateral  iliotibial band, and bilateral quadriceps to promote proper pelvic stability to decrease right lumbar right hip, and right knee pain < or = 2 /10 with activities of daily living such as sitting, lifting right leg, and sit to stand.(Progressing, not met)    Plan     Continue to progress towards goals set by Physical Therapist.  Work to improve core stability, pelvic alignment, posture and glute strength next visit.      Medardo Alcocer, PT

## 2024-03-27 NOTE — PROGRESS NOTES
"Physical Therapy Daily Treatment Note     Name: Jennifer Osullivan [facility]  Clinic Number: [id number]    Therapy Diagnosis:   Encounter Diagnoses   Name Primary?    Spondylosis without myelopathy or radiculopathy, lumbosacral region Yes    Dorsalgia, unspecified     Impaired strength of hip muscles          Physician: Martha Dasilva NP    Visit Date: 3/28/2024  Physician Orders: PT Eval and Treat   Medical Diagnosis: M54.9 (ICD-10-CM) - Dorsalgia, unspecified   Evaluation Date: 3/8/2024  Authorization Period Expiration: 12/31/2024  Plan of Care Certification Period: 5/3/2024  Visit # / Visits authorized: 7 (6/ 20)  FOTO: 5/5; 1 (Last issued on 3/8/2024)     PTA Visit #: 1/ 5     Time In: 1:47 pm  Time Out: 2:33 pm  Total Billable Time separate from evaluation: 40 minutes     Precautions: Standard     Subjective   Patient reports: Still having some muscle soreness, but the nerve pain is less    she was compliant with home exercise program   Response to previous treatment: "The next day it felt like a truck hit me"  Functional change: improvement in radicular symptoms    Pain: 2/10  Location: right knee and hip     Objective     Jennifer Osullivan received therapeutic exercises to develop strength, endurance, ROM, flexibility, posture, and core stabilization for 12 minutes including:  [x] Piriformis stretch (Figure 4) 2 x 30"    [] Hamstring stretch on right x 30"   [] Seated 3 way ball rollouts x 5    [] Push/Pull 3 x 5 second hold    [x] Lower trunk rotations on PB x 3 min    [] Hooklying hip abduction with pilates ring x 10 ( stopped 2* increased tightness and pain in right buttock    [x] Hooklying hip adduction ball squeeze 2 x 10    [x] Hooklying clam with Red Theraband x 20     [] side lying clams with red band x 20 repetitions each     [] Seated piriformis stretch    [] Mini Bridge 3 x 10    [] Standing posterior pelvic tilt         Jennifer Osullivan received the following manual therapy techniques: Joint mobilizations, Manual " "traction, Myofacial release, Manual Lymphatic Drainage, Soft tissue Mobilization, and Friction Massage were applied to the: R IT band for 4 minutes including:  [] Manual right IT band stretch 3 x 20 seconds   [] Shotgun technique   [] Long leg distraction  [] Lateral hip distraction  [x] STM right piriformis and IT band    [x] IASTM theraroller to low back and glutes    Application of TDN: Pt educated on benefits and potential side effects of dry needling. Educated pt on benefits, precautions, side effects following TDN. Educated pt to use heat following treatment sessions if pt is experiencing pain or soreness. Pt verbalized good understanding of education.  Pt signed written consent to dry needling. Pt gave verbal consent for DN    Pt received dry needling to the below listed muscles using 40 mm, 50 mm, and 60 mm needles.  [] Right paraspinals L3, L4, L5   [] Right gluteus medius, gluteus minimus   [] Right piriformis x 2   [] Right gluteal crease   [] Right biceps femoris mid    [] Right semimembranosus, semitendinosus     E-stim applied through 3 channels at 2.4 Hz and 4-5 mA to tolerance.       Patient received neuromuscular reeducation for 16 minutes for improved balance, coordination, kinesthetic, sense, proprioception, and posture including:  [x] Transverse activation with press against yellow physioball 20 x 3 second hold    [x] Posterior pelvic tilt 20 x 3" pain with last repetition    [x] Bridge 2 x 10    [] Supine sciatic nerve glide on right x 20 repetitions, did not decrease symptoms    [x] Prone on elbows 5 x 5"    [x] Prone press up 5 x 5"      [x] +Bird dogs x 10 each   [x] +Bird dog rows with 4# x 15 each    Jennifer Osullivan participated in dynamic functional therapeutic activities to improve functional performance for 8 minutes, including:  [] Lateral walk 2 x 30 ft    [] Train Tracks 2 x 30 ft    [x] +Suitcase carry with 10# KB  30ft x 2 each  [x] +Front carry with 10# KB 30ft x 4    Home Exercises " Provided and Patient Education Provided     Education provided:   Reviewed benefits and risks of dry needling with patient. Patient signed consent form and verbally agreed to proceed with dry needling. Discussed use of heating pain vs ice if she experienced needle site pain    Written Home Exercises Provided: Patient instructed to cont prior HEP. Exercises were reviewed and Jennifer Osullivan was able to demonstrate them prior to the end of the session.  Jennifer Osullivan demonstrated good  understanding of the education provided. See Electronic Medical Record  under Patient Instructions for exercises provided during therapy sessions.      Assessment   Patient tolerated treatment well until attempting piriformis stretch, which increase pain in right low back and leg. Soft tissue massage was too intense on right side afterwards, as well as theraroller. Added several core strengthening exercises today prior to pain increase, with no reports of pain. Will continue to monitor symptoms and consider dry needling in future visit.    Jennifer Osullivan Is progressing well towards her goals.   Pt prognosis is Good.     Pt will continue to benefit from skilled outpatient physical therapy to address the deficits listed in the problem list box on initial evaluation, provide pt/family education and to maximize pt's level of independence in the home and community environment.     Pt's spiritual, cultural and educational needs considered and pt agreeable to plan of care and goals.    Anticipated barriers to physical therapy: none    Goals: Short Term Goals: 4 weeks   Independent with home exercise program . (Progressing, not met)   Report decreased right lumbar right hip, and right knee pain < or =  6/10 with activities of daily living such as sitting, lifting right leg, and sit to stand.(Progressing, not met)  Increased manual muscle test for bilateral lower extremities by 1/2 muscle grade to promote proper pelvic stability to decrease right lumbar  right hip, and right knee pain < or =  6/10 with activities of daily living such as sitting, lifting right leg, and sit to stand. (Progressing, not met)     Long Term Goals: 8 weeks   Report decreased right lumbar right hip, and right knee pain < or = 2 /10 with activities of daily living such as sitting, lifting right leg, and sit to stand.(Progressing, not met)  Increased manual muscle test for bilateral lower extremities  by 1 muscle grade to promote proper pelvic stability to decrease right lumbar right hip, and right knee pain < or = 2 /10 with activities of daily living such as sitting, lifting right leg, and sit to stand. (Progressing, not met)  Increased flexibility in bilateral  hamstrings to -20 deg with 90/90 test to promote proper pelvic stability to decrease right lumbar right hip, and right knee pain < or = 2 /10 with activities of daily living such as sitting, lifting right leg, and sit to stand. (Progressing, not met)  Increased flexibility in bilateral  iliotibial band, and bilateral quadriceps to promote proper pelvic stability to decrease right lumbar right hip, and right knee pain < or = 2 /10 with activities of daily living such as sitting, lifting right leg, and sit to stand.(Progressing, not met)    Plan     Continue to progress towards goals set by Physical Therapist.  Work to improve core stability, pelvic alignment, posture and glute strength next visit.      Ilan Rodríguez III, PTA

## 2024-03-28 ENCOUNTER — CLINICAL SUPPORT (OUTPATIENT)
Dept: REHABILITATION | Facility: OTHER | Age: 34
End: 2024-03-28
Payer: COMMERCIAL

## 2024-03-28 DIAGNOSIS — R29.898 IMPAIRED STRENGTH OF HIP MUSCLES: ICD-10-CM

## 2024-03-28 DIAGNOSIS — M47.817 SPONDYLOSIS WITHOUT MYELOPATHY OR RADICULOPATHY, LUMBOSACRAL REGION: Primary | ICD-10-CM

## 2024-03-28 DIAGNOSIS — M54.9 DORSALGIA, UNSPECIFIED: ICD-10-CM

## 2024-03-28 PROCEDURE — 97112 NEUROMUSCULAR REEDUCATION: CPT | Mod: PN,CQ

## 2024-03-28 PROCEDURE — 97110 THERAPEUTIC EXERCISES: CPT | Mod: PN,CQ

## 2024-03-28 PROCEDURE — 97530 THERAPEUTIC ACTIVITIES: CPT | Mod: PN,CQ

## 2024-03-28 RX ORDER — DEXTROAMPHETAMINE SACCHARATE, AMPHETAMINE ASPARTATE MONOHYDRATE, DEXTROAMPHETAMINE SULFATE AND AMPHETAMINE SULFATE 3.75; 3.75; 3.75; 3.75 MG/1; MG/1; MG/1; MG/1
15 CAPSULE, EXTENDED RELEASE ORAL EVERY MORNING
Qty: 30 CAPSULE | Refills: 0 | Status: SHIPPED | OUTPATIENT
Start: 2024-03-28

## 2024-03-31 ENCOUNTER — HOSPITAL ENCOUNTER (INPATIENT)
Facility: HOSPITAL | Age: 34
LOS: 2 days | Discharge: HOME | DRG: 520 | End: 2024-04-02
Attending: STUDENT IN AN ORGANIZED HEALTH CARE EDUCATION/TRAINING PROGRAM | Admitting: STUDENT IN AN ORGANIZED HEALTH CARE EDUCATION/TRAINING PROGRAM
Payer: COMMERCIAL

## 2024-03-31 DIAGNOSIS — M48.00 CENTRAL STENOSIS OF SPINAL CANAL: ICD-10-CM

## 2024-03-31 DIAGNOSIS — R29.898 RIGHT LEG WEAKNESS: ICD-10-CM

## 2024-03-31 DIAGNOSIS — M54.41 ACUTE RIGHT-SIDED LOW BACK PAIN WITH RIGHT-SIDED SCIATICA: ICD-10-CM

## 2024-03-31 DIAGNOSIS — M51.26 LUMBAR HERNIATED DISC: Primary | ICD-10-CM

## 2024-03-31 LAB
ABO + RH BLD: NORMAL
ALBUMIN SERPL BCP-MCNC: 4.6 G/DL (ref 3.5–5.2)
ALP SERPL-CCNC: 71 U/L (ref 55–135)
ALT SERPL W/O P-5'-P-CCNC: 20 U/L (ref 10–44)
ANION GAP SERPL CALC-SCNC: 9 MMOL/L (ref 8–16)
APTT PPP: 25.1 SEC (ref 21–32)
AST SERPL-CCNC: 24 U/L (ref 10–40)
B-HCG UR QL: NEGATIVE
BASOPHILS # BLD AUTO: 0.06 K/UL (ref 0–0.2)
BASOPHILS NFR BLD: 0.7 % (ref 0–1.9)
BILIRUB SERPL-MCNC: 0.5 MG/DL (ref 0.1–1)
BLD GP AB SCN CELLS X3 SERPL QL: NORMAL
BUN SERPL-MCNC: 11 MG/DL (ref 6–20)
CALCIUM SERPL-MCNC: 9.8 MG/DL (ref 8.7–10.5)
CHLORIDE SERPL-SCNC: 107 MMOL/L (ref 95–110)
CO2 SERPL-SCNC: 22 MMOL/L (ref 23–29)
CREAT SERPL-MCNC: 0.8 MG/DL (ref 0.5–1.4)
CTP QC/QA: YES
DIFFERENTIAL METHOD BLD: ABNORMAL
EOSINOPHIL # BLD AUTO: 0.4 K/UL (ref 0–0.5)
EOSINOPHIL NFR BLD: 4.5 % (ref 0–8)
ERYTHROCYTE [DISTWIDTH] IN BLOOD BY AUTOMATED COUNT: 12.1 % (ref 11.5–14.5)
EST. GFR  (NO RACE VARIABLE): >60 ML/MIN/1.73 M^2
GLUCOSE SERPL-MCNC: 81 MG/DL (ref 70–110)
HCT VFR BLD AUTO: 39.3 % (ref 37–48.5)
HGB BLD-MCNC: 13.3 G/DL (ref 12–16)
IMM GRANULOCYTES # BLD AUTO: 0.03 K/UL (ref 0–0.04)
IMM GRANULOCYTES NFR BLD AUTO: 0.3 % (ref 0–0.5)
INR PPP: 0.9 (ref 0.8–1.2)
LYMPHOCYTES # BLD AUTO: 2.8 K/UL (ref 1–4.8)
LYMPHOCYTES NFR BLD: 32.4 % (ref 18–48)
MCH RBC QN AUTO: 29.4 PG (ref 27–31)
MCHC RBC AUTO-ENTMCNC: 33.8 G/DL (ref 32–36)
MCV RBC AUTO: 87 FL (ref 82–98)
MONOCYTES # BLD AUTO: 0.5 K/UL (ref 0.3–1)
MONOCYTES NFR BLD: 6 % (ref 4–15)
NEUTROPHILS # BLD AUTO: 4.8 K/UL (ref 1.8–7.7)
NEUTROPHILS NFR BLD: 56.1 % (ref 38–73)
NRBC BLD-RTO: 0 /100 WBC
PLATELET # BLD AUTO: 320 K/UL (ref 150–450)
PMV BLD AUTO: 8.9 FL (ref 9.2–12.9)
POTASSIUM SERPL-SCNC: 3.5 MMOL/L (ref 3.5–5.1)
PROT SERPL-MCNC: 7.9 G/DL (ref 6–8.4)
PROTHROMBIN TIME: 9.8 SEC (ref 9–12.5)
RBC # BLD AUTO: 4.52 M/UL (ref 4–5.4)
SODIUM SERPL-SCNC: 138 MMOL/L (ref 136–145)
SPECIMEN OUTDATE: NORMAL
WBC # BLD AUTO: 8.62 K/UL (ref 3.9–12.7)

## 2024-03-31 PROCEDURE — 25000003 PHARM REV CODE 250

## 2024-03-31 PROCEDURE — 85610 PROTHROMBIN TIME: CPT

## 2024-03-31 PROCEDURE — 11000001 HC ACUTE MED/SURG PRIVATE ROOM

## 2024-03-31 PROCEDURE — 25000003 PHARM REV CODE 250: Performed by: STUDENT IN AN ORGANIZED HEALTH CARE EDUCATION/TRAINING PROGRAM

## 2024-03-31 PROCEDURE — 96372 THER/PROPH/DIAG INJ SC/IM: CPT

## 2024-03-31 PROCEDURE — 63600175 PHARM REV CODE 636 W HCPCS

## 2024-03-31 PROCEDURE — 81025 URINE PREGNANCY TEST: CPT | Performed by: STUDENT IN AN ORGANIZED HEALTH CARE EDUCATION/TRAINING PROGRAM

## 2024-03-31 PROCEDURE — 86850 RBC ANTIBODY SCREEN: CPT

## 2024-03-31 PROCEDURE — 63600175 PHARM REV CODE 636 W HCPCS: Performed by: STUDENT IN AN ORGANIZED HEALTH CARE EDUCATION/TRAINING PROGRAM

## 2024-03-31 PROCEDURE — 99222 1ST HOSP IP/OBS MODERATE 55: CPT | Mod: ,,, | Performed by: NEUROLOGICAL SURGERY

## 2024-03-31 PROCEDURE — 80053 COMPREHEN METABOLIC PANEL: CPT

## 2024-03-31 PROCEDURE — 85025 COMPLETE CBC W/AUTO DIFF WBC: CPT

## 2024-03-31 PROCEDURE — 99285 EMERGENCY DEPT VISIT HI MDM: CPT | Mod: 25

## 2024-03-31 PROCEDURE — 85730 THROMBOPLASTIN TIME PARTIAL: CPT

## 2024-03-31 RX ORDER — KETOROLAC TROMETHAMINE 30 MG/ML
10 INJECTION, SOLUTION INTRAMUSCULAR; INTRAVENOUS
Status: DISCONTINUED | OUTPATIENT
Start: 2024-03-31 | End: 2024-03-31

## 2024-03-31 RX ORDER — SODIUM CHLORIDE 9 MG/ML
INJECTION, SOLUTION INTRAVENOUS CONTINUOUS
Status: DISCONTINUED | OUTPATIENT
Start: 2024-04-01 | End: 2024-04-02 | Stop reason: HOSPADM

## 2024-03-31 RX ORDER — TALC
6 POWDER (GRAM) TOPICAL NIGHTLY PRN
Status: DISCONTINUED | OUTPATIENT
Start: 2024-03-31 | End: 2024-04-02 | Stop reason: HOSPADM

## 2024-03-31 RX ORDER — ACETAMINOPHEN 500 MG
1000 TABLET ORAL EVERY 8 HOURS
Status: DISCONTINUED | OUTPATIENT
Start: 2024-03-31 | End: 2024-04-01

## 2024-03-31 RX ORDER — HYDROMORPHONE HYDROCHLORIDE 1 MG/ML
0.5 INJECTION, SOLUTION INTRAMUSCULAR; INTRAVENOUS; SUBCUTANEOUS EVERY 4 HOURS PRN
Status: DISCONTINUED | OUTPATIENT
Start: 2024-03-31 | End: 2024-04-02 | Stop reason: HOSPADM

## 2024-03-31 RX ORDER — PREGABALIN 150 MG/1
150 CAPSULE ORAL ONCE
Status: COMPLETED | OUTPATIENT
Start: 2024-03-31 | End: 2024-03-31

## 2024-03-31 RX ORDER — METHOCARBAMOL 500 MG/1
1000 TABLET, FILM COATED ORAL
Status: COMPLETED | OUTPATIENT
Start: 2024-03-31 | End: 2024-03-31

## 2024-03-31 RX ORDER — GLUCAGON 1 MG
1 KIT INJECTION
Status: DISCONTINUED | OUTPATIENT
Start: 2024-03-31 | End: 2024-04-02 | Stop reason: HOSPADM

## 2024-03-31 RX ORDER — IBUPROFEN 200 MG
24 TABLET ORAL
Status: DISCONTINUED | OUTPATIENT
Start: 2024-03-31 | End: 2024-04-02 | Stop reason: HOSPADM

## 2024-03-31 RX ORDER — DEXAMETHASONE SODIUM PHOSPHATE 4 MG/ML
12 INJECTION, SOLUTION INTRA-ARTICULAR; INTRALESIONAL; INTRAMUSCULAR; INTRAVENOUS; SOFT TISSUE ONCE
Status: COMPLETED | OUTPATIENT
Start: 2024-03-31 | End: 2024-03-31

## 2024-03-31 RX ORDER — KETOROLAC TROMETHAMINE 30 MG/ML
10 INJECTION, SOLUTION INTRAMUSCULAR; INTRAVENOUS
Status: COMPLETED | OUTPATIENT
Start: 2024-03-31 | End: 2024-03-31

## 2024-03-31 RX ORDER — HYDROCODONE BITARTRATE AND ACETAMINOPHEN 5; 325 MG/1; MG/1
1 TABLET ORAL
Status: COMPLETED | OUTPATIENT
Start: 2024-03-31 | End: 2024-03-31

## 2024-03-31 RX ORDER — OXYCODONE HYDROCHLORIDE 5 MG/1
5 TABLET ORAL EVERY 6 HOURS PRN
Status: DISCONTINUED | OUTPATIENT
Start: 2024-03-31 | End: 2024-04-01

## 2024-03-31 RX ORDER — IBUPROFEN 200 MG
16 TABLET ORAL
Status: DISCONTINUED | OUTPATIENT
Start: 2024-03-31 | End: 2024-04-02 | Stop reason: HOSPADM

## 2024-03-31 RX ORDER — DIAZEPAM 5 MG/1
5 TABLET ORAL EVERY 8 HOURS PRN
Status: DISCONTINUED | OUTPATIENT
Start: 2024-03-31 | End: 2024-04-01

## 2024-03-31 RX ORDER — PREGABALIN 75 MG/1
75 CAPSULE ORAL 2 TIMES DAILY
Status: DISCONTINUED | OUTPATIENT
Start: 2024-04-01 | End: 2024-04-02 | Stop reason: HOSPADM

## 2024-03-31 RX ADMIN — METHOCARBAMOL 1000 MG: 500 TABLET ORAL at 04:03

## 2024-03-31 RX ADMIN — DEXAMETHASONE SODIUM PHOSPHATE 12 MG: 4 INJECTION, SOLUTION INTRAMUSCULAR; INTRAVENOUS at 11:03

## 2024-03-31 RX ADMIN — HYDROCODONE BITARTRATE AND ACETAMINOPHEN 1 TABLET: 5; 325 TABLET ORAL at 06:03

## 2024-03-31 RX ADMIN — KETOROLAC TROMETHAMINE 10 MG: 30 INJECTION, SOLUTION INTRAMUSCULAR; INTRAVENOUS at 04:03

## 2024-03-31 RX ADMIN — ACETAMINOPHEN 1000 MG: 500 TABLET ORAL at 11:03

## 2024-03-31 RX ADMIN — PREGABALIN 150 MG: 150 CAPSULE ORAL at 11:03

## 2024-03-31 NOTE — ED PROVIDER NOTES
Encounter Date: 3/31/2024       History     Chief Complaint   Patient presents with    Back Pain     Patient presents for evaluation of low back pain that radiates down the right leg. Patient reports pain occurred while sitting at [facility]. Endorses known back injury that is currently completing PT.      33-year-old female with past medical history ADHD, anxiety, allergies, low back pain requiring PT OT presents for evaluation of low back pain and right lower extremity weakness beginning today.  Patient states she was in her usual state of health while at [facility] this afternoon when she suddenly began to have excruciating low back pain radiating down her right leg.  She also noted decreased sensation in the right leg at that time as well as weakness.  Patient says that she is unable to place weight on her right leg or else it will collapse.  She denies fever/chills, nausea/vomiting, bowel or bladder incontinence, abdominal pain, chest pain, shortness of breath.  Patient denies any history of IV drug use.      The history is provided by the patient and a friend.     Review of patient's allergies indicates:   Allergen Reactions    Insect venom Anxiety, Rash and Shortness Of Breath    Gluten Nausea Only     Past Medical History:   Diagnosis Date    ADHD     Allergy     Anxiety disorder, unspecified     Celiac disease     Mild intermittent asthma, uncomplicated      Past Surgical History:   Procedure Laterality Date    BREAST SURGERY      REDUCTION    INTRAUTERINE DEVICE INSERTION  04/01/2015    KJB---MIRENA    REPAIR OF COLLATERAL LIGAMENT OF THUMB Right 01/06/2020    Procedure: REPAIR, LIGAMENT, COLLATERAL, THUMB right;  Surgeon: Lisette Zaman MD;  Location: [facility];  Service: Orthopedics;  Laterality: Right;  regional MAC    WISDOM TOOTH EXTRACTION       Family History   Problem Relation Age of Onset    Graves' disease Mother     Hypertension Father     Diabetes Father     No Known Problems Sister     No Known  Problems Brother     No Known Problems Brother     Cancer Maternal Grandmother         unknown etiology    Heart disease Maternal Grandfather     Breast cancer Paternal Grandmother     Dementia Paternal Grandmother     Colon cancer Neg Hx     Ovarian cancer Neg Hx     Esophageal cancer Neg Hx      Social History     Tobacco Use    Smoking status: Former     Current packs/day: 0.00     Types: Cigarettes     Start date: 2007     Quit date: 2009     Years since quitting: 15.2     Passive exposure: Past    Smokeless tobacco: Never    Tobacco comments:     1-2 cig daily   Substance Use Topics    Alcohol use: Not Currently     Comment: ~10 drinks/week. Seltzers, occasional wine    Drug use: No         Physical Exam     Initial Vitals [03/31/24 1432]   BP Pulse Resp Temp SpO2   131/75 84 16 98.3 °F (36.8 °C) 99 %      MAP       --         Physical Exam    Nursing note and vitals reviewed.  Constitutional: She appears well-developed and well-nourished.   Neck: There are no signs of injury.   Cardiovascular:  Normal rate and regular rhythm.           Pulmonary/Chest: Breath sounds normal. No respiratory distress.   Musculoskeletal:      Cervical back: No swelling, edema, deformity, erythema, signs of trauma, lacerations, tenderness or bony tenderness.      Thoracic back: No swelling, edema, deformity, signs of trauma, lacerations, tenderness or bony tenderness.      Lumbar back: No swelling, edema, deformity, signs of trauma or lacerations. Positive right straight leg raise test.      Comments: Spinal tenderness to palpation at the L3-S1 region, muscular tenderness on the right flank at L3-S1     Neurological: She is alert. GCS score is 15. GCS eye subscore is 4. GCS verbal subscore is 5. GCS motor subscore is 6.   5/5 strength in left lower extremity  3/5 strength in right lower extremity  Diminished sensation in right lower extremity diffusely  Decreased rectal tone without saddle anesthesia   Skin: Skin is warm and dry.  Capillary refill takes less than 2 seconds.         ED Course   Procedures  Labs Reviewed   COMPREHENSIVE METABOLIC PANEL - Abnormal; Notable for the following components:       Result Value    CO2 22 (*)     All other components within normal limits    Narrative:     add on CBCWD per Dixon Carey MD. to order number [id number] and   CMP to order number [id number]   CBC W/ AUTO DIFFERENTIAL - Abnormal; Notable for the following components:    MPV 8.9 (*)     All other components within normal limits    Narrative:     add on CBCWD per Dixon Carey MD. to order number [id number] and   CMP to order number [id number]   PROTIME-INR   APTT   CBC W/ AUTO DIFFERENTIAL   COMPREHENSIVE METABOLIC PANEL   POCT URINE PREGNANCY   TYPE & SCREEN          Imaging Results               MRI Lumbar Spine Without Contrast (Final result)  Result time 03/31/24 17:45:06      Final result by Jevon Willingham MD (03/31/24 17:45:06)                   Impression:      This report was flagged in Epic as abnormal.    1. Prominent disc protrusion at L4-L5 with associated extrusion as described.  This results in severe spinal canal stenosis and severe right neuroforaminal narrowing.  Please see above for full details.  2. Additional degenerative changes as described.      Electronically signed by: Jevon Willingham MD  Date:    03/31/2024  Time:    17:45               Narrative:    EXAMINATION:  MRI LUMBAR SPINE WITHOUT CONTRAST    CLINICAL HISTORY:  Low back pain, progressive neurologic deficit;    TECHNIQUE:  Multiplanar, multisequence MR images were acquired from the thoracolumbar junction to the sacrum without the administration of contrast.    COMPARISON:  Radiograph 08/03/2023, CT renal stone study 11/16/2022    FINDINGS:  There is motion artifact.    Allowing for motion artifact, sagittal sequence demonstrates adequate alignment of the lumbar spine noting disc desiccation involving L3-L4, L4-L5 and L5-S1.  Disc space height loss  most significantly involves L4-L5 and L5-S1.  No abnormal marrow signal to suggest malignancy or infection.  The conus terminates at the level of L1-L2.  No findings to suggest acute fracture.  No dislocation.  The visualized abdominopelvic content is grossly unremarkable.  The paraspinous soft tissues are unremarkable.    L1-L2: No significant disc bulge, spinal canal stenosis, or neuroforaminal narrowing.    L2-L3: No significant disc bulge, spinal canal stenosis, or neuroforaminal narrowing.    L3-L4: There is a minimal broad-based disc bulge with left foraminal resulting in moderate left neuroforaminal narrowing without significant spinal canal stenosis.  There is facet arthropathy.    L4-L5: There is broad-based disc bulge with posterior central protrusion.  There is associated ascending disc extrusion measuring 1.6 cm along the posterior aspect of the L4 vertebral body.  Combination of the above results in moderate left and severe right neuroforaminal narrowing.  There is facet arthropathy.    L5-S1: There is a minimal broad-based disc bulge with right foraminal component resulting in mild right neuroforaminal narrowing without significant left neuroforaminal narrowing or spinal canal stenosis.  There is an associated annular fissure.                                       Medications   0.9%  NaCl infusion ( Intravenous New Bag 4/1/24 0000)   pregabalin capsule 75 mg ( Oral Canceled Entry 4/1/24 0900)   diazePAM tablet 5 mg (5 mg Oral Given 4/1/24 1121)   melatonin tablet 6 mg (has no administration in time range)   glucose chewable tablet 16 g (has no administration in time range)   glucose chewable tablet 16 g (has no administration in time range)   glucose chewable tablet 24 g (has no administration in time range)   glucagon (human recombinant) injection 1 mg (has no administration in time range)   acetaminophen tablet 1,000 mg (1,000 mg Oral Given 4/1/24 1407)   oxyCODONE immediate release tablet 5 mg (5 mg  Oral Given 4/1/24 1121)   HYDROmorphone injection 0.5 mg (0.5 mg Intravenous Given 4/1/24 1050)   dextrose 10% bolus 125 mL 125 mL (has no administration in time range)   dextrose 10% bolus 250 mL 250 mL (has no administration in time range)   ketorolac injection 9.999 mg (9.999 mg Intramuscular Given 3/31/24 1609)   methocarbamoL tablet 1,000 mg (1,000 mg Oral Given 3/31/24 1616)   HYDROcodone-acetaminophen 5-325 mg per tablet 1 tablet (1 tablet Oral Given 3/31/24 1829)   dexAMETHasone injection 12 mg (12 mg Intravenous Given 3/31/24 2330)   pregabalin capsule 150 mg (150 mg Oral Given 3/31/24 2308)   HYDROmorphone injection 0.5 mg (0.5 mg Intravenous Given 4/1/24 1407)     Medical Decision Making  Past medical history ADHD, allergies, low back pain requiring PT OT evaluation low back pain and right lower extremity weakness beginning today.    Pathologies I have considered in my differential diagnosis include, but are not limited to, lumbosacral radiculopathy, epidural abscess, conus medullaris, cauda equina syndrome.    Low suspicion for epidural abscess:  No fevers/chills, no history of IV drug use, no epidural abscess noted on MRI.  Low suspicion for conus medullaris or cauda equina syndrome:  While patient does have right lower extremity weakness and decreased rectal tone, patient without bowel or bladder incontinence, no saddle anesthesia.    MRI concerning for disc herniation causing spinal canal stenosis and lumbosacral radiculopathy.  Given patient's profound new weakness affecting the right side, discussed patient with neurosurgery.  After evaluation, neurosurgery decided to admit patient for possible surgery.    Amount and/or Complexity of Data Reviewed  External Data Reviewed: notes.     Details: Spine clinic note 09/2023:   Plan:     1. Prior records reviewed today  2. Overall improvement in back pan  3. Continue PT  4. Continue relafen 500mg as needed for pain  5. Continue robaxin 750mg TID as needed  for muscle pain  6. We discussed posture sitting and the importance of trying to sit better.    7. We discussed the benefits of therapy and exercise and continuing to move.   8. RTC for followup as needed    Labs: ordered. Decision-making details documented in ED Course.  Radiology: ordered. Decision-making details documented in ED Course.  Discussion of management or test interpretation with external provider(s): Discussed patient with neurosurgery, who plans to admit patient for possible surgery tomorrow.    Risk  Prescription drug management.  Parenteral controlled substances.  Decision regarding hospitalization.  Emergency major surgery.  Risk Details: Patient received Norco, Robaxin, Toradol while in the emergency department.  After discussion with Neurosurgery, decided to admit patient given extensive right lower extremity weakness.              Attending Attestation:   Physician Attestation Statement for Resident:  As the supervising MD   Physician Attestation Statement: I have personally seen and examined this patient.   I agree with the above history.  -:   As the supervising MD I agree with the above PE.     As the supervising MD I agree with the above treatment, course, plan, and disposition.   -: See ED course for additional attending MDM    Procedure: Critical Care  Please put in 30 minutes of critical care due to patient having a high risk of neurological failure.                                                 Clinical Impression:  Final diagnoses:  [M54.41] Acute right-sided low back pain with right-sided sciatica  [M51.26] Lumbar herniated disc (Primary)  [M48.00] Central stenosis of spinal canal  [R29.898] Right leg weakness          ED Disposition Condition    Admit                 Dixon Samuels MD  Resident  04/01/24 0018       Dixon Carey MD  04/01/24 3289

## 2024-03-31 NOTE — ED TRIAGE NOTES
Jennifer Nguyen, a 33 y.o. female presents to the ED w/ complaint of back pain. Pt states she has been back in PT for 3 weeks and has been dealing with persistent back pain. Pt states she has reduced sensation down left leg to her left foot and reports burning sensation. Pt denies putting compression on right side and states she was just sitting normal until she felt the weakness down her leg.    Triage note:  Chief Complaint   Patient presents with    Back Pain     Patient presents for evaluation of low back pain that radiates down the right leg. Patient reports pain occurred while sitting at brunch. Endorses known back injury that is currently completing PT.      Review of patient's allergies indicates:   Allergen Reactions    Insect venom Anxiety, Rash and Shortness Of Breath    Gluten Nausea Only     Past Medical History:   Diagnosis Date    ADHD     Allergy     Anxiety disorder, unspecified     Celiac disease     Mild intermittent asthma, uncomplicated

## 2024-04-01 ENCOUNTER — ANESTHESIA EVENT (OUTPATIENT)
Dept: SURGERY | Facility: HOSPITAL | Age: 34
DRG: 520 | End: 2024-04-01
Payer: COMMERCIAL

## 2024-04-01 ENCOUNTER — DOCUMENTATION ONLY (OUTPATIENT)
Dept: REHABILITATION | Facility: OTHER | Age: 34
End: 2024-04-01
Payer: COMMERCIAL

## 2024-04-01 ENCOUNTER — ANESTHESIA (OUTPATIENT)
Dept: SURGERY | Facility: HOSPITAL | Age: 34
DRG: 520 | End: 2024-04-01
Payer: COMMERCIAL

## 2024-04-01 PROCEDURE — 88304 TISSUE EXAM BY PATHOLOGIST: CPT | Mod: 26,,, | Performed by: STUDENT IN AN ORGANIZED HEALTH CARE EDUCATION/TRAINING PROGRAM

## 2024-04-01 PROCEDURE — 63600175 PHARM REV CODE 636 W HCPCS: Performed by: PHYSICIAN ASSISTANT

## 2024-04-01 PROCEDURE — 63600175 PHARM REV CODE 636 W HCPCS: Performed by: STUDENT IN AN ORGANIZED HEALTH CARE EDUCATION/TRAINING PROGRAM

## 2024-04-01 PROCEDURE — 37000008 HC ANESTHESIA 1ST 15 MINUTES: Performed by: STUDENT IN AN ORGANIZED HEALTH CARE EDUCATION/TRAINING PROGRAM

## 2024-04-01 PROCEDURE — 0SB24ZZ EXCISION OF LUMBAR VERTEBRAL DISC, PERCUTANEOUS ENDOSCOPIC APPROACH: ICD-10-PCS | Performed by: STUDENT IN AN ORGANIZED HEALTH CARE EDUCATION/TRAINING PROGRAM

## 2024-04-01 PROCEDURE — 25000003 PHARM REV CODE 250: Performed by: STUDENT IN AN ORGANIZED HEALTH CARE EDUCATION/TRAINING PROGRAM

## 2024-04-01 PROCEDURE — 27201423 OPTIME MED/SURG SUP & DEVICES STERILE SUPPLY: Performed by: STUDENT IN AN ORGANIZED HEALTH CARE EDUCATION/TRAINING PROGRAM

## 2024-04-01 PROCEDURE — 11000001 HC ACUTE MED/SURG PRIVATE ROOM

## 2024-04-01 PROCEDURE — 36000711: Performed by: STUDENT IN AN ORGANIZED HEALTH CARE EDUCATION/TRAINING PROGRAM

## 2024-04-01 PROCEDURE — 88304 TISSUE EXAM BY PATHOLOGIST: CPT | Performed by: STUDENT IN AN ORGANIZED HEALTH CARE EDUCATION/TRAINING PROGRAM

## 2024-04-01 PROCEDURE — 27000221 HC OXYGEN, UP TO 24 HOURS

## 2024-04-01 PROCEDURE — D9220A PRA ANESTHESIA: Mod: CRNA,,, | Performed by: NURSE ANESTHETIST, CERTIFIED REGISTERED

## 2024-04-01 PROCEDURE — 71000033 HC RECOVERY, INTIAL HOUR: Performed by: STUDENT IN AN ORGANIZED HEALTH CARE EDUCATION/TRAINING PROGRAM

## 2024-04-01 PROCEDURE — 63600175 PHARM REV CODE 636 W HCPCS: Performed by: NURSE ANESTHETIST, CERTIFIED REGISTERED

## 2024-04-01 PROCEDURE — 99900035 HC TECH TIME PER 15 MIN (STAT)

## 2024-04-01 PROCEDURE — 63047 LAM FACETEC & FORAMOT LUMBAR: CPT | Mod: ,,, | Performed by: STUDENT IN AN ORGANIZED HEALTH CARE EDUCATION/TRAINING PROGRAM

## 2024-04-01 PROCEDURE — 01NB4ZZ RELEASE LUMBAR NERVE, PERCUTANEOUS ENDOSCOPIC APPROACH: ICD-10-PCS | Performed by: STUDENT IN AN ORGANIZED HEALTH CARE EDUCATION/TRAINING PROGRAM

## 2024-04-01 PROCEDURE — 94761 N-INVAS EAR/PLS OXIMETRY MLT: CPT

## 2024-04-01 PROCEDURE — 71000015 HC POSTOP RECOV 1ST HR: Performed by: STUDENT IN AN ORGANIZED HEALTH CARE EDUCATION/TRAINING PROGRAM

## 2024-04-01 PROCEDURE — 25000003 PHARM REV CODE 250: Performed by: NURSE ANESTHETIST, CERTIFIED REGISTERED

## 2024-04-01 PROCEDURE — 37000009 HC ANESTHESIA EA ADD 15 MINS: Performed by: STUDENT IN AN ORGANIZED HEALTH CARE EDUCATION/TRAINING PROGRAM

## 2024-04-01 PROCEDURE — D9220A PRA ANESTHESIA: Mod: ANES,,, | Performed by: ANESTHESIOLOGY

## 2024-04-01 PROCEDURE — 36000710: Performed by: STUDENT IN AN ORGANIZED HEALTH CARE EDUCATION/TRAINING PROGRAM

## 2024-04-01 RX ORDER — MIDAZOLAM HYDROCHLORIDE 1 MG/ML
INJECTION, SOLUTION INTRAMUSCULAR; INTRAVENOUS
Status: DISCONTINUED | OUTPATIENT
Start: 2024-04-01 | End: 2024-04-01

## 2024-04-01 RX ORDER — MORPHINE SULFATE 2 MG/ML
2 INJECTION, SOLUTION INTRAMUSCULAR; INTRAVENOUS
Status: DISCONTINUED | OUTPATIENT
Start: 2024-04-01 | End: 2024-04-02 | Stop reason: HOSPADM

## 2024-04-01 RX ORDER — OXYCODONE HYDROCHLORIDE 5 MG/1
5 TABLET ORAL EVERY 4 HOURS PRN
Status: DISCONTINUED | OUTPATIENT
Start: 2024-04-01 | End: 2024-04-02 | Stop reason: HOSPADM

## 2024-04-01 RX ORDER — ALUMINUM HYDROXIDE, MAGNESIUM HYDROXIDE, AND SIMETHICONE 1200; 120; 1200 MG/30ML; MG/30ML; MG/30ML
30 SUSPENSION ORAL EVERY 4 HOURS PRN
Status: DISCONTINUED | OUTPATIENT
Start: 2024-04-01 | End: 2024-04-02 | Stop reason: HOSPADM

## 2024-04-01 RX ORDER — HYDROMORPHONE HYDROCHLORIDE 1 MG/ML
0.5 INJECTION, SOLUTION INTRAMUSCULAR; INTRAVENOUS; SUBCUTANEOUS ONCE
Status: COMPLETED | OUTPATIENT
Start: 2024-04-01 | End: 2024-04-01

## 2024-04-01 RX ORDER — FENTANYL CITRATE 50 UG/ML
INJECTION, SOLUTION INTRAMUSCULAR; INTRAVENOUS
Status: DISCONTINUED | OUTPATIENT
Start: 2024-04-01 | End: 2024-04-01

## 2024-04-01 RX ORDER — MUPIROCIN 20 MG/G
OINTMENT TOPICAL 2 TIMES DAILY
Status: DISCONTINUED | OUTPATIENT
Start: 2024-04-01 | End: 2024-04-02 | Stop reason: HOSPADM

## 2024-04-01 RX ORDER — BISACODYL 10 MG/1
10 SUPPOSITORY RECTAL DAILY
Status: DISCONTINUED | OUTPATIENT
Start: 2024-04-02 | End: 2024-04-02 | Stop reason: HOSPADM

## 2024-04-01 RX ORDER — DEXAMETHASONE SODIUM PHOSPHATE 4 MG/ML
INJECTION, SOLUTION INTRA-ARTICULAR; INTRALESIONAL; INTRAMUSCULAR; INTRAVENOUS; SOFT TISSUE
Status: DISCONTINUED | OUTPATIENT
Start: 2024-04-01 | End: 2024-04-01

## 2024-04-01 RX ORDER — FENTANYL CITRATE 50 UG/ML
25 INJECTION, SOLUTION INTRAMUSCULAR; INTRAVENOUS
Status: DISCONTINUED | OUTPATIENT
Start: 2024-04-01 | End: 2024-04-01 | Stop reason: HOSPADM

## 2024-04-01 RX ORDER — SODIUM CHLORIDE 0.9 % (FLUSH) 0.9 %
3 SYRINGE (ML) INJECTION
Status: DISCONTINUED | OUTPATIENT
Start: 2024-04-01 | End: 2024-04-01 | Stop reason: HOSPADM

## 2024-04-01 RX ORDER — OXYCODONE HYDROCHLORIDE 10 MG/1
10 TABLET ORAL EVERY 4 HOURS PRN
Status: DISCONTINUED | OUTPATIENT
Start: 2024-04-01 | End: 2024-04-02 | Stop reason: HOSPADM

## 2024-04-01 RX ORDER — ONDANSETRON 8 MG/1
8 TABLET, ORALLY DISINTEGRATING ORAL EVERY 6 HOURS PRN
Status: DISCONTINUED | OUTPATIENT
Start: 2024-04-01 | End: 2024-04-02 | Stop reason: HOSPADM

## 2024-04-01 RX ORDER — AMOXICILLIN 250 MG
2 CAPSULE ORAL NIGHTLY PRN
Status: DISCONTINUED | OUTPATIENT
Start: 2024-04-01 | End: 2024-04-02 | Stop reason: HOSPADM

## 2024-04-01 RX ORDER — KETAMINE HCL IN 0.9 % NACL 50 MG/5 ML
SYRINGE (ML) INTRAVENOUS
Status: DISCONTINUED | OUTPATIENT
Start: 2024-04-01 | End: 2024-04-01

## 2024-04-01 RX ORDER — METHYLPREDNISOLONE ACETATE 40 MG/ML
INJECTION, SUSPENSION INTRA-ARTICULAR; INTRALESIONAL; INTRAMUSCULAR; SOFT TISSUE
Status: DISCONTINUED | OUTPATIENT
Start: 2024-04-01 | End: 2024-04-01 | Stop reason: HOSPADM

## 2024-04-01 RX ORDER — CEFAZOLIN SODIUM 1 G/3ML
INJECTION, POWDER, FOR SOLUTION INTRAMUSCULAR; INTRAVENOUS
Status: DISCONTINUED | OUTPATIENT
Start: 2024-04-01 | End: 2024-04-01

## 2024-04-01 RX ORDER — PROPOFOL 10 MG/ML
VIAL (ML) INTRAVENOUS
Status: DISCONTINUED | OUTPATIENT
Start: 2024-04-01 | End: 2024-04-01

## 2024-04-01 RX ORDER — SUCCINYLCHOLINE CHLORIDE 20 MG/ML
INJECTION INTRAMUSCULAR; INTRAVENOUS
Status: DISCONTINUED | OUTPATIENT
Start: 2024-04-01 | End: 2024-04-01

## 2024-04-01 RX ORDER — PROCHLORPERAZINE EDISYLATE 5 MG/ML
5 INJECTION INTRAMUSCULAR; INTRAVENOUS EVERY 6 HOURS PRN
Status: DISCONTINUED | OUTPATIENT
Start: 2024-04-01 | End: 2024-04-02 | Stop reason: HOSPADM

## 2024-04-01 RX ORDER — HEPARIN SODIUM 5000 [USP'U]/ML
5000 INJECTION, SOLUTION INTRAVENOUS; SUBCUTANEOUS EVERY 8 HOURS
Status: DISCONTINUED | OUTPATIENT
Start: 2024-04-02 | End: 2024-04-02 | Stop reason: HOSPADM

## 2024-04-01 RX ORDER — LIDOCAINE HYDROCHLORIDE 20 MG/ML
INJECTION INTRAVENOUS
Status: DISCONTINUED | OUTPATIENT
Start: 2024-04-01 | End: 2024-04-01

## 2024-04-01 RX ORDER — DIAZEPAM 5 MG/1
5 TABLET ORAL EVERY 8 HOURS
Status: DISCONTINUED | OUTPATIENT
Start: 2024-04-01 | End: 2024-04-02 | Stop reason: HOSPADM

## 2024-04-01 RX ORDER — ACETAMINOPHEN 500 MG
1000 TABLET ORAL EVERY 6 HOURS
Status: DISCONTINUED | OUTPATIENT
Start: 2024-04-02 | End: 2024-04-02 | Stop reason: HOSPADM

## 2024-04-01 RX ORDER — DEXMEDETOMIDINE HYDROCHLORIDE 100 UG/ML
INJECTION, SOLUTION INTRAVENOUS
Status: DISCONTINUED | OUTPATIENT
Start: 2024-04-01 | End: 2024-04-01

## 2024-04-01 RX ORDER — ONDANSETRON HYDROCHLORIDE 2 MG/ML
INJECTION, SOLUTION INTRAVENOUS
Status: DISCONTINUED | OUTPATIENT
Start: 2024-04-01 | End: 2024-04-01

## 2024-04-01 RX ORDER — ROCURONIUM BROMIDE 10 MG/ML
INJECTION, SOLUTION INTRAVENOUS
Status: DISCONTINUED | OUTPATIENT
Start: 2024-04-01 | End: 2024-04-01

## 2024-04-01 RX ADMIN — PROPOFOL 120 MG: 10 INJECTION, EMULSION INTRAVENOUS at 03:04

## 2024-04-01 RX ADMIN — ROCURONIUM BROMIDE 5 MG: 10 INJECTION, SOLUTION INTRAVENOUS at 03:04

## 2024-04-01 RX ADMIN — CEFAZOLIN 2 G: 330 INJECTION, POWDER, FOR SOLUTION INTRAMUSCULAR; INTRAVENOUS at 03:04

## 2024-04-01 RX ADMIN — PREGABALIN 75 MG: 75 CAPSULE ORAL at 08:04

## 2024-04-01 RX ADMIN — Medication 30 MG: at 03:04

## 2024-04-01 RX ADMIN — SODIUM CHLORIDE, SODIUM GLUCONATE, SODIUM ACETATE, POTASSIUM CHLORIDE, MAGNESIUM CHLORIDE, SODIUM PHOSPHATE, DIBASIC, AND POTASSIUM PHOSPHATE: .53; .5; .37; .037; .03; .012; .00082 INJECTION, SOLUTION INTRAVENOUS at 03:04

## 2024-04-01 RX ADMIN — LIDOCAINE HYDROCHLORIDE 100 MG: 20 INJECTION INTRAVENOUS at 03:04

## 2024-04-01 RX ADMIN — SODIUM CHLORIDE 0.2 MCG/KG/MIN: 9 INJECTION, SOLUTION INTRAVENOUS at 03:04

## 2024-04-01 RX ADMIN — HYDROMORPHONE HYDROCHLORIDE 0.5 MG: 1 INJECTION, SOLUTION INTRAMUSCULAR; INTRAVENOUS; SUBCUTANEOUS at 02:04

## 2024-04-01 RX ADMIN — DIAZEPAM 5 MG: 5 TABLET ORAL at 11:04

## 2024-04-01 RX ADMIN — DEXAMETHASONE SODIUM PHOSPHATE 8 MG: 4 INJECTION, SOLUTION INTRAMUSCULAR; INTRAVENOUS at 03:04

## 2024-04-01 RX ADMIN — FENTANYL CITRATE 100 MCG: 50 INJECTION, SOLUTION INTRAMUSCULAR; INTRAVENOUS at 03:04

## 2024-04-01 RX ADMIN — DIAZEPAM 5 MG: 5 TABLET ORAL at 09:04

## 2024-04-01 RX ADMIN — ONDANSETRON 4 MG: 2 INJECTION INTRAMUSCULAR; INTRAVENOUS at 04:04

## 2024-04-01 RX ADMIN — MUPIROCIN: 20 OINTMENT TOPICAL at 08:04

## 2024-04-01 RX ADMIN — OXYCODONE HYDROCHLORIDE 10 MG: 10 TABLET ORAL at 05:04

## 2024-04-01 RX ADMIN — SODIUM CHLORIDE: 9 INJECTION, SOLUTION INTRAVENOUS at 03:04

## 2024-04-01 RX ADMIN — HYDROMORPHONE HYDROCHLORIDE 0.5 MG: 1 INJECTION, SOLUTION INTRAMUSCULAR; INTRAVENOUS; SUBCUTANEOUS at 06:04

## 2024-04-01 RX ADMIN — OXYCODONE 5 MG: 5 TABLET ORAL at 11:04

## 2024-04-01 RX ADMIN — MIDAZOLAM HYDROCHLORIDE 2 MG: 2 INJECTION, SOLUTION INTRAMUSCULAR; INTRAVENOUS at 03:04

## 2024-04-01 RX ADMIN — HYDROMORPHONE HYDROCHLORIDE 0.5 MG: 1 INJECTION, SOLUTION INTRAMUSCULAR; INTRAVENOUS; SUBCUTANEOUS at 07:04

## 2024-04-01 RX ADMIN — PREGABALIN 75 MG: 75 CAPSULE ORAL at 07:04

## 2024-04-01 RX ADMIN — DEXMEDETOMIDINE 12 MCG: 100 INJECTION, SOLUTION, CONCENTRATE INTRAVENOUS at 03:04

## 2024-04-01 RX ADMIN — SODIUM CHLORIDE: 0.9 INJECTION, SOLUTION INTRAVENOUS at 12:04

## 2024-04-01 RX ADMIN — ACETAMINOPHEN 1000 MG: 500 TABLET ORAL at 05:04

## 2024-04-01 RX ADMIN — DEXMEDETOMIDINE 8 MCG: 100 INJECTION, SOLUTION, CONCENTRATE INTRAVENOUS at 04:04

## 2024-04-01 RX ADMIN — SUCCINYLCHOLINE CHLORIDE 200 MG: 20 INJECTION, SOLUTION INTRAMUSCULAR; INTRAVENOUS at 03:04

## 2024-04-01 RX ADMIN — DIAZEPAM 5 MG: 5 TABLET ORAL at 12:04

## 2024-04-01 RX ADMIN — HYDROMORPHONE HYDROCHLORIDE 0.5 MG: 1 INJECTION, SOLUTION INTRAMUSCULAR; INTRAVENOUS; SUBCUTANEOUS at 10:04

## 2024-04-01 RX ADMIN — ACETAMINOPHEN 1000 MG: 500 TABLET ORAL at 02:04

## 2024-04-01 RX ADMIN — Medication 10 MG: at 04:04

## 2024-04-01 NOTE — ANESTHESIA PREPROCEDURE EVALUATION
Pre-operative evaluation for Procedure(s) (LRB):  LAMINECTOMY, SPINE, LUMBAR (Right)    Jennifer Nguyen is a 33 y.o. female     - CHRISTINE, Panic disorder  - ADHD  - mild asthma    Denies problems with prior anesthetics. Appropriatrely NPO.       ECHO (if on file):  No results found for this or any previous visit.      Patient Active Problem List   Diagnosis    Anxiety    Celiac disease    Generalized anxiety disorder    Panic disorder    Depression    Gamekeeper's thumb    Decreased range of motion of right thumb    Thumb pain, right    Thumb swelling    IUD (intrauterine device) in place- strings lost on 9/15    Chronic instability of metacarpophalangeal joint of right thumb    Decreased  strength of right hand    Chronic thumb pain, right    ADHD (attention deficit hyperactivity disorder)    Insomnia    Chronic fatigue    Hymenoptera allergy    Allergic rhinitis due to dust mite    Allergic rhinitis due to pollen    Mild intermittent asthma without complication    Acute bilateral low back pain    Dorsalgia, unspecified    Decreased strength of trunk and back    Impaired strength of hip muscles    Asthma    Spondylosis without myelopathy or radiculopathy, lumbosacral region    Lumbar herniated disc       Review of patient's allergies indicates:   Allergen Reactions    Insect venom Anxiety, Rash and Shortness Of Breath    Gluten Nausea Only       No current facility-administered medications on file prior to encounter.     Current Outpatient Medications on File Prior to Encounter   Medication Sig Dispense Refill    albuterol (VENTOLIN HFA) 90 mcg/actuation inhaler Inhale 1-2 puffs into the lungs 2 (two) times daily as needed for Wheezing or Shortness of Breath. Rescue 18 g 0    azelastine (ASTELIN) 137 mcg (0.1 %) nasal spray 1 spray (137 mcg total) by Nasal route 2 (two) times daily. 30 mL 0    cetirizine HCl (ZYRTEC ORAL) Take by mouth.      dextroamphetamine-amphetamine (ADDERALL XR) 15 MG 24 hr capsule Take 1  capsule (15 mg total) by mouth every morning. 30 capsule 0    dextroamphetamine-amphetamine (ADDERALL) 5 mg Tab Take 5 mg by mouth daily as needed (inattention PRN). 30 tablet 0    EScitalopram oxalate (LEXAPRO) 20 MG tablet Take 1 tablet (20 mg total) by mouth once daily. 30 tablet 11    EScitalopram oxalate (LEXAPRO) 20 MG tablet Take 1 tablet (20 mg total) by mouth once daily. 30 tablet 11    fluocinonide 0.05% (LIDEX) 0.05 % cream AAA of hands bid prn flares. 60 g 3    fluticasone propionate (CUTIVATE) 0.05 % cream Apply to affected areas of body daily prn eczema. 60 g 3    fluticasone propionate (FLONASE) 50 mcg/actuation nasal spray 1 spray by Each Nostril route once daily.      fluticasone-salmeterol diskus inhaler 250-50 mcg Inhale 1 puff into the lungs once daily. Controller 30 each 3    lisdexamfetamine (VYVANSE) 30 MG capsule Take 1 capsule (30 mg total) by mouth every morning. 30 capsule 0    lisdexamfetamine (VYVANSE) 30 MG capsule Take 1 capsule (30 mg total) by mouth every morning. 30 capsule 0    loratadine (CLARITIN) 10 mg tablet Take 10 mg by mouth once daily.      LORazepam (ATIVAN) 0.5 MG tablet Take 1 tablet (0.5 mg total) by mouth daily as needed for Anxiety. 10 tablet 0    methocarbamoL (ROBAXIN) 750 MG Tab TAKE 1 TABLET (750 MG TOTAL) BY MOUTH 3 (THREE) TIMES DAILY AS NEEDED (MUSCLE PAIN). 90 tablet 3    nabumetone (RELAFEN) 500 MG tablet Take 500 mg by mouth 2 (two) times daily.      nabumetone (RELAFEN) 500 MG tablet TAKE 1 TABLET BY MOUTH 2 TIMES DAILY AS NEEDED FOR PAIN. 60 tablet 3    nirmatrelvir-ritonavir (PAXLOVID) 300 mg (150 mg x 2)-100 mg copackaged tablets (EUA) Take 3 tablets by mouth 2 (two) times daily. Each dose contains 2 nirmatrelvir (pink tablets) and 1 ritonavir (white tablet). Take all 3 tablets together 30 tablet 0    olopatadine (PATADAY) 0.2 % Drop Place 1 drop into both eyes daily as needed (itching eyes). 5 mL 0    ondansetron (ZOFRAN) 4 MG tablet Take 1 tablet (4 mg  total) by mouth every 6 (six) hours as needed for Nausea. 30 tablet 0       Past Surgical History:   Procedure Laterality Date    BREAST SURGERY      REDUCTION    INTRAUTERINE DEVICE INSERTION  04/01/2015    KJB---MIRENA    REPAIR OF COLLATERAL LIGAMENT OF THUMB Right 01/06/2020    Procedure: REPAIR, LIGAMENT, COLLATERAL, THUMB right;  Surgeon: Lisette Zaman MD;  Location: [facility];  Service: Orthopedics;  Laterality: Right;  regional MAC    WISDOM TOOTH EXTRACTION         Social History     Socioeconomic History    Marital status: Single   Tobacco Use    Smoking status: Former     Current packs/day: 0.00     Types: Cigarettes     Start date: 2007     Quit date: 2009     Years since quitting: 15.2     Passive exposure: Past    Smokeless tobacco: Never    Tobacco comments:     1-2 cig daily   Substance and Sexual Activity    Alcohol use: Not Currently     Comment: ~10 drinks/week. Seltzers, occasional wine    Drug use: No    Sexual activity: Yes     Partners: Male     Birth control/protection: I.U.D.   Social History Narrative    Significant other. No children. 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     Social Determinants of Health     Financial Resource Strain: Low Risk  (3/4/2024)    Overall Financial Resource Strain (CARDIA)     Difficulty of Paying Living Expenses: Not very hard   Food Insecurity: No Food Insecurity (3/4/2024)    Hunger Vital Sign     Worried About Running Out of Food in the Last Year: Never true     Ran Out of Food in the Last Year: Never true   Transportation Needs: No Transportation Needs (3/4/2024)    PRAPARE - Transportation     Lack of Transportation (Medical): No     Lack of Transportation (Non-Medical): No   Physical Activity: Insufficiently Active (3/4/2024)    Exercise Vital Sign     Days of Exercise per Week: 4 days     Minutes of Exercise per Session: 30 min   Stress: Stress Concern Present (3/4/2024)    [employer] [employer] -  Occupational Stress Questionnaire     Feeling of Stress : To some extent   Social Connections: Unknown (3/4/2024)    Social Connection and Isolation Panel [NHANES]     Frequency of Communication with Friends and Family: More than three times a week     Frequency of Social Gatherings with Friends and Family: Twice a week     Active Member of Clubs or Organizations: Yes     Attends Club or Organization Meetings: More than 4 times per year     Marital Status: Never    Housing Stability: Low Risk  (3/4/2024)    Housing Stability Vital Sign     Unable to Pay for Housing in the Last Year: No     Number of Places Lived in the Last Year: 1     Unstable Housing in the Last Year: No         CBC:   Recent Labs     24  1842   WBC 8.62   RBC 4.52   HGB 13.3   HCT 39.3      MCV 87   MCH 29.4   MCHC 33.8       CMP:   Recent Labs     24  1842      K 3.5      CO2 22*   BUN 11   CREATININE 0.8   GLU 81   CALCIUM 9.8   ALBUMIN 4.6   PROT 7.9   ALKPHOS 71   ALT 20   AST 24   BILITOT 0.5       INR  Recent Labs     24  1842   INR 0.9   APTT 25.1           Diagnostic Studies:      EKD Echo:  No results found for this or any previous visit.        Pre-op Assessment    I have reviewed the Patient Summary Reports.     I have reviewed the Nursing Notes. I have reviewed the NPO Status.   I have reviewed the Medications.     Review of Systems  Anesthesia Hx:  No problems with previous Anesthesia   History of prior surgery of interest to airway management or planning:          Denies Family Hx of Anesthesia complications.    Denies Personal Hx of Anesthesia complications.                    Hematology/Oncology:  Hematology Normal   Oncology Normal                                   Pulmonary:    Asthma mild                   Psych:  Psychiatric History anxiety depression                Physical Exam  General: Well nourished, Cooperative, Alert and Oriented    Airway:  Mallampati: III /  II  Mouth Opening: Normal  TM Distance: Normal  Tongue: Normal  Neck ROM: Normal ROM    Dental:  Intact, Prominent Incisors    Chest/Lungs:  Normal Respiratory Rate    Heart:  Rate: Normal        Anesthesia Plan  Type of Anesthesia, risks & benefits discussed:    Anesthesia Type: Gen ETT  Intra-op Monitoring Plan: Standard ASA Monitors  Post Op Pain Control Plan: multimodal analgesia and IV/PO Opioids PRN  Induction:  IV  Airway Plan: Direct and Video, Post-Induction  Informed Consent: Informed consent signed with the Patient and all parties understand the risks and agree with anesthesia plan.  All questions answered. Patient consented to blood products? Yes  ASA Score: 2  Day of Surgery Review of History & Physical: H&P Update referred to the surgeon/provider.  Anesthesia Plan Notes: NPO confirmed.  Discussed case with Dr. Camacho, requesting TIVA / no paralysis    Ready For Surgery From Anesthesia Perspective.     .

## 2024-04-01 NOTE — ASSESSMENT & PLAN NOTE
34 yo F who presents to ED with back and RLE pain that started today around brunch and worsened severely. She has dealt with back pain off and on since August last year with some improvement with PT at one point. Denies any trauma today or in past. Denies saddle anesthesia or bowel bladder incontinence. Not taking blood thinners.     Imaging:  - MRI showed large extruded disc at L4/L5 eccentric to right with moderate to severe central stenosis and severe foraminal stenosis; small far lateral disc herniation also at L3/L4 on left side  - L spine flex ex pending    Plan:  - admit to nsgy service  - pain control  - NPO at midnight  - OR tomorrow for R sided L4/L5 lami/disectomy    Discussed with Dr. Camacho

## 2024-04-01 NOTE — CONSULTS
[facility] ([facility])  Neurosurgery  Consult Note    Inpatient consult to Neurosurgery  Consult performed by: Christopher Wren MD  Consult ordered by: Dixon Samuels MD        Subjective:     Chief Complaint/Reason for Admission: back and RLE pain    History of Present Illness: 34 yo F who presents to ED with back and RLE pain that started today around brunch and worsened severely. She has dealt with back pain off and on since August last year with some improvement with PT at one point. Denies any trauma today or in past. Denies saddle anesthesia or bowel bladder incontinence. Not taking blood thinners. MRI showed large extruded disc at L4/L5    Facility-Administered Medications Prior to Admission   Medication Dose Route Frequency Provider Last Rate Last Admin    levonorgestreL (MIRENA) 20 mcg/24 hours (6 yrs) 52 mg IUD 1 Intra Uterine Device  1 Intra Uterine Device Intrauterine  Jemma Cochran MD   1 Intra Uterine Device at 10/01/21 0900     Medications Prior to Admission   Medication Sig Dispense Refill Last Dose    albuterol (VENTOLIN HFA) 90 mcg/actuation inhaler Inhale 1-2 puffs into the lungs 2 (two) times daily as needed for Wheezing or Shortness of Breath. Rescue 18 g 0     azelastine (ASTELIN) 137 mcg (0.1 %) nasal spray 1 spray (137 mcg total) by Nasal route 2 (two) times daily. 30 mL 0     cetirizine HCl (ZYRTEC ORAL) Take by mouth.       dextroamphetamine-amphetamine (ADDERALL XR) 15 MG 24 hr capsule Take 1 capsule (15 mg total) by mouth every morning. 30 capsule 0     dextroamphetamine-amphetamine (ADDERALL) 5 mg Tab Take 5 mg by mouth daily as needed (inattention PRN). 30 tablet 0     EScitalopram oxalate (LEXAPRO) 20 MG tablet Take 1 tablet (20 mg total) by mouth once daily. 30 tablet 11     EScitalopram oxalate (LEXAPRO) 20 MG tablet Take 1 tablet (20 mg total) by mouth once daily. 30 tablet 11     fluocinonide 0.05% (LIDEX) 0.05 % cream AAA of hands bid prn flares. 60 g 3      fluticasone propionate (CUTIVATE) 0.05 % cream Apply to affected areas of body daily prn eczema. 60 g 3     fluticasone propionate (FLONASE) 50 mcg/actuation nasal spray 1 spray by Each Nostril route once daily.       fluticasone-salmeterol diskus inhaler 250-50 mcg Inhale 1 puff into the lungs once daily. Controller 30 each 3     lisdexamfetamine (VYVANSE) 30 MG capsule Take 1 capsule (30 mg total) by mouth every morning. 30 capsule 0     lisdexamfetamine (VYVANSE) 30 MG capsule Take 1 capsule (30 mg total) by mouth every morning. 30 capsule 0     loratadine (CLARITIN) 10 mg tablet Take 10 mg by mouth once daily.       LORazepam (ATIVAN) 0.5 MG tablet Take 1 tablet (0.5 mg total) by mouth daily as needed for Anxiety. 10 tablet 0     methocarbamoL (ROBAXIN) 750 MG Tab TAKE 1 TABLET (750 MG TOTAL) BY MOUTH 3 (THREE) TIMES DAILY AS NEEDED (MUSCLE PAIN). 90 tablet 3     nabumetone (RELAFEN) 500 MG tablet Take 500 mg by mouth 2 (two) times daily.       nabumetone (RELAFEN) 500 MG tablet TAKE 1 TABLET BY MOUTH 2 TIMES DAILY AS NEEDED FOR PAIN. 60 tablet 3     nirmatrelvir-ritonavir (PAXLOVID) 300 mg (150 mg x 2)-100 mg copackaged tablets (EUA) Take 3 tablets by mouth 2 (two) times daily. Each dose contains 2 nirmatrelvir (pink tablets) and 1 ritonavir (white tablet). Take all 3 tablets together 30 tablet 0     olopatadine (PATADAY) 0.2 % Drop Place 1 drop into both eyes daily as needed (itching eyes). 5 mL 0     ondansetron (ZOFRAN) 4 MG tablet Take 1 tablet (4 mg total) by mouth every 6 (six) hours as needed for Nausea. 30 tablet 0        Review of patient's allergies indicates:   Allergen Reactions    Insect venom Anxiety, Rash and Shortness Of Breath    Gluten Nausea Only       Past Medical History:   Diagnosis Date    ADHD     Allergy     Anxiety disorder, unspecified     Celiac disease     Mild intermittent asthma, uncomplicated      Past Surgical History:   Procedure Laterality Date    BREAST SURGERY       REDUCTION    INTRAUTERINE DEVICE INSERTION  04/01/2015    KJB---MIRENA    REPAIR OF COLLATERAL LIGAMENT OF THUMB Right 01/06/2020    Procedure: REPAIR, LIGAMENT, COLLATERAL, THUMB right;  Surgeon: Lisette Zaman MD;  Location: [facility];  Service: Orthopedics;  Laterality: Right;  regional MAC    WISDOM TOOTH EXTRACTION       Family History       Problem Relation (Age of Onset)    Breast cancer Paternal Grandmother    Cancer Maternal Grandmother    Dementia Paternal Grandmother    Diabetes Father    Graves' disease Mother    Heart disease Maternal Grandfather    Hypertension Father    No Known Problems Sister, Brother, Brother          Tobacco Use    Smoking status: Former     Current packs/day: 0.00     Types: Cigarettes     Start date: 2007     Quit date: 2009     Years since quitting: 15.2     Passive exposure: Past    Smokeless tobacco: Never    Tobacco comments:     1-2 cig daily   Substance and Sexual Activity    Alcohol use: Not Currently     Comment: ~10 drinks/week. Seltzers, occasional wine    Drug use: No    Sexual activity: Yes     Partners: Male     Birth control/protection: I.U.D.     Review of Systems   Neurological:  Positive for weakness and numbness.   All other systems reviewed and are negative.    Objective:     Weight: 60.8 kg (134 lb)  Body mass index is 23 kg/m².  Vital Signs (Most Recent):  Temp: 98.3 °F (36.8 °C) (03/31/24 2106)  Pulse: (!) 55 (03/31/24 2106)  Resp: (!) 8 (03/31/24 2106)  BP: 119/75 (03/31/24 2106)  SpO2: 99 % (03/31/24 2106) Vital Signs (24h Range):  Temp:  [98.2 °F (36.8 °C)-99.1 °F (37.3 °C)] 98.3 °F (36.8 °C)  Pulse:  [55-84] 55  Resp:  [8-20] 8  SpO2:  [98 %-100 %] 99 %  BP: (108-131)/(60-75) 119/75                                 Physical Exam  Constitutional:       Appearance: She is well-developed and well-nourished.   Eyes:      Extraocular Movements: EOM normal.      Conjunctiva/sclera: Conjunctivae normal.      Pupils: Pupils are equal, round, and reactive to  light.   Cardiovascular:      Pulses: Normal pulses.   Abdominal:      Palpations: Abdomen is soft.   Neurological:      Mental Status: She is alert and oriented to person, place, and time.      Comments: AAOx4  PERRL  Cranial nerves intact  BUE 5/5  LLE 5/5  RLE 4- hf/ke/kf with some pain limitation. 5/5 in df/pf  Decreased sensation entire RLE worse in L4 distribution  No saddle anesthesia  Rectal tone intact   Psychiatric:         Mood and Affect: Mood and affect normal.              Physical Exam:    Constitutional: She appears well-developed and well-nourished.     Eyes: Pupils are equal, round, and reactive to light. Conjunctivae and EOM are normal.     Cardiovascular: Normal pulses.     Abdominal: Soft.     Psych/Behavior: She is alert. She is oriented to person, place, and time. She has a normal mood and affect.     Neurological:   AAOx4  PERRL  Cranial nerves intact  BUE 5/5  LLE 5/5  RLE 4- hf/ke/kf with some pain limitation. 5/5 in df/pf  Decreased sensation entire RLE worse in L4 distribution  No saddle anesthesia  Rectal tone intact       Significant Labs:  Recent Labs   Lab 03/31/24  1842   GLU 81      K 3.5      CO2 22*   BUN 11   CREATININE 0.8   CALCIUM 9.8     Recent Labs   Lab 03/31/24  1842   WBC 8.62   HGB 13.3   HCT 39.3        Recent Labs   Lab 03/31/24  1842   INR 0.9   APTT 25.1     Microbiology Results (last 7 days)       ** No results found for the last 168 hours. **          All pertinent labs from the last 24 hours have been reviewed.    Significant Diagnostics:  I have reviewed all pertinent imaging results/findings within the past 24 hours.  Assessment/Plan:     Lumbar herniated disc  34 yo F who presents to ED with back and RLE pain that started today around brun and worsened severely. She has dealt with back pain off and on since August last year with some improvement with PT at one point. Denies any trauma today or in past. Denies saddle anesthesia or bowel  bladder incontinence. Not taking blood thinners.     Imaging:  - MRI showed large extruded disc at L4/L5 eccentric to right with moderate to severe central stenosis and severe foraminal stenosis; small far lateral disc herniation also at L3/L4 on left side  - L spine flex ex pending    Plan:  - admit to nsgy service  - pain control  - NPO at midnight  - OR tomorrow for R sided L4/L5 lami/disectomy    Discussed with Dr. Camacho        Thank you for your consult. I will follow-up with patient. Please contact us if you have any additional questions.    Christopher Wren MD  Neurosurgery  [facility] ([facility])

## 2024-04-01 NOTE — OP NOTE
DATE OF SURGERY: 4/1/2024          PREOPERATIVE DIAGNOSIS:  1. Herniated intervertebral disc, right L4/5   2. Right L4, 5 radiculopathies  3. Severe right L4/5 lateral recess stenosis     POSTOPERATIVE DIAGNOSIS:        PROCEDURE PERFORMED:  1. R L4/5 hemilaminectomy and microdiscectomy from MIS approach  2. Use of intraoperative microscope  3. Use of intraoperative flouroscopy  4. Use of neuromonitoring with free run EMG     SURGEON: Fausto Camacho DO     ASSISTANT: Vimal Biswas MD     ANESTHESIA: GETA     ESTIMATED BLOOD LOSS: Minimal     COMPLICATIONS: None     DRAINS: None     SPECIMENS SENT: L4/5 disc     FINDINGS: none     INDICATIONS:     33 F presented to ED yesterday with acute onset RLE radicular pain and numbness in L4,5 distribution.  Imaging showed a large disc herniation at L4/5 resulting in severe right lateral recess stenosis and moderate to severe right L4 foraminal stenosis.  She was offered a right L4/5 MIS microdiscectomy in order to decompress her neural elements.     Risks, benefits, alternatives, indications and methods were reviewed in detail and the patient wished to proceed. All questions were answered. No guarantees about the results of the procedure were made.     PROCEDURE:     The patient was brought to the operating room where she was intubated and placed under general anesthesia without difficulty.  All lines were placed. She was placed prone onto a Evan 4 post with appropriate padding of all pressure points.  AP and lateral x-ray were used to localize to the L4/5 disc space and to plan a right paramedian incision. The skin was marked and the area was prepped and draped in the usual sterile fashion. A timeout was performed prior to the procedure. Ten mL of Lidocaine with Epinephrine was injected into the skin.      A linear paramedian incision was made with a 10 blade.  Bovie electric cautery was used to open the fascia. Sequential dilators were docked onto the L4 hemilamina.   A tubular retractor was placed and docked onto the operative table.  The microscope was brought into the field for microdissection.  Soft tissues were cleared with Bovie electrocautery to expose the L4 hemilamina and the medial L4/5 facet.  A hemilaminectomy was then performed with a combination of the high-speed drill and Kerrison punches.  The ligamentum flavum was removed with curettes and Kerrison punches to expose the thecal sac and traversing L5 nerve root.  The thecal sac was retracted medially with a nerve root retractor and epidural veins were cauterized and divided.  The L4/5 disc space then came into view and an annulotomy was performed with a 15 blade.  A large amount of disc material was immediately able to be mobilized with pituitary rongeur and removed.  This was sent as specimen.  We then worked cephalad under the PLL and were able to remove the more cephalad portion of the disc with pituitaries.  The shoulder of the right L5 nerve root was then explored with a Andrew probe and found to be adequately decompressed.  Surgiflo was then used for hemostasis in addition to bipolar.     The wound was copiously irrigated with sterile normal saline.  Depo-Medrol was placed over the thecal sac.  The microscope was taken out of the field.  Hemostasis was achieved with bipolar electrocautery.  The fascia was closed in a watertight fashion with a running UR6 needle.  The soft tissues were closed in layers.  A 4-0 Monocryl running stitch was placed.  A preneo dressing covered with dermabond was placed over the incision.     The patient appeared to tolerate the procedure well from a hemodynamic and neuro monitoring standpoint.  I was present for all critical portions of the case.  At the end of the case all counts are correct.  The patient was repositioned supine onto the hospital bed where she was extubated and allowed to emerge from anesthesia without difficulty.

## 2024-04-01 NOTE — HPI
34 yo F who presents to ED with back and RLE pain that started today around brunch and worsened severely. She has dealt with back pain off and on since August last year with some improvement with PT at one point. Denies any trauma today or in past. Denies saddle anesthesia or bowel bladder incontinence. Not taking blood thinners. MRI showed large extruded disc at L4/L5

## 2024-04-01 NOTE — SUBJECTIVE & OBJECTIVE
Facility-Administered Medications Prior to Admission   Medication Dose Route Frequency Provider Last Rate Last Admin    levonorgestreL (MIRENA) 20 mcg/24 hours (6 yrs) 52 mg IUD 1 Intra Uterine Device  1 Intra Uterine Device Intrauterine  Jemma Cochran MD   1 Intra Uterine Device at 10/01/21 0900     Medications Prior to Admission   Medication Sig Dispense Refill Last Dose    albuterol (VENTOLIN HFA) 90 mcg/actuation inhaler Inhale 1-2 puffs into the lungs 2 (two) times daily as needed for Wheezing or Shortness of Breath. Rescue 18 g 0     azelastine (ASTELIN) 137 mcg (0.1 %) nasal spray 1 spray (137 mcg total) by Nasal route 2 (two) times daily. 30 mL 0     cetirizine HCl (ZYRTEC ORAL) Take by mouth.       dextroamphetamine-amphetamine (ADDERALL XR) 15 MG 24 hr capsule Take 1 capsule (15 mg total) by mouth every morning. 30 capsule 0     dextroamphetamine-amphetamine (ADDERALL) 5 mg Tab Take 5 mg by mouth daily as needed (inattention PRN). 30 tablet 0     EScitalopram oxalate (LEXAPRO) 20 MG tablet Take 1 tablet (20 mg total) by mouth once daily. 30 tablet 11     EScitalopram oxalate (LEXAPRO) 20 MG tablet Take 1 tablet (20 mg total) by mouth once daily. 30 tablet 11     fluocinonide 0.05% (LIDEX) 0.05 % cream AAA of hands bid prn flares. 60 g 3     fluticasone propionate (CUTIVATE) 0.05 % cream Apply to affected areas of body daily prn eczema. 60 g 3     fluticasone propionate (FLONASE) 50 mcg/actuation nasal spray 1 spray by Each Nostril route once daily.       fluticasone-salmeterol diskus inhaler 250-50 mcg Inhale 1 puff into the lungs once daily. Controller 30 each 3     lisdexamfetamine (VYVANSE) 30 MG capsule Take 1 capsule (30 mg total) by mouth every morning. 30 capsule 0     lisdexamfetamine (VYVANSE) 30 MG capsule Take 1 capsule (30 mg total) by mouth every morning. 30 capsule 0     loratadine (CLARITIN) 10 mg tablet Take 10 mg by mouth once daily.       LORazepam (ATIVAN) 0.5 MG tablet Take 1  tablet (0.5 mg total) by mouth daily as needed for Anxiety. 10 tablet 0     methocarbamoL (ROBAXIN) 750 MG Tab TAKE 1 TABLET (750 MG TOTAL) BY MOUTH 3 (THREE) TIMES DAILY AS NEEDED (MUSCLE PAIN). 90 tablet 3     nabumetone (RELAFEN) 500 MG tablet Take 500 mg by mouth 2 (two) times daily.       nabumetone (RELAFEN) 500 MG tablet TAKE 1 TABLET BY MOUTH 2 TIMES DAILY AS NEEDED FOR PAIN. 60 tablet 3     nirmatrelvir-ritonavir (PAXLOVID) 300 mg (150 mg x 2)-100 mg copackaged tablets (EUA) Take 3 tablets by mouth 2 (two) times daily. Each dose contains 2 nirmatrelvir (pink tablets) and 1 ritonavir (white tablet). Take all 3 tablets together 30 tablet 0     olopatadine (PATADAY) 0.2 % Drop Place 1 drop into both eyes daily as needed (itching eyes). 5 mL 0     ondansetron (ZOFRAN) 4 MG tablet Take 1 tablet (4 mg total) by mouth every 6 (six) hours as needed for Nausea. 30 tablet 0        Review of patient's allergies indicates:   Allergen Reactions    Insect venom Anxiety, Rash and Shortness Of Breath    Gluten Nausea Only       Past Medical History:   Diagnosis Date    ADHD     Allergy     Anxiety disorder, unspecified     Celiac disease     Mild intermittent asthma, uncomplicated      Past Surgical History:   Procedure Laterality Date    BREAST SURGERY      REDUCTION    INTRAUTERINE DEVICE INSERTION  04/01/2015    KJB---MIRENA    REPAIR OF COLLATERAL LIGAMENT OF THUMB Right 01/06/2020    Procedure: REPAIR, LIGAMENT, COLLATERAL, THUMB right;  Surgeon: Lisette Zaman MD;  Location: [facility];  Service: Orthopedics;  Laterality: Right;  regional MAC    WISDOM TOOTH EXTRACTION       Family History       Problem Relation (Age of Onset)    Breast cancer Paternal Grandmother    Cancer Maternal Grandmother    Dementia Paternal Grandmother    Diabetes Father    Graves' disease Mother    Heart disease Maternal Grandfather    Hypertension Father    No Known Problems Sister, Brother, Brother          Tobacco Use    Smoking  status: Former     Current packs/day: 0.00     Types: Cigarettes     Start date: 2007     Quit date: 2009     Years since quitting: 15.2     Passive exposure: Past    Smokeless tobacco: Never    Tobacco comments:     1-2 cig daily   Substance and Sexual Activity    Alcohol use: Not Currently     Comment: ~10 drinks/week. Seltzers, occasional wine    Drug use: No    Sexual activity: Yes     Partners: Male     Birth control/protection: I.U.D.     Review of Systems   Neurological:  Positive for weakness and numbness.   All other systems reviewed and are negative.    Objective:     Weight: 60.8 kg (134 lb)  Body mass index is 23 kg/m².  Vital Signs (Most Recent):  Temp: 98.3 °F (36.8 °C) (03/31/24 2106)  Pulse: (!) 55 (03/31/24 2106)  Resp: (!) 8 (03/31/24 2106)  BP: 119/75 (03/31/24 2106)  SpO2: 99 % (03/31/24 2106) Vital Signs (24h Range):  Temp:  [98.2 °F (36.8 °C)-99.1 °F (37.3 °C)] 98.3 °F (36.8 °C)  Pulse:  [55-84] 55  Resp:  [8-20] 8  SpO2:  [98 %-100 %] 99 %  BP: (108-131)/(60-75) 119/75                                 Physical Exam  Constitutional:       Appearance: She is well-developed and well-nourished.   Eyes:      Extraocular Movements: EOM normal.      Conjunctiva/sclera: Conjunctivae normal.      Pupils: Pupils are equal, round, and reactive to light.   Cardiovascular:      Pulses: Normal pulses.   Abdominal:      Palpations: Abdomen is soft.   Neurological:      Mental Status: She is alert and oriented to person, place, and time.      Comments: AAOx4  PERRL  Cranial nerves intact  BUE 5/5  LLE 5/5  RLE 4- hf/ke/kf with some pain limitation. 5/5 in df/pf  Decreased sensation entire RLE worse in L4 distribution  No saddle anesthesia  Rectal tone intact   Psychiatric:         Mood and Affect: Mood and affect normal.              Physical Exam:    Constitutional: She appears well-developed and well-nourished.     Eyes: Pupils are equal, round, and reactive to light. Conjunctivae and EOM are normal.      Cardiovascular: Normal pulses.     Abdominal: Soft.     Psych/Behavior: She is alert. She is oriented to person, place, and time. She has a normal mood and affect.     Neurological:   AAOx4  PERRL  Cranial nerves intact  BUE 5/5  LLE 5/5  RLE 4- hf/ke/kf with some pain limitation. 5/5 in df/pf  Decreased sensation entire RLE worse in L4 distribution  No saddle anesthesia  Rectal tone intact       Significant Labs:  Recent Labs   Lab 03/31/24  1842   GLU 81      K 3.5      CO2 22*   BUN 11   CREATININE 0.8   CALCIUM 9.8     Recent Labs   Lab 03/31/24  1842   WBC 8.62   HGB 13.3   HCT 39.3        Recent Labs   Lab 03/31/24  1842   INR 0.9   APTT 25.1     Microbiology Results (last 7 days)       ** No results found for the last 168 hours. **          All pertinent labs from the last 24 hours have been reviewed.    Significant Diagnostics:  I have reviewed all pertinent imaging results/findings within the past 24 hours.

## 2024-04-01 NOTE — ANESTHESIA PROCEDURE NOTES
Intubation    Date/Time: 4/1/2024 3:30 PM    Performed by: Abran Sanchez CRNA  Authorized by: Max Andrade MD    Intubation:     Induction:  Intravenous    Intubated:  Postinduction    Mask Ventilation:  Easy mask    Attempts:  1    Attempted By:  CRNA    Method of Intubation:  Video laryngoscopy    Blade:  Egan 3    Laryngeal View Grade: Grade I - full view of cords      Difficult Airway Encountered?: No      Complications:  None    Airway Device:  Oral endotracheal tube    Airway Device Size:  7.0    Style/Cuff Inflation:  Cuffed (inflated to minimal occlusive pressure)    Tube secured:  22    Secured at:  The lips    Placement Verified By:  Capnometry    Complicating Factors:  None    Findings Post-Intubation:  BS equal bilateral and atraumatic/condition of teeth unchanged

## 2024-04-01 NOTE — PROGRESS NOTES
[facility]  Physical Therapy Discharge Note    Name: Jennifer Nguyen  Clinic Number: [id number]    Therapy Diagnosis: M54.9 (ICD-10-CM) - Dorsalgia, unspecified    Physician: Martha Dasilva, NP     Physician Orders: PT Eval and Treat   Medical Diagnosis: M54.9 (ICD-10-CM) - Dorsalgia, unspecified   Evaluation Date: 3/8/2024      Date of Last visit: 3/28/2024  Total Visits Received: 7    ASSESSMENT        Discharge reason: Other:  admitted to hospital for surgery    Discharge FOTO Score: not obtained secondary admitted to hospital    Goals: Goals not achieved secondary admitted to hospital for surgery secondary herniated disk.    PLAN   This patient is discharged from Physical Therapy      Medardo Alcocer, PT

## 2024-04-01 NOTE — NURSING
Nurses Note -- 4 Eyes      4/1/2024   3:43 AM      Skin assessed during: Admit      [x] No Altered Skin Integrity Present    [x]Prevention Measures Documented      [] Yes- Altered Skin Integrity Present or Discovered   [] LDA Added if Not in Epic (Describe Wound)   [] New Altered Skin Integrity was Present on Admit and Documented in LDA   [] Wound Image Taken    Wound Care Consulted? No    Attending Nurse:  TENZIN Joyce    Second RN/Staff Member:   TENZIN Hernandez

## 2024-04-01 NOTE — NURSING TRANSFER
Nursing Transfer Note      4/1/2024   5:41 PM    Nurse giving handoff:Lizette DAVE PACU  Nurse receiving handoff:Awilda DAVE NPU    Reason patient is being transferred: s/p anesthesia    Transfer To: Room 930    Transfer via bed    Transported by transport staff x2    Transfer Vital Signs: see flowsheet    Order for Tele Monitor? No    Any special needs or follow-up needed: none    Patient belongings transferred with patient: No    Chart send with patient: Yes    Notified: spouse    Patient reassessed at: 4/1/24 (date, time)

## 2024-04-01 NOTE — TRANSFER OF CARE
"Anesthesia Transfer of Care Note    Patient: Jennifer Nguyen    Procedure(s) Performed: Procedure(s) (LRB):  LAMINECTOMY, SPINE, LUMBAR (Right)    Patient location: PACU    Anesthesia Type: general    Transport from OR: Transported from OR on 6-10 L/min O2 by face mask with adequate spontaneous ventilation    Post pain: adequate analgesia    Post assessment: no apparent anesthetic complications and tolerated procedure well    Post vital signs: stable    Level of consciousness: awake, alert and oriented    Nausea/Vomiting: no nausea/vomiting    Complications: none    Transfer of care protocol was followed      Last vitals: Visit Vitals  /77   Pulse (!) 53   Temp 37.2 °C (99 °F)   Resp 16   Ht 5' 4" (1.626 m)   Wt 60.8 kg (134 lb)   LMP  (LMP Unknown)   SpO2 99%   Breastfeeding No   BMI 23.00 kg/m²     "

## 2024-04-02 ENCOUNTER — TELEPHONE (OUTPATIENT)
Dept: NEUROSURGERY | Facility: CLINIC | Age: 34
End: 2024-04-02
Payer: COMMERCIAL

## 2024-04-02 VITALS
TEMPERATURE: 98 F | WEIGHT: 134 LBS | HEART RATE: 70 BPM | DIASTOLIC BLOOD PRESSURE: 59 MMHG | SYSTOLIC BLOOD PRESSURE: 103 MMHG | OXYGEN SATURATION: 98 % | BODY MASS INDEX: 22.88 KG/M2 | HEIGHT: 64 IN | RESPIRATION RATE: 16 BRPM

## 2024-04-02 PROBLEM — R52 PAIN: Status: ACTIVE | Noted: 2024-04-02

## 2024-04-02 PROCEDURE — 97530 THERAPEUTIC ACTIVITIES: CPT

## 2024-04-02 PROCEDURE — 63600175 PHARM REV CODE 636 W HCPCS: Performed by: STUDENT IN AN ORGANIZED HEALTH CARE EDUCATION/TRAINING PROGRAM

## 2024-04-02 PROCEDURE — 25000003 PHARM REV CODE 250: Performed by: STUDENT IN AN ORGANIZED HEALTH CARE EDUCATION/TRAINING PROGRAM

## 2024-04-02 PROCEDURE — 25000242 PHARM REV CODE 250 ALT 637 W/ HCPCS: Performed by: STUDENT IN AN ORGANIZED HEALTH CARE EDUCATION/TRAINING PROGRAM

## 2024-04-02 PROCEDURE — 97166 OT EVAL MOD COMPLEX 45 MIN: CPT

## 2024-04-02 PROCEDURE — 97535 SELF CARE MNGMENT TRAINING: CPT

## 2024-04-02 PROCEDURE — 94640 AIRWAY INHALATION TREATMENT: CPT

## 2024-04-02 PROCEDURE — 97162 PT EVAL MOD COMPLEX 30 MIN: CPT

## 2024-04-02 PROCEDURE — 94761 N-INVAS EAR/PLS OXIMETRY MLT: CPT

## 2024-04-02 PROCEDURE — 94799 UNLISTED PULMONARY SVC/PX: CPT

## 2024-04-02 RX ORDER — ACETAMINOPHEN 500 MG
500 TABLET ORAL EVERY 6 HOURS PRN
Qty: 120 TABLET | Refills: 0 | Status: SHIPPED | OUTPATIENT
Start: 2024-04-02 | End: 2024-05-02

## 2024-04-02 RX ORDER — OXYCODONE HYDROCHLORIDE 5 MG/1
5 TABLET ORAL EVERY 6 HOURS PRN
Qty: 28 TABLET | Refills: 0 | Status: SHIPPED | OUTPATIENT
Start: 2024-04-02 | End: 2024-04-09

## 2024-04-02 RX ORDER — METHOCARBAMOL 500 MG/1
500 TABLET, FILM COATED ORAL 2 TIMES DAILY PRN
Qty: 60 TABLET | Refills: 0 | Status: SHIPPED | OUTPATIENT
Start: 2024-04-02 | End: 2024-05-02

## 2024-04-02 RX ORDER — AMOXICILLIN 250 MG
2 CAPSULE ORAL NIGHTLY PRN
Qty: 60 TABLET | Refills: 0 | Status: SHIPPED | OUTPATIENT
Start: 2024-04-02 | End: 2024-05-02

## 2024-04-02 RX ORDER — ESCITALOPRAM OXALATE 20 MG/1
20 TABLET ORAL DAILY
Status: DISCONTINUED | OUTPATIENT
Start: 2024-04-02 | End: 2024-04-02 | Stop reason: HOSPADM

## 2024-04-02 RX ORDER — ALBUTEROL SULFATE 90 UG/1
1 AEROSOL, METERED RESPIRATORY (INHALATION) 2 TIMES DAILY PRN
Status: DISCONTINUED | OUTPATIENT
Start: 2024-04-02 | End: 2024-04-02 | Stop reason: HOSPADM

## 2024-04-02 RX ADMIN — ESCITALOPRAM OXALATE 20 MG: 20 TABLET ORAL at 08:04

## 2024-04-02 RX ADMIN — OXYCODONE HYDROCHLORIDE 10 MG: 10 TABLET ORAL at 09:04

## 2024-04-02 RX ADMIN — DIAZEPAM 5 MG: 5 TABLET ORAL at 05:04

## 2024-04-02 RX ADMIN — ACETAMINOPHEN 1000 MG: 500 TABLET ORAL at 05:04

## 2024-04-02 RX ADMIN — DIAZEPAM 5 MG: 5 TABLET ORAL at 12:04

## 2024-04-02 RX ADMIN — HEPARIN SODIUM 5000 UNITS: 5000 INJECTION INTRAVENOUS; SUBCUTANEOUS at 05:04

## 2024-04-02 RX ADMIN — OXYCODONE HYDROCHLORIDE 10 MG: 10 TABLET ORAL at 12:04

## 2024-04-02 RX ADMIN — ACETAMINOPHEN 1000 MG: 500 TABLET ORAL at 12:04

## 2024-04-02 RX ADMIN — ACETAMINOPHEN 1000 MG: 500 TABLET ORAL at 11:04

## 2024-04-02 RX ADMIN — CEFAZOLIN 2 G: 2 INJECTION, POWDER, FOR SOLUTION INTRAMUSCULAR; INTRAVENOUS at 12:04

## 2024-04-02 RX ADMIN — PREGABALIN 75 MG: 75 CAPSULE ORAL at 08:04

## 2024-04-02 RX ADMIN — HYDROMORPHONE HYDROCHLORIDE 0.5 MG: 1 INJECTION, SOLUTION INTRAMUSCULAR; INTRAVENOUS; SUBCUTANEOUS at 08:04

## 2024-04-02 RX ADMIN — CEFAZOLIN 2 G: 2 INJECTION, POWDER, FOR SOLUTION INTRAMUSCULAR; INTRAVENOUS at 08:04

## 2024-04-02 RX ADMIN — HYDROMORPHONE HYDROCHLORIDE 0.5 MG: 1 INJECTION, SOLUTION INTRAMUSCULAR; INTRAVENOUS; SUBCUTANEOUS at 11:04

## 2024-04-02 RX ADMIN — ALBUTEROL SULFATE 1 PUFF: 108 INHALANT RESPIRATORY (INHALATION) at 12:04

## 2024-04-02 NOTE — PT/OT/SLP EVAL
Physical Therapy Evaluation and Discharge Note    Patient Name:  Jennifer Nguyen   MRN:  [id number]    Recommendations:     Discharge Recommendations: No Therapy Indicated  Discharge Equipment Recommendations: none   Barriers to discharge: None    Assessment:     Jennifer Nguyen is a 33 y.o. female admitted with a medical diagnosis of Lumbar herniated disc. At this time, patient is functioning at their prior level of function and does not require further acute PT services. Patient performed all mobility and transfers with no assistance, no new acute deficits noted.      Recent Surgery: Procedure(s) (LRB):  LAMINECTOMY, SPINE, LUMBAR, L4- L5 (Right) 1 Day Post-Op    Plan:     During this hospitalization, patient does not require further acute PT services.  Please re-consult if situation changes.      Subjective     Chief Complaint: not stated  Patient/Family Comments/goals: to continue progressing   Pain/Comfort:  Pain Rating 1: 2/10  Location - Side 1: Bilateral  Location - Orientation 1: lower  Location 1: back  Pain Addressed 1: Reposition, Distraction  Pain Rating Post-Intervention 1: 3/10    Patients cultural, spiritual, Buddhism conflicts given the current situation: no    Living Environment:  Patient lives alone in apt with 4-5 WILIAM no HR to front door and 2 flights of stairs with LHR. Tub shower. Will be staying with SO for several days following discharge- 1st floor apt with tub, 0STE.   Prior to admission, patients level of function was independent.  Equipment used at home: none.  DME owned (not currently used): none.  Upon discharge, patient will have assistance from significant other.    Objective:     Communicated with RN prior to session.  Patient found up in chair with peripheral IV upon PT entry to room. Significant other in room.    General Precautions: Standard, fall    Orthopedic Precautions:spinal precautions   Braces: N/A  Respiratory Status: Room air    Exams:  Cognitive Exam:  Patient is  oriented to Person, Place, Time, and Situation  Sensation:    -       some numbness in RLE, reportedly improving since surgery  RLE ROM: WNL  RLE Strength: WNL  LLE ROM: WNL  LLE Strength: WNL    Functional Mobility:  Transfers:     Sit to Stand:  stand by assistance with no AD  Gait: patient ambulated 50' with SBA no AD. Decreased gait speed.   Balance:   Sitting static: independent   Sitting dynamic: independent   Standing static: supervision  Standing dynamic: supervision    Stairs:  Pt ascended/descended 1 flight(s) with No Assistive Device with right handrail with Stand-by Assistance.     AM-PAC 6 CLICK MOBILITY  Total Score:24       Treatment and Education:  Education: patient educated on POC and discharge from therapy, safety with mobility upon return home, discharge recommendations and equipment recommendations. Patient verbalized understanding of all topics.   Education and demonstration of safe stair navigation with step to pattern/BUE railing use for improved pain, exercise tolerance, balance, and to address overall safety.    Answered patient and SO questions regarding return to activity, future PT, driving and desk sitting positioning for comfort.     AM-PAC 6 CLICK MOBILITY  Total Score:24     Patient left up in chair with all lines intact, call button in reach, and significant other present.    GOALS:   Multidisciplinary Problems       Physical Therapy Goals       Not on file              Multidisciplinary Problems (Resolved)          Problem: Physical Therapy    Goal Priority Disciplines Outcome Goal Variances Interventions   Physical Therapy Goal   (Resolved)     PT, PT/OT Met     Description: Patient evaluated and discharged, no goals or care plan created.                         History:     Past Medical History:   Diagnosis Date    ADHD     Allergy     Anxiety disorder, unspecified     Celiac disease     Mild intermittent asthma, uncomplicated        Past Surgical History:   Procedure Laterality  Date    BREAST SURGERY      REDUCTION    INTRAUTERINE DEVICE INSERTION  04/01/2015    KJB---MIRENA    LUMBAR LAMINECTOMY Right 4/1/2024    Procedure: LAMINECTOMY, SPINE, LUMBAR, L4- L5;  Surgeon: Fausto Camacho DO;  Location: [address];  Service: Neurosurgery;  Laterality: Right;  R sided L4/L5 MIS lami/discectomy, neuromonitoring, Evan 4 post, microscope    REPAIR OF COLLATERAL LIGAMENT OF THUMB Right 01/06/2020    Procedure: REPAIR, LIGAMENT, COLLATERAL, THUMB right;  Surgeon: Lisette Zaman MD;  Location: [facility];  Service: Orthopedics;  Laterality: Right;  regional MAC    WISDOM TOOTH EXTRACTION         Time Tracking:     PT Received On: 04/02/24  PT Start Time: 1052     PT Stop Time: 1125  PT Total Time (min): 33 min     Billable Minutes: Evaluation 15 minutes and Therapeutic Activity 18 minutes       04/02/2024

## 2024-04-02 NOTE — TELEPHONE ENCOUNTER
----- Message from Quin Bob PA-C sent at 4/2/2024 10:08 AM CDT -----  Hello!     Could we please schedule this patient for a 2 week wound check followed by a 4-6 week post op visit with Dr. Camacho or Sonia? No imaging required prior to the visit.     Thanks!  -Quin

## 2024-04-02 NOTE — NURSING
Nurses Note -- 4 Eyes      4/1/24    0759         Skin assessed during: Transfer      [] No Altered Skin Integrity Present    []Prevention Measures Documented      [x] Yes- Altered Skin Integrity Present or Discovered   [x] LDA Added if Not in Epic (Describe Wound)   [] New Altered Skin Integrity was Present on Admit and Documented in LDA   [] Wound Image Taken      Pt back from surgery. New lower back incision noted.    4  Wound Care Consulted? No    Attending Nurse:  Awilda Anderson RN/Staff Member:   Isiah

## 2024-04-02 NOTE — ANESTHESIA POSTPROCEDURE EVALUATION
Anesthesia Post Evaluation    Patient: Jennifer Nguyen    Procedure(s) Performed: Procedure(s) (LRB):  LAMINECTOMY, SPINE, LUMBAR, L4- L5 (Right)    Final Anesthesia Type: general      Patient location during evaluation: PACU  Patient participation: Yes- Able to Participate  Level of consciousness: awake and alert  Post-procedure vital signs: reviewed and stable  Pain management: adequate  Airway patency: patent    PONV status at discharge: No PONV  Anesthetic complications: no      Cardiovascular status: blood pressure returned to baseline  Respiratory status: unassisted  Hydration status: euvolemic  Follow-up not needed.              Vitals Value Taken Time   BP 92/50 04/02/24 0433   Temp 36.6 °C (97.9 °F) 04/02/24 0433   Pulse 53 04/02/24 0433   Resp 13 04/02/24 0433   SpO2 99 % 04/02/24 0433         Event Time   Out of Recovery 04/01/2024 17:00:00         Pain/Audie Score: Pain Rating Prior to Med Admin: 6 (4/2/2024  5:50 AM)  Pain Rating Post Med Admin: 7 (4/1/2024 11:20 AM)  Audie Score: 10 (4/1/2024  8:00 PM)

## 2024-04-02 NOTE — CARE UPDATE
I have reviewed the chart of Jennifer Nguyen who is hospitalized for the following:    Active Hospital Problems    Diagnosis    *Lumbar herniated disc    Pain     back and RLE pain   Multimodal pain management      Depression    Anxiety     On lorazepam          Zaira Wise NP  Unit Based [facility]

## 2024-04-02 NOTE — PT/OT/SLP EVAL
"Occupational Therapy   Evaluation and Discharge Note    Name: Jennifer Nguyen  MRN: [id number]  Admitting Diagnosis: Lumbar herniated disc  Recent Surgery: Procedure(s) (LRB):  LAMINECTOMY, SPINE, LUMBAR, L4- L5 (Right) 1 Day Post-Op    Recommendations:     Discharge Recommendations: No Therapy Indicated  Discharge Equipment Recommendations: none  Barriers to discharge:  None    Assessment:     Jennifer Nguyen is a 33 y.o. female with a medical diagnosis of Lumbar herniated disc. At this time, patient is functioning at their prior level of function and does not require further acute OT services.     Plan:     During this hospitalization, patient does not require further acute OT services.  Please re-consult if situation changes.    Plan of Care Reviewed with: patient, significant other    Subjective     Chief Complaint: low back pain  Patient/Family Comments/goals: "I actually feel better after my surgery, I didn't expect that"    Occupational Profile:  Living Environment: Pt lives alone in an apartment with 2 flights of stairs with L HR to enter + 4-5STE with no HR to front door. Bathroom has a tub/shower combo.  Pt will be staying with her s/o for a few weeks following DC - lives in a first floor apartment with a tub/shower combo.  Previous level of function: independent  Roles and Routines: works from home, drives  Equipment Used at home: none  Assistance upon Discharge: boyfriend, neighbors, friends    Pain/Comfort:  Pain Rating 1: 4/10  Location - Side 1: Bilateral  Location - Orientation 1: lower  Location 1: back  Pain Addressed 1: Reposition, Distraction  Pain Rating Post-Intervention 1: 6/10    Patients cultural, spiritual, Hinduism conflicts given the current situation: no    Objective:     Communicated with: RN prior to session.  Patient found HOB elevated with peripheral IV upon OT entry to room.    General Precautions: Standard, fall  Orthopedic Precautions: spinal precautions  Braces: " N/A  Respiratory Status: Room air     Occupational Performance:    Bed Mobility:    Patient completed Scooting/Bridging with independence  Patient completed Supine to Sit with independence    Functional Mobility/Transfers:  Patient completed Sit <> Stand Transfer with supervision  with  no assistive device   Patient completed Bed <> Chair Transfer using Step Transfer technique with supervision with no assistive device  Functional Mobility: Pt engaging in functional mobility to simulate household/community distances approx 100' with supervision and utilizing no AD in order to maximize functional activity tolerance and standing balance required for engagement in occupations of choice.      Activities of Daily Living:  Grooming: supervision to complete oral hygiene in stance  Upper Body Dressing: independence to don gown as robe  Lower Body Dressing: independence to adjust socks    Cognitive/Visual Perceptual:  Cognitive/Psychosocial Skills:     -       Oriented to: Person, Place, Time, and Situation   -       Follows Commands/attention:Follows one-step commands  -       Communication: clear/fluent  -       Safety awareness/insight to disability: intact   Visual/Perceptual:      -Intact      Physical Exam:  Sensation:    -       Intact  Dominant hand:    -       Right  Upper Extremity Range of Motion:     -       Right Upper Extremity: WNL  -       Left Upper Extremity: WNL  Upper Extremity Strength:    -       Right Upper Extremity: WNL  -       Left Upper Extremity: WNL   Strength:    -       Right Upper Extremity: WNL  -       Left Upper Extremity: WNL  Fine Motor Coordination:    -       Intact    AMPAC 6 Click ADL:  AMPAC Total Score: 24    Treatment & Education:  - role of OT, OT POC, including DC from OT  - spinal precautions, including no bending, lifting >5lbs, or twisting and how to adhere to them with compensatory techniques while performing various ADLs.   - log roll techniques  - use of call  light/importance of calling for staff assist for mobility  - All pt questions within OT scope of practice addressed.      Patient left up in chair with all lines intact, call button in reach, RN notified, and boyfriend present    GOALS:   Multidisciplinary Problems       Occupational Therapy Goals       Not on file              Multidisciplinary Problems (Resolved)          Problem: Occupational Therapy    Goal Priority Disciplines Outcome Interventions   Occupational Therapy Goal   (Resolved)     OT, PT/OT Met    Description: Patient will increase functional independence with ADLs by performing:    UE Dressing with [place].  LE Dressing with [place].  Grooming while standing at sink with Supervision.  Supine to sit with [place].  Step transfer with Supervision                         History:     Past Medical History:   Diagnosis Date    ADHD     Allergy     Anxiety disorder, unspecified     Celiac disease     Mild intermittent asthma, uncomplicated          Past Surgical History:   Procedure Laterality Date    BREAST SURGERY      REDUCTION    INTRAUTERINE DEVICE INSERTION  04/01/2015    KJB---MIRENA    LUMBAR LAMINECTOMY Right 4/1/2024    Procedure: LAMINECTOMY, SPINE, LUMBAR, L4- L5;  Surgeon: Fausto Camacho DO;  Location: [address];  Service: Neurosurgery;  Laterality: Right;  R sided L4/L5 MIS lami/discectomy, neuromonitoring, Evan 4 post, microscope    REPAIR OF COLLATERAL LIGAMENT OF THUMB Right 01/06/2020    Procedure: REPAIR, LIGAMENT, COLLATERAL, THUMB right;  Surgeon: Lisette Zaman MD;  Location: [facility];  Service: Orthopedics;  Laterality: Right;  regional MAC    WISDOM TOOTH EXTRACTION         Time Tracking:     OT Date of Treatment: 04/02/24  OT Start Time: 0738  OT Stop Time: 0808  OT Total Time (min): 30 min    Billable Minutes:Evaluation 10  Self Care/Home Management 10  Therapeutic Activity 10    4/2/2024

## 2024-04-02 NOTE — PLAN OF CARE
[facility] - Neurosurgery (Hospital)  Discharge Assessment    Primary Care Provider: She Oleary,      Discharge Assessment (most recent)       BRIEF DISCHARGE ASSESSMENT - 04/02/24 7175          Discharge Planning    Assessment Type Discharge Planning Brief Assessment     Resource/Environmental Concerns none     Support Systems Spouse/significant other     Equipment Currently Used at Home none     Current Living Arrangements home     Patient/Family Anticipates Transition to home     Patient/Family Anticipated Services at Transition none     DME Needed Upon Discharge  none     Discharge Plan A Home     Discharge Plan B Home

## 2024-04-02 NOTE — DISCHARGE INSTRUCTIONS
Neurosurgery Discharge Instructions:    Activity Restrictions:  [x]  Return to work will be determined on an individual basis.  [x]  No lifting greater than 10 pounds.  [x]  No excessive bending or twisting for 2 weeks, further instructions will be given at your 2 week wound check appointment. Do not perform any excessive bending over or leaning forward as this is a fall hazard.  [x]  Do not consume any alcoholic beverages until released by your Neurosurgeon.  [x]  No driving or operating machinery:  [x]  until cleared by your surgeon.  [x]  while taking narcotic pain medications or muscle relaxants.      Discharge Medication/Follow-up:  [x]  Please refer to discharge medication reconciliation form.   [x]  Do not take ANY non-steroidal anti-inflammatory drugs (NSAIDS), including the following: ibuprofen, naprosyn, Aleve, Advil, Indocin, Mobic, or Celebrex for:  [x]  2 weeks  [x]  Do not take any Aspirin or Aspirin containing products for 2 weeks after surgery.  [x]  Do not take any herbal supplements for 2 weeks after surgery.   [x]  Do not take any OTC products containing acetaminophen at the same time as you take your narcotic pain medication. Medications that may contain acetaminophen include but are not limited to: Excedrin and other headache medications, arthritis medications, cold and sinus medications, etc. Please review the list of active ingredients in any OTC medication prior to taking it.  [x]  Prescriptions for appropriate medication will be given upon discharge.  [x]  Follow-up appointment:  [x]  10-14 days post-op for wound check by nurse. Follow-up with Dr. Camacho in 6 weeks.  [x]  An appointment will be mailed to you.      Wound Care:  [x]Showering instructions: Keep incisions dry. Do not soak under water (bathtub, swimming pool, etc.). You may shower on the 2nd day after your surgery. Please shower with baby shampoo, but do not take a bath. Have the force of water hit you opposite from the incision.  If the incision becomes wet, gently pat it dry with a clean towel; do not rub.    Call your doctor or go to the Emergency Room for any signs of infection, including: increased redness, drainage, pain, or fever (temperature ?101.5 for 24 hours). Call your doctor or go to the Emergency Room if there are any localized neurological changes; sudden onset of lethargy or sleepiness, sudden confusion, trouble speaking, or understanding, sudden trouble seeing in one or both eyes, sudden trouble walking, dizziness, loss of coordination, sudden severe headache with no known cause, sudden onset of numbness or weakness; or for other concerns     Special Instructions:  [x]  No use of tobacco products.  [x]  Diet: Please eat a regular diet as tolerated.       If you have any questions please send us a message on [facility] or call [phone].

## 2024-04-02 NOTE — PLAN OF CARE
[facility] - Neurosurgery (Hospital)  Discharge Final Note    Primary Care Provider: She Oleary DO    Expected Discharge Date: 4/2/2024    Patient discharged home.  The patient did not have any home needs.  Family provided transportation home.  Neurosurgery clinic to schedule follow up appointment.    Future Appointments   Date Time Provider Department Center   4/10/2024  8:30 AM INJECTION, ALLERGY [facility]        Final Discharge Note (most recent)       Final Note - 04/02/24 1559          Final Note    Assessment Type Final Discharge Note     Anticipated Discharge Disposition Home or Self Care        Post-Acute Status    Post-Acute Authorization Other     Other Status No Post-Acute Service Needs     Discharge Delays None known at this time                     Important Message from Medicare

## 2024-04-02 NOTE — DISCHARGE SUMMARY
[facility] - Neurosurgery ([facility])  Neurosurgery  Discharge Summary      Patient Name: Jennifer Nguyen  MRN: [id number]  Admission Date: 3/31/2024  Hospital Length of Stay: 2 days  Discharge Date and Time:  04/02/2024 11:01 AM  Attending Physician: Fausto Camacho DO   Discharging Provider: Quin Bob PA-C  Primary Care Provider: She Oleary DO    HPI:   34 yo F who presents to ED with back and RLE pain that started today around brunch and worsened severely. She has dealt with back pain off and on since August last year with some improvement with PT at one point. Denies any trauma today or in past. Denies saddle anesthesia or bowel bladder incontinence. Not taking blood thinners. MRI showed large extruded disc at L4/L5    Procedure(s) (LRB):  LAMINECTOMY, SPINE, LUMBAR, L4- L5 (Right)     Hospital Course:   Patient was admitted under Neurosurgery and on 4/1 she was brought to the OR for L4/L5 MIS hemilaminectomy and microdiscectomy. She tolerated the procedure without complication and was extubated post operatively. She recovered in PACU and returned to NPU for further care. At time of discharge on 4/2 she was neurologically stable, was afebrile, and vital signs were stable. She was tolerating a diet and voiding without difficulty. Discharge instructions were verbally discussed with the patient, including wound care and follow up appointments. The patient was also given a discharge instruction sheet explaining the aforementioned discussion. The patient verbalized understanding of instructions and agreed to the plan.       Goals of Care Treatment Preferences:  Code Status: Full Code    Physical Exam:  General: well developed, well nourished, no distress.   Head: normocephalic, atraumatic  Mental Status: Awake, Alert, Oriented  Speech: Clear with content appropriate to conversation  Cranial nerves: face symmetric, CN II-XII grossly intact.   Sensory: intact to light touch throughout    Motor Strength:      Strength  Deltoids Triceps Biceps Wrist Extension Wrist Flexion Hand    Upper: R 5/5 5/5 5/5 5/5 5/5 5/5    L 5/5 5/5 5/5 5/5 5/5 5/5     Iliopsoas Quadriceps Knee  Flexion Tibialis  anterior Gastro- cnemius EHL   Lower: R 5/5 5/5 5/5 5/5 5/5 5/5    L 5/5 5/5 5/5 5/5 5/5 5/5     Incision clean, dry, intact with no surrounding edema or erythema    Consults:   Consults (From admission, onward)          Status Ordering Provider     Inpatient consult to Neurosurgery  Once        Provider:  (Not yet assigned)    Completed ALEJANDRO SANTAMARIA            Significant Diagnostic Studies: N/A    Pending Diagnostic Studies:       Procedure Component Value Units Date/Time    Basic metabolic panel [[id number]]     Order Status: Sent Lab Status: No result     Specimen: Blood     CBC auto differential [[id number]]     Order Status: Sent Lab Status: No result     Specimen: Blood     Specimen to Pathology, Surgery Neurosurgery [[id number]] Collected: 04/01/24 1631    Order Status: Sent Lab Status: In process Updated: 04/02/24 0555    Specimen: Tissue           Final Active Diagnoses:    Diagnosis Date Noted POA    PRINCIPAL PROBLEM:  Lumbar herniated disc [M51.26] 03/31/2024 Yes      Problems Resolved During this Admission:      Discharged Condition: good     Disposition: Home or Self Care    Follow Up:    Patient Instructions:      Notify your health care provider if you experience any of the following:  temperature >100.4     Notify your health care provider if you experience any of the following:  persistent nausea and vomiting or diarrhea     Notify your health care provider if you experience any of the following:  severe uncontrolled pain     Notify your health care provider if you experience any of the following:  redness, tenderness, or signs of infection (pain, swelling, redness, odor or green/yellow discharge around incision site)     Notify your health care provider if you experience any of the following:  difficulty  breathing or increased cough     Notify your health care provider if you experience any of the following:  severe persistent headache     Notify your health care provider if you experience any of the following:  worsening rash     Notify your health care provider if you experience any of the following:  persistent dizziness, light-headedness, or visual disturbances     Notify your health care provider if you experience any of the following:  increased confusion or weakness     Notify your health care provider if you experience any of the following:     Activity as tolerated     Medications:  Reconciled Home Medications:      Medication List        START taking these medications      acetaminophen 500 MG tablet  Commonly known as: TYLENOL  Take 1 tablet (500 mg total) by mouth every 6 (six) hours as needed for Pain.     oxyCODONE 5 MG immediate release tablet  Commonly known as: ROXICODONE  Take 1 tablet (5 mg total) by mouth every 6 (six) hours as needed for Pain.     STOOL SOFTENER-LAXATIVE 8.6-50 mg per tablet  Generic drug: senna-docusate 8.6-50 mg  Take 2 tablets by mouth nightly as needed for Constipation.            CHANGE how you take these medications      EScitalopram oxalate 20 MG tablet  Commonly known as: LEXAPRO  Take 1 tablet (20 mg total) by mouth once daily.  What changed: Another medication with the same name was removed. Continue taking this medication, and follow the directions you see here.     fluticasone propionate 0.05 % cream  Commonly known as: CUTIVATE  Apply to affected areas of body daily prn eczema.  What changed: Another medication with the same name was removed. Continue taking this medication, and follow the directions you see here.     lisdexamfetamine 30 MG capsule  Commonly known as: VYVANSE  Take 1 capsule (30 mg total) by mouth every morning.  What changed: Another medication with the same name was removed. Continue taking this medication, and follow the directions you see  here.     methocarbamoL 500 MG Tab  Commonly known as: ROBAXIN  Take 1 tablet (500 mg total) by mouth 2 (two) times daily as needed.  What changed:   medication strength  how much to take  when to take this  reasons to take this            CONTINUE taking these medications      albuterol 90 mcg/actuation inhaler  Commonly known as: VENTOLIN HFA  Inhale 1-2 puffs into the lungs 2 (two) times daily as needed for Wheezing or Shortness of Breath. Rescue     azelastine 137 mcg (0.1 %) nasal spray  Commonly known as: ASTELIN  1 spray (137 mcg total) by Nasal route 2 (two) times daily.     * dextroamphetamine-amphetamine 5 mg Tab  Commonly known as: AdderalL  Take 5 mg by mouth daily as needed (inattention PRN).     * dextroamphetamine-amphetamine 15 MG 24 hr capsule  Commonly known as: ADDERALL XR  Take 1 capsule (15 mg total) by mouth every morning.     fluocinonide 0.05% 0.05 % cream  Commonly known as: LIDEX  AAA of hands bid prn flares.     fluticasone-salmeterol 250-50 mcg/dose 250-50 mcg/dose diskus inhaler  Commonly known as: ADVAIR  Inhale 1 puff into the lungs once daily. Controller     ZYRTEC ORAL  Take by mouth.           * This list has 2 medication(s) that are the same as other medications prescribed for you. Read the directions carefully, and ask your doctor or other care provider to review them with you.                STOP taking these medications      loratadine 10 mg tablet  Commonly known as: CLARITIN     LORazepam 0.5 MG tablet  Commonly known as: ATIVAN     nabumetone 500 MG tablet  Commonly known as: RELAFEN     olopatadine 0.2 % Drop  Commonly known as: PATADAY     ondansetron 4 MG tablet  Commonly known as: ZOFRAN     PAXLOVID 300 mg (150 mg x 2)-100 mg copackaged tablets (EUA)  Generic drug: nirmatrelvir-ritonavir              Quin Bob PA-C  Neurosurgery  [facility] - Neurosurgery \[facility]\"")

## 2024-04-02 NOTE — PLAN OF CARE
OT eval completed, POC established and met. Pt is not in need of skilled acute OT at this time, DC OT. Please re-consult if functional status changes.    Problem: Occupational Therapy  Goal: Occupational Therapy Goal  Description: Patient will increase functional independence with ADLs by performing:    UE Dressing with [place].  LE Dressing with Pitt.  Grooming while standing at sink with Supervision.  Supine to sit with Pitt.  Step transfer with Supervision    Outcome: Met

## 2024-04-05 ENCOUNTER — TELEPHONE (OUTPATIENT)
Dept: ALLERGY | Facility: CLINIC | Age: 34
End: 2024-04-05
Payer: COMMERCIAL

## 2024-04-05 ENCOUNTER — PATIENT MESSAGE (OUTPATIENT)
Dept: ADMINISTRATIVE | Facility: CLINIC | Age: 34
End: 2024-04-05
Payer: COMMERCIAL

## 2024-04-05 ENCOUNTER — PATIENT OUTREACH (OUTPATIENT)
Dept: ADMINISTRATIVE | Facility: CLINIC | Age: 34
End: 2024-04-05
Payer: COMMERCIAL

## 2024-04-05 ENCOUNTER — PATIENT MESSAGE (OUTPATIENT)
Dept: NEUROSURGERY | Facility: CLINIC | Age: 34
End: 2024-04-05
Payer: COMMERCIAL

## 2024-04-05 NOTE — PROGRESS NOTES
C3 nurse attempted to contact KrystynaShi Nguyen for a TCC post hospital discharge follow up call. No answer. Left voicemail with callback information. The patient has a scheduled HOSFU appointment with Neurosurgery Nurse wound check on 4/17/24 @ 11:30am.

## 2024-04-05 NOTE — PROGRESS NOTES
C3 nurse spoke with Jennifer Nguyen for a TCC post hospital discharge follow up call. The patient has a scheduled HOSFU appointment with Neurosurgery Nurse wound check on 4/17/24 @ 11:30am.     She questions the timeframe of albuterol inhaler prior to allergy shots planned for 4/10/24? Message sent to allergy clinic to inquire about this and to inform the patient of instructions.

## 2024-04-10 ENCOUNTER — CLINICAL SUPPORT (OUTPATIENT)
Dept: ALLERGY | Facility: CLINIC | Age: 34
End: 2024-04-10
Payer: COMMERCIAL

## 2024-04-10 ENCOUNTER — DOCUMENTATION ONLY (OUTPATIENT)
Dept: ALLERGY | Facility: CLINIC | Age: 34
End: 2024-04-10

## 2024-04-10 DIAGNOSIS — Z91.038 ALLERGY TO INSECT STINGS: Primary | ICD-10-CM

## 2024-04-10 PROCEDURE — 95115 IMMUNOTHERAPY ONE INJECTION: CPT | Mod: S$GLB,,, | Performed by: ALLERGY & IMMUNOLOGY

## 2024-04-10 PROCEDURE — 99999 PR PBB SHADOW E&M-EST. PATIENT-LVL I: CPT | Mod: PBBFAC,,,

## 2024-04-10 NOTE — PROGRESS NOTES
Pt. Next allergy injection is scheduled for 7/10/24, vial expires 4/25/24 has been discarded and new vial will be ordered in May 2024

## 2024-04-11 LAB
FINAL PATHOLOGIC DIAGNOSIS: NORMAL
GROSS: NORMAL
Lab: NORMAL
MICROSCOPIC EXAM: NORMAL

## 2024-04-12 ENCOUNTER — PATIENT MESSAGE (OUTPATIENT)
Dept: NEUROSURGERY | Facility: CLINIC | Age: 34
End: 2024-04-12
Payer: COMMERCIAL

## 2024-04-16 NOTE — PROGRESS NOTES
Jennifer Lozano a 33 y.o. female here for 2 week post op wound check.    Surgery: Pt is POD 16 FROM Laminectomy, Spine, Lumbar, L4- L5 - Right on 4/1/2024 by Dr. Camacho.    DME: none    Brace in Use: none    SUBJECTIVE    New Symptoms: none. Pt stated she will be going to [place] for a wedding. Instructed do not go into water and the concern at the beach would be getting sand in the incision.       PAIN MANAGEMENT     0,       INCISION (S)    Location: lumbar/sacral    Incision Status: Clean, Dry, JANE. Edges well-approximated. No drainage or swelling noted. Preneo tape intact. Instructed as tape loosens or rolls ok to snip.     PICTURE        INTERVENTION    Incision: Dissolvable Sutures in Place preneo tape     Medication Refills Requested: None     EDUCATION    Reviewed Post Op instructions with patient/caregiver.  Keep incision JANE, no lotions, creams or bandages.  Ok to shower without direct water pressure to incision. Pat dry after shower.  Do not submerge wound in bath tub or go swimming until released by surgeon.  No lifting > 10lbs.  No twisting or bending surgical area greater than 45 degrees from neutral position.  No driving or operating machinery while taking narcotic pain medication or muscle relaxers and cleared by surgeon.  no brace required.  Patient encouraged to walk as much as possible but advised to walk with someone and rest as necessary.  You may begin taking aspirin or aspiring containing products as well as NSAIDS.  Take over the counter stool softner/laxative for constipation.  Call your doctor or report to the Emergency Room for any signs of infection, including: increased redness, drainage, pain or fever (temperature greater than or equal to 101.5 for 24 hours). Call doctor or go to the Emergency Room if there are any localized neurological changes; problems with speech, vision, numbness, tingling, weakness, or severe headache; or for other concerns.      Written copy of post op  instructions provided to patient/caregiver. All questions answered. Pt/caregiver encouraged to call clinic or send message through portal with any future concerns. Patient/caregiver verbalized understanding.     FOLLOW UP    Future Appointments   Date Time Provider Department Center   4/17/2024 11:30 AM Katia Altamirano, TENZIN [facility] NEUROS[address] [place] [address]   4/26/2024 10:00 AM PULMONARY FUNCTION [facility] PULMLAB [facility]   5/30/2024  1:30 PM Sonia Belcher, PA-C [facility] NEUROS[address] [place] [address]   7/8/2024  9:30 AM INJECTION, ALLERGY [facility] ALLIMM [facility]   8/6/2024  8:15 AM Janett Raygoza MD MDCC DERM [facility]

## 2024-04-17 ENCOUNTER — CLINICAL SUPPORT (OUTPATIENT)
Dept: NEUROSURGERY | Facility: CLINIC | Age: 34
End: 2024-04-17
Payer: COMMERCIAL

## 2024-04-17 DIAGNOSIS — M51.26 LUMBAR HERNIATED DISC: Primary | ICD-10-CM

## 2024-04-19 ENCOUNTER — OFFICE VISIT (OUTPATIENT)
Dept: DERMATOLOGY | Facility: CLINIC | Age: 34
End: 2024-04-19
Payer: COMMERCIAL

## 2024-04-19 ENCOUNTER — PATIENT MESSAGE (OUTPATIENT)
Dept: DERMATOLOGY | Facility: CLINIC | Age: 34
End: 2024-04-19

## 2024-04-19 DIAGNOSIS — L20.84 INTRINSIC ATOPIC DERMATITIS: ICD-10-CM

## 2024-04-19 DIAGNOSIS — L23.9 ALLERGIC CONTACT DERMATITIS, UNSPECIFIED TRIGGER: Primary | ICD-10-CM

## 2024-04-19 DIAGNOSIS — L90.5 SCAR: ICD-10-CM

## 2024-04-19 PROCEDURE — 99214 OFFICE O/P EST MOD 30 MIN: CPT | Mod: S$GLB,,, | Performed by: DERMATOLOGY

## 2024-04-19 PROCEDURE — 1159F MED LIST DOCD IN RCRD: CPT | Mod: CPTII,S$GLB,, | Performed by: DERMATOLOGY

## 2024-04-19 PROCEDURE — 1160F RVW MEDS BY RX/DR IN RCRD: CPT | Mod: CPTII,S$GLB,, | Performed by: DERMATOLOGY

## 2024-04-19 PROCEDURE — 1111F DSCHRG MED/CURRENT MED MERGE: CPT | Mod: CPTII,S$GLB,, | Performed by: DERMATOLOGY

## 2024-04-19 RX ORDER — FLUTICASONE PROPIONATE 0.05 MG/G
OINTMENT TOPICAL
Qty: 15 G | Refills: 0 | Status: SHIPPED | OUTPATIENT
Start: 2024-04-19

## 2024-04-19 RX ORDER — PIMECROLIMUS 10 MG/G
CREAM TOPICAL
Qty: 30 G | Refills: 3 | Status: SHIPPED | OUTPATIENT
Start: 2024-04-19 | End: 2024-04-24 | Stop reason: ALTCHOICE

## 2024-04-19 NOTE — PROGRESS NOTES
Patient Information  Name: Jennifer Nguyen  : 1990  MRN: [id number]     Referring Physician:  No ref. provider found   Primary Care Physician:  She Oleary DO   Date of Visit: 2024      Subjective:     History of Present lllness:    Jennifer Nguyen is a 33 y.o. female who presents with a chief complaint of rash and scar.  Location: both upper and lower eyelids  Duration: 1 year, on and off  Signs/Symptoms: red, itching, puffy, dry  Exacerbating factors: sun exposure  Relieving factors/Prior treatments: OTC moisturizers (cerave, Neutrogena hand cream, Aquaphor) hydrocortisone, SPF, wearing a hat when she is out in the sun helps prevent the most    Scar  Location: back  Duration: had surgery 3/30/24  Signs/Symptoms: a little raised, pink  Exacerbating factors: none  Relieving factors/Prior treatments: none    Patient was last seen: 8/15/2023.  Prior notes by myself reviewed.   Clinical documentation obtained by nursing staff reviewed.    Review of Systems   Eyes:  Positive for eye watering (seasonal) and eyelid inflammation (swelling/puffy/irritated). Negative for itching.   Skin:  Positive for rash.       Objective:   Physical Exam   Constitutional: She appears well-developed and well-nourished. No distress.   Neurological: She is alert and oriented to person, place, and time. She is not disoriented.   Psychiatric: She has a normal mood and affect.   Skin:   Areas Examined (abnormalities noted in diagram):   Head / Face Inspection Performed  Back Inspection Performed            Diagram Legend     Erythematous scaling macule/papule c/w actinic keratosis       Vascular papule c/w angioma      Pigmented verrucoid papule/plaque c/w seborrheic keratosis      Yellow umbilicated papule c/w sebaceous hyperplasia      Irregularly shaped tan macule c/w lentigo     1-2 mm smooth white papules consistent with Milia      Movable subcutaneous cyst with punctum c/w epidermal inclusion cyst      Subcutaneous  movable cyst c/w pilar cyst      Firm pink to brown papule c/w dermatofibroma      Pedunculated fleshy papule(s) c/w skin tag(s)      Evenly pigmented macule c/w junctional nevus     Mildly variegated pigmented, slightly irregular-bordered macule c/w mildly atypical nevus      Flesh colored to evenly pigmented papule c/w intradermal nevus       Pink pearly papule/plaque c/w basal cell carcinoma      Erythematous hyperkeratotic cursted plaque c/w SCC      Surgical scar with no sign of skin cancer recurrence      Open and closed comedones      Inflammatory papules and pustules      Verrucoid papule consistent consistent with wart     Erythematous eczematous patches and plaques     Dystrophic onycholytic nail with subungual debris c/w onychomycosis     Umbilicated papule    Erythematous-base heme-crusted tan verrucoid plaque consistent with inflamed seborrheic keratosis     Erythematous Silvery Scaling Plaque c/w Psoriasis     See annotation    No images are attached to the encounter or orders placed in the encounter.      [] Data reviewed  [] Prior external notes reviewed  [] Independent review of test  [] Management discussed with another provider  [] Independent historian    Assessment / Plan:        Allergic contact dermatitis, unspecified trigger  Intrinsic atopic dermatitis  - chronic problem with exacerbation/progression   Tried and failed fluticasone cream, fluocinonide cream, hydrocortisone cream.  -     fluticasone propionate (CUTIVATE) 0.005 % ointment; Apply to affected areas of face BID prn irritation. Do not use for longer than 2 weeks in a row.  Dispense: 15 g; Refill: 0  Side effects from the overuse of topical steroids include thinning of skin, easy tearing/bruising of skin, stretch marks, spider veins, etc. Use the topical steroid no more than 2 days per week if used long-term and/or take breaks from the topical steroid, especially if any of the above side effects are noticed.    Discussed the risks  of long-term use of topical steroids around the eyes, including glaucoma and cataracts. Will be safer to add in a topical calcineurin inhibitor if needed for long-term use.  -     pimecrolimus (ELIDEL) 1 % cream; Apply to affected areas of face BID prn irritation.  Dispense: 30 g; Refill: 3    Scar  Recommend OTC silicone gel sheets, such as ScarAway, or OTC silicone tape, such as CicaTape or Mepitac. Sheets/tape can be cut to size and should be worn for 12-24 hours per day.  Minimum treatment time is 2-3 months. Larger and older scars may take longer, therefore continued use is recommended if improvement is still seen after the initial 3 months.   Scar massage (apply firm pressure and rock finger side-to-side) for 5 minutes 5 times per day can also help.      Follow up in about 2 months (around 6/19/2024).      Janett Raygoza MD, FAAD  [facility]

## 2024-04-19 NOTE — PATIENT INSTRUCTIONS
Consider trial of [employer]class.com SPF due to minimal risk of irritation: https://www.[employer]er[employer]/products/sport-mineral-sunscreen-cream-tube-spf-40      Recommend OTC silicone gel sheets, such as ScarAway, or OTC silicone tape, such as CicaTape or Mepitac. Sheets/tape can be cut to size and should be worn for 12-24 hours per day.  Minimum treatment time is 2-3 months. Larger and older scars may take longer, therefore continued use is recommended if improvement is still seen after the initial 3 months.   If the scar becomes raised, scar massage (apply firm pressure and rock finger side-to-side) for 5 minutes 5 times per day can also help.

## 2024-04-22 ENCOUNTER — PATIENT MESSAGE (OUTPATIENT)
Dept: PSYCHIATRY | Facility: CLINIC | Age: 34
End: 2024-04-22
Payer: COMMERCIAL

## 2024-04-22 DIAGNOSIS — F90.0 ATTENTION DEFICIT HYPERACTIVITY DISORDER (ADHD), PREDOMINANTLY INATTENTIVE TYPE: Primary | ICD-10-CM

## 2024-04-24 ENCOUNTER — OFFICE VISIT (OUTPATIENT)
Dept: DERMATOLOGY | Facility: CLINIC | Age: 34
End: 2024-04-24
Payer: COMMERCIAL

## 2024-04-24 DIAGNOSIS — L03.031 CHRONIC PARONYCHIA OF TOE OF RIGHT FOOT: ICD-10-CM

## 2024-04-24 DIAGNOSIS — L23.9 ALLERGIC CONTACT DERMATITIS, UNSPECIFIED TRIGGER: ICD-10-CM

## 2024-04-24 DIAGNOSIS — L03.032 CHRONIC PARONYCHIA OF TOE OF LEFT FOOT: Primary | ICD-10-CM

## 2024-04-24 PROCEDURE — 1159F MED LIST DOCD IN RCRD: CPT | Mod: CPTII,95,, | Performed by: DERMATOLOGY

## 2024-04-24 PROCEDURE — 1111F DSCHRG MED/CURRENT MED MERGE: CPT | Mod: CPTII,95,, | Performed by: DERMATOLOGY

## 2024-04-24 PROCEDURE — 99213 OFFICE O/P EST LOW 20 MIN: CPT | Mod: 95,,, | Performed by: DERMATOLOGY

## 2024-04-24 PROCEDURE — 1160F RVW MEDS BY RX/DR IN RCRD: CPT | Mod: CPTII,95,, | Performed by: DERMATOLOGY

## 2024-04-24 RX ORDER — TACROLIMUS 1 MG/G
OINTMENT TOPICAL
Qty: 30 G | Refills: 3 | Status: SHIPPED | OUTPATIENT
Start: 2024-04-24

## 2024-04-24 RX ORDER — FLUCONAZOLE 200 MG/1
TABLET ORAL
Qty: 12 TABLET | Refills: 0 | Status: SHIPPED | OUTPATIENT
Start: 2024-04-24

## 2024-04-24 RX ORDER — LISDEXAMFETAMINE DIMESYLATE CAPSULES 10 MG/1
30 CAPSULE ORAL EVERY MORNING
Qty: 90 CAPSULE | Refills: 0 | Status: SHIPPED | OUTPATIENT
Start: 2024-04-24 | End: 2024-06-04 | Stop reason: SDUPTHER

## 2024-04-24 NOTE — PROGRESS NOTES
The patient location is: home  The chief complaint leading to consultation is: toenails  Visit type: virtual visit with synchronous audio and video  Total time spent with patient: 18 minutes  Each patient to whom I provide medical services by telemedicine is:  (1) informed of the relationship between the physician and patient and the respective role of any other health care provider with respect to management of the patient; and (2) notified that he or she may decline to receive medical services by telemedicine and may withdraw from such care at any time.      Patient Information  Name: Jennifer Nguyen  : 1990  MRN: [id number]     Referring Physician:  No ref. provider found   Primary Care Physician:  She Oleary DO   Date of Visit: 2024      Subjective:     History of Present lllness:    Jennifer Nguyen is a 33 y.o. female who presents with a chief complaint of toenails falling off.  Location: toenails (started on L foot, then on R)  Duration: 4 months  Signs/Symptoms: thickening skin at toenail bed, nailbeds having issues  Exacerbating factors: thought she had some trauma that went unnoticed, but then spread to R as well  Relieving factors/Prior treatments: OTC antifungals and OTC hydrocortisone--no change     Had covid in early December.  Does not wear nail polish often.    Was unable to get elidel cream filled at [facility].    Patient was last seen: 2024.  Prior notes by myself reviewed.   Clinical documentation obtained by nursing staff reviewed.    Review of Systems    Objective:   Physical Exam     Diagram Legend     Erythematous scaling macule/papule c/w actinic keratosis       Vascular papule c/w angioma      Pigmented verrucoid papule/plaque c/w seborrheic keratosis      Yellow umbilicated papule c/w sebaceous hyperplasia      Irregularly shaped tan macule c/w lentigo     1-2 mm smooth white papules consistent with Milia      Movable subcutaneous cyst with punctum c/w epidermal  inclusion cyst      Subcutaneous movable cyst c/w pilar cyst      Firm pink to brown papule c/w dermatofibroma      Pedunculated fleshy papule(s) c/w skin tag(s)      Evenly pigmented macule c/w junctional nevus     Mildly variegated pigmented, slightly irregular-bordered macule c/w mildly atypical nevus      Flesh colored to evenly pigmented papule c/w intradermal nevus       Pink pearly papule/plaque c/w basal cell carcinoma      Erythematous hyperkeratotic cursted plaque c/w SCC      Surgical scar with no sign of skin cancer recurrence      Open and closed comedones      Inflammatory papules and pustules      Verrucoid papule consistent consistent with wart     Erythematous eczematous patches and plaques     Dystrophic onycholytic nail with subungual debris c/w onychomycosis     Umbilicated papule    Erythematous-base heme-crusted tan verrucoid plaque consistent with inflamed seborrheic keratosis     Erythematous Silvery Scaling Plaque c/w Psoriasis     See annotation              [] Data reviewed  [] Prior external notes reviewed  [] Independent review of test  [] Management discussed with another provider  [] Independent historian    Assessment / Plan:        Chronic paronychia of toe of left foot  Chronic paronychia of toe of right foot  -     fluconazole (DIFLUCAN) 200 MG Tab; Take 1 pill PO 2 times per week.  Dispense: 12 tablet; Refill: 0  Potential side effects of fluconazole include but are not limited to headache, GI symptoms (N/V, diarrhea) and rarely rash or abnormal liver function tests.   These side effects are minimized by taking the medication only once or twice per week.  Can use fluticasone ointment for up to 2 weeks as well.    Allergic contact dermatitis, unspecified trigger  -     tacrolimus (PROTOPIC) 0.1 % ointment; Compound tacrolimus 0.1% cream. Apply to affected areas of eyes BID. Safe to use everyday.  Dispense: 30 g; Refill: 3      Follow up in about 3 months (around  7/24/2024).      Janett Raygoza MD, FAAD  [facility]

## 2024-04-26 ENCOUNTER — HOSPITAL ENCOUNTER (OUTPATIENT)
Dept: PULMONOLOGY | Facility: CLINIC | Age: 34
Discharge: HOME | End: 2024-04-26
Payer: COMMERCIAL

## 2024-04-26 DIAGNOSIS — J45.909 ASTHMA, UNSPECIFIED ASTHMA SEVERITY, UNSPECIFIED WHETHER COMPLICATED, UNSPECIFIED WHETHER PERSISTENT: ICD-10-CM

## 2024-04-26 LAB
FEF 25 75 LLN: 2.17
FEF 25 75 PRE REF: 81.8 %
FEF 25 75 REF: 3.43
FET100 CHG: -1.8 %
FEV05 LLN: 1.37
FEV05 REF: 2.22
FEV1 CHG: 6.6 %
FEV1 FVC LLN: 73
FEV1 FVC PRE REF: 96.5 %
FEV1 FVC REF: 84
FEV1 LLN: 2.52
FEV1 PRE REF: 95.7 %
FEV1 REF: 3.15
FEV1 VOL CHG: 0.2
FVC CHG: 2.8 %
FVC LLN: 3.02
FVC PRE REF: 98.7 %
FVC REF: 3.77
FVC VOL CHG: 0.1
PEF LLN: 5.3
PEF PRE REF: 94.1 %
PEF REF: 7.01
PHYSICIAN COMMENT: NORMAL
POST FEF 25 75: 3.7 L/S (ref 2.17–4.91)
POST FET 100: 5.76 SEC
POST FEV1 FVC: 83.98 % (ref 72.59–93.03)
POST FEV1: 3.21 L (ref 2.52–3.75)
POST FEV5: 2.54 L (ref 1.37–3.08)
POST FVC: 3.83 L (ref 3.02–4.55)
POST PEF: 6.9 L/S (ref 5.3–8.72)
PRE FEF 25 75: 2.8 L/S (ref 2.17–4.91)
PRE FET 100: 5.87 SEC
PRE FEV05 REF: 102.9 %
PRE FEV1 FVC: 80.98 % (ref 72.59–93.03)
PRE FEV1: 3.01 L (ref 2.52–3.75)
PRE FEV5: 2.29 L (ref 1.37–3.08)
PRE FVC: 3.72 L (ref 3.02–4.55)
PRE PEF: 6.6 L/S (ref 5.3–8.72)

## 2024-04-26 PROCEDURE — 94060 EVALUATION OF WHEEZING: CPT | Mod: S$GLB,,, | Performed by: INTERNAL MEDICINE

## 2024-05-30 ENCOUNTER — TELEPHONE (OUTPATIENT)
Dept: NEUROSURGERY | Facility: CLINIC | Age: 34
End: 2024-05-30
Payer: COMMERCIAL

## 2024-05-30 DIAGNOSIS — M51.26 LUMBAR HERNIATED DISC: Primary | ICD-10-CM

## 2024-05-30 NOTE — TELEPHONE ENCOUNTER
----- Message from Padma Parekh sent at 5/30/2024 10:47 AM CDT -----  Regarding: missed call  Contact: [phone]  ALEC MCCLELLAND calling regarding Appointment Access  (message) for # pt returning call from office she is in between meeting and trying to call back to speak with someone in office pt is unavailable from 11:30-1 please call to advise

## 2024-06-03 ENCOUNTER — HOSPITAL ENCOUNTER (OUTPATIENT)
Dept: RADIOLOGY | Facility: HOSPITAL | Age: 34
Discharge: HOME | End: 2024-06-03
Attending: STUDENT IN AN ORGANIZED HEALTH CARE EDUCATION/TRAINING PROGRAM
Payer: COMMERCIAL

## 2024-06-03 ENCOUNTER — OFFICE VISIT (OUTPATIENT)
Dept: NEUROSURGERY | Facility: CLINIC | Age: 34
End: 2024-06-03
Payer: COMMERCIAL

## 2024-06-03 VITALS — SYSTOLIC BLOOD PRESSURE: 116 MMHG | DIASTOLIC BLOOD PRESSURE: 81 MMHG | HEART RATE: 79 BPM | TEMPERATURE: 99 F

## 2024-06-03 DIAGNOSIS — M51.26 LUMBAR HERNIATED DISC: ICD-10-CM

## 2024-06-03 DIAGNOSIS — M51.26 LUMBAR HERNIATED DISC: Primary | ICD-10-CM

## 2024-06-03 PROCEDURE — 72100 X-RAY EXAM L-S SPINE 2/3 VWS: CPT | Mod: TC

## 2024-06-03 PROCEDURE — 1159F MED LIST DOCD IN RCRD: CPT | Mod: CPTII,S$GLB,,

## 2024-06-03 PROCEDURE — 99999 PR PBB SHADOW E&M-EST. PATIENT-LVL III: CPT | Mod: PBBFAC,,,

## 2024-06-03 PROCEDURE — 3074F SYST BP LT 130 MM HG: CPT | Mod: CPTII,S$GLB,,

## 2024-06-03 PROCEDURE — 72100 X-RAY EXAM L-S SPINE 2/3 VWS: CPT | Mod: 26,,, | Performed by: RADIOLOGY

## 2024-06-03 PROCEDURE — 3079F DIAST BP 80-89 MM HG: CPT | Mod: CPTII,S$GLB,,

## 2024-06-03 PROCEDURE — 99024 POSTOP FOLLOW-UP VISIT: CPT | Mod: S$GLB,,,

## 2024-06-04 ENCOUNTER — PATIENT MESSAGE (OUTPATIENT)
Dept: PSYCHIATRY | Facility: CLINIC | Age: 34
End: 2024-06-04
Payer: COMMERCIAL

## 2024-06-04 DIAGNOSIS — F90.0 ATTENTION DEFICIT HYPERACTIVITY DISORDER (ADHD), PREDOMINANTLY INATTENTIVE TYPE: Primary | ICD-10-CM

## 2024-06-05 ENCOUNTER — CLINICAL SUPPORT (OUTPATIENT)
Dept: REHABILITATION | Facility: OTHER | Age: 34
End: 2024-06-05
Payer: COMMERCIAL

## 2024-06-05 DIAGNOSIS — R29.898 WEAKNESS OF LOWER EXTREMITY, UNSPECIFIED LATERALITY: Primary | ICD-10-CM

## 2024-06-05 DIAGNOSIS — M51.26 LUMBAR HERNIATED DISC: ICD-10-CM

## 2024-06-05 PROCEDURE — 97161 PT EVAL LOW COMPLEX 20 MIN: CPT | Mod: PN

## 2024-06-05 PROCEDURE — 97110 THERAPEUTIC EXERCISES: CPT | Mod: PN

## 2024-06-05 RX ORDER — LISDEXAMFETAMINE DIMESYLATE CAPSULES 10 MG/1
30 CAPSULE ORAL EVERY MORNING
Qty: 90 CAPSULE | Refills: 0 | Status: SHIPPED | OUTPATIENT
Start: 2024-06-05

## 2024-06-05 NOTE — PROGRESS NOTES
[facility]  Physical Therapy Initial Evaluation    Name: Jennifer Nguyen  Clinic Number: [id number]    Therapy Diagnosis:   Encounter Diagnosis   Name Primary?    Lumbar herniated disc      Physician: Sonia Belcher PA-C    Physician Orders: Physical Therapy Evaluate and Treat  Medical Diagnosis from Referral: lumbar herniated disc  Evaluation Date: 6/5/24  Authorization Period Expiration: 12/31/24  Plan of Care Expiration: 6/5/2024 to 9/5/24  Visit # / Visits authorized: 1/1 (pending additional authorization following initial evaluation)   FOTO: 0/3  on 6/5/24      Time In: 1100a  Time Out: 1155a  Total Billable Time: 55 minutes    Precautions: standard    Subjective     History of current condition - Jennifer Osullivan reports: 8 weeks s/p laminectomy    Right lower extremity weakness and diminished sensation at right lateral foot to right shin.     Prior to surgery prolonged sitting was painful. She stated that she was sitting at [facility] on [facility], and she went to stand up and her right lower extremity gave out. She ended up in the ER and she ended up having an emergency laminectomy    Patient stated that she has a standing desk, ergonomic chair     Medical History:   Past Medical History:   Diagnosis Date    ADHD     Allergy     Anxiety disorder, unspecified     Celiac disease     Mild intermittent asthma, uncomplicated        Surgical History:   Jennifer Nguyen  has a past surgical history that includes Breast surgery; [place] tooth extraction; Intrauterine device insertion (04/01/2015); Repair of collateral ligament of thumb (Right, 01/06/2020); and Lumbar laminectomy (Right, 4/1/2024).    Medications:   Jennifer has a current medication list which includes the following prescription(s): albuterol, azelastine, cetirizine hcl, dextroamphetamine-amphetamine, dextroamphetamine-amphetamine, escitalopram oxalate, fluconazole, fluocinonide 0.05%, fluticasone propionate, fluticasone propionate,  fluticasone-salmeterol 250-50 mcg/dose, lisdexamfetamine, and tacrolimus, and the following Facility-Administered Medications: levonorgestrel.    Allergies:   Review of patient's allergies indicates:   Allergen Reactions    Insect venom Anxiety, Rash and Shortness Of Breath    Gluten Nausea Only        Imaging: FINDINGS:  The disc spaces are narrowed between L4 and S1 vertebral segments.  There is a few mm L3/L4 retrolisthesis.  No fracture or dislocation.  No bone destruction identified.  IUD noted in the pelvis.    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    Objective     WNL=within normal limits  WFL=within functional limits  NT=not tested  *=pain    Posture: WNL  Palpation: tenderness to palpation   Sensation: diminished at lateral aspect of right foot and lower leg  Deep tendon reflexes: NT    Lumbar Active range of motion (%) Pain/dysfunction with movement:   Flexion 75    Extension 10 **low back pain    Right side bending 100 Lumbar spine tightness   Left side bending 100 Lumbar spine tightness   Right rotation 75 Left hip pain   Left rotation 75          Right LE   Left LE      Iliopsoas:  L2 3+/5 Iliopsoas: L2 4/5    Quadriceps:  L3 - femoral nerve 3/5 Quadriceps: L3 - femoral nerve 4+/5    Hip adduction:  L3 - obterator 4+/5 Hip adduction: L3 - obterator 4/5    Hamstrings:  S2 4+/5 Hamstrings: S2 3+/5    Ankle DF/EV:  L4 4+/5 Ankle DF/EV: L4 3+/5    GT Ext:  L5 5/5 GT Ext L5 4+/5    Hip Abduction:  L5-S1 - Superior  gluteal nerve 3+/5  Hip Abduction: L5-S1 - Superior gluteal nerve 4+/5    Hip extension:  L5-S2 - Inferior gluteal nerve 2/5 Hip extension: L5-S2 - Inferior gluteal nerve 3+/5    Ankle PF:   S1 - tibial nerve NT Ankle PF S1 - tibial nerve NT         Slump R: + for right lower leg pain   Slump L: + for left lower leg pain    SLR R: + for right lateral foot to lateral lower leg pain  SLR L: + for left knee pain      Joint mobility:   Thoracic: NT  Lumbar: hypermobility with PA segmental mobility assessment.        CMS Impairment/Limitation/Restriction for FOTO Survey    Therapist reviewed FOTO scores for Jennifer Nguyen on 6/5/2024.   FOTO documents entered into [employer] - see Media section.    Limitation Score: 53%  Predicted Goal: 35%    Category: Mobility     TREATMENT     Treatment Time In: 1145a  Treatment Time Out: 1155a  Total Treatment time separate from Evaluation: 10 minutes    Therapeutic Exercises were provided for 10 minutes to improve strength and AROM including:    Transverse abdominis bracing x10  Transverse abdominis bracing with march x10  Terri on ball x10  LTR with ball x10  Bridge with belt x10  Bird dog x10    Home Exercises and Patient Education Provided:    Education provided:   - Findings; prognosis and plan of care (POC)  - Home exercise program (HEP)  - Modality options  - Therapist contact information    Written Home Exercises Provided: Yes  Exercises were reviewed and Jennifer Osullivan was able to demonstrate them prior to the end of the session.  Jennifer Osullivan demonstrated good understanding of the education provided.       Assessment     Jennifer is a 34 y.o. female referred to outpatient Physical Therapy with a medical diagnosis of lumbar herniated disc. Pt presents to PT with pain, decreased lumbar spine ROM, decreased strength and flexibility, poor posture, and functional deficits with recreational activity. These deficits are negatively impacting this patient's ability to complete their work  duties and activities of daily living.     Pt prognosis is Good.   Pt will benefit from skilled outpatient Physical Therapy to address the deficits stated above and in the chart below, provide pt/family education, and to maximize pt's level of independence.     Plan of care discussed with patient: Yes  Pt's spiritual, cultural and educational needs considered and pt agreeable to plan of care and goals as stated below:     Anticipated Barriers for therapy: None      Medical Necessity is demonstrated by the following  History  Co-morbidities and personal factors that may impact the plan of care Co-morbidities:   S/p laminectomy    Personal Factors:   no deficits     low   Examination  Body Structures and Functions, activity limitations and participation restrictions that may impact the plan of care Body Regions:   back  lower extremities    Body Systems:    ROM  strength  gross coordinated movement  gait  transfers  transitions  motor control    Participation Restrictions:   Walking    Activity limitations:   Learning and applying knowledge  no deficits    General Tasks and Commands  No Deficits    Communication  No Deficits    Mobility  lifting and carrying objects  walking  driving (bike, car, motorcycle)    Self care  no deficits    Domestic Life  No Deficits    Interactions/Relationships  No Deficits    Life Areas  No Deficits    Community and Social Life  No Deficits         low   Clinical Presentation stable and uncomplicated low   Decision Making/ Complexity Score: low     GOALS:  Short Term Goals (4 Weeks):  1. Patient will be compliant with home exercise program to supplement therapy in promoting functional mobility. (progressing, not met)    2. Patient will perform lifting activity with good control to demonstrate improved core strength. (progressing, not met)    3. Patient will report no pain during thoracolumbar active range of motion to promote functional mobility.  (progressing, not met)    4. Patient  will improve impaired lower extremity manual muscle tests  to >/=4/5 to improve strength for functional tasks. (progressing, not met)        Long Term Goals (6 Weeks):   1. Patient will improve FOTO score to </= 35% limited to decrease perceived limitation with maintaining/changing body position. (progressing, not met)    2. Patient will perform yoga movements with good control to demonstrate improved core strength.  (progressing, not met)    3. Patient will improve impaired lower extremity manual muscle tests to >/=4+/5 to improve strength for functional tasks.  (progressing, not met)    4. Patient will tolerate sitting for 120 minutes with no increase in low back pain to return to PLOF.  (progressing, not met)          Plan     Plan of care Certification: 6/5/2024 to 9/5/24    Outpatient Physical Therapy 2 times weekly for 12 weeks to include the following interventions: Therapeutic Exercises, Manual Therapeutic Technique, Neuromuscular Re Education, Therapeutic Activities. Modalities, Kinesiotape prn, and Functional Dry Needling as needed.    Jarret Hawkins, PT,  DPT, OCS

## 2024-06-06 NOTE — PROGRESS NOTES
Neurosurgery  Established Patient    SUBJECTIVE:     History of Present Illness:  Jennifer Nguyen is a 34 y.o. female s/p L4/5 MIS lami and microdiscectomy on 4/1/24 for a herniated disc. The patient has been doing well post op. She reports no leg pain. Continues to have some low back pain. She does reports continued numbness in her R shin and foot. She reports her right leg does feel unstable. She is not taking pain medicine any longer. No new numbness, paraesthesias, weakness, bowel/bladder dysfunction or gait difficulties.    Review of patient's allergies indicates:   Allergen Reactions    Insect venom Anxiety, Rash and Shortness Of Breath    Gluten Nausea Only       Current Outpatient Medications   Medication Sig Dispense Refill    cetirizine HCl (ZYRTEC ORAL) Take by mouth.      EScitalopram oxalate (LEXAPRO) 20 MG tablet Take 1 tablet (20 mg total) by mouth once daily. 30 tablet 11    fluconazole (DIFLUCAN) 200 MG Tab Take 1 pill PO 2 times per week. 12 tablet 0    fluocinonide 0.05% (LIDEX) 0.05 % cream AAA of hands bid prn flares. 60 g 3    fluticasone propionate (CUTIVATE) 0.005 % ointment Apply to affected areas of face BID prn irritation. Do not use for longer than 2 weeks in a row. 15 g 0    fluticasone propionate (CUTIVATE) 0.05 % cream Apply to affected areas of body daily prn eczema. 60 g 3    tacrolimus (PROTOPIC) 0.1 % ointment Compound tacrolimus 0.1% cream. Apply to affected areas of eyes BID. Safe to use everyday. 30 g 3    albuterol (VENTOLIN HFA) 90 mcg/actuation inhaler Inhale 1-2 puffs into the lungs 2 (two) times daily as needed for Wheezing or Shortness of Breath. Rescue 18 g 0    azelastine (ASTELIN) 137 mcg (0.1 %) nasal spray 1 spray (137 mcg total) by Nasal route 2 (two) times daily. 30 mL 0    dextroamphetamine-amphetamine (ADDERALL XR) 15 MG 24 hr capsule Take 1 capsule (15 mg total) by mouth every morning. (Patient not taking: Reported on 6/3/2024) 30 capsule 0     dextroamphetamine-amphetamine (ADDERALL) 5 mg Tab Take 5 mg by mouth daily as needed (inattention PRN). (Patient not taking: Reported on 6/3/2024) 30 tablet 0    fluticasone-salmeterol diskus inhaler 250-50 mcg Inhale 1 puff into the lungs once daily. Controller 30 each 3    lisdexamfetamine (VYVANSE) 10 mg Cap Take 3 capsules (30 mg total) by mouth every morning. 90 capsule 0     Current Facility-Administered Medications   Medication Dose Route Frequency Provider Last Rate Last Admin    levonorgestreL (MIRENA) 20 mcg/24 hours (6 yrs) 52 mg IUD 1 Intra Uterine Device  1 Intra Uterine Device Intrauterine  Jemma Cochran MD   1 Intra Uterine Device at 10/01/21 0900       Past Medical History:   Diagnosis Date    ADHD     Allergy     Anxiety disorder, unspecified     Celiac disease     Mild intermittent asthma, uncomplicated      Past Surgical History:   Procedure Laterality Date    BREAST SURGERY      REDUCTION    INTRAUTERINE DEVICE INSERTION  04/01/2015    KJB---MIRENA    LUMBAR LAMINECTOMY Right 4/1/2024    Procedure: LAMINECTOMY, SPINE, LUMBAR, L4- L5;  Surgeon: Fausto Camacho DO;  Location: [address];  Service: Neurosurgery;  Laterality: Right;  R sided L4/L5 MIS lami/discectomy, neuromonitoring, Evan 4 post, microscope    REPAIR OF COLLATERAL LIGAMENT OF THUMB Right 01/06/2020    Procedure: REPAIR, LIGAMENT, COLLATERAL, THUMB right;  Surgeon: Lisette Zaman MD;  Location: [facility];  Service: Orthopedics;  Laterality: Right;  regional MAC    WISDOM TOOTH EXTRACTION       Family History       Problem Relation (Age of Onset)    Breast cancer Paternal Grandmother    Cancer Maternal Grandmother    Dementia Paternal Grandmother    Diabetes Father    Graves' disease Mother    Heart disease Maternal Grandfather    Hypertension Father    No Known Problems Sister, Brother, Brother          Social History     Socioeconomic History    Marital status: Single   Tobacco Use    Smoking status: Former      Current packs/day: 0.00     Types: Cigarettes     Start date: 2007     Quit date: 2009     Years since quitting: 15.4     Passive exposure: Past    Smokeless tobacco: Never    Tobacco comments:     1-2 cig daily   Substance and Sexual Activity    Alcohol use: Not Currently     Comment: ~10 drinks/week. Seltzers, occasional wine    Drug use: No    Sexual activity: Yes     Partners: Male     Birth control/protection: I.U.D.   Social History Narrative    Significant other. No children. 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     Social Determinants of Health     Financial Resource Strain: Low Risk  (4/2/2024)    Overall Financial Resource Strain (CARDIA)     Difficulty of Paying Living Expenses: Not very hard   Food Insecurity: No Food Insecurity (4/2/2024)    Hunger Vital Sign     Worried About Running Out of Food in the Last Year: Never true     Ran Out of Food in the Last Year: Never true   Transportation Needs: No Transportation Needs (4/2/2024)    PRAPARE - Transportation     Lack of Transportation (Medical): No     Lack of Transportation (Non-Medical): No   Physical Activity: Insufficiently Active (3/4/2024)    Exercise Vital Sign     Days of Exercise per Week: 4 days     Minutes of Exercise per Session: 30 min   Stress: Stress Concern Present (4/2/2024)    [place] [place] [employer] - Occupational Stress Questionnaire     Feeling of Stress : To some extent   Housing Stability: Low Risk  (4/2/2024)    Housing Stability Vital Sign     Unable to Pay for Housing in the Last Year: No     Number of Places Lived in the Last Year: 1     Unstable Housing in the Last Year: No       Review of Systems  Positive per HPI, otherwise a pertinent ROS was performed and was negative.    OBJECTIVE:     Vital Signs  Temp: 98.8 °F (37.1 °C)  Pulse: 79  BP: 116/81  Pain Score:   2  There is no height or weight on file to calculate BMI.    Neurosurgery Physical Exam  General: Well  developed, well nourished, no distress.   Head: Normocephalic, atraumatic.  Neurologic: Alert and oriented. Thought content appropriate.  Mental Status: Awake, Alert, Oriented x 4  Language: No aphasia.  Speech: No dysarthria.  Cranial nerves: Face symmetric, tongue midline, CN II-XII grossly intact.   Eyes: Pupils equal, round, reactive to light with accomodation, EOMI.   Pulmonary: Normal respirations, no signs of respiratory distress.  Abdomen: Soft, non-distended, not tender to palpation.  Vascular: Pulses 2+ and symmetric radial and dorsalis pedis. No LE edema.   Skin: Skin is warm, dry and intact.  Sensory: Intact to light touch throughout.  Motor Strength: Moves all extremities spontaneously with good tone. Full strength upper and lower extremities. No abnormal movements seen.     Strength  Deltoids Triceps Biceps Wrist Extension Wrist Flexion Hand    Upper: R 5/5 5/5 5/5 5/5 5/5 5/5    L 5/5 5/5 5/5 5/5 5/5 5/5     Iliopsoas Quadriceps Knee  Flexion Tibialis  anterior Gastro- cnemius EHL   Lower: R 4+/5 4+/5 4+/5 5/5 5/5 5/5    L 5/5 5/5 5/5 5/5 5/5 5/5     Coordination/Cerebellar:   Gait: Stable.    Back incision healed well.    Diagnostic Results:  Imaging was independently reviewed by myself.  X-ray lumbar spine AP and Lateral 6/3/24: No fractures or dislocations. Mild retrolisthesis at L3/4.    ASSESSMENT/PLAN:     Jennifer Nguyen is a 34 y.o. female s/p L4/5 MIS lami and microdiscectomy for a herniated disc. She is doing well clinically. No leg pain. Neurologically stable with improved strength. No concerning abnormalities on her X-Ray. I will place a referral for PT/OT. RTC for her 3 month post op to see Dr. Camacho.      Diagnoses addressed and orders placed this encounter:      Lumbar herniated disc  -     Ambulatory referral/consult to Physical/Occupational Therapy; Future; Expected date: 06/10/2024        Sonia Belcher PA-C  Neurosurgery   [facility]

## 2024-06-17 ENCOUNTER — CLINICAL SUPPORT (OUTPATIENT)
Dept: REHABILITATION | Facility: OTHER | Age: 34
End: 2024-06-17
Payer: COMMERCIAL

## 2024-06-17 DIAGNOSIS — R29.898 WEAKNESS OF LOWER EXTREMITY, UNSPECIFIED LATERALITY: Primary | ICD-10-CM

## 2024-06-17 PROCEDURE — 97112 NEUROMUSCULAR REEDUCATION: CPT | Mod: PN

## 2024-06-17 NOTE — PROGRESS NOTES
[facility]   Physical Therapy Treatment Note     Name: Jennifer Nguyen  Clinic Number: [id number]    Therapy Diagnosis:   Encounter Diagnosis   Name Primary?    Weakness of lower extremity, unspecified laterality Yes     Physician: Sonia Belcher PA-C    Visit Date: 6/17/2024    Physician Orders: Physical Therapy Evaluate and Treat  Medical Diagnosis from Referral: lumbar herniated disc  Evaluation Date: 6/5/24  Authorization Period Expiration: 12/31/24  Plan of Care Expiration: 6/5/2024 to 9/5/24  Visit # / Visits authorized: 1/1 (pending additional authorization following initial evaluation)   FOTO: 0/3  on 6/5/24    Time In: 400p  Time Out: 505p  Total Billable Time: 55 minutes    SUBJECTIVE     Pt reports: she has some increased pain after driving home from [place] yesterday.    She was not compliant with home exercise program.  Response to previous treatment: no change  Functional change: NA    Pain:  Current 2/10, worst 2/10, best 1/10   Location: left low back and left posterior hip  Description: Aching with occasional stabbing with trunk rotation  Aggravating Factors: sitting  Easing Factors: rest     Pts goals: Pt would like to return to yoga with no increase in low back pain.    OBJECTIVE     Objective Measures updated at progress report unless specified.     Treatment     Jennifer Osullivan received the treatments listed below in bold:      therapeutic exercises to develop strength, endurance, ROM, and flexibility for 0 minutes including:  NP      neuromuscular re-education activities to improve: Coordination, Kinesthetic, Sense, Proprioception, and Posture for 55 minutes. The following activities were included:    Transverse abdominis bracing with dead bug (lower extremities only) 2x10  Bridge with belt 2x10 (increased right hip pain)  Prone on elbows x3mins  Prone multifidi retraining 2x10  Prone knee flexion 3x10  Bird dog 2x10  Quadruped rock backs  Seated sciatic nerve glide  x20  TRX squat 3x10  Shuttle squat 2x20 vs 100#   SL shuttle squat vs 50# 2x10  Step up  Donkey kick on shuttle        hot pack for 10 minutes to lumbar spine        Patient Education and Home Exercises     Home Exercises Provided and Patient Education Provided     Education provided:   - Findings; prognosis and plan of care (POC)  - Home exercise program (HEP)  - Modality options  - Therapist contact information     Written Home Exercises Provided: Yes  Exercises were reviewed and Jennifer Osullivan was able to demonstrate them prior to the end of the session.  Jennifer Osullivan demonstrated good understanding of the education provided.     Written Home Exercises Provided: Patient instructed to cont prior HEP. Exercises were reviewed and Jennifer Osullivan was able to demonstrate them prior to the end of the session.  Jennifer Osullivan demonstrated good  understanding of the education provided. See EMR under Patient Instructions for exercises provided during therapy sessions    ASSESSMENT     Patient presented with moderate tissue irritability and left with minimal tissue irritability. Pt tolerated therapeutic interventions well with minimal complaint of increased pain. Patient reported relief after physical therapy treatment today.    Jennifer Osullivan Is progressing well towards her goals.   Pt prognosis is Good.     Pt will continue to benefit from skilled outpatient physical therapy to address the deficits listed in the problem list box on initial evaluation, provide pt/family education and to maximize pt's level of independence in the home and community environment.     Pt's spiritual, cultural and educational needs considered and pt agreeable to plan of care and goals.     Anticipated barriers to physical therapy: NA    Goals: GOALS:  Short Term Goals (4 Weeks):  1. Patient will be compliant with home exercise program to supplement therapy in promoting functional mobility. (progressing, not met)    2. Patient will perform lifting activity with  good control to demonstrate improved core strength. (progressing, not met)    3. Patient will report no pain during thoracolumbar active range of motion to promote functional mobility.  (progressing, not met)    4. Patient will improve impaired lower extremity manual muscle tests  to >/=4/5 to improve strength for functional tasks. (progressing, not met)          Long Term Goals (6 Weeks):   1. Patient will improve FOTO score to </= 35% limited to decrease perceived limitation with maintaining/changing body position. (progressing, not met)    2. Patient will perform yoga movements with good control to demonstrate improved core strength.  (progressing, not met)    3. Patient will improve impaired lower extremity manual muscle tests to >/=4+/5 to improve strength for functional tasks.  (progressing, not met)    4. Patient will tolerate sitting for 120 minutes with no increase in low back pain to return to PLOF.  (progressing, not met)     PLAN     Plan of care Certification: 6/5/2024 to 9/5/24     Outpatient Physical Therapy 2 times weekly for 12 weeks to include the following interventions: Therapeutic Exercises, Manual Therapeutic Technique, Neuromuscular Re Education, Therapeutic Activities. Modalities, Kinesiotape prn, and Functional Dry Needling as needed.    Jarret Hawkins, PT

## 2024-06-19 ENCOUNTER — CLINICAL SUPPORT (OUTPATIENT)
Dept: REHABILITATION | Facility: OTHER | Age: 34
End: 2024-06-19
Payer: COMMERCIAL

## 2024-06-19 ENCOUNTER — DOCUMENTATION ONLY (OUTPATIENT)
Dept: REHABILITATION | Facility: OTHER | Age: 34
End: 2024-06-19
Payer: COMMERCIAL

## 2024-06-19 DIAGNOSIS — M51.26 LUMBAR HERNIATED DISC: Primary | ICD-10-CM

## 2024-06-19 PROCEDURE — 97112 NEUROMUSCULAR REEDUCATION: CPT | Mod: PN,CQ

## 2024-06-19 PROCEDURE — 97530 THERAPEUTIC ACTIVITIES: CPT | Mod: PN,CQ

## 2024-06-19 PROCEDURE — 97110 THERAPEUTIC EXERCISES: CPT | Mod: PN,CQ

## 2024-06-19 NOTE — PROGRESS NOTES
Physical Therapist and Physical Therapist Assistant met face to face to discuss patient's treatment plan and progress towards established goals. Pt will be seen by a physical therapist minimally every 6th visit or every 30 days.    Alf Conley, PTA  06/19/2024

## 2024-06-19 NOTE — PROGRESS NOTES
[facility]   Physical Therapy Treatment Note     Name: Jennifer Nguyen  Clinic Number: [id number]    Therapy Diagnosis:   Encounter Diagnosis   Name Primary?    Lumbar herniated disc Yes       Physician: Sonia Belcher PA-C    Visit Date: 6/19/2024    Physician Orders: Physical Therapy Evaluate and Treat  Medical Diagnosis from Referral: lumbar herniated disc  Evaluation Date: 6/5/24  Authorization Period Expiration: 12/31/24  Plan of Care Expiration: 6/5/2024 to 9/5/24  Visit # / Visits authorized: 9/20  FOTO: 0/3  on 6/5/24    Precautions: standard    Time In: 0758  Time Out: 0900  Total Billable Time: 60 minutes    SUBJECTIVE     Pt reports: she has been performing her HEP as directed. Feels more weakness in her R leg vs L  She was compliant with home exercise program.  Response to previous treatment: tolerated well, no issues  Functional change: tolerated well, no issues    Prior Level of Function: no limitation  Current Level of Function: limited with flexion based movements, recreational activity     Pain:  Current 2/10, worst 2/10, best 1/10   Location: left low back and left posterior hip  Description: Aching with occasional stabbing with trunk rotation  Aggravating Factors: sitting  Easing Factors: rest     Pts goals: Pt would like to return to yoga with no increase in low back pain.    OBJECTIVE     Objective Measures updated at progress report unless specified.     Treatment     Jennifer Osullivan received the treatments listed below in bold:      therapeutic exercises to develop strength, endurance, ROM, flexibility, and posture for 8 minutes including:  +Recumbent bike x 8 min L 3    manual therapy techniques: Myofacial release and Soft tissue Mobilization were applied to the: 00 for 00 minutes, including:  NP    neuromuscular re-education activities to improve: Coordination, Kinesthetic, Sense, Proprioception, and Posture for 40 minutes. The following activities were  included:    +LTR on red ball, x 3 min - pain free  +DKTC on red ball, 10x10  +PPT  3 hold x 20  +Supine TrA + marching 20x  +Straight leg Bridging on ball 3 hold x 20  +SL clams, light green TB 2x10 R/L  +SLR with pilaties ring press, 2x10 R/L  +Prone leg lifts 2x10 R/L  +Bird dogs 2x10  +Shuttle squats, 50# 3x10         therapeutic activities to improve functional performance for 12 minutes, including:  +TRX plank row, 3x10  +Pallof press with lift up RTB 20x R/L        Patient Education and Home Exercises     Home Exercises Provided and Patient Education Provided     Education provided:   - Pt education regarding proper technique for there-ex/HEP, as well as rationale for exercise/ POC.       Written Home Exercises Provided: Patient instructed to cont prior HEP. Exercises were reviewed and Jennifer Osullivan was able to demonstrate them prior to the end of the session.  Jennifer Osullivan demonstrated good  understanding of the education provided. See EMR under Patient Instructions for exercises provided during therapy sessions    ASSESSMENT     Jennifer Osullivan tolerated exercise well with minimal complaints of increased pain. Muscle weakness in paraspinals/core musculature were notable during exercises however, she was able to demonstrate improved core/transverse abdominis activation after cuing. Emphasis was focused on improving postural stability and activating TrA. She demonstrates good carry-over from HEP.  Will progress stability exercises as tolerated.       Jennifer Osullivan Is progressing well towards her goals.   Pt prognosis is Good.     Pt will continue to benefit from skilled outpatient physical therapy to address the deficits listed in the problem list box on initial evaluation, provide pt/family education and to maximize pt's level of independence in the home and community environment.     Pt's spiritual, cultural and educational needs considered and pt agreeable to plan of care and goals.     Anticipated Barriers for  therapy: None    GOALS:  Short Term Goals (4 Weeks):  1. Patient will be compliant with home exercise program to supplement therapy in promoting functional mobility. (progressing, not met)    2. Patient will perform lifting activity with good control to demonstrate improved core strength. (progressing, not met)    3. Patient will report no pain during thoracolumbar active range of motion to promote functional mobility.  (progressing, not met)    4. Patient will improve impaired lower extremity manual muscle tests  to >/=4/5 to improve strength for functional tasks. (progressing, not met)          Long Term Goals (6 Weeks):   1. Patient will improve FOTO score to </= 35% limited to decrease perceived limitation with maintaining/changing body position. (progressing, not met)    2. Patient will perform yoga movements with good control to demonstrate improved core strength.  (progressing, not met)    3. Patient will improve impaired lower extremity manual muscle tests to >/=4+/5 to improve strength for functional tasks.  (progressing, not met)    4. Patient will tolerate sitting for 120 minutes with no increase in low back pain to return to PLOF.  (progressing, not met)         PLAN     Plan of care Certification: 6/5/2024 to 9/5/24    Focus on core/LE strength and ROM with emphasis on posture and ADL performance     Outpatient Physical Therapy 2 times weekly for 12 weeks to include the following interventions: Therapeutic Exercises, Manual Therapeutic Technique, Neuromuscular Re Education, Therapeutic Activities. Modalities, Kinesiotape prn, and Functional Dry Needling as needed.    Alf Conley, PTA

## 2024-06-24 ENCOUNTER — CLINICAL SUPPORT (OUTPATIENT)
Dept: REHABILITATION | Facility: OTHER | Age: 34
End: 2024-06-24
Payer: COMMERCIAL

## 2024-06-24 DIAGNOSIS — F90.0 ATTENTION DEFICIT HYPERACTIVITY DISORDER (ADHD), PREDOMINANTLY INATTENTIVE TYPE: ICD-10-CM

## 2024-06-24 DIAGNOSIS — R29.898 WEAKNESS OF LOWER EXTREMITY, UNSPECIFIED LATERALITY: Primary | ICD-10-CM

## 2024-06-24 PROCEDURE — 97112 NEUROMUSCULAR REEDUCATION: CPT | Mod: PN

## 2024-06-24 PROCEDURE — 97530 THERAPEUTIC ACTIVITIES: CPT | Mod: PN

## 2024-06-24 NOTE — PROGRESS NOTES
[facility]   Physical Therapy Treatment Note     Name: Jennifer Nguyen  Clinic Number: [id number]    Therapy Diagnosis:   Encounter Diagnosis   Name Primary?    Weakness of lower extremity, unspecified laterality Yes       Physician: Sonia Belcher PA-C    Visit Date: 6/24/2024    Physician Orders: Physical Therapy Evaluate and Treat  Medical Diagnosis from Referral: lumbar herniated disc  Evaluation Date: 6/5/24  Authorization Period Expiration: 12/31/24  Plan of Care Expiration: 6/5/2024 to 9/5/24  Visit # / Visits authorized: 9/20  FOTO: 0/3  on 6/5/24    Precautions: standard    Time In: 1105a  Time Out: 1155a  Total Billable Time: 55 minutes    SUBJECTIVE     Pt reports: she continues to feel left posterior hip / anterior hip pain which is worsened with prolonged sitting.    She was compliant with home exercise program.  Response to previous treatment: tolerated well, no issues  Functional change: tolerated well, no issues    Prior Level of Function: no limitation  Current Level of Function: limited with flexion based movements, recreational activity     Pain:  Current 2/10, worst 2/10, best 1/10   Location: left low back and left posterior hip  Description: Aching with occasional stabbing with trunk rotation  Aggravating Factors: sitting  Easing Factors: rest     Pts goals: Pt would like to return to yoga with no increase in low back pain.    OBJECTIVE     Objective Measures updated at progress report unless specified.     Treatment     Jennifer Osullivan received the treatments listed below in bold:      therapeutic exercises to develop strength, endurance, ROM, flexibility, and posture for 0 minutes including:    +Recumbent bike x 8 min L 3    manual therapy techniques: Myofacial release and Soft tissue Mobilization were applied to the: 00 for 00 minutes, including:  NP    neuromuscular re-education activities to improve: Coordination, Kinesthetic, Sense, Proprioception, and Posture  "for 40 minutes. The following activities were included:    LTR on red ball, x 3 min - pain free  DKTC on red ball, 10x10  PPT  3 hold x 20  Supine TrA + dead bug (LEs only) 20x  Straight leg Bridging on ball 3 hold x 20  SL clams, light green TB 2x10 R/L  SLR with pilaties ring press, 2x10 R/L  Prone leg lifts 2x10 R/L  Bird dogs 2x10  +Bear plank 10x3" holds  Shuttle squats, 50# 2x20   SL shuttle squats 25# 2x10  Hesitation march vs 10# KB 6x30"      therapeutic activities to improve functional performance for 10 minutes, including:    TRX squat row, 3x10  TRX reverse lunge x8 each  Goblet squat vs 5# DB 2x10  Good morning 2x10  Pallof press with lift up RTB 20x R/L        Patient Education and Home Exercises     Home Exercises Provided and Patient Education Provided     Education provided:   - Pt education regarding proper technique for there-ex/HEP, as well as rationale for exercise/ POC.       Written Home Exercises Provided: Patient instructed to cont prior HEP. Exercises were reviewed and Jennifer Osullivan was able to demonstrate them prior to the end of the session.  Jennifer Osullivan demonstrated good  understanding of the education provided. See EMR under Patient Instructions for exercises provided during therapy sessions    ASSESSMENT     Jennifer Osullivan tolerated exercise well with minimal complaints of increased pain. Transverse abdominis bracing has improved markedly with functional exercise. She continues to have right > left lower extremity weakness lucille with gluteal/quadriceps strengthening movements. Will progress stability exercises as tolerated.       Jennifer Osullivan Is progressing well towards her goals.   Pt prognosis is Good.     Pt will continue to benefit from skilled outpatient physical therapy to address the deficits listed in the problem list box on initial evaluation, provide pt/family education and to maximize pt's level of independence in the home and community environment.     Pt's spiritual, cultural and " educational needs considered and pt agreeable to plan of care and goals.     Anticipated Barriers for therapy: None    GOALS:  Short Term Goals (4 Weeks):  1. Patient will be compliant with home exercise program to supplement therapy in promoting functional mobility. (progressing, not met)    2. Patient will perform lifting activity with good control to demonstrate improved core strength. (progressing, not met)    3. Patient will report no pain during thoracolumbar active range of motion to promote functional mobility.  (progressing, not met)    4. Patient will improve impaired lower extremity manual muscle tests  to >/=4/5 to improve strength for functional tasks. (progressing, not met)          Long Term Goals (6 Weeks):   1. Patient will improve FOTO score to </= 35% limited to decrease perceived limitation with maintaining/changing body position. (progressing, not met)    2. Patient will perform yoga movements with good control to demonstrate improved core strength.  (progressing, not met)    3. Patient will improve impaired lower extremity manual muscle tests to >/=4+/5 to improve strength for functional tasks.  (progressing, not met)    4. Patient will tolerate sitting for 120 minutes with no increase in low back pain to return to PLOF.  (progressing, not met)         PLAN     Plan of care Certification: 6/5/2024 to 9/5/24    Focus on core/LE strength and ROM with emphasis on posture and ADL performance     Outpatient Physical Therapy 2 times weekly for 12 weeks to include the following interventions: Therapeutic Exercises, Manual Therapeutic Technique, Neuromuscular Re Education, Therapeutic Activities. Modalities, Kinesiotape prn, and Functional Dry Needling as needed.    Jarret Hawkins, PT

## 2024-06-25 RX ORDER — LISDEXAMFETAMINE DIMESYLATE CAPSULES 10 MG/1
30 CAPSULE ORAL EVERY MORNING
Qty: 90 CAPSULE | Refills: 0 | Status: SHIPPED | OUTPATIENT
Start: 2024-06-25

## 2024-06-26 ENCOUNTER — CLINICAL SUPPORT (OUTPATIENT)
Dept: REHABILITATION | Facility: OTHER | Age: 34
End: 2024-06-26
Payer: COMMERCIAL

## 2024-06-26 DIAGNOSIS — M51.26 LUMBAR HERNIATED DISC: Primary | ICD-10-CM

## 2024-06-26 PROCEDURE — 97112 NEUROMUSCULAR REEDUCATION: CPT | Mod: PN,CQ

## 2024-06-26 PROCEDURE — 97530 THERAPEUTIC ACTIVITIES: CPT | Mod: PN,CQ

## 2024-06-26 NOTE — PROGRESS NOTES
[facility]   Physical Therapy Treatment Note     Name: Jennifer Nguyen  Clinic Number: [id number]    Therapy Diagnosis:   Encounter Diagnosis   Name Primary?    Lumbar herniated disc Yes         Physician: Sonia Belcher PA-C    Visit Date: 6/26/2024    Physician Orders: Physical Therapy Evaluate and Treat  Medical Diagnosis from Referral: lumbar herniated disc  Evaluation Date: 6/5/24  Authorization Period Expiration: 12/31/24  Plan of Care Expiration: 6/5/2024 to 9/5/24  Visit # / Visits authorized: 11/20  FOTO: 0/3  on 6/5/24    Precautions: standard    Time In: 1100  Time Out: 1200  Total Billable Time: 60 minutes    SUBJECTIVE     Pt reports: she feels like she is doing better. Still having the L posterior/lateral hip pain at times.     She was compliant with home exercise program.  Response to previous treatment: tolerated well, no issues  Functional change: tolerated well, no issues    Prior Level of Function: no limitation  Current Level of Function: limited with flexion based movements, recreational activity     Pain:  Current 2/10, worst 2/10, best 1/10   Location: left low back and left posterior hip  Description: Aching with occasional stabbing with trunk rotation  Aggravating Factors: sitting  Easing Factors: rest     Pts goals: Pt would like to return to yoga with no increase in low back pain.    OBJECTIVE     Objective Measures updated at progress report unless specified.     Treatment     Jennifer Osullivan received the treatments listed below in bold:      therapeutic exercises to develop strength, endurance, ROM, flexibility, and posture for 0 minutes including:    +Recumbent bike x 8 min L 3    manual therapy techniques: Myofacial release and Soft tissue Mobilization were applied to the: 00 for 00 minutes, including:  NP    neuromuscular re-education activities to improve: Coordination, Kinesthetic, Sense, Proprioception, and Posture for 35 minutes. The following  "activities were included:    LTR on red ball, x 3 min - pain free  DKTC on red ball, 10x10  PPT  3 hold x 20  Supine TrA + dead bug (LEs only) 20x  Straight leg Bridging on ball 3 hold x 20  SL clams, light green TB 2x10 R/L  SLR with pilaties ring press, 3x10 R/L  Prone leg lifts 2x10 R/L  Bird dogs 2x10  Bear plank 10x3" holds  Shuttle squats, 50# 2x20   SL shuttle squats 25# 2x10  Hesitation march vs 10# KB 80'x4  +Pull down + march RTB 2x10        therapeutic activities to improve functional performance for 25 minutes, including:    TRX squat row, 3x10  TRX reverse lunge x8 each  Goblet squat vs 5# DB 2x10  Good morning 2x10  Pallof press with lift up RTB 20x R/L        Patient Education and Home Exercises     Home Exercises Provided and Patient Education Provided     Education provided:   - Pt education regarding proper technique for there-ex/HEP, as well as rationale for exercise/ POC.       Written Home Exercises Provided: Patient instructed to cont prior HEP. Exercises were reviewed and Jennifer Osullivan was able to demonstrate them prior to the end of the session.  Jennifer Osullivan demonstrated good  understanding of the education provided. See EMR under Patient Instructions for exercises provided during therapy sessions    ASSESSMENT     Jennifer Osullivan tolerated exercise well with minimal complaints of increased pain. She demonstrated improved musculare endurance with resisted exercises this visit as she was also able to complete increased reps. Improved ROM was noted with back/hip extension during bridging today. I feel her core strength will continue to improve as she progresses in therapy.     Jennifer Osullivan Is progressing well towards her goals.   Pt prognosis is Good.     Pt will continue to benefit from skilled outpatient physical therapy to address the deficits listed in the problem list box on initial evaluation, provide pt/family education and to maximize pt's level of independence in the home and community " environment.     Pt's spiritual, cultural and educational needs considered and pt agreeable to plan of care and goals.     Anticipated Barriers for therapy: None    GOALS:  Short Term Goals (4 Weeks):  1. Patient will be compliant with home exercise program to supplement therapy in promoting functional mobility. (progressing, not met)    2. Patient will perform lifting activity with good control to demonstrate improved core strength. (progressing, not met)    3. Patient will report no pain during thoracolumbar active range of motion to promote functional mobility.  (progressing, not met)    4. Patient will improve impaired lower extremity manual muscle tests  to >/=4/5 to improve strength for functional tasks. (progressing, not met)          Long Term Goals (6 Weeks):   1. Patient will improve FOTO score to </= 35% limited to decrease perceived limitation with maintaining/changing body position. (progressing, not met)    2. Patient will perform yoga movements with good control to demonstrate improved core strength.  (progressing, not met)    3. Patient will improve impaired lower extremity manual muscle tests to >/=4+/5 to improve strength for functional tasks.  (progressing, not met)    4. Patient will tolerate sitting for 120 minutes with no increase in low back pain to return to PLOF.  (progressing, not met)         PLAN     Plan of care Certification: 6/5/2024 to 9/5/24    Focus on core/LE strength and ROM with emphasis on posture and ADL performance     Outpatient Physical Therapy 2 times weekly for 12 weeks to include the following interventions: Therapeutic Exercises, Manual Therapeutic Technique, Neuromuscular Re Education, Therapeutic Activities. Modalities, Kinesiotape prn, and Functional Dry Needling as needed.    Alf Conley, PTA

## 2024-07-01 ENCOUNTER — CLINICAL SUPPORT (OUTPATIENT)
Dept: REHABILITATION | Facility: OTHER | Age: 34
End: 2024-07-01
Payer: COMMERCIAL

## 2024-07-01 DIAGNOSIS — R29.898 WEAKNESS OF LOWER EXTREMITY, UNSPECIFIED LATERALITY: Primary | ICD-10-CM

## 2024-07-01 PROCEDURE — 97530 THERAPEUTIC ACTIVITIES: CPT | Mod: PN

## 2024-07-01 PROCEDURE — 97112 NEUROMUSCULAR REEDUCATION: CPT | Mod: PN

## 2024-07-01 NOTE — PROGRESS NOTES
"  [facility]   Physical Therapy Treatment Note     Name: Jennifer Nguyen  Clinic Number: [id number]    Therapy Diagnosis:   Encounter Diagnosis   Name Primary?    Weakness of lower extremity, unspecified laterality Yes         Physician: Sonia Belcher PA-C    Visit Date: 7/1/2024    Physician Orders: Physical Therapy Evaluate and Treat  Medical Diagnosis from Referral: lumbar herniated disc  Evaluation Date: 6/5/24  Authorization Period Expiration: 12/31/24  Plan of Care Expiration: 6/5/2024 to 9/5/24  Visit # / Visits authorized: 12/20  FOTO: 0/3  on 6/5/24    Precautions: standard    Time In:  1125am  Time Out: 1230pm  Total Billable Time: 65 minutes    SUBJECTIVE     Pt reports: she is doing well today. Pt states that she attended a wedding and was able to stand/walk for several hours and was "a little sore" the next day. She also reports that she changed from heels into tennis shoes due to low back pain.     She was compliant with home exercise program.  Response to previous treatment: tolerated well, no issues  Functional change: tolerated well, no issues    Prior Level of Function: no limitation  Current Level of Function: limited with flexion based movements, recreational activity     Pain:  Current 2/10, worst 2/10, best 1/10   Location: left low back and left posterior hip  Description: Aching with occasional stabbing with trunk rotation  Aggravating Factors: sitting  Easing Factors: rest     Pts goals: Pt would like to return to yoga with no increase in low back pain.    OBJECTIVE     Objective Measures updated at progress report unless specified.     Treatment     Jennifer Osullivan received the treatments listed below in bold:      therapeutic exercises to develop strength, endurance, ROM, flexibility, and posture for 0 minutes including:    +Recumbent bike x 8 min L 3    manual therapy techniques: Myofacial release and Soft tissue Mobilization were applied to the: 00 for 00 " "minutes, including:  NP    neuromuscular re-education activities to improve: Coordination, Kinesthetic, Sense, Proprioception, and Posture for 35 minutes. The following activities were included:    Supine TrA + dead bug (LEs only) 20x  Straight leg Bridging on ball 3 hold x 20  SL clams, light green TB 2x10 R/L  SLR with pilaties ring press, 3x10 R/L  Prone leg lifts 2x10 R/L  Bird dogs 2x10  Bear plank 10x3" holds  Shuttle squats, 50# 2x20   SL shuttle squats 25# 2x10  Hesitation march vs 10# KB 80'x4  Pull down + march RTB 2x10        therapeutic activities to improve functional performance for 25 minutes, including:    TRX squat row, 3x10  TRX reverse lunge x8 each  Goblet squat vs 5# DB 2x10  Good morning 2x10  Pallof press with lift up vs grey tube 30x R/L        Patient Education and Home Exercises     Home Exercises Provided and Patient Education Provided     Education provided:   - Pt education regarding proper technique for there-ex/HEP, as well as rationale for exercise/ POC.       Written Home Exercises Provided: Patient instructed to cont prior HEP. Exercises were reviewed and Jennifer Osullivan was able to demonstrate them prior to the end of the session.  Jennifer Osullivan demonstrated good  understanding of the education provided. See EMR under Patient Instructions for exercises provided during therapy sessions    ASSESSMENT   Pt tolerated treatment session well today with progression to deadlift to mimic prior workout. Continue PT POC.    Jennifer Osullivan Is progressing well towards her goals.   Pt prognosis is Good.     Pt will continue to benefit from skilled outpatient physical therapy to address the deficits listed in the problem list box on initial evaluation, provide pt/family education and to maximize pt's level of independence in the home and community environment.     Pt's spiritual, cultural and educational needs considered and pt agreeable to plan of care and goals.     Anticipated Barriers for therapy: " None    GOALS:  Short Term Goals (4 Weeks):  1. Patient will be compliant with home exercise program to supplement therapy in promoting functional mobility. (progressing, not met)    2. Patient will perform lifting activity with good control to demonstrate improved core strength. (progressing, not met)    3. Patient will report no pain during thoracolumbar active range of motion to promote functional mobility.  (progressing, not met)    4. Patient will improve impaired lower extremity manual muscle tests  to >/=4/5 to improve strength for functional tasks. (progressing, not met)          Long Term Goals (6 Weeks):   1. Patient will improve FOTO score to </= 35% limited to decrease perceived limitation with maintaining/changing body position. (progressing, not met)    2. Patient will perform yoga movements with good control to demonstrate improved core strength.  (progressing, not met)    3. Patient will improve impaired lower extremity manual muscle tests to >/=4+/5 to improve strength for functional tasks.  (progressing, not met)    4. Patient will tolerate sitting for 120 minutes with no increase in low back pain to return to PLOF.  (progressing, not met)         PLAN     Plan of care Certification: 6/5/2024 to 9/5/24    Focus on core/LE strength and ROM with emphasis on posture and ADL performance     Outpatient Physical Therapy 2 times weekly for 12 weeks to include the following interventions: Therapeutic Exercises, Manual Therapeutic Technique, Neuromuscular Re Education, Therapeutic Activities. Modalities, Kinesiotape prn, and Functional Dry Needling as needed.    Ioana Keane, PT

## 2024-07-03 ENCOUNTER — CLINICAL SUPPORT (OUTPATIENT)
Dept: REHABILITATION | Facility: OTHER | Age: 34
End: 2024-07-03
Payer: COMMERCIAL

## 2024-07-03 DIAGNOSIS — R29.898 WEAKNESS OF LOWER EXTREMITY, UNSPECIFIED LATERALITY: Primary | ICD-10-CM

## 2024-07-03 PROCEDURE — 97112 NEUROMUSCULAR REEDUCATION: CPT | Mod: PN

## 2024-07-03 PROCEDURE — 97530 THERAPEUTIC ACTIVITIES: CPT | Mod: PN

## 2024-07-03 NOTE — BRIEF OP NOTE
[facility] - [facility] ([facility])  Brief Operative Note    SUMMARY     Surgery Date: 4/1/2024     Surgeon(s) and Role:     * Alfie Aparicio, DO - Primary    Assisting Surgeon: Luiz Biswas MD    Pre-op Diagnosis:  Lumbar herniated disc [M51.26]    Post-op Diagnosis:  Post-Op Diagnosis Codes:     * Lumbar herniated disc [M51.26]    Procedure(s) (LRB):  LAMINECTOMY, SPINE, LUMBAR (Right)    Anesthesia: General    Implants:  * No implants in log *    Operative Findings: L4/5 MIS discectomy    Estimated Blood Loss: 50cc         Specimens:   Specimen (24h ago, onward)       Start     Ordered    04/01/24 1631  Specimen to Pathology, Surgery Neurosurgery  Once        Comments: Pre-op Diagnosis: Lumbar herniated disc [M51.26]Procedure(s):LAMINECTOMY, SPINE, LUMBAR Number of specimens: 1Name of specimens: 1. L4, 5 Disc- Permanent     References:    Click here for ordering Quick Tip   Question Answer Comment   Procedure Type: Neurosurgery    Specimen Class: Known or suspected malignancy    Which provider would you like to cc? ALFIE APARICIO    Release to patient Immediate        04/01/24 1631                    [id number]       Vancomycin Dosing by Pharmacy- INITIAL    John Gutierrez is a 29 y.o. year old male who Pharmacy has been consulted for vancomycin dosing for other abdominal infection . Based on the patient's indication and renal status this patient will be dosed based on a goal AUC of 400-600.     Renal function is currently stable.    Visit Vitals  /72   Pulse 84   Temp 35.7 °C (96.3 °F)   Resp 16        Lab Results   Component Value Date    CREATININE 0.79 2024        Patient weight is as follows:   Vitals:    24 1833   Weight: 120 kg (265 lb)       Cultures:  No results found for the encounter in last 14 days.        No intake/output data recorded.  I/O during current shift:  No intake/output data recorded.    Temp (24hrs), Av.7 °C (96.3 °F), Min:35.7 °C (96.3 °F), Max:35.7 °C (96.3 °F)         Assessment/Plan     Patient has already been given a loading dose of 2000 mg.  Will initiate vancomycin maintenance,  1250 mg every 12 hours.    This dosing regimen is predicted by [employer] to result in the following pharmacokinetic parameters:  Regimen: 1250 mg IV every 12 hours.  Start time: 03:08 on 2024  Exposure target: AUC24 (range)400-600 mg/L.hr   AUC24,ss: 461 mg/L.hr  Probability of AUC24 > 400: 65 %  Ctrough,ss: 14.7 mg/L  Probability of Ctrough,ss > 20: 25 %  Probability of nephrotoxicity (Lodise LILIANA ): 10 %      Follow-up level will be ordered on  at AM labs unless clinically indicated sooner.  Will continue to monitor renal function daily while on vancomycin and order serum creatinine at least every 48 hours if not already ordered.  Follow for continued vancomycin needs, clinical response, and signs/symptoms of toxicity.       Ulisses Whitfield, PharmD

## 2024-07-03 NOTE — PROGRESS NOTES
"  [facility]   Physical Therapy Treatment Note     Name: Jennifer Nguyen  Clinic Number: [id number]    Therapy Diagnosis:   Encounter Diagnosis   Name Primary?    Weakness of lower extremity, unspecified laterality Yes         Physician: Sonia Belcher PA-C    Visit Date: 7/3/2024    Physician Orders: Physical Therapy Evaluate and Treat  Medical Diagnosis from Referral: lumbar herniated disc  Evaluation Date: 6/5/24  Authorization Period Expiration: 12/31/24  Plan of Care Expiration: 6/5/2024 to 9/5/24  Visit # / Visits authorized: 12/20  FOTO: 0/3  on 6/5/24    Precautions: standard    Time In:  1125am  Time Out: 1230pm  Total Billable Time: 65 minutes    SUBJECTIVE     Pt reports: she is doing well today. Pt states that she attended a wedding and was able to stand/walk for several hours and was "a little sore" the next day. She also reports that she changed from heels into tennis shoes due to low back pain.     She was compliant with home exercise program.  Response to previous treatment: tolerated well, no issues  Functional change: tolerated well, no issues    Prior Level of Function: no limitation  Current Level of Function: limited with flexion based movements, recreational activity     Pain:  Current 2/10, worst 2/10, best 1/10   Location: left low back and left posterior hip  Description: Aching with occasional stabbing with trunk rotation  Aggravating Factors: sitting  Easing Factors: rest     Pts goals: Pt would like to return to yoga with no increase in low back pain.    OBJECTIVE     Objective Measures updated at progress report unless specified.     Treatment     Jennifer Osullivan received the treatments listed below in bold:        neuromuscular re-education activities to improve: Coordination, Kinesthetic, Sense, Proprioception, and Posture for 20 minutes. The following activities were included:    Supine TrA + dead bug with 3# dumbbells 20x  SL clams, light green TB 2x10 R/L " "in side plank  SLR with pilaties ring press, 3x10 R/L  Bird dogs 2x10  Bear plank 10x3" holds  Shuttle squats, 50# 2x20   Hesitation march vs 15# KB 80'x4  Pull down + march RTB 2x10        therapeutic activities to improve functional performance for 40 minutes, including:    TRX squat row x3 min  TRX reverse lunge x3 min  Goblet squat vs 15# KB 2x10  Good morning 2x10  Pallof press with lift up vs grey tube 30x R/L  +Deadlift vs 10# on 6 inch step 30x        Patient Education and Home Exercises     Home Exercises Provided and Patient Education Provided     Education provided:   - Pt education regarding proper technique for there-ex/HEP, as well as rationale for exercise/ POC.       Written Home Exercises Provided: Patient instructed to cont prior HEP. Exercises were reviewed and Jennifer Osullivan was able to demonstrate them prior to the end of the session.  Jennifer Osullivan demonstrated good  understanding of the education provided. See EMR under Patient Instructions for exercises provided during therapy sessions    ASSESSMENT   Pt tolerated treatment session well today with progression to deadlift to mimic prior workout. Continue PT POC.    Jennifer Osullivan Is progressing well towards her goals.   Pt prognosis is Good.     Pt will continue to benefit from skilled outpatient physical therapy to address the deficits listed in the problem list box on initial evaluation, provide pt/family education and to maximize pt's level of independence in the home and community environment.     Pt's spiritual, cultural and educational needs considered and pt agreeable to plan of care and goals.     Anticipated Barriers for therapy: None    GOALS:  Short Term Goals (4 Weeks):  1. Patient will be compliant with home exercise program to supplement therapy in promoting functional mobility. (progressing, not met)    2. Patient will perform lifting activity with good control to demonstrate improved core strength. (progressing, not met)    3. Patient " will report no pain during thoracolumbar active range of motion to promote functional mobility.  (progressing, not met)    4. Patient will improve impaired lower extremity manual muscle tests  to >/=4/5 to improve strength for functional tasks. (progressing, not met)          Long Term Goals (6 Weeks):   1. Patient will improve FOTO score to </= 35% limited to decrease perceived limitation with maintaining/changing body position. (progressing, not met)    2. Patient will perform yoga movements with good control to demonstrate improved core strength.  (progressing, not met)    3. Patient will improve impaired lower extremity manual muscle tests to >/=4+/5 to improve strength for functional tasks.  (progressing, not met)    4. Patient will tolerate sitting for 120 minutes with no increase in low back pain to return to PLOF.  (progressing, not met)         PLAN     Plan of care Certification: 6/5/2024 to 9/5/24    Focus on core/LE strength and ROM with emphasis on posture and ADL performance     Outpatient Physical Therapy 2 times weekly for 12 weeks to include the following interventions: Therapeutic Exercises, Manual Therapeutic Technique, Neuromuscular Re Education, Therapeutic Activities. Modalities, Kinesiotape prn, and Functional Dry Needling as needed.    Ioana Keane, PT

## 2024-07-05 ENCOUNTER — TELEPHONE (OUTPATIENT)
Dept: ALLERGY | Facility: CLINIC | Age: 34
End: 2024-07-05
Payer: COMMERCIAL

## 2024-07-05 ENCOUNTER — PATIENT MESSAGE (OUTPATIENT)
Dept: ALLERGY | Facility: CLINIC | Age: 34
End: 2024-07-05
Payer: COMMERCIAL

## 2024-07-05 NOTE — TELEPHONE ENCOUNTER
Pts. Fire [facility] appointment scheduled for 7/8/24 was canceled, vial needed to be reordered. Left pt. A message.

## 2024-07-08 ENCOUNTER — CLINICAL SUPPORT (OUTPATIENT)
Dept: REHABILITATION | Facility: OTHER | Age: 34
End: 2024-07-08
Payer: COMMERCIAL

## 2024-07-08 ENCOUNTER — OFFICE VISIT (OUTPATIENT)
Dept: PRIMARY CARE CLINIC | Facility: CLINIC | Age: 34
End: 2024-07-08
Attending: FAMILY MEDICINE
Payer: COMMERCIAL

## 2024-07-08 VITALS
HEART RATE: 78 BPM | OXYGEN SATURATION: 99 % | WEIGHT: 142.06 LBS | HEIGHT: 64 IN | BODY MASS INDEX: 24.25 KG/M2 | SYSTOLIC BLOOD PRESSURE: 121 MMHG | DIASTOLIC BLOOD PRESSURE: 80 MMHG

## 2024-07-08 DIAGNOSIS — R29.898 WEAKNESS OF LOWER EXTREMITY, UNSPECIFIED LATERALITY: Primary | ICD-10-CM

## 2024-07-08 DIAGNOSIS — R41.89 BRAIN FOG: ICD-10-CM

## 2024-07-08 DIAGNOSIS — L60.9 NAIL DISORDER: ICD-10-CM

## 2024-07-08 DIAGNOSIS — J45.20 MILD INTERMITTENT ASTHMA WITHOUT COMPLICATION: ICD-10-CM

## 2024-07-08 DIAGNOSIS — R53.82 CHRONIC FATIGUE: ICD-10-CM

## 2024-07-08 DIAGNOSIS — Z00.01 ENCOUNTER FOR GENERAL ADULT MEDICAL EXAMINATION WITH ABNORMAL FINDINGS: Primary | ICD-10-CM

## 2024-07-08 DIAGNOSIS — R30.0 DYSURIA: ICD-10-CM

## 2024-07-08 PROBLEM — M25.641 DECREASED RANGE OF MOTION OF RIGHT THUMB: Status: RESOLVED | Noted: 2020-03-02 | Resolved: 2024-07-08

## 2024-07-08 PROBLEM — M54.50 ACUTE BILATERAL LOW BACK PAIN: Status: RESOLVED | Noted: 2023-08-09 | Resolved: 2024-07-08

## 2024-07-08 PROBLEM — G89.29 CHRONIC THUMB PAIN, RIGHT: Status: RESOLVED | Noted: 2022-01-11 | Resolved: 2024-07-08

## 2024-07-08 PROBLEM — M54.9 DORSALGIA, UNSPECIFIED: Status: RESOLVED | Noted: 2023-08-09 | Resolved: 2024-07-08

## 2024-07-08 PROBLEM — M79.644 THUMB PAIN, RIGHT: Status: RESOLVED | Noted: 2020-03-02 | Resolved: 2024-07-08

## 2024-07-08 PROBLEM — M79.644 CHRONIC THUMB PAIN, RIGHT: Status: RESOLVED | Noted: 2022-01-11 | Resolved: 2024-07-08

## 2024-07-08 PROBLEM — M79.89 THUMB SWELLING: Status: RESOLVED | Noted: 2020-03-02 | Resolved: 2024-07-08

## 2024-07-08 PROBLEM — J45.909 ASTHMA: Status: RESOLVED | Noted: 2023-10-04 | Resolved: 2024-07-08

## 2024-07-08 PROBLEM — S63.649A GAMEKEEPER'S THUMB: Status: RESOLVED | Noted: 2020-01-06 | Resolved: 2024-07-08

## 2024-07-08 PROBLEM — M25.341 CHRONIC INSTABILITY OF METACARPOPHALANGEAL JOINT OF RIGHT THUMB: Status: RESOLVED | Noted: 2022-01-10 | Resolved: 2024-07-08

## 2024-07-08 PROBLEM — R52 PAIN: Status: RESOLVED | Noted: 2024-04-02 | Resolved: 2024-07-08

## 2024-07-08 LAB
BACTERIA #/AREA URNS AUTO: NORMAL /HPF
BILIRUB UR QL STRIP: NEGATIVE
CLARITY UR REFRACT.AUTO: CLEAR
COLOR UR AUTO: COLORLESS
GLUCOSE UR QL STRIP: NEGATIVE
HGB UR QL STRIP: ABNORMAL
KETONES UR QL STRIP: NEGATIVE
LEUKOCYTE ESTERASE UR QL STRIP: ABNORMAL
MICROSCOPIC COMMENT: NORMAL
NITRITE UR QL STRIP: NEGATIVE
PH UR STRIP: 7 [PH] (ref 5–8)
PROT UR QL STRIP: NEGATIVE
RBC #/AREA URNS AUTO: 2 /HPF (ref 0–4)
SP GR UR STRIP: 1 (ref 1–1.03)
SQUAMOUS #/AREA URNS AUTO: 0 /HPF
URN SPEC COLLECT METH UR: ABNORMAL
WBC #/AREA URNS AUTO: 3 /HPF (ref 0–5)

## 2024-07-08 PROCEDURE — 81001 URINALYSIS AUTO W/SCOPE: CPT | Performed by: FAMILY MEDICINE

## 2024-07-08 PROCEDURE — 3079F DIAST BP 80-89 MM HG: CPT | Mod: CPTII,S$GLB,, | Performed by: FAMILY MEDICINE

## 2024-07-08 PROCEDURE — 99395 PREV VISIT EST AGE 18-39: CPT | Mod: S$GLB,,, | Performed by: FAMILY MEDICINE

## 2024-07-08 PROCEDURE — 99213 OFFICE O/P EST LOW 20 MIN: CPT | Mod: 25,S$GLB,, | Performed by: FAMILY MEDICINE

## 2024-07-08 PROCEDURE — 97530 THERAPEUTIC ACTIVITIES: CPT | Mod: PN

## 2024-07-08 PROCEDURE — 97112 NEUROMUSCULAR REEDUCATION: CPT | Mod: PN

## 2024-07-08 PROCEDURE — 1159F MED LIST DOCD IN RCRD: CPT | Mod: CPTII,S$GLB,, | Performed by: FAMILY MEDICINE

## 2024-07-08 PROCEDURE — 99999 PR PBB SHADOW E&M-EST. PATIENT-LVL IV: CPT | Mod: PBBFAC,,, | Performed by: FAMILY MEDICINE

## 2024-07-08 PROCEDURE — 3074F SYST BP LT 130 MM HG: CPT | Mod: CPTII,S$GLB,, | Performed by: FAMILY MEDICINE

## 2024-07-08 PROCEDURE — 3008F BODY MASS INDEX DOCD: CPT | Mod: CPTII,S$GLB,, | Performed by: FAMILY MEDICINE

## 2024-07-08 NOTE — PROGRESS NOTES
" [facility]   Physical Therapy Treatment Note     Name: Jennifer Nguyen  Clinic Number: [id number]    Therapy Diagnosis:   Encounter Diagnosis   Name Primary?    Weakness of lower extremity, unspecified laterality Yes         Physician: Sonia Belcher PA-C    Visit Date: 7/8/2024    Physician Orders: Physical Therapy Evaluate and Treat  Medical Diagnosis from Referral: lumbar herniated disc  Evaluation Date: 6/5/24  Authorization Period Expiration: 12/31/24  Plan of Care Expiration: 6/5/2024 to 9/5/24  Visit # / Visits authorized: 12/20  FOTO: 0/3  on 6/5/24    Precautions: standard    Time In:  0400pm  Time Out: 0500pm  Total Billable Time: 60 minutes    SUBJECTIVE     Pt reports: that she was sore after her last treatment session. Pt states that her soreness reached a 4/10 and it eventually subsided.       She was compliant with home exercise program.  Response to previous treatment: tolerated well, no issues  Functional change: tolerated well, no issues    Prior Level of Function: no limitation  Current Level of Function: limited with flexion based movements, recreational activity     Pain:  Current 2/10, worst 2/10, best 1/10   Location: left low back and left posterior hip  Description: Aching with occasional stabbing with trunk rotation  Aggravating Factors: sitting  Easing Factors: rest     Pts goals: Pt would like to return to yoga with no increase in low back pain.    OBJECTIVE     Objective Measures updated at progress report unless specified.     Treatment     Jennifer Osullivan received the treatments listed below in bold:        neuromuscular re-education activities to improve: Coordination, Kinesthetic, Sense, Proprioception, and Posture for 20 minutes. The following activities were included:    Supine TrA + dead bug with 3# dumbbells 20x  SL clams, light green TB 2x10 R/L in side plank  SLR with pilaties ring press, 3x10 R/L  Bird dogs 2x10  Bear plank 10x3" holds  Shuttle " squats, 50# 2x20   Hesitation march vs 15# KB 80'x4  Pull down + march RTB 2x10  SL hip circles 30x CW/CCW x 2 sets      therapeutic activities to improve functional performance for 40 minutes, including:    TRX squat row x3 min  TRX reverse lunge x3 min  Goblet squat vs 10# KB 2x10  Good morning 2x10  Pallof press with lift up vs grey tube 30x R/L  Deadlift vs 10# on 6 inch step 30x        Patient Education and Home Exercises     Home Exercises Provided and Patient Education Provided     Education provided:   - Pt education regarding proper technique for there-ex/HEP, as well as rationale for exercise/ POC.       Written Home Exercises Provided: Patient instructed to cont prior HEP. Exercises were reviewed and Jennifer Osullivan was able to demonstrate them prior to the end of the session.  Jennifer Osullivan demonstrated good  understanding of the education provided. See EMR under Patient Instructions for exercises provided during therapy sessions    ASSESSMENT   Pt tolerated treatment session well today. Decreased resistance with core strengthening exercises as pt was sore following her previous session. Plan to increase resistance with her exercises at her appointment on Wednesday.    Jennifer Osullivan Is progressing well towards her goals.   Pt prognosis is Good.     Pt will continue to benefit from skilled outpatient physical therapy to address the deficits listed in the problem list box on initial evaluation, provide pt/family education and to maximize pt's level of independence in the home and community environment.     Pt's spiritual, cultural and educational needs considered and pt agreeable to plan of care and goals.     Anticipated Barriers for therapy: None    GOALS:  Short Term Goals (4 Weeks):  1. Patient will be compliant with home exercise program to supplement therapy in promoting functional mobility. (progressing, not met)    2. Patient will perform lifting activity with good control to demonstrate improved core  strength. (progressing, not met)    3. Patient will report no pain during thoracolumbar active range of motion to promote functional mobility.  (progressing, not met)    4. Patient will improve impaired lower extremity manual muscle tests  to >/=4/5 to improve strength for functional tasks. (progressing, not met)          Long Term Goals (6 Weeks):   1. Patient will improve FOTO score to </= 35% limited to decrease perceived limitation with maintaining/changing body position. (progressing, not met)    2. Patient will perform yoga movements with good control to demonstrate improved core strength.  (progressing, not met)    3. Patient will improve impaired lower extremity manual muscle tests to >/=4+/5 to improve strength for functional tasks.  (progressing, not met)    4. Patient will tolerate sitting for 120 minutes with no increase in low back pain to return to PLOF.  (progressing, not met)         PLAN     Plan of care Certification: 6/5/2024 to 9/5/24    Focus on core/LE strength and ROM with emphasis on posture and ADL performance     Outpatient Physical Therapy 2 times weekly for 12 weeks to include the following interventions: Therapeutic Exercises, Manual Therapeutic Technique, Neuromuscular Re Education, Therapeutic Activities. Modalities, Kinesiotape prn, and Functional Dry Needling as needed.    Ioana Keane, PT

## 2024-07-08 NOTE — PROGRESS NOTES
FAMILY MEDICINE  [facility]    Reason for visit:   Chief Complaint   Patient presents with    Annual Exam    Fatigue    Brain fog         SUBJECTIVE: Jennifer Nguyen is a 34 y.o. female  - former smoker (quit 2009) with anxiety, ADHD, asthma and celiac disease presents for her routine annual physical and has concerns with increased fatigue, brain fog, skin issues     Dermatology Janett Ferguson MD  Back and spine:Martha Dasilva, MARCELA  Allergist: Reji Frederick MD  Psychiatry: Lien Ochoa MD  Neurosurgery Fausto Ugalde, DO   - 4/2024 herniated disc     The patient's last visit with me was on 9/11/2023.  Since her last visit with me she did herniated lumbar disc April/2024.  She did have surgery with Neurosurgery.  She is currently undergoing physical therapy.  She reports her strength, and sensation have improved.    She also reports that she does not drink at least 64 oz of water a day that she has pain with urination.  However when she starts drinking water the pain resolves.  This has been on and off for several months.  She denies any flank pain, fever, nausea, vomiting, hematuria, urinary urgency or hesitancy    Also reports she had a recent FeNO that was elevated.  She follows with an allergist for her asthma.  Her spirometry has been normal.  She will discuss with her allergist    She also reports that she has been following Dermatology for her nail issues.    She does have a family history of Grave's disease with her mother      1. Fatigue, brain fog and skin issues    Onset: acute on chronic worsening last 3-4 months  Location: see below  Duration: acute on chronic   Character:   Increase sleep like 11 hours recently  Hot flashes and sweating at night  Lightheaded when getting out of the shower. Last several seconds.  No episodes of syncope.   Brain fog - gluten worsens in the past and when avoids was previously fine but now still having it  "without eating gluten. Feels like "when stuff comes it is processes very slowly."   Skin issues: nails and toes nails. Thickening and skin peeling near the top. Multiple toes nails fell off but have since regrown. Started 1/2024. Saw Dermatology and started steroids which helped with stopping nails from falling off but otherwise stable.   Itchiness: all over but does not feel like it is the skin and feel like it is underneath the skin. On and off.   Aggravating factors: doing more   Relieving factors: increase caffeine   Timing of day: all day          Review of Systems   HENT:  Negative for hearing loss.    Eyes:  Negative for discharge.   Respiratory:  Negative for wheezing.    Cardiovascular:  Positive for chest pain. Negative for palpitations.   Gastrointestinal:  Negative for blood in stool, constipation, diarrhea and vomiting.   Genitourinary:  Positive for dysuria. Negative for hematuria.   Musculoskeletal:  Negative for neck pain.   Neurological:  Positive for weakness. Negative for headaches.   Endo/Heme/Allergies:  Negative for polydipsia.   All other systems reviewed and are negative.      HEALTH MAINTENANCE:   Health Maintenance   Topic Date Due    TETANUS VACCINE  03/06/2033    Hepatitis C Screening  Completed    Lipid Panel  Completed     Health Maintenance Topics with due status: Not Due       Topic Last Completion Date    Cervical Cancer Screening 09/15/2021    TETANUS VACCINE 03/06/2023    Influenza Vaccine Not Due     Health Maintenance Due   Topic Date Due    COVID-19 Vaccine (4 - 2023-24 season) 09/01/2023       HISTORY:   Past Medical History:   Diagnosis Date    Acute bilateral low back pain 08/09/2023    ADHD     Allergy     Anxiety disorder, unspecified     Celiac disease     Chronic instability of metacarpophalangeal joint of right thumb 01/10/2022    Chronic thumb pain, right 01/11/2022    Gamekeeper's thumb 01/06/2020    Mild intermittent asthma, uncomplicated        Past Surgical History: "   Procedure Laterality Date    BREAST SURGERY      REDUCTION    INTRAUTERINE DEVICE INSERTION  04/01/2015    KJB---MIRENA    LUMBAR LAMINECTOMY Right 4/1/2024    Procedure: LAMINECTOMY, SPINE, LUMBAR, L4- L5;  Surgeon: Fausto Camacho DO;  Location: [address];  Service: Neurosurgery;  Laterality: Right;  R sided L4/L5 MIS lami/discectomy, neuromonitoring, Evan 4 post, microscope    REPAIR OF COLLATERAL LIGAMENT OF THUMB Right 01/06/2020    Procedure: REPAIR, LIGAMENT, COLLATERAL, THUMB right;  Surgeon: Lisette Zaman MD;  Location: [facility];  Service: Orthopedics;  Laterality: Right;  regional MAC    WISDOM TOOTH EXTRACTION         Family History   Problem Relation Name Age of Onset    Graves' disease Mother      Hypertension Father      Diabetes Father      No Known Problems Sister      No Known Problems Brother      No Known Problems Brother      Cancer Maternal Grandmother          unknown etiology    Heart disease Maternal Grandfather      Breast cancer Paternal Grandmother      Dementia Paternal Grandmother      Colon cancer Neg Hx      Ovarian cancer Neg Hx      Esophageal cancer Neg Hx         Social History     Tobacco Use    Smoking status: Former     Current packs/day: 0.00     Types: Cigarettes     Start date: 2007     Quit date: 2009     Years since quitting: 15.5     Passive exposure: Past    Smokeless tobacco: Never    Tobacco comments:     1-2 cig daily   Substance Use Topics    Alcohol use: Not Currently     Comment: ~10 drinks/week. Seltzers, occasional wine    Drug use: No       Social History     Social History Narrative    Significant other. No children. 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 30 tablet 11 Taking    fluocinonide 0.05% (LIDEX) 0.05 % cream AAA of hands bid prn flares. 60 g 3 Taking    fluticasone propionate (CUTIVATE) 0.005 % ointment Apply to affected areas of face BID prn irritation. Do not use for longer than 2 weeks in a row. 15 g 0 Taking    fluticasone propionate (CUTIVATE) 0.05 % cream Apply to affected areas of body daily prn eczema. 60 g 3 Taking    lisdexamfetamine (VYVANSE) 10 mg Cap Take 3 capsules (30 mg total) by mouth every morning. 90 capsule 0 Taking    albuterol (VENTOLIN HFA) 90 mcg/actuation inhaler Inhale 1-2 puffs into the lungs 2 (two) times daily as needed for Wheezing or Shortness of Breath. Rescue 18 g 0     azelastine (ASTELIN) 137 mcg (0.1 %) nasal spray 1 spray (137 mcg total) by Nasal route 2 (two) times daily. 30 mL 0     cetirizine HCl (ZYRTEC ORAL) Take by mouth.       dextroamphetamine-amphetamine (ADDERALL XR) 15 MG 24 hr capsule Take 1 capsule (15 mg total) by mouth every morning. (Patient not taking: Reported on 6/3/2024) 30 capsule 0 Not Taking    dextroamphetamine-amphetamine (ADDERALL) 5 mg Tab Take 5 mg by mouth daily as needed (inattention PRN). (Patient not taking: Reported on 6/3/2024) 30 tablet 0     fluconazole (DIFLUCAN) 200 MG Tab Take 1 pill PO 2 times per week. (Patient not taking: Reported on 7/8/2024) 12 tablet 0 Not Taking    fluticasone-salmeterol diskus inhaler 250-50 mcg Inhale 1 puff into the lungs once daily. Controller 30 each 3     tacrolimus (PROTOPIC) 0.1 % ointment Compound tacrolimus 0.1% cream. Apply to affected areas of eyes BID. Safe to use everyday. 30 g 3          Vital signs:   Vitals:    07/08/24 1228   BP: 121/80   BP Location: Left arm   Patient Position: Sitting   Pulse: 78   SpO2: 99%   Weight: 64.4 kg (142 lb 1.4 oz)   Height: 5' 4" (1.626 m)     Body mass index is 24.39 kg/m².    PHYSICAL EXAM:     Physical " Exam  Vitals reviewed.   Constitutional:       General: She is not in acute distress.  HENT:      Head: Normocephalic and atraumatic.      Right Ear: Tympanic membrane and ear canal normal.      Left Ear: Tympanic membrane and ear canal normal.   Eyes:      General: No scleral icterus.     Conjunctiva/sclera: Conjunctivae normal.   Neck:      Thyroid: No thyromegaly.      Vascular: No carotid bruit.   Cardiovascular:      Rate and Rhythm: Normal rate and regular rhythm.      Heart sounds: Normal heart sounds. No murmur heard.     No friction rub. No gallop.   Pulmonary:      Effort: Pulmonary effort is normal.      Breath sounds: Normal breath sounds. No wheezing, rhonchi or rales.   Abdominal:      General: Bowel sounds are normal. There is no distension.      Palpations: Abdomen is soft.      Tenderness: There is no abdominal tenderness.   Musculoskeletal:      Cervical back: Normal range of motion and neck supple.      Right lower leg: No edema.      Left lower leg: No edema.   Lymphadenopathy:      Cervical: No cervical adenopathy.   Skin:     General: Skin is warm.      Capillary Refill: Capillary refill takes less than 2 seconds.   Neurological:      Mental Status: She is alert.             PERTINENT RESULTS:     Lab Results   Component Value Date    WBC 8.62 03/31/2024    HGB 13.3 03/31/2024    HCT 39.3 03/31/2024    MCV 87 03/31/2024     03/31/2024       CMP  Sodium   Date Value Ref Range Status   03/31/2024 138 136 - 145 mmol/L Final     Potassium   Date Value Ref Range Status   03/31/2024 3.5 3.5 - 5.1 mmol/L Final     Chloride   Date Value Ref Range Status   03/31/2024 107 95 - 110 mmol/L Final     CO2   Date Value Ref Range Status   03/31/2024 22 (L) 23 - 29 mmol/L Final     Glucose   Date Value Ref Range Status   03/31/2024 81 70 - 110 mg/dL Final     BUN   Date Value Ref Range Status   03/31/2024 11 6 - 20 mg/dL Final     Creatinine   Date Value Ref Range Status   03/31/2024 0.8 0.5 - 1.4 mg/dL  Final     Calcium   Date Value Ref Range Status   03/31/2024 9.8 8.7 - 10.5 mg/dL Final     Total Protein   Date Value Ref Range Status   03/31/2024 7.9 6.0 - 8.4 g/dL Final     Albumin   Date Value Ref Range Status   03/31/2024 4.6 3.5 - 5.2 g/dL Final     Total Bilirubin   Date Value Ref Range Status   03/31/2024 0.5 0.1 - 1.0 mg/dL Final     Comment:     For infants and newborns, interpretation of results should be based  on gestational age, weight and in agreement with clinical  observations.    Premature Infant recommended reference ranges:  Up to 24 hours.............<8.0 mg/dL  Up to 48 hours............<12.0 mg/dL  3-5 days..................<15.0 mg/dL  6-29 days.................<15.0 mg/dL       Alkaline Phosphatase   Date Value Ref Range Status   03/31/2024 71 55 - 135 U/L Final     AST   Date Value Ref Range Status   03/31/2024 24 10 - 40 U/L Final     ALT   Date Value Ref Range Status   03/31/2024 20 10 - 44 U/L Final     Anion Gap   Date Value Ref Range Status   03/31/2024 9 8 - 16 mmol/L Final     eGFR   Date Value Ref Range Status   03/31/2024 >60.0 >60 mL/min/1.73 m^2 Final     Lab Results   Component Value Date    TSH 1.362 09/13/2023     Lab Results   Component Value Date    CHOL 184 09/13/2023    CHOL 164 01/05/2016     Lab Results   Component Value Date    HDL 61 09/13/2023    HDL 52 01/05/2016     Lab Results   Component Value Date    LDLCALC 111.8 09/13/2023    LDLCALC 97.2 01/05/2016     Lab Results   Component Value Date    TRIG 56 09/13/2023    TRIG 74 01/05/2016       Lab Results   Component Value Date    CHOLHDL 33.2 09/13/2023    CHOLHDL 31.7 01/05/2016     Lab Results   Component Value Date    HGBA1C 5.1 09/13/2023     No results found for this or any previous visit.  No results found for this or any previous visit.      ASSESSMENT/PLAN:    1. Encounter for general adult medical examination with abnormal findings  Overview:  - preventative health counseling  - counseling on current  recommendations for breast cancer screening. Paternal grandmother breast cancer > 66 yo  - counseling on current recommendations for cervical cancer screening. Followed by Gynecology and up to date  - counseling on current recommendations for colon cancer screening. Average risk      Orders:  -     Lipid Panel; Future; Expected date: 07/08/2024  -     Hemoglobin A1C; Future; Expected date: 07/08/2024  -     Comprehensive Metabolic Panel; Future; Expected date: 07/08/2024  -     CBC Auto Differential; Future; Expected date: 07/08/2024    2. Chronic fatigue  Overview:  - prior similar issues in 2022   -acute on chronic  -recent worsening   -recommend CBC, TSH, CMP, CRP, ESR and JERILYN    Orders:  -     Comprehensive Metabolic Panel; Future; Expected date: 07/08/2024  -     CBC Auto Differential; Future; Expected date: 07/08/2024  -     T3, Free; Future; Expected date: 08/08/2024  -     T4, Free; Future; Expected date: 08/08/2024  -     Thyroid Peroxidase Antibody; Future; Expected date: 08/08/2024  -     TSH; Future; Expected date: 08/08/2024  -     JERILYN Screen w/Reflex; Future; Expected date: 07/08/2024  -     C-REACTIVE PROTEIN; Future; Expected date: 07/08/2024  -     Sedimentation rate; Future; Expected date: 07/08/2024    3. Brain fog  -     Comprehensive Metabolic Panel; Future; Expected date: 07/08/2024  -     T3, Free; Future; Expected date: 08/08/2024  -     T4, Free; Future; Expected date: 08/08/2024  -     Thyroid Peroxidase Antibody; Future; Expected date: 08/08/2024  -     TSH; Future; Expected date: 08/08/2024  -     JERILYN Screen w/Reflex; Future; Expected date: 07/08/2024  -     C-REACTIVE PROTEIN; Future; Expected date: 07/08/2024  -     Sedimentation rate; Future; Expected date: 07/08/2024    4. Nail disorder  -     JERILYN Screen w/Reflex; Future; Expected date: 07/08/2024  -     C-REACTIVE PROTEIN; Future; Expected date: 07/08/2024  -     Sedimentation rate; Future; Expected date: 07/08/2024    5. Dysuria  -      Urinalysis, Reflex to Urine Culture Urine, Clean Catch; Future; Expected date: 07/08/2024    6. Mild intermittent asthma without complication  Overview:  - well controlled  - continue current management plan   - patient encouraged to notify me with any changes            ORDERS:   Orders Placed This Encounter    Lipid Panel    Hemoglobin A1C    Comprehensive Metabolic Panel    CBC Auto Differential    T3, Free    T4, Free    Thyroid Peroxidase Antibody    TSH    JERILYN Screen w/Reflex    C-REACTIVE PROTEIN    Sedimentation rate    Urinalysis, Reflex to Urine Culture Urine, Clean Catch       Vaccines recommended:  COVID-19 update    Follow up in about 1 year (around 7/8/2025). or sooner with any concerns        This note is dictated using the M*Modal Fluency Direct word recognition program. There are word recognition mistakes that are occasionally missed on review.    Dr. She Oleary D.O.   Family Medicine

## 2024-07-09 ENCOUNTER — LAB VISIT (OUTPATIENT)
Dept: LAB | Facility: OTHER | Age: 34
End: 2024-07-09
Attending: FAMILY MEDICINE
Payer: COMMERCIAL

## 2024-07-09 ENCOUNTER — OFFICE VISIT (OUTPATIENT)
Dept: PSYCHIATRY | Facility: CLINIC | Age: 34
End: 2024-07-09
Payer: COMMERCIAL

## 2024-07-09 DIAGNOSIS — R30.0 DYSURIA: ICD-10-CM

## 2024-07-09 DIAGNOSIS — F90.0 ATTENTION DEFICIT HYPERACTIVITY DISORDER (ADHD), PREDOMINANTLY INATTENTIVE TYPE: Primary | ICD-10-CM

## 2024-07-09 DIAGNOSIS — F33.1 MODERATE EPISODE OF RECURRENT MAJOR DEPRESSIVE DISORDER: ICD-10-CM

## 2024-07-09 LAB
BACTERIA #/AREA URNS HPF: ABNORMAL /HPF
BILIRUB UR QL STRIP: NEGATIVE
CLARITY UR: CLEAR
COLOR UR: COLORLESS
GLUCOSE UR QL STRIP: NEGATIVE
HGB UR QL STRIP: ABNORMAL
KETONES UR QL STRIP: NEGATIVE
LEUKOCYTE ESTERASE UR QL STRIP: ABNORMAL
MICROSCOPIC COMMENT: ABNORMAL
NITRITE UR QL STRIP: NEGATIVE
PH UR STRIP: 7 [PH] (ref 5–8)
PROT UR QL STRIP: NEGATIVE
RBC #/AREA URNS HPF: 2 /HPF (ref 0–4)
SP GR UR STRIP: 1 (ref 1–1.03)
SQUAMOUS #/AREA URNS HPF: 1 /HPF
URN SPEC COLLECT METH UR: ABNORMAL
WBC #/AREA URNS HPF: 17 /HPF (ref 0–5)

## 2024-07-09 PROCEDURE — 99214 OFFICE O/P EST MOD 30 MIN: CPT | Mod: 95,,, | Performed by: STUDENT IN AN ORGANIZED HEALTH CARE EDUCATION/TRAINING PROGRAM

## 2024-07-09 PROCEDURE — 81000 URINALYSIS NONAUTO W/SCOPE: CPT | Performed by: FAMILY MEDICINE

## 2024-07-09 PROCEDURE — 87088 URINE BACTERIA CULTURE: CPT | Performed by: FAMILY MEDICINE

## 2024-07-09 PROCEDURE — 87086 URINE CULTURE/COLONY COUNT: CPT | Performed by: FAMILY MEDICINE

## 2024-07-09 PROCEDURE — 87186 SC STD MICRODIL/AGAR DIL: CPT | Performed by: FAMILY MEDICINE

## 2024-07-09 RX ORDER — DEXTROAMPHETAMINE SACCHARATE, AMPHETAMINE ASPARTATE, DEXTROAMPHETAMINE SULFATE AND AMPHETAMINE SULFATE 1.25; 1.25; 1.25; 1.25 MG/1; MG/1; MG/1; MG/1
5 TABLET ORAL DAILY PRN
Qty: 30 TABLET | Refills: 0 | Status: SHIPPED | OUTPATIENT
Start: 2024-07-09

## 2024-07-09 RX ORDER — LORAZEPAM 0.5 MG/1
0.5 TABLET ORAL EVERY 6 HOURS PRN
Qty: 15 TABLET | Refills: 0 | Status: SHIPPED | OUTPATIENT
Start: 2024-07-09 | End: 2024-08-08

## 2024-07-09 RX ORDER — LISDEXAMFETAMINE DIMESYLATE 30 MG/1
30 CAPSULE ORAL EVERY MORNING
Qty: 30 CAPSULE | Refills: 0 | Status: SHIPPED | OUTPATIENT
Start: 2024-08-08

## 2024-07-09 RX ORDER — LISDEXAMFETAMINE DIMESYLATE 30 MG/1
30 CAPSULE ORAL EVERY MORNING
Qty: 30 CAPSULE | Refills: 0 | Status: SHIPPED | OUTPATIENT
Start: 2024-09-06

## 2024-07-09 RX ORDER — ESCITALOPRAM OXALATE 20 MG/1
20 TABLET ORAL DAILY
Qty: 90 TABLET | Refills: 3 | Status: SHIPPED | OUTPATIENT
Start: 2024-07-09 | End: 2025-07-09

## 2024-07-09 RX ORDER — LISDEXAMFETAMINE DIMESYLATE 30 MG/1
30 CAPSULE ORAL EVERY MORNING
Qty: 30 CAPSULE | Refills: 0 | Status: SHIPPED | OUTPATIENT
Start: 2024-07-09

## 2024-07-09 NOTE — PROGRESS NOTES
"Outpatient Psychiatry Follow-Up Visit (MD/NP)    7/9/2024    Chief Complaint:  Jennifer Nguyen is a 34 y.o. female who presents today for follow-up of attention problems.  Met with patient.      Interval History and Content of Current Session:  Interim Events/Subjective Report/Content of Current Session:   Jennifer Nguyen checked in on time for her visit- pt takes 30 mg vyvanse daily. Lexapro 20 mg ativan PRN. States she has been doing "OK"- back is on the mend, she is in physical therapy. She is sore on and off. Saw PCP yesterday with complaint of fatigue and malaise. Overall mood has been improving. She is still in regular therapy. She has had spine surgery since last visit due to severely herniated disc. Her innjury made her realize how good her support system is. She is still taking the lexapro 20 mg daily.       Her back is better, she had a pinched nerve. Mood has been "up and down."  She feels like her vyvanse wears off around 2-3. Talked about taking an IR adderall 5 mg in the afternoon. Talked about the issues with her pharmacy. She wants to be sure she can maintain her appetite- she is trying to be active and healthy. She has no history of eating disorder.  She feels like the lexapro is still working well for her. Has taken very few ativan- sometimes finds it makes her feel sedated or loopy, so she breaks the pill in half.       She denies any side effects from her medication.    No thoughts of suicide.   No HI, no AVH.       Initial history with me:  Had tried wellbutrin, it made her irritable, which she was not a fan of. She likes the vyvanse, but she is concerned about the cost. She is able to sit still, sit in silence. She is no longer as distracted by extraneous stimuli. 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 She feels like her skin may be drier from the medication- she does have a history of eczema. She is ok with it. She denies chest pain, palpitations. NO hx of heart murmur, no hx of heart problems. " "  She states her anxiety has been OK. She is also in therapy and saw her therapist yesterday. A lot of her anxiety was secondary to problems with focus. She feels like things are much better and her focus and functioning is much better. She had tried anxiety, depression medications and had not seen benefit.   She has had weird bruising, fatigue- saw GP, saw a hematologist.     Pt is from [place], [place]. Appetite has decreased, she has lost about 10 lbs.   Denies SI, HI, AVH. She is very accident prone.   No thoughts about suicide.     No gun in her house. Did attempt suicide in 9th grade. Has a history of self harm-  was picking scabs during the pandemic, but hasn't cut in a long time. She lives alone, and was very isolated in the pandemic. IT was hard for her to cope with the loneliness.     Review of Systems   PSYCHIATRIC: Pertinant items are noted in the narrative.  CONSTITUTIONAL: No weight gain or loss.   MUSCULOSKELETAL: No pain or stiffness of the joints.  ENT: See above.  RESPIRATORY: See above.  CARDIOVASCULAR: No tachycardia or chest pain.  GASTROINTESTINAL: No nausea, vomiting, pain, constipation or diarrhea.    Past Medical, Family and Social History: The patient's past medical, family and social history have been reviewed and updated as appropriate within the electronic medical record - see encounter notes.    Compliance: yes    Side effects: see above    Risk Parameters:  Patient reports no suicidal ideation  Patient reports no homicidal ideation  Patient reports no self-injurious behavior  Patient reports no violent behavior    Exam (detailed: at least 9 elements; comprehensive: all 15 elements)   Constitutional  Vitals:  There were no vitals filed for this visit.           General:  unremarkable, age appropriate     Musculoskeletal  Muscle Strength/Tone:  not examined   Gait & Station:  non-ataxic     Psychiatric  Speech:  no latency; no press   Mood & Affect:  "Doing better"  congruent and " appropriate   Thought Process:  normal and logical   Associations:  intact   Thought Content:  normal, no suicidality, no homicidality, delusions, or paranoia   Insight:  intact   Judgement: behavior is adequate to circumstances   Orientation:  grossly intact   Memory: intact for content of interview   Language: grossly intact   Attention Span & Concentration:  able to focus   Fund of Knowledge:  intact and appropriate to age and level of education     Assessment and Diagnosis   Status/Progress: Based on the examination today, the patient's problem(s) is/are adequately but not ideally controlled.  New problems have not been presented today.   Co-morbidities, Diagnostic uncertainty, and Lack of compliance are not complicating management of the primary condition.  There are no active rule-out diagnoses for this patient at this time.     General Impression: Jennifer Nguyen, a 34 y.o.  female, presenting for follow-up visit for management of ADHD symptoms. Presents 12/22/22 - did not tolerate Wellbutrin; Vyvanse started. Presents at 3/21/22 for F/u, vvyanse has been helping and she denies SE, feels great benefit.   9/2023- suffering due to chronic pain, having depression and anxiety.   7/9/2024- doing well. No issues with medications wishes to continue    ADHD, inattentive type   Major depressive disorder, recurrent, moderate  Chronic pain     Intervention/Counseling/Treatment Plan   Medication Management: Continue current medications.    Continue Vyvanse 30 mg daily. Can start adderall 5 mg IR in the afternoon PRN.   Patient has no contraindications: no h/o allergic rxn, agitation, anxiety, tourette's, arrythmia, cardiovascular disease, cardiac structural abnormalities, hyperthyroidism, glaucoma, Other psychiatric illness, etc.   Reviewed possible side effects 3 times. Discussed diagnosis, risks and benefits of proposed treatment vs alternative treatments vs no treatment, and potential side effects of these  treatments (death, dependency, psychosis, alisson, aggression, HTN, tics, MI, stroke, arrythmia, seizure, anaphylaxis or other allergic reactions, leukopenia, nervousness, anorexia, insomnia, tachycardia, palpitations, dizziness, BP changes, HR changes, visual disturbance, etc.). The patient expresses understanding of the above and displays the capacity to agree with this treatment given said understanding. Patient also agrees that, currently, the benefits outweigh the risks and would like to pursue treatment at this time.  The risks and benefits of medication were discussed with the patient.    Discussed diagnosis, risks and benefits of proposed treatment above vs alternative treatments vs no treatment, and potential side effects of these treatments. The patient expresses understanding of the above and displays the capacity to agree with this treatment given said understanding. Patient also agrees that, currently, the benefits outweigh the risks and would like to pursue treatment at this time.  Pt is experiencing a recurrence of her depression. continue lexapro to 20 mg daily.  Reviewed r/b/SE of medication including but not limited to GI distress, serotonin syndrome, manic switching, increased SI, sexual SE. All questions and concerns addressed. No SI.   Wrote #15 0.5 mg ativan for PRN anxiety, insomnia. Informed pt of the risks of continuous She does not need a refill. Benzodiazepine use including tolerance, dependence and withdrawals that may be life threatening upon abrupt cessation. Also advised not to take Benzodiazepines with Opiates or other sedatives and also not to drive or operate heavy machinery while using Benzodiazepines. Reminded never to mix with alcohol or opiates.  Continue with therapy.  Discussed upcoming maternity leave.   Discussed inherent unpredictability of medications in each individual.   Encouraged Patient to keep future appointments.   Take medications as prescribed and abstain from  substance abuse.   In the event of an emergency patient was advised to go to the emergency room    Return to Clinic:3 months or sooner ELSIE Kessler MD

## 2024-07-10 ENCOUNTER — PATIENT MESSAGE (OUTPATIENT)
Dept: PRIMARY CARE CLINIC | Facility: CLINIC | Age: 34
End: 2024-07-10
Payer: COMMERCIAL

## 2024-07-10 ENCOUNTER — CLINICAL SUPPORT (OUTPATIENT)
Dept: REHABILITATION | Facility: OTHER | Age: 34
End: 2024-07-10
Payer: COMMERCIAL

## 2024-07-10 DIAGNOSIS — M35.7 HYPERMOBILITY SYNDROME: ICD-10-CM

## 2024-07-10 DIAGNOSIS — K90.0 CELIAC DISEASE: Primary | ICD-10-CM

## 2024-07-10 DIAGNOSIS — E87.1 HYPONATREMIA: ICD-10-CM

## 2024-07-10 DIAGNOSIS — D72.10 EOSINOPHILIA, UNSPECIFIED TYPE: Primary | ICD-10-CM

## 2024-07-10 DIAGNOSIS — R29.898 WEAKNESS OF LOWER EXTREMITY, UNSPECIFIED LATERALITY: Primary | ICD-10-CM

## 2024-07-10 PROCEDURE — 97112 NEUROMUSCULAR REEDUCATION: CPT | Mod: PN

## 2024-07-10 PROCEDURE — 97530 THERAPEUTIC ACTIVITIES: CPT | Mod: PN

## 2024-07-10 RX ORDER — NITROFURANTOIN 25; 75 MG/1; MG/1
100 CAPSULE ORAL 2 TIMES DAILY
Qty: 10 CAPSULE | Refills: 0 | Status: SHIPPED | OUTPATIENT
Start: 2024-07-10 | End: 2024-07-11

## 2024-07-10 NOTE — PROGRESS NOTES
"  [facility]   Physical Therapy Treatment Note     Name: Jennifer Nguyen  Clinic Number: [id number]    Therapy Diagnosis:   Encounter Diagnosis   Name Primary?    Weakness of lower extremity, unspecified laterality Yes         Physician: Sonia Belcher PA-C    Visit Date: 7/10/2024    Physician Orders: Physical Therapy Evaluate and Treat  Medical Diagnosis from Referral: lumbar herniated disc  Evaluation Date: 6/5/24  Authorization Period Expiration: 12/31/24  Plan of Care Expiration: 6/5/2024 to 9/5/24  Visit # / Visits authorized: 14/20  FOTO: 0/3  on 6/5/24    Precautions: standard    Time In: 0800am  Time Out: 0900am  Total Billable Time: 60 minutes    SUBJECTIVE     Pt reports: that she is doing well today. Pt states that she is having less pain than during her previous treatment session.       She was compliant with home exercise program.  Response to previous treatment: tolerated well, no issues  Functional change: tolerated well, no issues    Prior Level of Function: no limitation  Current Level of Function: limited with flexion based movements, recreational activity     Pain:  Current 2/10, worst 2/10, best 1/10   Location: left low back and left posterior hip  Description: Aching with occasional stabbing with trunk rotation  Aggravating Factors: sitting  Easing Factors: rest     Pts goals: Pt would like to return to yoga with no increase in low back pain.    OBJECTIVE     Objective Measures updated at progress report unless specified.     Treatment     Jennifer Osullivan received the treatments listed below in bold:        neuromuscular re-education activities to improve: Coordination, Kinesthetic, Sense, Proprioception, and Posture for 20 minutes. The following activities were included:    Supine TrA + dead bug with 3# dumbbells 20x  SL clams, light green TB 2x10 R/L in side plank  SLR with pilaties ring press, 3x10 R/L  Bird dogs 2x10  Bear plank 10x3" holds  Shuttle squats, 50# " 2x20   Hesitation march vs 15# KB 80'x4  Pull down + march RTB 2x10  SL hip circles 30x CW/CCW x 2 sets      therapeutic activities to improve functional performance for 40 minutes, including:    TRX squat row x3 min  TRX reverse lunge x3 min  Goblet squat vs 10# KB 2x10  Good morning 2x10  Pallof press with lift up vs grey tube 30x R/L  Deadlift vs 10# on 6 inch step 30x        Patient Education and Home Exercises     Home Exercises Provided and Patient Education Provided     Education provided:   - Pt education regarding proper technique for there-ex/HEP, as well as rationale for exercise/ POC.       Written Home Exercises Provided: Patient instructed to cont prior HEP. Exercises were reviewed and Jennifer Osullivan was able to demonstrate them prior to the end of the session.  Jennifer Osullivan demonstrated good  understanding of the education provided. See EMR under Patient Instructions for exercises provided during therapy sessions    ASSESSMENT   Pt tolerated treatment session well today. Progressed pt back to prior routine following soreness experienced after last treatment session. Continue PT POC.    Jennifer Osullivan Is progressing well towards her goals.   Pt prognosis is Good.     Pt will continue to benefit from skilled outpatient physical therapy to address the deficits listed in the problem list box on initial evaluation, provide pt/family education and to maximize pt's level of independence in the home and community environment.     Pt's spiritual, cultural and educational needs considered and pt agreeable to plan of care and goals.     Anticipated Barriers for therapy: None    GOALS:  Short Term Goals (4 Weeks):  1. Patient will be compliant with home exercise program to supplement therapy in promoting functional mobility. (progressing, not met)    2. Patient will perform lifting activity with good control to demonstrate improved core strength. (progressing, not met)    3. Patient will report no pain during thoracolumbar  active range of motion to promote functional mobility.  (progressing, not met)    4. Patient will improve impaired lower extremity manual muscle tests  to >/=4/5 to improve strength for functional tasks. (progressing, not met)          Long Term Goals (6 Weeks):   1. Patient will improve FOTO score to </= 35% limited to decrease perceived limitation with maintaining/changing body position. (progressing, not met)    2. Patient will perform yoga movements with good control to demonstrate improved core strength.  (progressing, not met)    3. Patient will improve impaired lower extremity manual muscle tests to >/=4+/5 to improve strength for functional tasks.  (progressing, not met)    4. Patient will tolerate sitting for 120 minutes with no increase in low back pain to return to PLOF.  (progressing, not met)         PLAN     Plan of care Certification: 6/5/2024 to 9/5/24    Focus on core/LE strength and ROM with emphasis on posture and ADL performance     Outpatient Physical Therapy 2 times weekly for 12 weeks to include the following interventions: Therapeutic Exercises, Manual Therapeutic Technique, Neuromuscular Re Education, Therapeutic Activities. Modalities, Kinesiotape prn, and Functional Dry Needling as needed.    Ioana Keane, PT

## 2024-07-11 ENCOUNTER — PATIENT MESSAGE (OUTPATIENT)
Dept: PRIMARY CARE CLINIC | Facility: CLINIC | Age: 34
End: 2024-07-11
Payer: COMMERCIAL

## 2024-07-11 LAB — BACTERIA UR CULT: ABNORMAL

## 2024-07-11 RX ORDER — CIPROFLOXACIN 500 MG/1
500 TABLET ORAL EVERY 12 HOURS
Qty: 10 TABLET | Refills: 0 | Status: SHIPPED | OUTPATIENT
Start: 2024-07-11 | End: 2024-07-16

## 2024-07-15 ENCOUNTER — CLINICAL SUPPORT (OUTPATIENT)
Dept: REHABILITATION | Facility: OTHER | Age: 34
End: 2024-07-15
Payer: COMMERCIAL

## 2024-07-15 ENCOUNTER — TELEPHONE (OUTPATIENT)
Dept: GASTROENTEROLOGY | Facility: CLINIC | Age: 34
End: 2024-07-15
Payer: COMMERCIAL

## 2024-07-15 DIAGNOSIS — R29.898 WEAKNESS OF LOWER EXTREMITY, UNSPECIFIED LATERALITY: Primary | ICD-10-CM

## 2024-07-15 PROCEDURE — 97112 NEUROMUSCULAR REEDUCATION: CPT | Mod: PN

## 2024-07-15 PROCEDURE — 97530 THERAPEUTIC ACTIVITIES: CPT | Mod: PN

## 2024-07-15 NOTE — PROGRESS NOTES
[facility]   Physical Therapy Treatment Note     Name: Jennifer Nguyen  Clinic Number: [id number]    Therapy Diagnosis:   Encounter Diagnosis   Name Primary?    Weakness of lower extremity, unspecified laterality Yes         Physician: Sonia Belcher PA-C    Visit Date: 7/15/2024    Physician Orders: Physical Therapy Evaluate and Treat  Medical Diagnosis from Referral: lumbar herniated disc  Evaluation Date: 6/5/24  Authorization Period Expiration: 12/31/24  Plan of Care Expiration: 6/5/2024 to 9/5/24  Visit # / Visits authorized: 15/20  FOTO: 0/3  on 6/5/24    Precautions: standard    Time In: 1130am  Time Out: 1230pm  Total Billable Time: 60 minutes    SUBJECTIVE     Pt reports: that she is doing well today. Pt states that her low back has not bothered her since her last visit. She states general soreness in her arms after doing her exercises last week, but nothing related to her core/low back.      She was compliant with home exercise program.  Response to previous treatment: tolerated well, no issues  Functional change: tolerated well, no issues    Prior Level of Function: no limitation  Current Level of Function: limited with flexion based movements, recreational activity     Pain:  Current 2/10, worst 2/10, best 1/10   Location: left low back and left posterior hip  Description: Aching with occasional stabbing with trunk rotation  Aggravating Factors: sitting  Easing Factors: rest     Pts goals: Pt would like to return to yoga with no increase in low back pain.    OBJECTIVE     Objective Measures updated at progress report unless specified.     Treatment     Jennifer Osullivan received the treatments listed below in bold:        neuromuscular re-education activities to improve: Coordination, Kinesthetic, Sense, Proprioception, and Posture for 20 minutes. The following activities were included:    Supine TrA + dead bug with 3# dumbbells 20x  SL clams, pink looped TB 2x10 R/L in side  "plank  SLR with pilaties ring press, 3x10 R/L  Bird dogs 2x10  Bear plank 10x3" holds  Shuttle squats, 50# 2x20   +SL shuttle squats 50# 3x10 repetitions  Hesitation march vs 15# KB 80'x4  Pull down + march RTB 2x10  SL hip circles 30x CW/CCW x 2 sets      therapeutic activities to improve functional performance for 40 minutes, including:    TRX row x3 min  TRX reverse lunge x3 min  Goblet squat vs 15# KB 3x10  Good morning 2x10  Pallof press with lift up vs 7# on Freemotion 30x R/L  Deadlift vs 15# on 6 inch step 30x        Patient Education and Home Exercises     Home Exercises Provided and Patient Education Provided     Education provided:   - Pt education regarding proper technique for there-ex/HEP, as well as rationale for exercise/ POC.       Written Home Exercises Provided: Patient instructed to cont prior HEP. Exercises were reviewed and Jennifer Osullivan was able to demonstrate them prior to the end of the session.  Jennifer Osullivan demonstrated good  understanding of the education provided. See EMR under Patient Instructions for exercises provided during therapy sessions    ASSESSMENT   Pt tolerated treatment session well today. Progressed pt back to prior routine following soreness experienced after last treatment session. Continue PT POC.    Jennifer Osullivan Is progressing well towards her goals.   Pt prognosis is Good.     Pt will continue to benefit from skilled outpatient physical therapy to address the deficits listed in the problem list box on initial evaluation, provide pt/family education and to maximize pt's level of independence in the home and community environment.     Pt's spiritual, cultural and educational needs considered and pt agreeable to plan of care and goals.     Anticipated Barriers for therapy: None    GOALS:  Short Term Goals (4 Weeks):  1. Patient will be compliant with home exercise program to supplement therapy in promoting functional mobility. (progressing, not met)    2. Patient will perform " lifting activity with good control to demonstrate improved core strength. (progressing, not met)    3. Patient will report no pain during thoracolumbar active range of motion to promote functional mobility.  (progressing, not met)    4. Patient will improve impaired lower extremity manual muscle tests  to >/=4/5 to improve strength for functional tasks. (progressing, not met)          Long Term Goals (6 Weeks):   1. Patient will improve FOTO score to </= 35% limited to decrease perceived limitation with maintaining/changing body position. (progressing, not met)    2. Patient will perform yoga movements with good control to demonstrate improved core strength.  (progressing, not met)    3. Patient will improve impaired lower extremity manual muscle tests to >/=4+/5 to improve strength for functional tasks.  (progressing, not met)    4. Patient will tolerate sitting for 120 minutes with no increase in low back pain to return to PLOF.  (progressing, not met)         PLAN     Plan of care Certification: 6/5/2024 to 9/5/24    Focus on core/LE strength and ROM with emphasis on posture and ADL performance     Outpatient Physical Therapy 2 times weekly for 12 weeks to include the following interventions: Therapeutic Exercises, Manual Therapeutic Technique, Neuromuscular Re Education, Therapeutic Activities. Modalities, Kinesiotape prn, and Functional Dry Needling as needed.    Ioana Keane, PT

## 2024-07-17 ENCOUNTER — CLINICAL SUPPORT (OUTPATIENT)
Dept: REHABILITATION | Facility: OTHER | Age: 34
End: 2024-07-17
Payer: COMMERCIAL

## 2024-07-17 DIAGNOSIS — R29.898 WEAKNESS OF LOWER EXTREMITY, UNSPECIFIED LATERALITY: Primary | ICD-10-CM

## 2024-07-17 PROCEDURE — 97112 NEUROMUSCULAR REEDUCATION: CPT | Mod: PN,CQ

## 2024-07-17 PROCEDURE — 97530 THERAPEUTIC ACTIVITIES: CPT | Mod: PN,CQ

## 2024-07-17 NOTE — PROGRESS NOTES
[facility]   Physical Therapy Treatment Note     Name: Jennifer Nguyen  Clinic Number: [id number]    Therapy Diagnosis:   Encounter Diagnosis   Name Primary?    Weakness of lower extremity, unspecified laterality Yes           Physician: Sonia Belcher PA-C    Visit Date: 7/17/2024    Physician Orders: Physical Therapy Evaluate and Treat  Medical Diagnosis from Referral: lumbar herniated disc  Evaluation Date: 6/5/24  Authorization Period Expiration: 12/31/24  Plan of Care Expiration: 6/5/2024 to 9/5/24  Visit # / Visits authorized: 17/20  FOTO: 0/3  on 6/5/24    Precautions: standard    Time In:  0758  Time Out: 0900  Total Billable Time: 60 minutes    SUBJECTIVE     Pt reports: that she is feeling mild exercise induced muscle soreness in her back/hips. Feels more pain on the L hip today however R anterior hip has been bothering her.       She was compliant with home exercise program.  Response to previous treatment: minor soreness  Functional change: tolerated well, no issues    Prior Level of Function: no limitation  Current Level of Function: limited with flexion based movements, recreational activity     Pain:  Current 2/10, worst 2/10, best 1/10   Location: left low back and left posterior hip  Description: Aching with occasional stabbing with trunk rotation  Aggravating Factors: sitting  Easing Factors: rest     Pts goals: Pt would like to return to yoga with no increase in low back pain.    OBJECTIVE     Objective Measures updated at progress report unless specified.     Treatment     Jennifer Osullivan received the treatments listed below in bold:        neuromuscular re-education activities to improve: Coordination, Kinesthetic, Sense, Proprioception, and Posture for 30 minutes. The following activities were included:    Supine TrA + dead bug with 3# dumbbells 20x  SL clams, pink looped TB 2x10 R/L in side plank  SLR with pilaties ring press, 3x10 R/L  +Seated trunk extension  "using BTB anchored to 35# KB, 3x10  Bird dogs 2x10  Bear plank 10x3" holds  Shuttle squats, 50# 3x15  SL shuttle squats 50# 3x10 repetitions  Hesitation march vs 15# KB 80'x4  Pull down + march RTB 2x10  SL hip circles 30x CW/CCW x 2 sets      therapeutic activities to improve functional performance for 30 minutes, including:    TRX row x3 min  TRX reverse lunge x3 min  Goblet squat vs 15# KB 3x10  Good morning 2x10  Pallof press with lift up vs 7# on Freemotion 30x R/L  Deadlift vs 15# on 6 inch step 30x        Patient Education and Home Exercises     Home Exercises Provided and Patient Education Provided     Education provided:   - Pt education regarding proper technique for there-ex/HEP, as well as rationale for exercise/ POC.       Written Home Exercises Provided: Patient instructed to cont prior HEP. Exercises were reviewed and Jennifer Osullivan was able to demonstrate them prior to the end of the session.  Jennifer Osullivan demonstrated good  understanding of the education provided. See EMR under Patient Instructions for exercises provided during therapy sessions    ASSESSMENT   Jennifer Osullivan appears to be progressing well as her strength in her core/LE continue to improve. She is less guarded with trunk rotational movements however she still continue to have weakness/instability in hip and multifidi/transverse abdominis musculature. I feel this will continue to improve with as her strength and ROM progresses.     Jennifer Osullivan Is progressing well towards her goals.   Pt prognosis is Good.     Pt will continue to benefit from skilled outpatient physical therapy to address the deficits listed in the problem list box on initial evaluation, provide pt/family education and to maximize pt's level of independence in the home and community environment.     Pt's spiritual, cultural and educational needs considered and pt agreeable to plan of care and goals.     Anticipated Barriers for therapy: None    GOALS:  Short Term Goals (4 " Weeks):  1. Patient will be compliant with home exercise program to supplement therapy in promoting functional mobility. (progressing, not met)    2. Patient will perform lifting activity with good control to demonstrate improved core strength. (progressing, not met)    3. Patient will report no pain during thoracolumbar active range of motion to promote functional mobility.  (progressing, not met)    4. Patient will improve impaired lower extremity manual muscle tests  to >/=4/5 to improve strength for functional tasks. (progressing, not met)          Long Term Goals (6 Weeks):   1. Patient will improve FOTO score to </= 35% limited to decrease perceived limitation with maintaining/changing body position. (progressing, not met)    2. Patient will perform yoga movements with good control to demonstrate improved core strength.  (progressing, not met)    3. Patient will improve impaired lower extremity manual muscle tests to >/=4+/5 to improve strength for functional tasks.  (progressing, not met)    4. Patient will tolerate sitting for 120 minutes with no increase in low back pain to return to PLOF.  (progressing, not met)         PLAN     Plan of care Certification: 6/5/2024 to 9/5/24    Focus on core/LE strength and ROM with emphasis on posture and ADL performance     Outpatient Physical Therapy 2 times weekly for 12 weeks to include the following interventions: Therapeutic Exercises, Manual Therapeutic Technique, Neuromuscular Re Education, Therapeutic Activities. Modalities, Kinesiotape prn, and Functional Dry Needling as needed.    Alf Conley, PTA

## 2024-07-22 ENCOUNTER — CLINICAL SUPPORT (OUTPATIENT)
Dept: REHABILITATION | Facility: OTHER | Age: 34
End: 2024-07-22
Payer: COMMERCIAL

## 2024-07-22 ENCOUNTER — OFFICE VISIT (OUTPATIENT)
Dept: NEUROSURGERY | Facility: CLINIC | Age: 34
End: 2024-07-22
Payer: COMMERCIAL

## 2024-07-22 VITALS — SYSTOLIC BLOOD PRESSURE: 114 MMHG | TEMPERATURE: 99 F | HEART RATE: 83 BPM | DIASTOLIC BLOOD PRESSURE: 75 MMHG

## 2024-07-22 DIAGNOSIS — M51.26 LUMBAR HERNIATED DISC: Primary | ICD-10-CM

## 2024-07-22 DIAGNOSIS — R29.898 WEAKNESS OF LOWER EXTREMITY, UNSPECIFIED LATERALITY: Primary | ICD-10-CM

## 2024-07-22 PROCEDURE — 97112 NEUROMUSCULAR REEDUCATION: CPT | Mod: PN,CQ

## 2024-07-22 PROCEDURE — 3078F DIAST BP <80 MM HG: CPT | Mod: CPTII,S$GLB,, | Performed by: STUDENT IN AN ORGANIZED HEALTH CARE EDUCATION/TRAINING PROGRAM

## 2024-07-22 PROCEDURE — 3044F HG A1C LEVEL LT 7.0%: CPT | Mod: CPTII,S$GLB,, | Performed by: STUDENT IN AN ORGANIZED HEALTH CARE EDUCATION/TRAINING PROGRAM

## 2024-07-22 PROCEDURE — 99214 OFFICE O/P EST MOD 30 MIN: CPT | Mod: S$GLB,,, | Performed by: STUDENT IN AN ORGANIZED HEALTH CARE EDUCATION/TRAINING PROGRAM

## 2024-07-22 PROCEDURE — 3074F SYST BP LT 130 MM HG: CPT | Mod: CPTII,S$GLB,, | Performed by: STUDENT IN AN ORGANIZED HEALTH CARE EDUCATION/TRAINING PROGRAM

## 2024-07-22 PROCEDURE — 97530 THERAPEUTIC ACTIVITIES: CPT | Mod: PN,CQ

## 2024-07-22 PROCEDURE — 1159F MED LIST DOCD IN RCRD: CPT | Mod: CPTII,S$GLB,, | Performed by: STUDENT IN AN ORGANIZED HEALTH CARE EDUCATION/TRAINING PROGRAM

## 2024-07-22 NOTE — PROGRESS NOTES
Neurosurgery  Established Patient    SUBJECTIVE:     History of Present Illness:  Jennifer Nguyen is a 34 y.o. female s/p L4/5 MIS lami and microdiscectomy on 4/1/24 for a herniated disc. The patient has been doing well post op. She reports no leg pain. Continues to have some low back pain. She does reports continued numbness in her R shin and foot. She reports her right leg does feel unstable. She is not taking pain medicine any longer. No new numbness, paraesthesias, weakness, bowel/bladder dysfunction or gait difficulties.    Interval history 7/22/24: Pt has been doing physical therapy since last visit and feels like she has had significant improvement in strength and activity, however she still feels some minor imbalance in strength on the right with exercise. She has resumed pilates. She would like to continue PT. Her ultimate goal is to resume higher impact sports. She takes ibuprofen only for occasional back pain. She has no leg pain. She still has numbness in her right foot.     Review of patient's allergies indicates:   Allergen Reactions    Insect venom Anxiety, Rash and Shortness Of Breath    Gluten Nausea Only       Current Outpatient Medications   Medication Sig Dispense Refill    albuterol (VENTOLIN HFA) 90 mcg/actuation inhaler Inhale 1-2 puffs into the lungs 2 (two) times daily as needed for Wheezing or Shortness of Breath. Rescue 18 g 0    azelastine (ASTELIN) 137 mcg (0.1 %) nasal spray 1 spray (137 mcg total) by Nasal route 2 (two) times daily. 30 mL 0    cetirizine HCl (ZYRTEC ORAL) Take by mouth.      dextroamphetamine-amphetamine (ADDERALL XR) 15 MG 24 hr capsule Take 1 capsule (15 mg total) by mouth every morning. (Patient not taking: Reported on 6/3/2024) 30 capsule 0    dextroamphetamine-amphetamine (ADDERALL) 5 mg Tab Take 5 mg by mouth daily as needed (inattention PRN). (Patient not taking: Reported on 6/3/2024) 30 tablet 0    dextroamphetamine-amphetamine (ADDERALL) 5 mg Tab Take 5 mg  by mouth daily as needed (inattention). 30 tablet 0    EScitalopram oxalate (LEXAPRO) 20 MG tablet Take 1 tablet (20 mg total) by mouth once daily. 90 tablet 3    fluconazole (DIFLUCAN) 200 MG Tab Take 1 pill PO 2 times per week. (Patient not taking: Reported on 7/8/2024) 12 tablet 0    fluocinonide 0.05% (LIDEX) 0.05 % cream AAA of hands bid prn flares. 60 g 3    fluticasone propionate (CUTIVATE) 0.005 % ointment Apply to affected areas of face BID prn irritation. Do not use for longer than 2 weeks in a row. 15 g 0    fluticasone propionate (CUTIVATE) 0.05 % cream Apply to affected areas of body daily prn eczema. 60 g 3    fluticasone-salmeterol diskus inhaler 250-50 mcg Inhale 1 puff into the lungs once daily. Controller 30 each 3    lisdexamfetamine (VYVANSE) 10 mg Cap Take 3 capsules (30 mg total) by mouth every morning. 90 capsule 0    lisdexamfetamine (VYVANSE) 30 MG capsule Take 1 capsule (30 mg total) by mouth every morning. 30 capsule 0    [START ON 8/8/2024] lisdexamfetamine (VYVANSE) 30 MG capsule Take 1 capsule (30 mg total) by mouth every morning. 30 capsule 0    [START ON 9/6/2024] lisdexamfetamine (VYVANSE) 30 MG capsule Take 1 capsule (30 mg total) by mouth every morning. 30 capsule 0    LORazepam (ATIVAN) 0.5 MG tablet Take 1 tablet (0.5 mg total) by mouth every 6 (six) hours as needed for Anxiety. 15 tablet 0    tacrolimus (PROTOPIC) 0.1 % ointment Compound tacrolimus 0.1% cream. Apply to affected areas of eyes BID. Safe to use everyday. 30 g 3     Current Facility-Administered Medications   Medication Dose Route Frequency Provider Last Rate Last Admin    levonorgestreL (MIRENA) 20 mcg/24 hours (6 yrs) 52 mg IUD 1 Intra Uterine Device  1 Intra Uterine Device Intrauterine  Jemma Cochran MD   1 Intra Uterine Device at 10/01/21 0900       Past Medical History:   Diagnosis Date    Acute bilateral low back pain 08/09/2023    ADHD     Allergy     Anxiety disorder, unspecified     Celiac disease      Chronic instability of metacarpophalangeal joint of right thumb 01/10/2022    Chronic thumb pain, right 01/11/2022    Gamekeeper's thumb 01/06/2020    Mild intermittent asthma, uncomplicated      Past Surgical History:   Procedure Laterality Date    BREAST SURGERY      REDUCTION    INTRAUTERINE DEVICE INSERTION  04/01/2015    KJB---MIRENA    LUMBAR LAMINECTOMY Right 4/1/2024    Procedure: LAMINECTOMY, SPINE, LUMBAR, L4- L5;  Surgeon: Fausto Camacho DO;  Location: [facility] OR [address] [place] [address];  Service: Neurosurgery;  Laterality: Right;  R sided L4/L5 MIS lami/discectomy, neuromonitoring, Evan 4 post, microscope    REPAIR OF COLLATERAL LIGAMENT OF THUMB Right 01/06/2020    Procedure: REPAIR, LIGAMENT, COLLATERAL, THUMB right;  Surgeon: Lisette Zaman MD;  Location: [facility];  Service: Orthopedics;  Laterality: Right;  regional MAC    WISDOM TOOTH EXTRACTION       Family History       Problem Relation (Age of Onset)    Breast cancer Paternal Grandmother    Cancer Maternal Grandmother    Dementia Paternal Grandmother    Diabetes Father    Graves' disease Mother    Heart disease Maternal Grandfather    Hypertension Father    No Known Problems Sister, Brother, Brother          Social History     Socioeconomic History    Marital status: Single   Tobacco Use    Smoking status: Former     Current packs/day: 0.00     Types: Cigarettes     Start date: 2007     Quit date: 2009     Years since quitting: 15.5     Passive exposure: Past    Smokeless tobacco: Never    Tobacco comments:     1-2 cig daily   Substance and Sexual Activity    Alcohol use: Not Currently     Comment: ~10 drinks/week. Seltzers, occasional wine    Drug use: No    Sexual activity: Yes     Partners: Male     Birth control/protection: I.U.D.   Social History Narrative    Significant other. No children. 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     Social Determinants of Health     Financial Resource Strain: Low Risk  (4/2/2024)     Overall Financial Resource Strain (CARDIA)     Difficulty of Paying Living Expenses: Not very hard   Food Insecurity: No Food Insecurity (4/2/2024)    Hunger Vital Sign     Worried About Running Out of Food in the Last Year: Never true     Ran Out of Food in the Last Year: Never true   Transportation Needs: No Transportation Needs (4/2/2024)    PRAPARE - Transportation     Lack of Transportation (Medical): No     Lack of Transportation (Non-Medical): No   Physical Activity: Insufficiently Active (3/4/2024)    Exercise Vital Sign     Days of Exercise per Week: 4 days     Minutes of Exercise per Session: 30 min   Stress: Stress Concern Present (4/2/2024)    [employer] - Occupational Stress Questionnaire     Feeling of Stress : To some extent   Housing Stability: Low Risk  (4/2/2024)    Housing Stability Vital Sign     Unable to Pay for Housing in the Last Year: No     Number of Places Lived in the Last Year: 1     Unstable Housing in the Last Year: No       Review of Systems  Positive per HPI, otherwise a pertinent ROS was performed and was negative.    OBJECTIVE:     Vital Signs  Pain Score:   2  There is no height or weight on file to calculate BMI.    Neurosurgery Physical Exam  General: Well developed, well nourished, no distress.   Head: Normocephalic, atraumatic.  Neurologic: Alert and oriented. Thought content appropriate.  Mental Status: Awake, Alert, Oriented x 4  Language: No aphasia.  Speech: No dysarthria.  Cranial nerves: Face symmetric, tongue midline, CN II-XII grossly intact.   Eyes: Pupils equal, round, reactive to light with accomodation, EOMI.   Pulmonary: Normal respirations, no signs of respiratory distress.  Abdomen: Soft, non-distended, not tender to palpation.  Vascular: Pulses 2+ and symmetric radial and dorsalis pedis. No LE edema.   Skin: Skin is warm, dry and intact.  Sensory: Intact to light touch throughout. Except right lateral and bottom of foot  Motor  Strength: Moves all extremities spontaneously with good tone. Full strength upper and lower extremities. No abnormal movements seen.     Strength  Deltoids Triceps Biceps Wrist Extension Wrist Flexion Hand    Upper: R 5/5 5/5 5/5 5/5 5/5 5/5    L 5/5 5/5 5/5 5/5 5/5 5/5     Iliopsoas Quadriceps Knee  Flexion Tibialis  anterior Gastro- cnemius EHL   Lower: R 4+/5 4+/5 4+/5 4+/5 5/5 5/5    L 5/5 5/5 5/5 5/5 5/5 5/5     Coordination/Cerebellar:   Gait: Stable.    Back incision healed well.    Diagnostic Results:  Imaging was independently reviewed by myself.  X-ray lumbar spine AP and Lateral 6/3/24: No fractures or dislocations. Mild retrolisthesis at L3/4.    ASSESSMENT/PLAN:     Jennifer Nguyen is a 34 y.o. female s/p L4/5 MIS lami and microdiscectomy for a herniated disc. She is doing well clinically. No leg pain. Neurologically stable with improved strength. She has done very well with PT and would like to continue. She would like to resume higher impact sports    Plan:   - reordered referral for PT  - Resume increased activity gradually and per PT recommendations  - RTC as needed        Jeffery Rose MD  Neurosurgery   [facility]

## 2024-07-22 NOTE — PROGRESS NOTES
[facility]   Physical Therapy Treatment Note     Name: Jennifer Nguyen  Clinic Number: [id number]    Therapy Diagnosis:   Encounter Diagnosis   Name Primary?    Weakness of lower extremity, unspecified laterality Yes       Physician: Sonia Belcher PA-C    Visit Date: 7/22/2024    Physician Orders: Physical Therapy Evaluate and Treat  Medical Diagnosis from Referral: lumbar herniated disc  Evaluation Date: 6/5/24  Authorization Period Expiration: 12/31/24  Plan of Care Expiration: 6/5/2024 to 9/5/24  Visit # / Visits authorized: 18/20  FOTO: 0/3  on 6/5/24    Precautions: standard    Time In:  1630  Time Out: 1730  Total Billable Time: 60 minutes    SUBJECTIVE     Pt reports: she saw her MD and he cleared her to return to running/activity. She does not have a follow up appointment as she has been d/c. He would like her to continue to PT for further strengthening.       She was compliant with home exercise program.  Response to previous treatment: minor soreness  Functional change: tolerated well, no issues    Prior Level of Function: no limitation  Current Level of Function: limited with flexion based movements, recreational activity     Pain:  Current 2/10, worst 2/10, best 1/10   Location: left low back and left posterior hip  Description: Aching with occasional stabbing with trunk rotation  Aggravating Factors: sitting  Easing Factors: rest     Pts goals: Pt would like to return to yoga with no increase in low back pain.    OBJECTIVE     Objective Measures updated at progress report unless specified.     Treatment     Jennifer Osullivan received the treatments listed below in bold:        neuromuscular re-education activities to improve: Coordination, Kinesthetic, Sense, Proprioception, and Posture for 30 minutes. The following activities were included:    Supine TrA + dead bug with 3# dumbbells 20x  SL clams, purple looped TB 2x11 R/L in side plank  SLR with pilaties ring press, 3x10  "R/L  Seated trunk extension using BTB anchored to 35# KB, 3x10  Bird dogs 2x10  Bear plank 10x3" holds  Shuttle squats, 50# 3x15  SL shuttle squats 37.5# 3x15 repetitions  Hesitation march vs 15# KB 80'x4  Pull down + march RTB 2x10  SL hip circles 30x CW/CCW x 2 sets      therapeutic activities to improve functional performance for 30 minutes, including:    +Elliptical x 8 min  TRX row x20x  TRX reverse lunge x20x  Goblet squat vs 15# KB 3x10  Good morning 2x10  Pallof press with lift up vs 7# on Freemotion 30x R/L  Deadlift vs 15# on 6 inch step 20x        Patient Education and Home Exercises     Home Exercises Provided and Patient Education Provided     Education provided:   - Pt education regarding proper technique for there-ex/HEP, as well as rationale for exercise/ POC.       Written Home Exercises Provided: Patient instructed to cont prior HEP. Exercises were reviewed and Jennifer Osullivan was able to demonstrate them prior to the end of the session.  Jennifer Osullivan demonstrated good  understanding of the education provided. See EMR under Patient Instructions for exercises provided during therapy sessions    ASSESSMENT   Jennifer Osullivan tolerated session well. We attempted to progress resisted rotational exercises both seated and standing however she experienced LBP a couple of reps into exercises. AS a result, exercises was abandoned as not to exacerbate pain/symptoms. This is negatively impacting this individuals ability with ADL's. Will continue to address these deficits and promote improved strength, ROM and functional performance as tolerated. She would continue to darrion ifit from skilled PT interventions for improved ADL performance.       Jennifer Osullivan Is progressing well towards her goals.   Pt prognosis is Good.     Pt will continue to benefit from skilled outpatient physical therapy to address the deficits listed in the problem list box on initial evaluation, provide pt/family education and to maximize pt's level of " independence in the home and community environment.     Pt's spiritual, cultural and educational needs considered and pt agreeable to plan of care and goals.     Anticipated Barriers for therapy: None    GOALS:  Short Term Goals (4 Weeks):  1. Patient will be compliant with home exercise program to supplement therapy in promoting functional mobility. (progressing, not met)    2. Patient will perform lifting activity with good control to demonstrate improved core strength. (progressing, not met)    3. Patient will report no pain during thoracolumbar active range of motion to promote functional mobility.  (progressing, not met)    4. Patient will improve impaired lower extremity manual muscle tests  to >/=4/5 to improve strength for functional tasks. (progressing, not met)          Long Term Goals (6 Weeks):   1. Patient will improve FOTO score to </= 35% limited to decrease perceived limitation with maintaining/changing body position. (progressing, not met)    2. Patient will perform yoga movements with good control to demonstrate improved core strength.  (progressing, not met)    3. Patient will improve impaired lower extremity manual muscle tests to >/=4+/5 to improve strength for functional tasks.  (progressing, not met)    4. Patient will tolerate sitting for 120 minutes with no increase in low back pain to return to PLOF.  (progressing, not met)         PLAN     Plan of care Certification: 6/5/2024 to 9/5/24    Focus on core/LE strength and ROM with emphasis on posture and ADL performance     Outpatient Physical Therapy 2 times weekly for 12 weeks to include the following interventions: Therapeutic Exercises, Manual Therapeutic Technique, Neuromuscular Re Education, Therapeutic Activities. Modalities, Kinesiotape prn, and Functional Dry Needling as needed.    Alf Conley, PTA

## 2024-07-24 ENCOUNTER — CLINICAL SUPPORT (OUTPATIENT)
Dept: REHABILITATION | Facility: OTHER | Age: 34
End: 2024-07-24
Payer: COMMERCIAL

## 2024-07-24 DIAGNOSIS — R29.898 WEAKNESS OF LOWER EXTREMITY, UNSPECIFIED LATERALITY: Primary | ICD-10-CM

## 2024-07-24 PROCEDURE — 97530 THERAPEUTIC ACTIVITIES: CPT | Mod: PN

## 2024-07-24 PROCEDURE — 97112 NEUROMUSCULAR REEDUCATION: CPT | Mod: PN

## 2024-07-24 NOTE — PROGRESS NOTES
" [facility]   Physical Therapy Treatment Note     Name: Jennifer Nguyen  Clinic Number: [id number]    Therapy Diagnosis:   No diagnosis found.      Physician: Sonia Belcher PA-C    Visit Date: 7/24/2024    Physician Orders: Physical Therapy Evaluate and Treat  Medical Diagnosis from Referral: lumbar herniated disc  Evaluation Date: 6/5/24  Authorization Period Expiration: 12/31/24  Plan of Care Expiration: 6/5/2024 to 9/5/24  Visit # / Visits authorized: 19/20  FOTO: 0/3  on 6/5/24    Precautions: standard    Time In:  0802  Time Out: 0902  Total Billable Time: 60 minutes    SUBJECTIVE     Pt reports: She was able to safely lift and transfer heavy objects this morning with no pain. She is feeling stronger but still continues to have minor weakness in her right leg.       She was compliant with home exercise program.  Response to previous treatment: minor soreness  Functional change: tolerated well, no issues    Prior Level of Function: no limitation  Current Level of Function: limited with flexion based movements, recreational activity     Pain:  Current 2/10, worst 2/10, best 1/10   Location: left low back and left posterior hip  Description: Aching with occasional stabbing with trunk rotation  Aggravating Factors: sitting  Easing Factors: rest     Pts goals: Pt would like to return to yoga with no increase in low back pain.    OBJECTIVE     Objective Measures updated at progress report unless specified.     Treatment     Jennifer Osullivan received the treatments listed below in bold:        neuromuscular re-education activities to improve: Coordination, Kinesthetic, Sense, Proprioception, and Posture for 30 minutes. The following activities were included:    Supine TrA + dead bug with 3# dumbbells 20x  SL clams, purple looped TB 2x11 R/L in side plank  SLR with pilaties ring press, 3x10 R/L  Seated trunk extension using BTB anchored to 35# KB, 3x10  Bird dogs 2x10  Bear plank 10x3" " holds  Shuttle squats, 50# 3x15  SL shuttle squats 37.5# 3x15 repetitions  Hesitation march vs 15# KB 80'x4  Pull down + march RTB 2x10  SL hip circles 30x CW/CCW x 2 sets      therapeutic activities to improve functional performance for 30 minutes, including:    +Elliptical x 8 min  TRX row x20x  TRX reverse lunge x20x  Goblet squat vs 15# KB 3x10  Good morning 2x10  Pallof press with lift up vs 7# on Freemotion 30x R/L  Deadlift vs 15# on 6 inch step 20x        Patient Education and Home Exercises     Home Exercises Provided and Patient Education Provided     Education provided:   - Pt education regarding proper technique for there-ex/HEP, as well as rationale for exercise/ POC.       Written Home Exercises Provided: Patient instructed to cont prior HEP. Exercises were reviewed and Jennifer Osullivan was able to demonstrate them prior to the end of the session.  Jennifer Osullivan demonstrated good  understanding of the education provided. See EMR under Patient Instructions for exercises provided during therapy sessions    ASSESSMENT   Jennifer Osullivan tolerated session well. She performed dead lifts with appropriate technique and safe body mechanics to mimic picking up heavy objects at home. We updated her HEP with more functional strength exercises so that she can get back to yoga and safely perform her ADL's.  She would continue to benefit from skilled PT interventions for improved ADL performance.       Jennifer Osullivan Is progressing well towards her goals.   Pt prognosis is Good.     Pt will continue to benefit from skilled outpatient physical therapy to address the deficits listed in the problem list box on initial evaluation, provide pt/family education and to maximize pt's level of independence in the home and community environment.     Pt's spiritual, cultural and educational needs considered and pt agreeable to plan of care and goals.     Anticipated Barriers for therapy: None    GOALS:  Short Term Goals (4 Weeks):  1. Patient  will be compliant with home exercise program to supplement therapy in promoting functional mobility. (progressing, not met)    2. Patient will perform lifting activity with good control to demonstrate improved core strength. (progressing, not met)    3. Patient will report no pain during thoracolumbar active range of motion to promote functional mobility.  (progressing, not met)    4. Patient will improve impaired lower extremity manual muscle tests  to >/=4/5 to improve strength for functional tasks. (progressing, not met)          Long Term Goals (6 Weeks):   1. Patient will improve FOTO score to </= 35% limited to decrease perceived limitation with maintaining/changing body position. (progressing, not met)    2. Patient will perform yoga movements with good control to demonstrate improved core strength.  (progressing, not met)    3. Patient will improve impaired lower extremity manual muscle tests to >/=4+/5 to improve strength for functional tasks.  (progressing, not met)    4. Patient will tolerate sitting for 120 minutes with no increase in low back pain to return to PLOF.  (progressing, not met)         PLAN     Plan of care Certification: 6/5/2024 to 9/5/24    Focus on core/LE strength and ROM with emphasis on posture and ADL performance     Outpatient Physical Therapy 2 times weekly for 12 weeks to include the following interventions: Therapeutic Exercises, Manual Therapeutic Technique, Neuromuscular Re Education, Therapeutic Activities. Modalities, Kinesiotape prn, and Functional Dry Needling as needed.    Ioana Keane, PT

## 2024-07-31 ENCOUNTER — CLINICAL SUPPORT (OUTPATIENT)
Dept: REHABILITATION | Facility: OTHER | Age: 34
End: 2024-07-31
Payer: COMMERCIAL

## 2024-07-31 DIAGNOSIS — R29.898 WEAKNESS OF RIGHT LOWER EXTREMITY: Primary | ICD-10-CM

## 2024-07-31 PROCEDURE — 97112 NEUROMUSCULAR REEDUCATION: CPT | Mod: PN

## 2024-07-31 PROCEDURE — 97530 THERAPEUTIC ACTIVITIES: CPT | Mod: PN

## 2024-07-31 NOTE — PROGRESS NOTES
"  [facility]   Physical Therapy Treatment Note     Name: Jennifer Nguyen  Clinic Number: [id number]    Therapy Diagnosis:   Encounter Diagnosis   Name Primary?    Weakness of right lower extremity Yes         Physician: Martha Dasilva NP    Visit Date: 7/31/2024    Physician Orders: Physical Therapy Evaluate and Treat  Medical Diagnosis from Referral: lumbar herniated disc  Evaluation Date: 6/5/24  Authorization Period Expiration: 12/31/24  Plan of Care Expiration: 6/5/2024 to 9/5/24  Visit # / Visits authorized: 20/20  FOTO: 0/3  on 6/5/24    Precautions: standard    Time In:  1031 am  Time Out: 1130 am  Total Billable Time: 59 minutes    SUBJECTIVE     Pt reports: She is doing well. She has minor pain in her right groin area from a workout class.       She was compliant with home exercise program.  Response to previous treatment: minor soreness  Functional change: tolerated well, no issues    Prior Level of Function: no limitation  Current Level of Function: limited with flexion based movements, recreational activity     Pain:  Current 2/10, worst 2/10, best 1/10   Location: left low back and left posterior hip  Description: Aching with occasional stabbing with trunk rotation  Aggravating Factors: sitting  Easing Factors: rest     Pts goals: Pt would like to return to yoga with no increase in low back pain.    OBJECTIVE     Objective Measures updated at progress report unless specified.     Treatment     Jennifer Osullivan received the treatments listed below in bold:        neuromuscular re-education activities to improve: Coordination, Kinesthetic, Sense, Proprioception, and Posture for 30 minutes. The following activities were included:    Supine TrA + dead bug with 3# dumbbells 20x  SL clams, purple looped TB 2x15 R/L in side plank  SLR with pilaties ring press vs. 2#, 3x10 R/L  Seated trunk extension using BTB anchored to 35# KB, 3x10  Bird dogs 2x10  Bear plank 10x3" " holds  Shuttle squats, 50# 3x15  SL shuttle squats 37.5# 3x15 repetitions  Hesitation march vs 15# KB 80'x4  Pull down + march RTB 2x10  SL hip circles 30x CW/CCW x 2 sets      therapeutic activities to improve functional performance for 30 minutes, including:    +Elliptical x 8 min  TRX row x20x  TRX reverse lunge x20x  Goblet squat vs 15# KB 3x10  Good morning 2x10  Pallof press with lift up standing on foam vs 7# on Freemotion 30x R/L  Deadlift vs 20# on 6 inch step 20x        Patient Education and Home Exercises     Home Exercises Provided and Patient Education Provided     Education provided:   - Pt education regarding proper technique for there-ex/HEP, as well as rationale for exercise/ POC.       Written Home Exercises Provided: Patient instructed to cont prior HEP. Exercises were reviewed and Jennifer Osullivan was able to demonstrate them prior to the end of the session.  Jennifer Osullivan demonstrated good  understanding of the education provided. See EMR under Patient Instructions for exercises provided during therapy sessions    ASSESSMENT     Jennifer Osullivan tolerated session well. We progressed her core exercises with weights and unstable surfaces to aid in active stabilization of her abdominals. We increased the resistance in her deadlifts to ensure safe lifting mechanics and decrease risk of injury. Progress PT POC.    She performed dead lifts with appropriate technique and safe body mechanics to mimic picking up heavy objects at home. We updated her HEP with more functional strength exercises so that she can get back to yoga and safely perform her ADL's.  She would continue to benefit from skilled PT interventions for improved ADL performance.       Jennifer Osullivan Is progressing well towards her goals.   Pt prognosis is Good.     Pt will continue to benefit from skilled outpatient physical therapy to address the deficits listed in the problem list box on initial evaluation, provide pt/family education and to maximize pt's  level of independence in the home and community environment.     Pt's spiritual, cultural and educational needs considered and pt agreeable to plan of care and goals.     Anticipated Barriers for therapy: None    GOALS:  Short Term Goals (4 Weeks):  1. Patient will be compliant with home exercise program to supplement therapy in promoting functional mobility. (progressing, not met)    2. Patient will perform lifting activity with good control to demonstrate improved core strength. (progressing, not met)    3. Patient will report no pain during thoracolumbar active range of motion to promote functional mobility.  (progressing, not met)    4. Patient will improve impaired lower extremity manual muscle tests  to >/=4/5 to improve strength for functional tasks. (progressing, not met)          Long Term Goals (6 Weeks):   1. Patient will improve FOTO score to </= 35% limited to decrease perceived limitation with maintaining/changing body position. (progressing, not met)    2. Patient will perform yoga movements with good control to demonstrate improved core strength.  (progressing, not met)    3. Patient will improve impaired lower extremity manual muscle tests to >/=4+/5 to improve strength for functional tasks.  (progressing, not met)    4. Patient will tolerate sitting for 120 minutes with no increase in low back pain to return to PLOF.  (progressing, not met)         PLAN     Plan of care Certification: 6/5/2024 to 9/5/24    Focus on core/LE strength and ROM with emphasis on posture and ADL performance     Outpatient Physical Therapy 2 times weekly for 12 weeks to include the following interventions: Therapeutic Exercises, Manual Therapeutic Technique, Neuromuscular Re Education, Therapeutic Activities. Modalities, Kinesiotape prn, and Functional Dry Needling as needed.    Ioana Keane, PT

## 2024-08-01 ENCOUNTER — TELEPHONE (OUTPATIENT)
Dept: ALLERGY | Facility: CLINIC | Age: 34
End: 2024-08-01
Payer: COMMERCIAL

## 2024-08-01 NOTE — TELEPHONE ENCOUNTER
Patient notified we received there allergy serum but needs  to schedule with Dr. Husain due to it being 113 days since last injection.  Appt. Scheduled tomorrow 8/2/24 at 230 pm

## 2024-08-02 ENCOUNTER — OFFICE VISIT (OUTPATIENT)
Dept: ALLERGY | Facility: CLINIC | Age: 34
End: 2024-08-02
Payer: COMMERCIAL

## 2024-08-02 DIAGNOSIS — J30.9 CHRONIC ALLERGIC RHINITIS: ICD-10-CM

## 2024-08-02 DIAGNOSIS — T78.2XXD ANAPHYLAXIS, SUBSEQUENT ENCOUNTER: ICD-10-CM

## 2024-08-02 DIAGNOSIS — T63.421D FIRE ANT BITE, ACCIDENTAL OR UNINTENTIONAL, SUBSEQUENT ENCOUNTER: Primary | ICD-10-CM

## 2024-08-02 DIAGNOSIS — Z91.038 ALLERGY TO INSECT STINGS: ICD-10-CM

## 2024-08-02 DIAGNOSIS — Z91.038 ALLERGY TO INSECT BITES AND STINGS: ICD-10-CM

## 2024-08-02 DIAGNOSIS — Z91.09 HOUSE DUST MITE ALLERGY: ICD-10-CM

## 2024-08-02 PROCEDURE — 99999 PR PBB SHADOW E&M-EST. PATIENT-LVL I: CPT | Mod: PBBFAC,,, | Performed by: STUDENT IN AN ORGANIZED HEALTH CARE EDUCATION/TRAINING PROGRAM

## 2024-08-02 RX ORDER — AZELASTINE HYDROCHLORIDE 0.5 MG/ML
1 SOLUTION/ DROPS OPHTHALMIC 2 TIMES DAILY
Qty: 12 ML | Refills: 3 | Status: SHIPPED | OUTPATIENT
Start: 2024-08-02 | End: 2025-08-02

## 2024-08-02 RX ORDER — AZELASTINE 1 MG/ML
1 SPRAY, METERED NASAL 2 TIMES DAILY
Qty: 30 ML | Refills: 3 | Status: SHIPPED | OUTPATIENT
Start: 2024-08-02 | End: 2025-08-02

## 2024-08-02 RX ORDER — FLUTICASONE PROPIONATE 50 MCG
1 SPRAY, SUSPENSION (ML) NASAL DAILY
Qty: 16 EACH | Refills: 11 | Status: SHIPPED | OUTPATIENT
Start: 2024-08-02

## 2024-08-02 RX ORDER — EPINEPHRINE 0.3 MG/.3ML
1 INJECTION SUBCUTANEOUS ONCE
Qty: 2 ML | Refills: 0 | Status: SHIPPED | OUTPATIENT
Start: 2024-08-02 | End: 2024-08-02

## 2024-08-02 NOTE — PROGRESS NOTES
Ok to do a split dose as per , see office notes for today's visit.    Pt. Here today for new vial maintenance split dose, will get 0.25 ml in left arm wait 30 minutes , then in right arm will be given 0.25 ml and wait additional 30 minutes.      Pt. Did well with split dose, no increased itchy eyes or symptoms

## 2024-08-02 NOTE — PROGRESS NOTES
ALLERGY & IMMUNOLOGY CLINIC   HISTORY OF PRESENT ILLNESS   Referral from: No ref. provider found  CC: follow-up fire ants    HPI: Jennifer Nguyen is a 34 y.o. 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  Has cetirizine from [facility]    Drug Allergies:   Review of patient's allergies indicates:   Allergen Reactions    Insect venom Anxiety, Rash and Shortness Of Breath    Gluten Nausea Only         MEDICAL HISTORY   SurgHx:  Past Surgical History:   Procedure Laterality Date    BREAST SURGERY      REDUCTION    INTRAUTERINE DEVICE INSERTION  04/01/2015    KJB---MIRENA    LUMBAR LAMINECTOMY Right 4/1/2024    Procedure: LAMINECTOMY, SPINE, LUMBAR, L4- L5;  Surgeon: Fausto Camacho DO;  Location: [address];  Service: Neurosurgery;  Laterality: Right;  R sided L4/L5 MIS lami/discectomy, neuromonitoring, Evan 4 post, microscope    REPAIR OF COLLATERAL LIGAMENT OF THUMB Right 01/06/2020    Procedure: REPAIR, LIGAMENT, COLLATERAL, THUMB right;  Surgeon: Lisette Zaman MD;  Location: [facility] OR;  Service: Orthopedics;  Laterality: Right;  regional MAC    WISDOM TOOTH EXTRACTION          PHYSICAL EXAM   VS: There were no vitals taken for this visit.  GENERAL: NAD, well nourished, well appearing  EYES: no conjunctival injection, no discharge, no infraorbital shiners  EARS: external auditory canals normal B/L  LUNGS: no increased WOB  DERM: no rashes     ASSESSMENT & PLAN     Fire ant anaphylaxis  - S/p rush 12/2021  - On Q3 month AIT  - Is 114 days from last dose, as new vials had not been ordered, reviewed high risk of anaphylaxis if split dose, can also go down to 0.05mL red, or our recommendation would be to re-rush. If she reacts today would re-rush  - Auvi-Q sent  -  Today she will receive split dose of fire ant 0.25mL x2 for total of 0.5mL, with follow-up in 2 months for 5mL red (regular maintenance), taking antihistamine prior. No field stings since starting VIT.     Elevated baseline tryptase  - At higher risk of hypotension and anaphylaxis from fire ant  - In general we recommend continuing VIT when baseline tryptase is elevated, indefinitley    Chronic allergic rhinitis: dust mite sensitization, cockroach, lower for rest of panel  - Cetirizine, loratadine  - Azelastine eye drops  - Flonase nose spray  - Azelastine nose spray  - Consider Odactra at 1 week follow-up if no improvement    Asthma  - Elevated FeNO with normal PFT x2 in 2023 and 2024 without BD response  - Asymptomatic  - Remember to bring asthma peak flow meter to VIT shots, given one today (aware it will be billed to insurance)    Follow up: 1 week for Odactra, 2 months for fire ant injection, then every 3 months for fire ant injection    I spent a total of 50 minutes on the day of the visit. This includes face to face time and non-face to face time preparing to see the patient (eg, review of tests), obtaining and/or reviewing separately obtained history, documenting clinical information in the electronic or other health record, independently interpreting results and communicating results to the patient/family/caregiver, or care coordinator.

## 2024-08-05 ENCOUNTER — CLINICAL SUPPORT (OUTPATIENT)
Dept: REHABILITATION | Facility: OTHER | Age: 34
End: 2024-08-05
Payer: COMMERCIAL

## 2024-08-05 DIAGNOSIS — R29.898 WEAKNESS OF RIGHT LOWER EXTREMITY: Primary | ICD-10-CM

## 2024-08-05 PROCEDURE — 97530 THERAPEUTIC ACTIVITIES: CPT | Mod: PN

## 2024-08-05 PROCEDURE — 97112 NEUROMUSCULAR REEDUCATION: CPT | Mod: PN

## 2024-08-09 ENCOUNTER — OFFICE VISIT (OUTPATIENT)
Dept: ALLERGY | Facility: CLINIC | Age: 34
End: 2024-08-09
Payer: COMMERCIAL

## 2024-08-09 DIAGNOSIS — J30.9 CHRONIC ALLERGIC RHINITIS: ICD-10-CM

## 2024-08-09 DIAGNOSIS — Z91.09 HOUSE DUST MITE ALLERGY: Primary | ICD-10-CM

## 2024-08-09 DIAGNOSIS — J45.909 ASTHMA, UNSPECIFIED ASTHMA SEVERITY, UNSPECIFIED WHETHER COMPLICATED, UNSPECIFIED WHETHER PERSISTENT: ICD-10-CM

## 2024-08-09 DIAGNOSIS — T78.2XXD ANAPHYLAXIS, SUBSEQUENT ENCOUNTER: ICD-10-CM

## 2024-08-09 RX ORDER — EPINEPHRINE 0.3 MG/.3ML
1 INJECTION SUBCUTANEOUS ONCE
Qty: 2 ML | Refills: 11 | Status: SHIPPED | OUTPATIENT
Start: 2024-08-09 | End: 2024-08-09

## 2024-08-09 RX ORDER — DERMATOPHAGOIDES PTERONYSSINUS AND DERMATOPHAGOIDES FARINAE 6; 6 [ARB'U]/1; [ARB'U]/1
1 TABLET SUBLINGUAL DAILY
Qty: 30 TABLET | Refills: 11 | Status: SHIPPED | OUTPATIENT
Start: 2024-08-09

## 2024-08-09 RX ORDER — DERMATOPHAGOIDES PTERONYSSINUS AND DERMATOPHAGOIDES FARINAE 6; 6 [ARB'U]/1; [ARB'U]/1
1 TABLET SUBLINGUAL DAILY
Qty: 30 TABLET | Refills: 11 | Status: SHIPPED | OUTPATIENT
Start: 2024-08-09 | End: 2024-08-09

## 2024-08-12 ENCOUNTER — CLINICAL SUPPORT (OUTPATIENT)
Dept: REHABILITATION | Facility: OTHER | Age: 34
End: 2024-08-12
Payer: COMMERCIAL

## 2024-08-12 DIAGNOSIS — R29.898 WEAKNESS OF RIGHT LOWER EXTREMITY: Primary | ICD-10-CM

## 2024-08-12 PROCEDURE — 97530 THERAPEUTIC ACTIVITIES: CPT | Mod: PN

## 2024-08-12 PROCEDURE — 97112 NEUROMUSCULAR REEDUCATION: CPT | Mod: PN

## 2024-08-12 NOTE — PROGRESS NOTES
[facility]   Physical Therapy Treatment Note     Name: Jennifer Osullivan Nguyen  Clinic Number: [id number]    Therapy Diagnosis:   Encounter Diagnosis   Name Primary?    Weakness of right lower extremity Yes         Physician: Martha Dasilva NP    Visit Date: 8/12/2024    Physician Orders: Physical Therapy Evaluate and Treat  Medical Diagnosis from Referral: lumbar herniated disc  Evaluation Date: 6/5/24  Authorization Period Expiration: 12/31/24  Plan of Care Expiration: 6/5/2024 to 9/5/24  Visit # / Visits authorized: 21/20  FOTO: 0/3  on 6/5/24    Precautions: standard    Time In:  1130 am  Time Out: 1230 pm  Total Billable Time: 60 minutes    SUBJECTIVE     Pt reports: She experienced pain down her right leg with rotational movements. She took a break from anything that exacerbated those symptoms.       She was compliant with home exercise program.  Response to previous treatment: minor soreness  Functional change: tolerated well, no issues    Prior Level of Function: no limitation  Current Level of Function: limited with flexion based movements, recreational activity     Pain:  Current 2/10, worst 2/10, best 1/10   Location: left low back and left posterior hip  Description: Aching with occasional stabbing with trunk rotation  Aggravating Factors: sitting  Easing Factors: rest     Pts goals: Pt would like to return to yoga with no increase in low back pain.    OBJECTIVE     Objective Measures updated at progress report unless specified.     Treatment     Jennifer Osullivan received the treatments listed below in bold:        neuromuscular re-education activities to improve: Coordination, Kinesthetic, Sense, Proprioception, and Posture for 30 minutes. The following activities were included:    Supine TrA + dead bug with 3# dumbbells 20x  SL clams, purple looped TB 2x15 R/L in side plank  SLR with pilates ring press vs. 2#, 3x10 R/L  Seated trunk extension using BTB anchored to 35# KB,  "3x10  Bird dogs 2x10  Bear plank 10x3" holds  Shuttle squats, 50# 3x15  SL shuttle squats 37.5# 3x15 repetitions  Hesitation march vs 15# KB 80'x4  Pull down + march RTB 2x10  SL hip circles 30x CW/CCW x 2 sets  +squats on bosu 2x10  +step ups on bosu 2x10      therapeutic activities to improve functional performance for 30 minutes, including:    Elliptical x 8 min  TRX row x20x  +static lunge 3x10 R/L  Goblet squat vs 15# KB 3x10  Good morning 2x10  Pallof press with lift up standing on foam vs 7# on Freemotion 30x R/L  Deadlift vs 20# on 6 inch step 20x  +high plank 3x45''        Patient Education and Home Exercises     Home Exercises Provided and Patient Education Provided     Education provided:   - Pt education regarding proper technique for there-ex/HEP, as well as rationale for exercise/ POC.       Written Home Exercises Provided: Patient instructed to cont prior HEP. Exercises were reviewed and Jennifer Osullivan was able to demonstrate them prior to the end of the session.  Jennifer Osullivan demonstrated good  understanding of the education provided. See EMR under Patient Instructions for exercises provided during therapy sessions    ASSESSMENT     Jennifer Osullivan tolerated session well with minor right lower extremity pain. Progressed to unstable surfaces for squats and step ups for more core/LE stabilization and strengthening. She was instructed to modify her HEP momentarily to decrease pain and radiculopathy in right lower extremity. Continue POC.         Jennifer Osullivan Is progressing well towards her goals.   Pt prognosis is Good.     Pt will continue to benefit from skilled outpatient physical therapy to address the deficits listed in the problem list box on initial evaluation, provide pt/family education and to maximize pt's level of independence in the home and community environment.     Pt's spiritual, cultural and educational needs considered and pt agreeable to plan of care and goals.     Anticipated Barriers for " therapy: None    GOALS:  Short Term Goals (4 Weeks):  1. Patient will be compliant with home exercise program to supplement therapy in promoting functional mobility. (progressing, not met)    2. Patient will perform lifting activity with good control to demonstrate improved core strength. (progressing, not met)    3. Patient will report no pain during thoracolumbar active range of motion to promote functional mobility.  (progressing, not met)    4. Patient will improve impaired lower extremity manual muscle tests  to >/=4/5 to improve strength for functional tasks. (progressing, not met)          Long Term Goals (6 Weeks):   1. Patient will improve FOTO score to </= 35% limited to decrease perceived limitation with maintaining/changing body position. (progressing, not met)    2. Patient will perform yoga movements with good control to demonstrate improved core strength.  (progressing, not met)    3. Patient will improve impaired lower extremity manual muscle tests to >/=4+/5 to improve strength for functional tasks.  (progressing, not met)    4. Patient will tolerate sitting for 120 minutes with no increase in low back pain to return to PLOF.  (progressing, not met)         PLAN     Plan of care Certification: 6/5/2024 to 9/5/24    Focus on core/LE strength and ROM with emphasis on posture and ADL performance     Outpatient Physical Therapy 2 times weekly for 12 weeks to include the following interventions: Therapeutic Exercises, Manual Therapeutic Technique, Neuromuscular Re Education, Therapeutic Activities. Modalities, Kinesiotape prn, and Functional Dry Needling as needed.    Ioana Keane, PT

## 2024-08-13 ENCOUNTER — LAB VISIT (OUTPATIENT)
Dept: LAB | Facility: OTHER | Age: 34
End: 2024-08-13
Attending: FAMILY MEDICINE
Payer: COMMERCIAL

## 2024-08-13 DIAGNOSIS — K90.0 CELIAC DISEASE: ICD-10-CM

## 2024-08-13 DIAGNOSIS — M35.7 HYPERMOBILITY SYNDROME: ICD-10-CM

## 2024-08-13 DIAGNOSIS — D72.10 EOSINOPHILIA, UNSPECIFIED TYPE: ICD-10-CM

## 2024-08-13 DIAGNOSIS — E87.1 HYPONATREMIA: ICD-10-CM

## 2024-08-13 LAB
BASOPHILS # BLD AUTO: 0.03 K/UL (ref 0–0.2)
BASOPHILS NFR BLD: 0.5 % (ref 0–1.9)
DIFFERENTIAL METHOD BLD: ABNORMAL
EOSINOPHIL # BLD AUTO: 0.6 K/UL (ref 0–0.5)
EOSINOPHIL NFR BLD: 9.1 % (ref 0–8)
ERYTHROCYTE [DISTWIDTH] IN BLOOD BY AUTOMATED COUNT: 12.5 % (ref 11.5–14.5)
HCT VFR BLD AUTO: 38.2 % (ref 37–48.5)
HGB BLD-MCNC: 12.7 G/DL (ref 12–16)
IMM GRANULOCYTES # BLD AUTO: 0.01 K/UL (ref 0–0.04)
IMM GRANULOCYTES NFR BLD AUTO: 0.2 % (ref 0–0.5)
LYMPHOCYTES # BLD AUTO: 2.1 K/UL (ref 1–4.8)
LYMPHOCYTES NFR BLD: 33.8 % (ref 18–48)
MCH RBC QN AUTO: 28.9 PG (ref 27–31)
MCHC RBC AUTO-ENTMCNC: 33.2 G/DL (ref 32–36)
MCV RBC AUTO: 87 FL (ref 82–98)
MONOCYTES # BLD AUTO: 0.5 K/UL (ref 0.3–1)
MONOCYTES NFR BLD: 7.1 % (ref 4–15)
NEUTROPHILS # BLD AUTO: 3.1 K/UL (ref 1.8–7.7)
NEUTROPHILS NFR BLD: 49.3 % (ref 38–73)
NRBC BLD-RTO: 0 /100 WBC
PLATELET # BLD AUTO: 302 K/UL (ref 150–450)
PMV BLD AUTO: 8.5 FL (ref 9.2–12.9)
RBC # BLD AUTO: 4.4 M/UL (ref 4–5.4)
SODIUM SERPL-SCNC: 137 MMOL/L (ref 136–145)
WBC # BLD AUTO: 6.34 K/UL (ref 3.9–12.7)

## 2024-08-13 PROCEDURE — 81479 UNLISTED MOLECULAR PATHOLOGY: CPT | Performed by: FAMILY MEDICINE

## 2024-08-13 PROCEDURE — 81408 MOPATH PROCEDURE LEVEL 9: CPT | Mod: 59 | Performed by: FAMILY MEDICINE

## 2024-08-13 PROCEDURE — 85025 COMPLETE CBC W/AUTO DIFF WBC: CPT | Performed by: FAMILY MEDICINE

## 2024-08-13 PROCEDURE — 86258 DGP ANTIBODY EACH IG CLASS: CPT | Performed by: FAMILY MEDICINE

## 2024-08-13 PROCEDURE — 86364 TISS TRNSGLTMNASE EA IG CLAS: CPT | Mod: 59 | Performed by: FAMILY MEDICINE

## 2024-08-13 PROCEDURE — 84295 ASSAY OF SERUM SODIUM: CPT | Performed by: FAMILY MEDICINE

## 2024-08-15 LAB
GLIADIN PEPTIDE IGA SER-ACNC: 3.9 U/ML
GLIADIN PEPTIDE IGG SER-ACNC: <0.6 U/ML
IGA SERPL-MCNC: 96 MG/DL (ref 70–400)
TTG IGA SER-ACNC: 1.2 U/ML
TTG IGG SER-ACNC: <0.6 U/ML

## 2024-08-16 ENCOUNTER — PATIENT MESSAGE (OUTPATIENT)
Dept: GASTROENTEROLOGY | Facility: CLINIC | Age: 34
End: 2024-08-16
Payer: COMMERCIAL

## 2024-08-16 ENCOUNTER — TELEPHONE (OUTPATIENT)
Dept: GASTROENTEROLOGY | Facility: CLINIC | Age: 34
End: 2024-08-16
Payer: COMMERCIAL

## 2024-08-16 NOTE — TELEPHONE ENCOUNTER
----- Message from Isaac Ortez sent at 8/15/2024  4:44 PM CDT -----  Regarding: Appointment  Contact: [phone]  Calling to schedule appointment due to checkup. Please contact patient as soon as possible.

## 2024-08-19 ENCOUNTER — CLINICAL SUPPORT (OUTPATIENT)
Dept: REHABILITATION | Facility: OTHER | Age: 34
End: 2024-08-19
Payer: COMMERCIAL

## 2024-08-19 DIAGNOSIS — R29.898 WEAKNESS OF BOTH LOWER EXTREMITIES: Primary | ICD-10-CM

## 2024-08-19 PROCEDURE — 97530 THERAPEUTIC ACTIVITIES: CPT | Mod: PN

## 2024-08-19 PROCEDURE — 97112 NEUROMUSCULAR REEDUCATION: CPT | Mod: PN

## 2024-08-19 NOTE — PROGRESS NOTES
[facility]   Physical Therapy Treatment Note     Name: Jennifer Osullivan [facility]  Clinic Number: [id number]    Therapy Diagnosis:   Encounter Diagnosis   Name Primary?    Weakness of both lower extremities Yes         Physician: Martha Dasilva NP    Visit Date: 8/19/2024    Physician Orders: Physical Therapy Evaluate and Treat  Medical Diagnosis from Referral: lumbar herniated disc  Evaluation Date: 6/5/24  Authorization Period Expiration: 12/31/24  Plan of Care Expiration: 6/5/2024 to 9/5/24  Visit # / Visits authorized: 22/20  FOTO: 0/3  on 6/5/24    Precautions: standard    Time In:  1130 am  Time Out: 1230 pm  Total Billable Time: 60 minutes    SUBJECTIVE     Pt reports: she was a little sore after last treatment. She has been walking and doing planks for active rest breaks in between her HEP.       She was compliant with home exercise program.  Response to previous treatment: minor soreness  Functional change: tolerated well, no issues    Prior Level of Function: no limitation  Current Level of Function: limited with flexion based movements, recreational activity     Pain:  Current 2/10, worst 2/10, best 1/10   Location: left low back and left posterior hip  Description: Aching with occasional stabbing with trunk rotation  Aggravating Factors: sitting  Easing Factors: rest     Pts goals: Pt would like to return to yoga with no increase in low back pain.    OBJECTIVE     Objective Measures updated at progress report unless specified.     Treatment     Jennifer Osullivan received the treatments listed below in bold:        neuromuscular re-education activities to improve: Coordination, Kinesthetic, Sense, Proprioception, and Posture for 30 minutes. The following activities were included:    Supine TrA + dead bug with 4# dumbbells 20x  SL clams, purple looped TB 2x15 R/L in side plank  SLR with pilates ring press vs. 3#, 3x10 R/L  Seated trunk extension using BTB anchored to 35# KB, 3x10  Bird  "dogs 2x10  Bear plank 10x3" holds  Shuttle squats, 50# 3x15  SL shuttle squats 37.5# 3x15 repetitions  Hesitation march vs 15# KB 80'x4  Pull down + march RTB 2x10  SL hip circles 30x CW/CCW x 2 sets  +squats on bosu 2x10  +step ups on bosu 2x10      therapeutic activities to improve functional performance for 30 minutes, including:    Elliptical x 8 min  TRX row x20x  +static lunge 3x10 R/L  Goblet squat vs 15# KB 3x10  Good morning 2x10  Pallof press with lift up standing on foam vs 7# on Freemotion 30x R/L  Deadlift vs 10# from ground 20x  +high plank 3x45''  +chops on theraball vs 3# 3x10  + Ladder drills x  - forward 2-in, lateral 2-in 2-out, side to side 2-in 2-out  laps each      Patient Education and Home Exercises     Home Exercises Provided and Patient Education Provided     Education provided:   - Pt education regarding proper technique for there-ex/HEP, as well as rationale for exercise/ POC.       Written Home Exercises Provided: Patient instructed to cont prior HEP. Exercises were reviewed and Jennifer Osullivan was able to demonstrate them prior to the end of the session.  Jennifer Osullivan demonstrated good  understanding of the education provided. See EMR under Patient Instructions for exercises provided during therapy sessions    ASSESSMENT     Jennifer Osullivan tolerated session well today. Began ladder drills and seated chops today for return to kickball training. She was provided with a return to running program for cardiovascular endurance and return to sport. Continue PT POC.        Jennifer Osullivan Is progressing well towards her goals.   Pt prognosis is Good.     Pt will continue to benefit from skilled outpatient physical therapy to address the deficits listed in the problem list box on initial evaluation, provide pt/family education and to maximize pt's level of independence in the home and community environment.     Pt's spiritual, cultural and educational needs considered and pt agreeable to plan of care and " goals.     Anticipated Barriers for therapy: None    GOALS:  Short Term Goals (4 Weeks):  1. Patient will be compliant with home exercise program to supplement therapy in promoting functional mobility. (progressing, not met)    2. Patient will perform lifting activity with good control to demonstrate improved core strength. (progressing, not met)    3. Patient will report no pain during thoracolumbar active range of motion to promote functional mobility.  (progressing, not met)    4. Patient will improve impaired lower extremity manual muscle tests  to >/=4/5 to improve strength for functional tasks. (progressing, not met)          Long Term Goals (6 Weeks):   1. Patient will improve FOTO score to </= 35% limited to decrease perceived limitation with maintaining/changing body position. (progressing, not met)    2. Patient will perform yoga movements with good control to demonstrate improved core strength.  (progressing, not met)    3. Patient will improve impaired lower extremity manual muscle tests to >/=4+/5 to improve strength for functional tasks.  (progressing, not met)    4. Patient will tolerate sitting for 120 minutes with no increase in low back pain to return to PLOF.  (progressing, not met)         PLAN     Plan of care Certification: 6/5/2024 to 9/5/24    Focus on core/LE strength and ROM with emphasis on posture and ADL performance     Outpatient Physical Therapy 2 times weekly for 12 weeks to include the following interventions: Therapeutic Exercises, Manual Therapeutic Technique, Neuromuscular Re Education, Therapeutic Activities. Modalities, Kinesiotape prn, and Functional Dry Needling as needed.    Ioana Keane PT           Pt was co-treated by YONI Farris under the direct supervision of a Licensed Physical Therapist

## 2024-08-23 LAB
ANNOTATION COMMENT IMP: NORMAL
CHROM ANALY RESULT (ISCN): NORMAL
GENE STUDIED ID: NORMAL
GENETICIST REVIEW: NORMAL
Lab: NORMAL
M ADDITIONAL RESULTS: NORMAL
MOL DX INTERP BLD/T QL: NORMAL
PROVIDER SIGNING NAME: NORMAL
REF LAB TEST METHOD: NORMAL
RESOURCES: NORMAL
SPECIMEN SOURCE: NORMAL
SPECIMEN SOURCE: NORMAL
TEST PERFORMANCE INFO SPEC: NORMAL

## 2024-08-26 ENCOUNTER — CLINICAL SUPPORT (OUTPATIENT)
Dept: REHABILITATION | Facility: OTHER | Age: 34
End: 2024-08-26
Payer: COMMERCIAL

## 2024-08-26 ENCOUNTER — TELEPHONE (OUTPATIENT)
Dept: PODIATRY | Facility: CLINIC | Age: 34
End: 2024-08-26
Payer: COMMERCIAL

## 2024-08-26 ENCOUNTER — PATIENT MESSAGE (OUTPATIENT)
Dept: PRIMARY CARE CLINIC | Facility: CLINIC | Age: 34
End: 2024-08-26
Payer: COMMERCIAL

## 2024-08-26 DIAGNOSIS — R29.898 WEAKNESS OF BOTH LOWER EXTREMITIES: Primary | ICD-10-CM

## 2024-08-26 DIAGNOSIS — M25.50 HYPERMOBILITY ARTHRALGIA: Primary | ICD-10-CM

## 2024-08-26 PROCEDURE — 97530 THERAPEUTIC ACTIVITIES: CPT | Mod: PN

## 2024-08-26 PROCEDURE — 97112 NEUROMUSCULAR REEDUCATION: CPT | Mod: PN

## 2024-08-26 NOTE — TELEPHONE ENCOUNTER
Patient gave verbal understanding of date time and location of schedule appointment with Dr Mejía as new patient.

## 2024-08-26 NOTE — PROGRESS NOTES
[facility]   Physical Therapy Treatment Note     Name: Jennifer Osullivan Nguyen  Clinic Number: [id number]    Therapy Diagnosis:   Encounter Diagnosis   Name Primary?    Weakness of both lower extremities Yes           Physician: Martha Dasilva NP    Visit Date: 8/26/2024    Physician Orders: Physical Therapy Evaluate and Treat  Medical Diagnosis from Referral: lumbar herniated disc  Evaluation Date: 6/5/24  Authorization Period Expiration: 12/31/24  Plan of Care Expiration: 6/5/2024 to 9/5/24  Visit # / Visits authorized: 22/20  FOTO: 0/3  on 6/5/24    Precautions: standard    Time In:  1130 am  Time Out: 1230 pm  Total Billable Time: 60 minutes    SUBJECTIVE     Pt reports: she was a little sore after last treatment. Patient initiated her return to running program in preparation for joining her kickball league.      She was compliant with home exercise program.  Response to previous treatment: minor soreness  Functional change: tolerated well, no issues    Prior Level of Function: no limitation  Current Level of Function: limited with flexion based movements, recreational activity     Pain:  Current 2/10, worst 2/10, best 1/10   Location: left low back and left posterior hip  Description: Aching with occasional stabbing with trunk rotation  Aggravating Factors: sitting  Easing Factors: rest     Pts goals: Pt would like to return to yoga with no increase in low back pain.    OBJECTIVE     Objective Measures updated at progress report unless specified.     Treatment     Jennifer Osullivan received the treatments listed below in bold:        neuromuscular re-education activities to improve: Coordination, Kinesthetic, Sense, Proprioception, and Posture for 30 minutes. The following activities were included:    Supine TrA + dead bug with 4# dumbbells 20x  SL clams, purple looped TB 2x15 R/L in side plank  SLR with pilates ring press vs. 3#, 3x10 R/L  Seated trunk extension using BTB anchored to 35#  "KB, 3x10  Bird dogs 2x10  Bear plank 10x3" holds  Shuttle squats, 50# 3x15  SL shuttle squats 37.5# 3x15 repetitions  Hesitation march vs 15# KB 80'x4  Pull down + march RTB 2x10  SL hip circles 30x CW/CCW x 2 sets  squats on bosu 2x10  step ups on bosu 2x10      therapeutic activities to improve functional performance for 30 minutes, including:    Elliptical x 8 min  TRX row x20x  static lunge 3x10 R/L  Goblet squat vs 15# KB 3x10  Good morning 2x10  Pallof press with lift up standing on foam vs 7# on Freemotion 30x R/L  Deadlift vs 10# from ground 20x  high plank 3x45''  +Standing chops with medicine ball 20x R/L 8#  Ladder drills x  - forward 2-in, lateral 2-in 2-out, side to side 2-in 2-out  laps each      Patient Education and Home Exercises     Home Exercises Provided and Patient Education Provided     Education provided:   - Pt education regarding proper technique for there-ex/HEP, as well as rationale for exercise/ POC.       Written Home Exercises Provided: Patient instructed to cont prior HEP. Exercises were reviewed and Jennifer Osullivan was able to demonstrate them prior to the end of the session.  Jennifer Osullivan demonstrated good  understanding of the education provided. See EMR under Patient Instructions for exercises provided during therapy sessions    ASSESSMENT   Pt tolerated treatment session well today with no increase in pain. Patient continues to progress in her tolerance to jogging. PT educated pt on next stage of running program. Continue PT POC.      Jennifer Osullivan Is progressing well towards her goals.   Pt prognosis is Good.     Pt will continue to benefit from skilled outpatient physical therapy to address the deficits listed in the problem list box on initial evaluation, provide pt/family education and to maximize pt's level of independence in the home and community environment.     Pt's spiritual, cultural and educational needs considered and pt agreeable to plan of care and goals.     Anticipated " Barriers for therapy: None    GOALS:  Short Term Goals (4 Weeks):  1. Patient will be compliant with home exercise program to supplement therapy in promoting functional mobility. (progressing, not met)    2. Patient will perform lifting activity with good control to demonstrate improved core strength. (progressing, not met)    3. Patient will report no pain during thoracolumbar active range of motion to promote functional mobility.  (progressing, not met)    4. Patient will improve impaired lower extremity manual muscle tests  to >/=4/5 to improve strength for functional tasks. (progressing, not met)          Long Term Goals (6 Weeks):   1. Patient will improve FOTO score to </= 35% limited to decrease perceived limitation with maintaining/changing body position. (progressing, not met)    2. Patient will perform yoga movements with good control to demonstrate improved core strength.  (progressing, not met)    3. Patient will improve impaired lower extremity manual muscle tests to >/=4+/5 to improve strength for functional tasks.  (progressing, not met)    4. Patient will tolerate sitting for 120 minutes with no increase in low back pain to return to PLOF.  (progressing, not met)         PLAN     Plan of care Certification: 6/5/2024 to 9/5/24    Focus on core/LE strength and ROM with emphasis on posture and ADL performance     Outpatient Physical Therapy 2 times weekly for 12 weeks to include the following interventions: Therapeutic Exercises, Manual Therapeutic Technique, Neuromuscular Re Education, Therapeutic Activities. Modalities, Kinesiotape prn, and Functional Dry Needling as needed.    Ioana Keane, PT

## 2024-08-27 ENCOUNTER — TELEPHONE (OUTPATIENT)
Dept: GASTROENTEROLOGY | Facility: CLINIC | Age: 34
End: 2024-08-27
Payer: COMMERCIAL

## 2024-08-27 ENCOUNTER — OFFICE VISIT (OUTPATIENT)
Dept: PODIATRY | Facility: CLINIC | Age: 34
End: 2024-08-27
Payer: COMMERCIAL

## 2024-08-27 VITALS
SYSTOLIC BLOOD PRESSURE: 130 MMHG | BODY MASS INDEX: 24.24 KG/M2 | HEIGHT: 64 IN | DIASTOLIC BLOOD PRESSURE: 69 MMHG | HEART RATE: 84 BPM | WEIGHT: 142 LBS

## 2024-08-27 DIAGNOSIS — L03.032 PARONYCHIA, TOE, LEFT: ICD-10-CM

## 2024-08-27 DIAGNOSIS — L60.0 INGROWN NAIL: Primary | ICD-10-CM

## 2024-08-27 DIAGNOSIS — M79.675 TOE PAIN, LEFT: ICD-10-CM

## 2024-08-27 PROCEDURE — 1159F MED LIST DOCD IN RCRD: CPT | Mod: CPTII,S$GLB,, | Performed by: PODIATRIST

## 2024-08-27 PROCEDURE — 3044F HG A1C LEVEL LT 7.0%: CPT | Mod: CPTII,S$GLB,, | Performed by: PODIATRIST

## 2024-08-27 PROCEDURE — 99999 PR PBB SHADOW E&M-EST. PATIENT-LVL IV: CPT | Mod: PBBFAC,,, | Performed by: PODIATRIST

## 2024-08-27 PROCEDURE — 87070 CULTURE OTHR SPECIMN AEROBIC: CPT | Performed by: PODIATRIST

## 2024-08-27 PROCEDURE — 3078F DIAST BP <80 MM HG: CPT | Mod: CPTII,S$GLB,, | Performed by: PODIATRIST

## 2024-08-27 PROCEDURE — 87186 SC STD MICRODIL/AGAR DIL: CPT | Performed by: PODIATRIST

## 2024-08-27 PROCEDURE — 87075 CULTR BACTERIA EXCEPT BLOOD: CPT | Performed by: PODIATRIST

## 2024-08-27 PROCEDURE — 3075F SYST BP GE 130 - 139MM HG: CPT | Mod: CPTII,S$GLB,, | Performed by: PODIATRIST

## 2024-08-27 PROCEDURE — 3008F BODY MASS INDEX DOCD: CPT | Mod: CPTII,S$GLB,, | Performed by: PODIATRIST

## 2024-08-27 PROCEDURE — 99204 OFFICE O/P NEW MOD 45 MIN: CPT | Mod: S$GLB,,, | Performed by: PODIATRIST

## 2024-08-27 PROCEDURE — 87077 CULTURE AEROBIC IDENTIFY: CPT | Performed by: PODIATRIST

## 2024-08-27 RX ORDER — SULFAMETHOXAZOLE AND TRIMETHOPRIM 400; 80 MG/1; MG/1
1 TABLET ORAL 2 TIMES DAILY
Qty: 20 TABLET | Refills: 0 | Status: SHIPPED | OUTPATIENT
Start: 2024-08-27

## 2024-08-27 RX ORDER — TOBRAMYCIN 3 MG/ML
SOLUTION/ DROPS OPHTHALMIC
Qty: 5 ML | Refills: 3 | Status: SHIPPED | OUTPATIENT
Start: 2024-08-27

## 2024-08-27 NOTE — PROGRESS NOTES
Subjective:      Patient ID: Jennifer Nguyen is a 34 y.o. female.    Chief Complaint: Foot Problem (Infected L great toe)    Sharp, throbbing pain left great toe/toenail and left 3rd toe/toenail.  Patient has had chronic ingrown nails and these and other locations for several years.  They are not too frequent, but this exacerbation has been Both gradual onset, worsening over the past week or so.  Aggravated with socks shoes pressure ambulation.  No prior medical treatment.  No self-treatment.  Denies trauma and surgery both feet.    Review of Systems   Constitutional: Negative for chills, diaphoresis, fever, malaise/fatigue and night sweats.   Cardiovascular:  Negative for claudication, cyanosis, leg swelling and syncope.   Skin:  Positive for nail changes. Negative for color change, dry skin, rash, suspicious lesions and unusual hair distribution.   Musculoskeletal:  Negative for falls, joint pain, joint swelling, muscle cramps, muscle weakness and stiffness.   Gastrointestinal:  Negative for constipation, diarrhea, nausea and vomiting.   Neurological:  Negative for brief paralysis, disturbances in coordination, focal weakness, numbness, paresthesias, sensory change and tremors.         Objective:      Physical Exam  Constitutional:       General: She is not in acute distress.     Appearance: She is well-developed. She is not diaphoretic.   Cardiovascular:      Pulses:           Popliteal pulses are 2+ on the right side and 2+ on the left side.        Dorsalis pedis pulses are 2+ on the right side and 2+ on the left side.        Posterior tibial pulses are 2+ on the right side and 2+ on the left side.      Comments: Capillary refill 3 seconds all toes/distal feet, all toes/both feet warm to touch.      Negative lymphadenopathy bilateral popliteal fossa and tarsal tunnel.      Negavie lower extremity edema bilateral.    Musculoskeletal:      Right ankle: No swelling, deformity, ecchymosis or lacerations. Normal  "range of motion. Normal pulse.      Right Achilles Tendon: Normal. No defects. Rodríguez's test negative.   Lymphadenopathy:      Lower Body: No right inguinal adenopathy. No left inguinal adenopathy.      Comments: Negative lymphadenopathy bilateral popliteal fossa and tarsal tunnel.    Negative lymphangitic streaking bilateral feet/ankles/legs.   Skin:     General: Skin is warm and dry.      Capillary Refill: Capillary refill takes 2 to 3 seconds.      Coloration: Skin is not pale.      Findings: No abrasion, bruising, burn, ecchymosis, erythema, laceration, lesion or rash.      Nails: There is no clubbing.      Comments:   Visible and palpable ingrowth of toenail medial border left hallux and medial border left 3rd toe both with pain to palpation, and focal localized erythema and edema,  without ulceration, drainage, pus, tracking, fluctuance, malodor, or cardinal signs infection.    Otherwise, Skin is normal age and health appropriate color, turgor, texture, and temperature bilateral lower extremities without ulceration, hyperpigmentation, discoloration, masses nodules or cords palpated.  No ecchymosis, erythema, edema, or cardinal signs of infection bilateral lower extremities.      Neurological:      Mental Status: She is alert and oriented to person, place, and time.      Sensory: No sensory deficit.      Motor: No tremor, atrophy or abnormal muscle tone.      Gait: Gait normal.      Comments: Negative allodynia both feet    Negative tinel sign to percussion sural, superficial peroneal, deep peroneal, saphenous, and posterior tibial nerves right and left ankles and feet.    Psychiatric:         Behavior: Behavior is cooperative.           Assessment:       Encounter Diagnoses   Name Primary?    Ingrown nail Yes    Paronychia, toe, left     Toe pain, left          Plan:       Jennifer Osullivan" was seen today for foot problem.    Diagnoses and all orders for this visit:    Ingrown nail  -     Aerobic " culture  -     Culture, Anaerobic    Paronychia, toe, left  -     Aerobic culture  -     Culture, Anaerobic    Toe pain, left  -     Aerobic culture  -     Culture, Anaerobic    Other orders  -     tobramycin sulfate 0.3% (TOBREX) 0.3 % ophthalmic solution; 1-2 drops topically twice daily to affected toe(s).  -     sulfamethoxazole-trimethoprim 400-80mg (BACTRIM,SEPTRA) 400-80 mg per tablet; Take 1 tablet by mouth 2 (two) times daily.      I counseled the patient on her conditions, their implications and medical management.    Topical tobramycin drops twice daily left hallux.      Cover left hallux all times with Band-Aid or similar changing daily.      Bactrim, cultures left hallux medial border.      Discussed conservative treatment with shoes of adequate dimensions, material, and style to alleviate symptoms and delay or prevent surgical intervention.      Utilizing sterile toenail clippers I aggressively debrided the offending nail border approximately 3 mm from its edge and carried the nail plate incision down at an angle in order to wedge out the offending cryptotic portion of the nail plate. The offending border was then removed in toto. The area was cleansed with alcohol. Patient tolerated the procedure well and related significant relief.        One-week       No follow-ups on file.

## 2024-08-27 NOTE — TELEPHONE ENCOUNTER
"----- Message from Mary Ann Eckert sent at 8/27/2024  2:55 PM CDT -----  Regarding: pt advice  Contact: [phone]  .Name Of Caller: Self    [phone]  Contact Preference?:     What is the nature of the call?: Returning call to office in regards to scheduling pls mik      Additional Notes:  "Thank you for all that you do for our patients"  "

## 2024-08-29 ENCOUNTER — PATIENT MESSAGE (OUTPATIENT)
Dept: NEUROSURGERY | Facility: CLINIC | Age: 34
End: 2024-08-29
Payer: COMMERCIAL

## 2024-08-29 LAB — BACTERIA SPEC ANAEROBE CULT: NORMAL

## 2024-08-30 ENCOUNTER — PATIENT MESSAGE (OUTPATIENT)
Dept: PODIATRY | Facility: CLINIC | Age: 34
End: 2024-08-30
Payer: COMMERCIAL

## 2024-08-30 LAB — BACTERIA SPEC AEROBE CULT: ABNORMAL

## 2024-09-03 ENCOUNTER — CLINICAL SUPPORT (OUTPATIENT)
Dept: REHABILITATION | Facility: OTHER | Age: 34
End: 2024-09-03
Payer: COMMERCIAL

## 2024-09-03 DIAGNOSIS — R29.898 WEAKNESS OF BOTH LOWER EXTREMITIES: Primary | ICD-10-CM

## 2024-09-03 PROCEDURE — 97530 THERAPEUTIC ACTIVITIES: CPT | Mod: PN

## 2024-09-03 PROCEDURE — 97112 NEUROMUSCULAR REEDUCATION: CPT | Mod: PN

## 2024-09-03 NOTE — PROGRESS NOTES
[facility] OUTPATIENT THERAPY AND WELLNESS   Physical Therapy Treatment Note     Name: Jennifer Osullivan Nguyen  Clinic Number: [id number]    Therapy Diagnosis:   Encounter Diagnosis   Name Primary?    Weakness of both lower extremities Yes           Physician: Martha Dasilva NP    Visit Date: 9/3/2024    Physician Orders: Physical Therapy Evaluate and Treat  Medical Diagnosis from Referral: lumbar herniated disc  Evaluation Date: 6/5/24  Authorization Period Expiration: 12/31/24  Plan of Care Expiration: 6/5/2024 to 9/5/24  Visit # / Visits authorized: 23/20  FOTO: 0/3  on 6/5/24    Precautions: standard    Time In:  0130pm  Time Out: 0230pm  Total Billable Time: 60 minutes    SUBJECTIVE     Pt reports: that she is doing well today. Pt is having right ankle weakness today. She has initiated her return to running program and is progressing well.       She was compliant with home exercise program.  Response to previous treatment: minor soreness  Functional change: tolerated well, no issues    Prior Level of Function: no limitation  Current Level of Function: limited with flexion based movements, recreational activity     Pain:  Current 2/10, worst 2/10, best 1/10   Location: left low back and left posterior hip  Description: Aching with occasional stabbing with trunk rotation  Aggravating Factors: sitting  Easing Factors: rest     Pts goals: Pt would like to return to yoga with no increase in low back pain.    OBJECTIVE     Objective Measures updated at progress report unless specified.     Treatment     Jennifer Osullivan received the treatments listed below in bold:        neuromuscular re-education activities to improve: Coordination, Kinesthetic, Sense, Proprioception, and Posture for 30 minutes. The following activities were included:    Supine TrA + dead bug with 4# dumbbells 20x  SL clams, purple looped TB 2x15 R/L in side plank  SLR with pilates ring press vs. 3#, 3x10 R/L  Seated trunk extension using BTB anchored to  "35# KB, 3x10  Bird dogs 2x10  Bear plank 10x3" holds  Shuttle squats, 50# 3x15  SL shuttle squats 37.5# 3x15 repetitions  Hesitation march vs 15# KB 80'x4  Pull down + march RTB 2x10  SL hip circles 30x CW/CCW x 2 sets  squats on bosu 2x10  step ups on bosu 2x10      therapeutic activities to improve functional performance for 30 minutes, including:    Elliptical x 8 min  TRX row x20x  static lunge 3x10 R/L  Goblet squat vs 15# KB 3x10  Good morning 2x10  Pallof press with lift up standing on foam vs 7# on Freemotion 30x R/L  Deadlift vs 1# from ground 20x  high plank 3x45''  +Standing chops with medicine ball 20x R/L 8#  Ladder drills x  - forward 2-in, lateral 2-in 2-out, side to side 2-in 2-out  laps each      Patient Education and Home Exercises     Home Exercises Provided and Patient Education Provided     Education provided:   - Pt education regarding proper technique for there-ex/HEP, as well as rationale for exercise/ POC.       Written Home Exercises Provided: Patient instructed to cont prior HEP. Exercises were reviewed and Jennifer Osullivan was able to demonstrate them prior to the end of the session.  Jennifer Osullivan demonstrated good  understanding of the education provided. See EMR under Patient Instructions for exercises provided during therapy sessions    ASSESSMENT   Pt tolerated treatment session well today. Continue PT POC with emphasis on improving abdominal strength for decreased LBP.      Jennifer Osullivan Is progressing well towards her goals.   Pt prognosis is Good.     Pt will continue to benefit from skilled outpatient physical therapy to address the deficits listed in the problem list box on initial evaluation, provide pt/family education and to maximize pt's level of independence in the home and community environment.     Pt's spiritual, cultural and educational needs considered and pt agreeable to plan of care and goals.     Anticipated Barriers for therapy: None    GOALS:  Short Term Goals (4 Weeks):  1. " Patient will be compliant with home exercise program to supplement therapy in promoting functional mobility. (progressing, not met)    2. Patient will perform lifting activity with good control to demonstrate improved core strength. (progressing, not met)    3. Patient will report no pain during thoracolumbar active range of motion to promote functional mobility.  (progressing, not met)    4. Patient will improve impaired lower extremity manual muscle tests  to >/=4/5 to improve strength for functional tasks. (progressing, not met)          Long Term Goals (6 Weeks):   1. Patient will improve FOTO score to </= 35% limited to decrease perceived limitation with maintaining/changing body position. (progressing, not met)    2. Patient will perform yoga movements with good control to demonstrate improved core strength.  (progressing, not met)    3. Patient will improve impaired lower extremity manual muscle tests to >/=4+/5 to improve strength for functional tasks.  (progressing, not met)    4. Patient will tolerate sitting for 120 minutes with no increase in low back pain to return to PLOF.  (progressing, not met)         PLAN     Plan of care Certification: 6/5/2024 to 9/5/24    Focus on core/LE strength and ROM with emphasis on posture and ADL performance     Outpatient Physical Therapy 2 times weekly for 12 weeks to include the following interventions: Therapeutic Exercises, Manual Therapeutic Technique, Neuromuscular Re Education, Therapeutic Activities. Modalities, Kinesiotape prn, and Functional Dry Needling as needed.    Ioana Keane, PT

## 2024-09-04 ENCOUNTER — OFFICE VISIT (OUTPATIENT)
Dept: PODIATRY | Facility: CLINIC | Age: 34
End: 2024-09-04
Payer: COMMERCIAL

## 2024-09-04 VITALS
DIASTOLIC BLOOD PRESSURE: 76 MMHG | HEART RATE: 85 BPM | BODY MASS INDEX: 24.24 KG/M2 | SYSTOLIC BLOOD PRESSURE: 113 MMHG | WEIGHT: 142 LBS | HEIGHT: 64 IN

## 2024-09-04 DIAGNOSIS — M79.675 TOE PAIN, LEFT: ICD-10-CM

## 2024-09-04 DIAGNOSIS — L60.0 INGROWN NAIL: Primary | ICD-10-CM

## 2024-09-04 DIAGNOSIS — L03.032 PARONYCHIA, TOE, LEFT: ICD-10-CM

## 2024-09-04 PROCEDURE — 99213 OFFICE O/P EST LOW 20 MIN: CPT | Mod: S$GLB,,, | Performed by: PODIATRIST

## 2024-09-04 PROCEDURE — 3044F HG A1C LEVEL LT 7.0%: CPT | Mod: CPTII,S$GLB,, | Performed by: PODIATRIST

## 2024-09-04 PROCEDURE — 1159F MED LIST DOCD IN RCRD: CPT | Mod: CPTII,S$GLB,, | Performed by: PODIATRIST

## 2024-09-04 PROCEDURE — 3008F BODY MASS INDEX DOCD: CPT | Mod: CPTII,S$GLB,, | Performed by: PODIATRIST

## 2024-09-04 PROCEDURE — 3078F DIAST BP <80 MM HG: CPT | Mod: CPTII,S$GLB,, | Performed by: PODIATRIST

## 2024-09-04 PROCEDURE — 1160F RVW MEDS BY RX/DR IN RCRD: CPT | Mod: CPTII,S$GLB,, | Performed by: PODIATRIST

## 2024-09-04 PROCEDURE — 3074F SYST BP LT 130 MM HG: CPT | Mod: CPTII,S$GLB,, | Performed by: PODIATRIST

## 2024-09-04 PROCEDURE — 99999 PR PBB SHADOW E&M-EST. PATIENT-LVL IV: CPT | Mod: PBBFAC,,, | Performed by: PODIATRIST

## 2024-09-04 NOTE — PROGRESS NOTES
Subjective:      Patient ID: Jennifer Nguyen is a 34 y.o. female.    Chief Complaint: Follow-up (Ingrown toenail)    Ingrown nail, paronychia, infection left big toe.  Cultures positive for Staph aureus sensitive to everything but penicillin.  Patient taking Bactrim-has 1 dose left.  Pain tremendously improved.  Wound closed, dressings discontinued.  Patient has resumed all pre condition activity in shoes of choice without symptom    Review of Systems   Constitutional: Negative for chills, diaphoresis, fever, malaise/fatigue and night sweats.   Cardiovascular:  Negative for claudication, cyanosis, leg swelling and syncope.   Skin:  Positive for nail changes. Negative for color change, dry skin, rash, suspicious lesions and unusual hair distribution.   Musculoskeletal:  Negative for falls, joint pain, joint swelling, muscle cramps, muscle weakness and stiffness.   Gastrointestinal:  Negative for constipation, diarrhea, nausea and vomiting.   Neurological:  Negative for brief paralysis, disturbances in coordination, focal weakness, numbness, paresthesias, sensory change and tremors.         Objective:      Physical Exam  Constitutional:       General: She is not in acute distress.     Appearance: She is well-developed. She is not diaphoretic.   Cardiovascular:      Pulses:           Popliteal pulses are 2+ on the right side and 2+ on the left side.        Dorsalis pedis pulses are 2+ on the right side and 2+ on the left side.        Posterior tibial pulses are 2+ on the right side and 2+ on the left side.      Comments: Capillary refill 3 seconds all toes/distal feet, all toes/both feet warm to touch.      Negative lymphadenopathy bilateral popliteal fossa and tarsal tunnel.      Negavie lower extremity edema bilateral.    Musculoskeletal:      Right ankle: No swelling, deformity, ecchymosis or lacerations. Normal range of motion. Normal pulse.      Right Achilles Tendon: Normal. No defects. Rodríguez's test  negative.   Lymphadenopathy:      Lower Body: No right inguinal adenopathy. No left inguinal adenopathy.      Comments: Negative lymphadenopathy bilateral popliteal fossa and tarsal tunnel.    Negative lymphangitic streaking bilateral feet/ankles/legs.   Skin:     General: Skin is warm and dry.      Capillary Refill: Capillary refill takes 2 to 3 seconds.      Coloration: Skin is not pale.      Findings: No abrasion, bruising, burn, ecchymosis, erythema, laceration, lesion or rash.      Nails: There is no clubbing.      Comments:   Ingrown medial left hallux today is not tender to palpation in his without open skin drainage pus tracking fluctuance malodor signs of infection.      Otherwise, Skin is normal age and health appropriate color, turgor, texture, and temperature bilateral lower extremities without ulceration, hyperpigmentation, discoloration, masses nodules or cords palpated.  No ecchymosis, erythema, edema, or cardinal signs of infection bilateral lower extremities.    Neurological:      Mental Status: She is alert and oriented to person, place, and time.      Sensory: No sensory deficit.      Motor: No tremor, atrophy or abnormal muscle tone.      Gait: Gait normal.      Deep Tendon Reflexes:      Reflex Scores:       Patellar reflexes are 2+ on the right side and 2+ on the left side.       Achilles reflexes are 2+ on the right side and 2+ on the left side.  Psychiatric:         Behavior: Behavior is cooperative.           Assessment:       No diagnosis found.      Plan:       There are no diagnoses linked to this encounter.  I counseled the patient on her conditions, their implications and medical management.        Finish Bactrim.      Activity to tolerance and shoes of choice.      Inspect right and left hallux toenails (all others) daily for signs of infection ingrown nail.          No follow-ups on file.

## 2024-09-06 ENCOUNTER — TELEPHONE (OUTPATIENT)
Dept: GENETICS | Facility: CLINIC | Age: 34
End: 2024-09-06
Payer: COMMERCIAL

## 2024-09-06 NOTE — TELEPHONE ENCOUNTER
Informed pt that she would need to  be seen by one on the new  that start in the fall. Pt will be add to a scheduling list. We  will be in contact to schedule once we are aware of their schedules. Appt could be as far out as Aug 2025. Provided phone number to genetics at [facility] [phone] and [facility] [phone] . Pt verbalized understanding.         ----- Message from Kiana Navas sent at 9/6/2024  3:06 PM CDT -----  Type:  Patient Returning Call    Who Called: pt   Who Left Message for Patient:pt   Does the patient know what this is regarding?:pt has a referral and need a call to get an appt   Would the patient rather a call back or a response via [facility]? call  Best Call Back Number:Click to [phone]  Additional Information: call back

## 2024-09-11 NOTE — PROGRESS NOTES
[facility] Telemedicine Consult Note    The patient location is: home   The chief complaint leading to consultation is: celiac disease     Visit type: audiovisual    Face to Face time with patient: 15  30 minutes of total time spent on the encounter, which includes face to face time and non-face to face time preparing to see the patient (eg, review of tests), Obtaining and/or reviewing separately obtained history, Documenting clinical information in the electronic or other health record, Independently interpreting results (not separately reported) and communicating results to the patient/family/caregiver, or Care coordination (not separately reported).       Each patient to whom he or she provides medical services by telemedicine is:  (1) informed of the relationship between the physician and patient and the respective role of any other health care provider with respect to management of the patient; and (2) notified that he or she may decline to receive medical services by telemedicine and may withdraw from such care at any time.       PCP:   She Oleary       Referring MD:  No referring provider defined for this encounter.    HPI:  This is a 34 y.o. female here for follow up of celiac disease. She was diagnosed in 2006 based on celiac antibodies and subsequent endoscopy which confirmed the diagnosis. She had a normal cscope at that time. Initial symptoms were abdominal pain, nausea and weight loss. Last EGD was around 2013 which showed improvement in her celiac disease. No report on file. Screening for nutritional deficiencies and anemia at 2016 visit were normal except for insufficient vit D. She follows a gluten free diet well. Does admit to some inadvertent exposures a few times since last visit mostly at restaurants. Denied any diarrhea, blood in stool, constipation, abdominal pain, vomiting, dysphagia or heartburn. Does have nausea at times especially after gluten exposure and takes  zofran which helps. Takes a multivitamin daily. She has noted easy bruising recently which is new for her. INR and plt were wnl in the past. No FH of colon cancer, IBD. Mother has graves disease. Sister developed gastroparesis after a strep infection.     Interval history:   Spine surgery in April for herniated disc. No BM issues after. Recent celiac serologies normal. Not anemic. No recent abdominal imaging. No DXA on file. No EGD on file. Last EGD in 2013 was done due to recurrent symptoms and she think it showed some active inflammation. Also did a VCE which was normal she thinks. Currently she feels well. Has had pneumococcal vaccines. Still has an occasional inadvertent gluten exposure but otherwise doing well. Gets a rash from time to time which she is not sure is related to celiac or her eczema        Medical History:  has a past medical history of Acute bilateral low back pain (08/09/2023), ADHD, Allergy, Anxiety disorder, unspecified, Celiac disease, Chronic instability of metacarpophalangeal joint of right thumb (01/10/2022), Chronic thumb pain, right (01/11/2022), Gamekeeper's thumb (01/06/2020), and Mild intermittent asthma, uncomplicated.    Surgical History:  has a past surgical history that includes Breast surgery; [place] tooth extraction; Intrauterine device insertion (04/01/2015); Repair of collateral ligament of thumb (Right, 01/06/2020); and Lumbar laminectomy (Right, 4/1/2024).    Family History: family history includes Breast cancer in her paternal grandmother; Cancer in her maternal grandmother; Dementia in her paternal grandmother; Diabetes in her father; Graves' disease in her mother; Heart disease in her maternal grandfather; Hypertension in her father; No Known Problems in her brother, brother, and sister..     Social History:  reports that she quit smoking about 15 years ago. Her smoking use included cigarettes. She started smoking about 17 years ago. She has been exposed to tobacco smoke.  She has never used smokeless tobacco. She reports that she does not currently use alcohol. She reports that she does not use drugs.      Imaging:  Reviewed     Endoscopy:    None     Assessment:    Celiac disease     - EGD with biopsies to confirm small bowel healing. Would also take gastric biopsies to stain of mast cells as she mentions some issue with MCAS/histamine in the past which improved with zyrtec   - DEXA   - Ca/Vit D Supplement   - No NSAIDs   - Continue GFD   - UTD on pneumococcal vaccines     RTC 3 months     Order summary:  Orders Placed This Encounter    DEXA Bone Density Axial Skeleton 1 or more Site W TBS XPD         Thank you so much for allowing me to participate in the care of Krystyna Patrick Sharma MD

## 2024-09-12 ENCOUNTER — OFFICE VISIT (OUTPATIENT)
Dept: GASTROENTEROLOGY | Facility: CLINIC | Age: 34
End: 2024-09-12
Payer: COMMERCIAL

## 2024-09-12 ENCOUNTER — TELEPHONE (OUTPATIENT)
Dept: GASTROENTEROLOGY | Facility: CLINIC | Age: 34
End: 2024-09-12

## 2024-09-12 DIAGNOSIS — K90.0 CELIAC DISEASE: Primary | ICD-10-CM

## 2024-09-12 PROCEDURE — 99214 OFFICE O/P EST MOD 30 MIN: CPT | Mod: 95,,, | Performed by: STUDENT IN AN ORGANIZED HEALTH CARE EDUCATION/TRAINING PROGRAM

## 2024-09-12 PROCEDURE — 3044F HG A1C LEVEL LT 7.0%: CPT | Mod: CPTII,95,, | Performed by: STUDENT IN AN ORGANIZED HEALTH CARE EDUCATION/TRAINING PROGRAM

## 2024-09-13 ENCOUNTER — TELEPHONE (OUTPATIENT)
Dept: ENDOSCOPY | Facility: HOSPITAL | Age: 34
End: 2024-09-13
Payer: COMMERCIAL

## 2024-09-18 ENCOUNTER — OFFICE VISIT (OUTPATIENT)
Dept: PSYCHIATRY | Facility: CLINIC | Age: 34
End: 2024-09-18
Payer: COMMERCIAL

## 2024-09-18 ENCOUNTER — HOSPITAL ENCOUNTER (OUTPATIENT)
Dept: RADIOLOGY | Facility: CLINIC | Age: 34
Discharge: HOME | End: 2024-09-18
Attending: STUDENT IN AN ORGANIZED HEALTH CARE EDUCATION/TRAINING PROGRAM
Payer: COMMERCIAL

## 2024-09-18 DIAGNOSIS — K90.0 CELIAC DISEASE: ICD-10-CM

## 2024-09-18 DIAGNOSIS — F90.0 ATTENTION DEFICIT HYPERACTIVITY DISORDER (ADHD), PREDOMINANTLY INATTENTIVE TYPE: Primary | ICD-10-CM

## 2024-09-18 DIAGNOSIS — F33.1 MODERATE EPISODE OF RECURRENT MAJOR DEPRESSIVE DISORDER: ICD-10-CM

## 2024-09-18 PROCEDURE — 3044F HG A1C LEVEL LT 7.0%: CPT | Mod: CPTII,S$GLB,, | Performed by: STUDENT IN AN ORGANIZED HEALTH CARE EDUCATION/TRAINING PROGRAM

## 2024-09-18 PROCEDURE — 77080 DXA BONE DENSITY AXIAL: CPT | Mod: TC

## 2024-09-18 PROCEDURE — 99214 OFFICE O/P EST MOD 30 MIN: CPT | Mod: S$GLB,,, | Performed by: STUDENT IN AN ORGANIZED HEALTH CARE EDUCATION/TRAINING PROGRAM

## 2024-09-18 RX ORDER — LISDEXAMFETAMINE DIMESYLATE 40 MG/1
40 CAPSULE ORAL DAILY
Qty: 30 CAPSULE | Refills: 0 | Status: SHIPPED | OUTPATIENT
Start: 2024-09-18

## 2024-09-18 RX ORDER — DEXTROAMPHETAMINE SACCHARATE, AMPHETAMINE ASPARTATE, DEXTROAMPHETAMINE SULFATE AND AMPHETAMINE SULFATE 1.25; 1.25; 1.25; 1.25 MG/1; MG/1; MG/1; MG/1
5 TABLET ORAL DAILY PRN
Qty: 30 TABLET | Refills: 0 | Status: SHIPPED | OUTPATIENT
Start: 2024-09-18

## 2024-09-18 RX ORDER — DEXTROAMPHETAMINE SACCHARATE, AMPHETAMINE ASPARTATE, DEXTROAMPHETAMINE SULFATE AND AMPHETAMINE SULFATE 1.25; 1.25; 1.25; 1.25 MG/1; MG/1; MG/1; MG/1
5 TABLET ORAL DAILY
Qty: 30 TABLET | Refills: 0 | Status: SHIPPED | OUTPATIENT
Start: 2024-11-17

## 2024-09-18 NOTE — PROGRESS NOTES
"Outpatient Psychiatry Follow-Up Visit (MD/NP)    9/18/2024    Chief Complaint:  Jennifer Nguyen is a 34 y.o. female who presents today for follow-up of attention problems.  Met with patient.      Interval History and Content of Current Session:  Interim Events/Subjective Report/Content of Current Session:   Jennifer Nguyen checked in on time for her visit- pt takes 30 mg vyvanse daily. Lexapro 20 mg ativan PRN. Pt states she has been doing well. She is almost back to normal activity level. She has a kickball game tonight. Mood- "I have been doing better." She feels like she is less depressed. She is feeling less depressed, less anxious. She has some financial stressors- emergency back surgery costs. Sometimes feels like vyvanse is less effective, and we discussed ways to manage tolerance (IE vacation holidays). She noticed maybe in the last month that she feels like the medication is slightly less effective. We discussed trying an increased dose.     She denies any side effects from her medication.    No thoughts of suicide.   No HI, no AVH.       Initial history with me:  Had tried wellbutrin, it made her irritable, which she was not a fan of. She likes the vyvanse, but she is concerned about the cost. She is able to sit still, sit in silence. She is no longer as distracted by extraneous stimuli. 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 She feels like her skin may be drier from the medication- she does have a history of eczema. She is ok with it. She denies chest pain, palpitations. NO hx of heart murmur, no hx of heart problems.   She states her anxiety has been OK. She is also in therapy and saw her therapist yesterday. A lot of her anxiety was secondary to problems with focus. She feels like things are much better and her focus and functioning is much better. She had tried anxiety, depression medications and had not seen benefit.   She has had weird bruising, fatigue- saw GP, saw a hematologist.     Pt is from [place], " "[place]. Appetite has decreased, she has lost about 10 lbs.   Denies SI, HI, AVH. She is very accident prone.   No thoughts about suicide.     No gun in her house. Did attempt suicide in 9th grade. Has a history of self harm-  was picking scabs during the pandemic, but hasn't cut in a long time. She lives alone, and was very isolated in the pandemic. IT was hard for her to cope with the loneliness.     Review of Systems   PSYCHIATRIC: Pertinant items are noted in the narrative.  CONSTITUTIONAL: No weight gain or loss.   MUSCULOSKELETAL: No pain or stiffness of the joints.  ENT: See above.  RESPIRATORY: See above.  CARDIOVASCULAR: No tachycardia or chest pain.  GASTROINTESTINAL: No nausea, vomiting, pain, constipation or diarrhea.    Past Medical, Family and Social History: The patient's past medical, family and social history have been reviewed and updated as appropriate within the electronic medical record - see encounter notes.    Compliance: yes    Side effects: see above    Risk Parameters:  Patient reports no suicidal ideation  Patient reports no homicidal ideation  Patient reports no self-injurious behavior  Patient reports no violent behavior    Exam (detailed: at least 9 elements; comprehensive: all 15 elements)   Constitutional  Vitals:  There were no vitals filed for this visit.           General:  unremarkable, age appropriate     Musculoskeletal  Muscle Strength/Tone:  not examined   Gait & Station:  non-ataxic     Psychiatric  Speech:  no latency; no press   Mood & Affect:  "Doing better"  congruent and appropriate   Thought Process:  normal and logical   Associations:  intact   Thought Content:  normal, no suicidality, no homicidality, delusions, or paranoia   Insight:  intact   Judgement: behavior is adequate to circumstances   Orientation:  grossly intact   Memory: intact for content of interview   Language: grossly intact   Attention Span & Concentration:  able to focus   Fund of Knowledge:  " intact and appropriate to age and level of education     Assessment and Diagnosis   Status/Progress: Based on the examination today, the patient's problem(s) is/are adequately but not ideally controlled.  New problems have not been presented today.   Co-morbidities, Diagnostic uncertainty, and Lack of compliance are not complicating management of the primary condition.  There are no active rule-out diagnoses for this patient at this time.     General Impression: Jennifer Nguyen, a 34 y.o.  female, presenting for follow-up visit for management of ADHD symptoms. Presents 12/22/22 - did not tolerate Wellbutrin; Vyvanse started. Presents at 3/21/22 for F/u, vvyanse has been helping and she denies SE, feels great benefit.   9/2023- suffering due to chronic pain, having depression and anxiety.   7/9/2024- doing well. No issues with medications wishes to continue  9/18/24- doing generally well, anxiety and depression improved. Intattention still an issue, may try higher dose of vyvanse.     ADHD, inattentive type   Major depressive disorder, recurrent, moderate  Chronic pain     Intervention/Counseling/Treatment Plan   Medication Management: Continue current medications.    Increase vyvanse to 40 mg daily. Can continue adderall 5 mg IR PRN.   Patient has no contraindications: no h/o allergic rxn, agitation, anxiety, tourette's, arrythmia, cardiovascular disease, cardiac structural abnormalities, hyperthyroidism, glaucoma, Other psychiatric illness, etc.   Reviewed possible side effects multiple times. Discussed diagnosis, risks and benefits of proposed treatment vs alternative treatments vs no treatment, and potential side effects of these treatments (death, dependency, psychosis, alisson, aggression, HTN, tics, MI, stroke, arrythmia, seizure, anaphylaxis or other allergic reactions, leukopenia, nervousness, anorexia, insomnia, tachycardia, palpitations, dizziness, BP changes, HR changes, visual disturbance, etc.). The  patient expresses understanding of the above and displays the capacity to agree with this treatment given said understanding. Patient also agrees that, currently, the benefits outweigh the risks and would like to pursue treatment at this time.  The risks and benefits of medication were discussed with the patient.    Discussed diagnosis, risks and benefits of proposed treatment above vs alternative treatments vs no treatment, and potential side effects of these treatments. The patient expresses understanding of the above and displays the capacity to agree with this treatment given said understanding. Patient also agrees that, currently, the benefits outweigh the risks and would like to pursue treatment at this time.  Pt is experiencing a recurrence of her depression. continue lexapro to 20 mg daily.  Reviewed r/b/SE of medication including but not limited to GI distress, serotonin syndrome, manic switching, increased SI, sexual SE. All questions and concerns addressed. No SI.   Wrote #15 0.5 mg ativan for PRN anxiety, insomnia. Informed pt of the risks of continuous She does not need a refill. Benzodiazepine use including tolerance, dependence and withdrawals that may be life threatening upon abrupt cessation. Also advised not to take Benzodiazepines with Opiates or other sedatives and also not to drive or operate heavy machinery while using Benzodiazepines. Reminded never to mix with alcohol or opiates.  Continue with therapy.  Discussed inherent unpredictability of medications in each individual.   Encouraged Patient to keep future appointments.   Take medications as prescribed and abstain from substance abuse.   In the event of an emergency patient was advised to go to the emergency room    Return to Clinic:3 months or sooner ELSIE Kessler MD

## 2024-09-19 ENCOUNTER — CLINICAL SUPPORT (OUTPATIENT)
Dept: REHABILITATION | Facility: OTHER | Age: 34
End: 2024-09-19
Payer: COMMERCIAL

## 2024-09-19 DIAGNOSIS — R29.898 WEAKNESS OF BOTH LOWER EXTREMITIES: Primary | ICD-10-CM

## 2024-09-19 PROCEDURE — 97112 NEUROMUSCULAR REEDUCATION: CPT | Mod: PN

## 2024-09-19 PROCEDURE — 97530 THERAPEUTIC ACTIVITIES: CPT | Mod: PN

## 2024-09-19 NOTE — PROGRESS NOTES
[facility]   Physical Therapy Treatment Note     Name: Jennifer Osullivan Nguyen  Clinic Number: [id number]    Therapy Diagnosis:   Encounter Diagnosis   Name Primary?    Weakness of both lower extremities Yes           Physician: Martha Dasilva NP    Visit Date: 9/19/2024    Physician Orders: Physical Therapy Evaluate and Treat  Medical Diagnosis from Referral: lumbar herniated disc  Evaluation Date: 6/5/24  Authorization Period Expiration: 12/31/24  Plan of Care Expiration: 6/5/2024 to 9/5/24  Visit # / Visits authorized: 25/44  FOTO: 0/3  on 6/5/24    Precautions: standard    Time In:  1100am  Time Out: 1200pm  Total Billable Time: 60 minutes    SUBJECTIVE     Pt reports: that she is doing well today. Pt played kickball and felt mild soreness with kicking. However, she was able to play the entire game.      She was compliant with home exercise program.  Response to previous treatment: minor soreness  Functional change: tolerated well, no issues    Prior Level of Function: no limitation  Current Level of Function: limited with flexion based movements, recreational activity     Pain:  Current 2/10, worst 2/10, best 1/10   Location: left low back and left posterior hip  Description: Aching with occasional stabbing with trunk rotation  Aggravating Factors: sitting  Easing Factors: rest     Pts goals: Pt would like to return to yoga with no increase in low back pain.    OBJECTIVE     Objective Measures updated at progress report unless specified.     Treatment     Jennifer Osullivan received the treatments listed below in bold:        neuromuscular re-education activities to improve: Coordination, Kinesthetic, Sense, Proprioception, and Posture for 20 minutes. The following activities were included:    Supine TrA + dead bug with 4# dumbbells 20x  SL clams, purple looped TB 2x15 R/L in side plank  SLR with pilates ring press vs. 3#, 3x10 R/L  Seated trunk extension using BTB anchored to 35# KB,  "3x10  Bird dogs 2x10  Bear plank 10x3" holds  Shuttle squats, 50# 3x15  SL shuttle squats 37.5# 3x15 repetitions  Hesitation march vs 15# KB 80'x4  Pull down + march RTB 2x10  SL hip circles 30x CW/CCW x 2 sets  squats on bosu 2x10  step ups on bosu 2x10      therapeutic activities to improve functional performance for 40 minutes, including:    Elliptical x 8 min  TRX row x20x  static lunge 3x10 R/L  Goblet squat vs 15# KB 3x10  Good morning 2x10  Pallof press with lift up standing on foam vs 7# on Freemotion 30x R/L  Deadlift vs 1# from ground 20x  high plank 3x45''  Standing chops with medicine ball 20x R/L 8#  Ladder drills x  - forward 2-in, lateral 2-in 2-out, side to side 2-in 2-out  laps each  +Kettlebell swings vs 15# 20x        Patient Education and Home Exercises     Home Exercises Provided and Patient Education Provided     Education provided:   - Pt education regarding proper technique for there-ex/HEP, as well as rationale for exercise/ POC.       Written Home Exercises Provided: Patient instructed to cont prior HEP. Exercises were reviewed and Jennifer Osullivan was able to demonstrate them prior to the end of the session.  Jennifer Osullivan demonstrated good  understanding of the education provided. See EMR under Patient Instructions for exercises provided during therapy sessions    ASSESSMENT   Pt tolerated treatment session well today. Updated program to include ballistic motions needed for kickball. Continue PT POC.      Jennifer Osullivan Is progressing well towards her goals.   Pt prognosis is Good.     Pt will continue to benefit from skilled outpatient physical therapy to address the deficits listed in the problem list box on initial evaluation, provide pt/family education and to maximize pt's level of independence in the home and community environment.     Pt's spiritual, cultural and educational needs considered and pt agreeable to plan of care and goals.     Anticipated Barriers for therapy: None    GOALS:  Short " Term Goals (4 Weeks):  1. Patient will be compliant with home exercise program to supplement therapy in promoting functional mobility. (progressing, not met)    2. Patient will perform lifting activity with good control to demonstrate improved core strength. (progressing, not met)    3. Patient will report no pain during thoracolumbar active range of motion to promote functional mobility.  (progressing, not met)    4. Patient will improve impaired lower extremity manual muscle tests  to >/=4/5 to improve strength for functional tasks. (progressing, not met)          Long Term Goals (6 Weeks):   1. Patient will improve FOTO score to </= 35% limited to decrease perceived limitation with maintaining/changing body position. (progressing, not met)    2. Patient will perform yoga movements with good control to demonstrate improved core strength.  (progressing, not met)    3. Patient will improve impaired lower extremity manual muscle tests to >/=4+/5 to improve strength for functional tasks.  (progressing, not met)    4. Patient will tolerate sitting for 120 minutes with no increase in low back pain to return to PLOF.  (progressing, not met)         PLAN     Plan of care Certification: 6/5/2024 to 9/5/24    Focus on core/LE strength and ROM with emphasis on posture and ADL performance     Outpatient Physical Therapy 2 times weekly for 12 weeks to include the following interventions: Therapeutic Exercises, Manual Therapeutic Technique, Neuromuscular Re Education, Therapeutic Activities. Modalities, Kinesiotape prn, and Functional Dry Needling as needed.    Ioana Keane, PT

## 2024-09-23 ENCOUNTER — TELEPHONE (OUTPATIENT)
Dept: ENDOSCOPY | Facility: HOSPITAL | Age: 34
End: 2024-09-23
Payer: COMMERCIAL

## 2024-09-23 ENCOUNTER — CLINICAL SUPPORT (OUTPATIENT)
Dept: REHABILITATION | Facility: OTHER | Age: 34
End: 2024-09-23
Payer: COMMERCIAL

## 2024-09-23 DIAGNOSIS — R29.898 WEAKNESS OF BOTH LOWER EXTREMITIES: Primary | ICD-10-CM

## 2024-09-23 PROCEDURE — 97112 NEUROMUSCULAR REEDUCATION: CPT | Mod: PN

## 2024-09-23 PROCEDURE — 97530 THERAPEUTIC ACTIVITIES: CPT | Mod: PN

## 2024-09-23 NOTE — PROGRESS NOTES
[facility]   Physical Therapy Treatment Note     Name: Jennifer Osullivan Nguyen  Clinic Number: [id number]    Therapy Diagnosis:   Encounter Diagnosis   Name Primary?    Weakness of both lower extremities Yes           Physician: Martha Dasilva NP    Visit Date: 9/23/2024    Physician Orders: Physical Therapy Evaluate and Treat  Medical Diagnosis from Referral: lumbar herniated disc  Evaluation Date: 6/5/24  Authorization Period Expiration: 12/31/24  Plan of Care Expiration: 6/5/2024 to 9/5/24  Visit # / Visits authorized: 26/44  FOTO: 0/3  on 6/5/24    Precautions: standard    Time In:  1100am  Time Out: 1200pm  Total Billable Time: 60 minutes    SUBJECTIVE     Pt reports: that she is doing well today. Pt states that she was not sore after her last treatment session and is doing well overall.       She was compliant with home exercise program.  Response to previous treatment: minor soreness  Functional change: tolerated well, no issues    Prior Level of Function: no limitation  Current Level of Function: limited with flexion based movements, recreational activity     Pain:  Current 2/10, worst 2/10, best 1/10   Location: left low back and left posterior hip  Description: Aching with occasional stabbing with trunk rotation  Aggravating Factors: sitting  Easing Factors: rest     Pts goals: Pt would like to return to yoga with no increase in low back pain.    OBJECTIVE     Objective Measures updated at progress report unless specified.     Treatment     Jennifer Osullivan received the treatments listed below in bold:        neuromuscular re-education activities to improve: Coordination, Kinesthetic, Sense, Proprioception, and Posture for 15 minutes. The following activities were included:    Supine TrA + dead bug with 4# dumbbells 20x  SL clams, purple looped TB 2x15 R/L in side plank  SLR with pilates ring press vs. 3#, 3x10 R/L  Seated trunk extension using BTB anchored to 35# KB, 3x10  Bird  "dogs 2x10  Bear plank 10x3" holds  Shuttle squats, 50# 3x15  SL shuttle squats 37.5# 3x15 repetitions  Hesitation march vs 15# KB 80'x4  Pull down + march RTB 2x10  SL hip circles 30x CW/CCW x 2 sets  squats on bosu 2x10  step ups on bosu 2x10      therapeutic activities to improve functional performance for 45 minutes, including:    Elliptical x 8 min  TRX row x20x  static lunge 3x10 R/L  Goblet squat vs 15# KB 3x10  Good morning 2x10  Pallof press with lift up standing on foam vs 7# on Freemotion 30x R/L  Deadlift vs 15# from ground 20x  high plank 3x45''  Standing chops with medicine ball 3x8 R/L 8#  Ladder drills x  - forward 2-in, lateral 2-in 2-out, side to side 2-in 2-out  laps each  Kettlebell swings vs 15# 20x 3 sets        Patient Education and Home Exercises     Home Exercises Provided and Patient Education Provided     Education provided:   - Pt education regarding proper technique for there-ex/HEP, as well as rationale for exercise/ POC.       Written Home Exercises Provided: Patient instructed to cont prior HEP. Exercises were reviewed and Jennifer Osullivan was able to demonstrate them prior to the end of the session.  Jennifer Osullivan demonstrated good  understanding of the education provided. See EMR under Patient Instructions for exercises provided during therapy sessions    ASSESSMENT   Pt tolerated treatment session well today. Progressed pt's program to include plyometric exercises needed to participate in kickball.       Jennifer Osullivan Is progressing well towards her goals.   Pt prognosis is Good.     Pt will continue to benefit from skilled outpatient physical therapy to address the deficits listed in the problem list box on initial evaluation, provide pt/family education and to maximize pt's level of independence in the home and community environment.     Pt's spiritual, cultural and educational needs considered and pt agreeable to plan of care and goals.     Anticipated Barriers for therapy: " None    GOALS:  Short Term Goals (4 Weeks):  1. Patient will be compliant with home exercise program to supplement therapy in promoting functional mobility. (progressing, not met)    2. Patient will perform lifting activity with good control to demonstrate improved core strength. (progressing, not met)    3. Patient will report no pain during thoracolumbar active range of motion to promote functional mobility.  (progressing, not met)    4. Patient will improve impaired lower extremity manual muscle tests  to >/=4/5 to improve strength for functional tasks. (progressing, not met)          Long Term Goals (6 Weeks):   1. Patient will improve FOTO score to </= 35% limited to decrease perceived limitation with maintaining/changing body position. (progressing, not met)    2. Patient will perform yoga movements with good control to demonstrate improved core strength.  (progressing, not met)    3. Patient will improve impaired lower extremity manual muscle tests to >/=4+/5 to improve strength for functional tasks.  (progressing, not met)    4. Patient will tolerate sitting for 120 minutes with no increase in low back pain to return to PLOF.  (progressing, not met)         PLAN     Plan of care Certification: 6/5/2024 to 9/5/24    Focus on core/LE strength and ROM with emphasis on posture and ADL performance     Outpatient Physical Therapy 2 times weekly for 12 weeks to include the following interventions: Therapeutic Exercises, Manual Therapeutic Technique, Neuromuscular Re Education, Therapeutic Activities. Modalities, Kinesiotape prn, and Functional Dry Needling as needed.    Ioana Keane, PT

## 2024-09-24 ENCOUNTER — TELEPHONE (OUTPATIENT)
Dept: ENDOSCOPY | Facility: HOSPITAL | Age: 34
End: 2024-09-24
Payer: COMMERCIAL

## 2024-09-24 NOTE — TELEPHONE ENCOUNTER
"Attempted to reach patient to schedule a EGD. No answer. Left message on patients voice mail to call.             ----- Message -----   From: Yamila Sharma MD   Sent: 2024  11:43 AM CDT   To: Martha Casey RN     Procedure: EGD     Diagnosis: celiac     Procedure Timin-12 weeks     *If within 4 weeks selected, please kate as high priority*     *If greater than 12 weeks, please select "5-12 weeks" and delay sending until 3 months prior to requested date*     Location: any site     Additional Scheduling Information: No scheduling concerns     Prep Specifications:N/A     Is the patient taking a GLP-1 Agonist:no     Have you attached a patient to this message: yes   "

## 2024-09-25 ENCOUNTER — OFFICE VISIT (OUTPATIENT)
Dept: OBSTETRICS AND GYNECOLOGY | Facility: CLINIC | Age: 34
End: 2024-09-25
Payer: COMMERCIAL

## 2024-09-25 ENCOUNTER — TELEPHONE (OUTPATIENT)
Dept: ALLERGY | Facility: CLINIC | Age: 34
End: 2024-09-25
Payer: COMMERCIAL

## 2024-09-25 VITALS
BODY MASS INDEX: 25.24 KG/M2 | WEIGHT: 147.06 LBS | SYSTOLIC BLOOD PRESSURE: 120 MMHG | DIASTOLIC BLOOD PRESSURE: 70 MMHG

## 2024-09-25 DIAGNOSIS — Z01.419 WELL WOMAN EXAM: Primary | ICD-10-CM

## 2024-09-25 DIAGNOSIS — N39.0 FREQUENT UTI: ICD-10-CM

## 2024-09-25 DIAGNOSIS — Z12.4 ENCOUNTER FOR SCREENING FOR CERVICAL CANCER: ICD-10-CM

## 2024-09-25 DIAGNOSIS — Z30.431 IUD CHECK UP: ICD-10-CM

## 2024-09-25 LAB
BILIRUB SERPL-MCNC: NEGATIVE MG/DL
BLOOD URINE, POC: 250
CLARITY, POC UA: CLEAR
COLOR, POC UA: YELLOW
GLUCOSE UR QL STRIP: NORMAL
KETONES UR QL STRIP: NORMAL
LEUKOCYTE ESTERASE URINE, POC: NEGATIVE
NITRITE, POC UA: NEGATIVE
PH, POC UA: 5
PROTEIN, POC: NEGATIVE
SPECIFIC GRAVITY, POC UA: 1.02
UROBILINOGEN, POC UA: NORMAL

## 2024-09-25 PROCEDURE — 99395 PREV VISIT EST AGE 18-39: CPT | Mod: S$GLB,,, | Performed by: OBSTETRICS & GYNECOLOGY

## 2024-09-25 PROCEDURE — 81002 URINALYSIS NONAUTO W/O SCOPE: CPT | Mod: S$GLB,,, | Performed by: OBSTETRICS & GYNECOLOGY

## 2024-09-25 PROCEDURE — 3008F BODY MASS INDEX DOCD: CPT | Mod: CPTII,S$GLB,, | Performed by: OBSTETRICS & GYNECOLOGY

## 2024-09-25 PROCEDURE — 3078F DIAST BP <80 MM HG: CPT | Mod: CPTII,S$GLB,, | Performed by: OBSTETRICS & GYNECOLOGY

## 2024-09-25 PROCEDURE — 99999 PR PBB SHADOW E&M-EST. PATIENT-LVL IV: CPT | Mod: PBBFAC,,, | Performed by: OBSTETRICS & GYNECOLOGY

## 2024-09-25 PROCEDURE — 3074F SYST BP LT 130 MM HG: CPT | Mod: CPTII,S$GLB,, | Performed by: OBSTETRICS & GYNECOLOGY

## 2024-09-25 PROCEDURE — 1160F RVW MEDS BY RX/DR IN RCRD: CPT | Mod: CPTII,S$GLB,, | Performed by: OBSTETRICS & GYNECOLOGY

## 2024-09-25 PROCEDURE — 3044F HG A1C LEVEL LT 7.0%: CPT | Mod: CPTII,S$GLB,, | Performed by: OBSTETRICS & GYNECOLOGY

## 2024-09-25 PROCEDURE — 1159F MED LIST DOCD IN RCRD: CPT | Mod: CPTII,S$GLB,, | Performed by: OBSTETRICS & GYNECOLOGY

## 2024-09-25 NOTE — TELEPHONE ENCOUNTER
Spoke with Raquel @ [facility] to advise of below message.      Odactra denied by insurance on 2nd level appeal. Notified patient, she would like to fill with [facility]. Routed prescription and provided pharmacy phone number to patient. Closing [facility] referral. Staff message sent to provider to notify. Patient aware she will need to complete first dose observation in the office and will contact the office regarding this. She will not take Odactra until she is in the provider office.

## 2024-09-25 NOTE — PROGRESS NOTES
Subjective     Patient ID: Jennifer Nguyen is a 34 y.o. female.    Chief Complaint:  Well Woman (Frequent UTIs)      History of Present Illness  HPI    34 y.o. female  here for annual.     Reports frequent UTIs recently, has had asymptomatic infection as well.    She describes her periods as absent with IUD in place.   denies break through bleeding.   denies vaginal itching or irritation.  denies vaginal discharge.    She is sexually active.  She uses Mirena IUD for contraception.    History of abnormal pap: No. Patient has had partial completion of HPV vaccine. She had a reaction after 1 or 2nd dose so did not get the last dose.  Last Pap: 2021 - NILM/HPV neg  Last MMG: N/A  Last Colonoscopy: N/A  Last DXA: N/A    Family History   Problem Relation Name Age of Onset    Graves' disease Mother      Hypertension Father      Diabetes Father      No Known Problems Sister      No Known Problems Brother      No Known Problems Brother      Cancer Maternal Grandmother          unknown etiology    Heart disease Maternal Grandfather      Breast cancer Paternal Grandmother      Dementia Paternal Grandmother      Colon cancer Neg Hx      Ovarian cancer Neg Hx      Esophageal cancer Neg Hx             OB History    Para Term  AB Living   0 0 0 0 0 0   SAB IAB Ectopic Multiple Live Births   0 0 0 0         Review of Systems  Review of Systems   Constitutional:  Negative for chills, diaphoresis, fatigue and fever.   Respiratory:  Negative for cough and shortness of breath.    Cardiovascular:  Negative for chest pain and palpitations.   Gastrointestinal:  Negative for abdominal pain, constipation, diarrhea, nausea and vomiting.   Genitourinary:  Negative for dysmenorrhea, dysuria, frequency, menorrhagia, menstrual problem, pelvic pain, vaginal bleeding, vaginal discharge and vaginal pain.   Musculoskeletal:  Negative for back pain and myalgias.   Integumentary:  Negative for rash, acne, breast mass, nipple  "discharge and breast skin changes.   Neurological:  Negative for headaches.   Psychiatric/Behavioral:  Negative for depression. The patient is not nervous/anxious.    Breast: Negative for mass, mastodynia, nipple discharge and skin changes         Objective   Physical Exam:   Constitutional: She is oriented to person, place, and time. She appears well-developed and well-nourished. No distress.    HENT:   Head: Normocephalic and atraumatic.    Eyes: EOM are normal.    Neck: No thyromegaly present.     Pulmonary/Chest: Effort normal. She exhibits no mass and no tenderness. Right breast exhibits no inverted nipple, no mass, no nipple discharge, no skin change, no tenderness and no swelling. Left breast exhibits no inverted nipple, no mass, no nipple discharge, no skin change, no tenderness and no swelling. Breasts are symmetrical.        Abdominal: Soft. She exhibits no distension and no mass. There is no abdominal tenderness. There is no rebound and no guarding. No hernia.     Genitourinary:    Genitourinary Comments: Normal external female genitalia; vagina rugated, normal; cervix normal, no masses, IUD strings in place; uterus small mobile nontender; no adnexal masses palpated; rectal deferred               Musculoskeletal: Normal range of motion and moves all extremeties.       Neurological: She is alert and oriented to person, place, and time.    Skin: Skin is warm. No rash noted.    Psychiatric: She has a normal mood and affect. Her behavior is normal. Judgment and thought content normal.            Assessment and Plan     1. Well woman exam    2. Encounter for screening for cervical cancer    3. IUD check up    4. Frequent UTI            Plan:  Jennifer Osullivan" was seen today for well woman.    Diagnoses and all orders for this visit:    Well woman exam  - Pap guidelines discussed. Pap/HPV collected.  - Breast cancer screening not yet indicated.   - Colon cancer screening not yet indicated.   - Bone density " screening not yet indicated.  - Contraception - Continue IUD.     -     Liquid-Based Pap Smear, Screening  -     HPV High Risk Genotypes, PCR    Encounter for screening for cervical cancer  -     Liquid-Based Pap Smear, Screening  -     HPV High Risk Genotypes, PCR    IUD check up    Frequent UTI  -     POCT URINE DIPSTICK WITHOUT MICROSCOPE  - Urine dip neg.     Orders Placed This Encounter   Procedures    HPV High Risk Genotypes, PCR    POCT URINE DIPSTICK WITHOUT MICROSCOPE       Follow up in about 1 year (around 9/25/2025) for annual.

## 2024-09-25 NOTE — TELEPHONE ENCOUNTER
----- Message from Chacorta Wheat MA sent at 9/20/2024  4:43 PM CDT -----  Regarding: FW: prior auth requested    ----- Message -----  From: Pilar Abreu  Sent: 9/20/2024   9:10 AM CDT  To: Rodrigue Loomis Staff  Subject: prior auth requested                              Pharmacy Calling to Clarify an RX    Name of Caller Raquel     Pharmacy Name [facility]     Prescription Name  allerg xt,D.[email or web address]-D.pteronys (ODACTRA) 12 SQ-HDM Subl       What do they need to clarify? Requires prior auth     Best Call Back Number [phone]    Additional Information:

## 2024-09-26 ENCOUNTER — PATIENT MESSAGE (OUTPATIENT)
Dept: GASTROENTEROLOGY | Facility: CLINIC | Age: 34
End: 2024-09-26
Payer: COMMERCIAL

## 2024-09-26 ENCOUNTER — CLINICAL SUPPORT (OUTPATIENT)
Dept: ALLERGY | Facility: CLINIC | Age: 34
End: 2024-09-26
Payer: COMMERCIAL

## 2024-09-26 DIAGNOSIS — Z91.038 ALLERGY TO INSECT STINGS: Primary | ICD-10-CM

## 2024-09-26 DIAGNOSIS — Z91.038 ALLERGY TO INSECT BITES AND STINGS: ICD-10-CM

## 2024-09-26 PROCEDURE — 95115 IMMUNOTHERAPY ONE INJECTION: CPT | Mod: S$GLB,,, | Performed by: STUDENT IN AN ORGANIZED HEALTH CARE EDUCATION/TRAINING PROGRAM

## 2024-09-30 ENCOUNTER — PATIENT MESSAGE (OUTPATIENT)
Dept: ENDOSCOPY | Facility: HOSPITAL | Age: 34
End: 2024-09-30
Payer: COMMERCIAL

## 2024-09-30 ENCOUNTER — PATIENT MESSAGE (OUTPATIENT)
Dept: GASTROENTEROLOGY | Facility: CLINIC | Age: 34
End: 2024-09-30
Payer: COMMERCIAL

## 2024-09-30 ENCOUNTER — CLINICAL SUPPORT (OUTPATIENT)
Dept: REHABILITATION | Facility: OTHER | Age: 34
End: 2024-09-30
Payer: COMMERCIAL

## 2024-09-30 ENCOUNTER — TELEPHONE (OUTPATIENT)
Dept: ENDOSCOPY | Facility: HOSPITAL | Age: 34
End: 2024-09-30
Payer: COMMERCIAL

## 2024-09-30 ENCOUNTER — TELEPHONE (OUTPATIENT)
Dept: GASTROENTEROLOGY | Facility: CLINIC | Age: 34
End: 2024-09-30
Payer: COMMERCIAL

## 2024-09-30 DIAGNOSIS — R29.898 WEAKNESS OF BOTH LOWER EXTREMITIES: Primary | ICD-10-CM

## 2024-09-30 PROCEDURE — 97530 THERAPEUTIC ACTIVITIES: CPT | Mod: PN

## 2024-09-30 PROCEDURE — 97112 NEUROMUSCULAR REEDUCATION: CPT | Mod: PN

## 2024-09-30 NOTE — TELEPHONE ENCOUNTER
Contacted the patient to schedule an endoscopy procedure(s) egd. The patient did not answer the call and left a voice message requesting a call back.      Dear Referring Provider and Staff,    The Endoscopy Scheduling Department has made 2+ attempts to reach the patient for scheduling their egd. All final attempts have been made for this referral, if the referring provider would like the patient to receive endoscopic care, please confirm with the patient whether they would like to proceed. If so, then submit a new referral and inform the Pt that the Endoscopy Scheduling department will be contacting them to schedule their procedure.      Thank you,    Endoscopy Scheduling Department              Endoscopy Scheduling Department  [facility] [address] [place] [address]  Take the Elevators to 2nd Floor Endoscopy Procedural Area      Date: 9/30/2024      Medical Record #       Dear  Jennifer Nguyen    An order for the following procedure(s) Upper Endoscopy (EGD) was placed for you by Dr. Vivian Sharma.    Since multiple attempts have been made to get in touch with you, this is the last notification. Please call the scheduling nurse to schedule this procedure as soon as possible.    If you have already scheduled this appointment, please disregard this letter. If you would like to cancel this request, please call the number listed below.     Sincerely,        Endoscopy Scheduling Department [phone]        Comments: Office hours are Monday through Friday 8-430p.

## 2024-09-30 NOTE — PROGRESS NOTES
[facility]   Physical Therapy Treatment Note     Name: Jennifer Nguyen  Clinic Number: [id number]    Therapy Diagnosis:   Encounter Diagnosis   Name Primary?    Weakness of both lower extremities Yes           Physician: Martha Dasilva NP    Visit Date: 9/30/2024    Physician Orders: Physical Therapy Evaluate and Treat  Medical Diagnosis from Referral: lumbar herniated disc  Evaluation Date: 6/5/24  Authorization Period Expiration: 12/31/24  Plan of Care Expiration: 6/5/2024 to 9/5/24  Visit # / Visits authorized: 27/44  FOTO: 0/3  on 6/5/24    Precautions: standard    Time In:  1130am  Time Out: 1200pm  Total Billable Time: 60 minutes    SUBJECTIVE     Pt reports: that she played kickball with no increase in low back pain. Pt is pleased with her progress with therapy.     She was compliant with home exercise program.  Response to previous treatment: minor soreness  Functional change: tolerated well, no issues    Prior Level of Function: no limitation  Current Level of Function: limited with flexion based movements, recreational activity     Pain:  Current 2/10, worst 2/10, best 1/10   Location: left low back and left posterior hip  Description: Aching with occasional stabbing with trunk rotation  Aggravating Factors: sitting  Easing Factors: rest     Pts goals: Pt would like to return to yoga with no increase in low back pain.    OBJECTIVE     Objective Measures updated at progress report unless specified.     Treatment     Jennifer Osullivan received the treatments listed below in bold:        neuromuscular re-education activities to improve: Coordination, Kinesthetic, Sense, Proprioception, and Posture for 15 minutes. The following activities were included:    Supine TrA + dead bug with 4# dumbbells 20x  SL clams, purple looped TB 2x15 R/L in side plank  SLR with pilates ring press vs. 3#, 3x10 R/L  Seated trunk extension using BTB anchored to 35# KB, 3x10  Bird dogs 2x10  Bear plank  "10x3" holds  Shuttle squats, 50# 3x15  SL shuttle squats 37.5# 3x15 repetitions  Hesitation march vs 15# KB 80'x4  Pull down + march RTB 2x10  SL hip circles 30x CW/CCW x 2 sets  squats on bosu 2x10  step ups on bosu 2x10      therapeutic activities to improve functional performance for 45 minutes, including:    Elliptical x 8 min  TRX row x20x  static lunge 3x10 R/L  Goblet squat vs 15# KB 3x10  Good morning 2x10  Pallof press with lift up standing on foam vs 7# on Freemotion 30x R/L  Deadlift vs 15# from ground 20x  high plank 3x45''  Standing chops with medicine ball 3x8 R/L 8#  Ladder drills x  - forward 2-in, lateral 2-in 2-out, side to side 2-in 2-out  laps each  Kettlebell swings vs 15# 20x 3 sets        Patient Education and Home Exercises     Home Exercises Provided and Patient Education Provided     Education provided:   - Pt education regarding proper technique for there-ex/HEP, as well as rationale for exercise/ POC.       Written Home Exercises Provided: Patient instructed to cont prior HEP. Exercises were reviewed and Jennifer Osullivan was able to demonstrate them prior to the end of the session.  Jennifer Osullivan demonstrated good  understanding of the education provided. See EMR under Patient Instructions for exercises provided during therapy sessions    ASSESSMENT   Pt tolerated treatment session well today. Pt is now able to tolerate ballistic/plyometric motions with no instance of low back pain. Continue PT POC.    Jennifer Osullivan Is progressing well towards her goals.   Pt prognosis is Good.     Pt will continue to benefit from skilled outpatient physical therapy to address the deficits listed in the problem list box on initial evaluation, provide pt/family education and to maximize pt's level of independence in the home and community environment.     Pt's spiritual, cultural and educational needs considered and pt agreeable to plan of care and goals.     Anticipated Barriers for therapy: None    GOALS:  Short " Term Goals (4 Weeks):  1. Patient will be compliant with home exercise program to supplement therapy in promoting functional mobility. (progressing, not met)    2. Patient will perform lifting activity with good control to demonstrate improved core strength. (progressing, not met)    3. Patient will report no pain during thoracolumbar active range of motion to promote functional mobility.  (progressing, not met)    4. Patient will improve impaired lower extremity manual muscle tests  to >/=4/5 to improve strength for functional tasks. (progressing, not met)          Long Term Goals (6 Weeks):   1. Patient will improve FOTO score to </= 35% limited to decrease perceived limitation with maintaining/changing body position. (progressing, not met)    2. Patient will perform yoga movements with good control to demonstrate improved core strength.  (progressing, not met)    3. Patient will improve impaired lower extremity manual muscle tests to >/=4+/5 to improve strength for functional tasks.  (progressing, not met)    4. Patient will tolerate sitting for 120 minutes with no increase in low back pain to return to PLOF.  (progressing, not met)         PLAN     Plan of care Certification: 6/5/2024 to 9/5/24    Focus on core/LE strength and ROM with emphasis on posture and ADL performance     Outpatient Physical Therapy 2 times weekly for 12 weeks to include the following interventions: Therapeutic Exercises, Manual Therapeutic Technique, Neuromuscular Re Education, Therapeutic Activities. Modalities, Kinesiotape prn, and Functional Dry Needling as needed.    Ioana Keane, PT

## 2024-09-30 NOTE — TELEPHONE ENCOUNTER
----- Message from Bradley Benson sent at 9/30/2024  9:31 AM CDT -----  Dear Referring Provider and Staff,    The Endoscopy Scheduling Department has made 2+ attempts to reach the patient for scheduling their EGD. All final attempts have been made for this referral, if the referring provider would like the patient to receive endoscopic care, please confirm with the patient whether they would like to proceed. If so, then submit a new referral and inform the Pt that the Endoscopy Scheduling department will be contacting them to schedule their procedure.      Thank you,    Endoscopy Scheduling Department

## 2024-09-30 NOTE — TELEPHONE ENCOUNTER
MA left voicemail for patient to give the office a call back if she have any questions or concerns.

## 2024-09-30 NOTE — TELEPHONE ENCOUNTER
"----- Message from Mary Jane sent at 2024  4:00 PM CDT -----    ----- Message -----  From: Martha Casey RN  Sent: 2024  11:38 AM CDT  To: [facility] Endoscopist [facility] Patients      ----- Message -----  From: Ymaila Sharma MD  Sent: 2024  11:43 AM CDT  To: Martha Casey RN    Procedure: EGD    Diagnosis: celiac     Procedure Timin-12 weeks    #If within 4 weeks selected, please kate as high priority#    #If greater than 12 weeks, please select "5-12 weeks" and delay sending until 3 months prior to requested date#     Location: any site     Additional Scheduling Information: No scheduling concerns    Prep Specifications:N/A    Is the patient taking a GLP-1 Agonist:no    Have you attached a patient to this message: yes  "

## 2024-10-01 ENCOUNTER — TELEPHONE (OUTPATIENT)
Dept: ENDOSCOPY | Facility: HOSPITAL | Age: 34
End: 2024-10-01
Payer: COMMERCIAL

## 2024-10-01 ENCOUNTER — ANESTHESIA EVENT (OUTPATIENT)
Dept: ENDOSCOPY | Facility: HOSPITAL | Age: 34
End: 2024-10-01
Payer: COMMERCIAL

## 2024-10-01 VITALS — HEIGHT: 64 IN | WEIGHT: 140 LBS | BODY MASS INDEX: 23.9 KG/M2

## 2024-10-01 DIAGNOSIS — K90.0 CELIAC DISEASE: Primary | ICD-10-CM

## 2024-10-01 NOTE — TELEPHONE ENCOUNTER
Spoke to patient to schedule procedure(s) Upper Endoscopy (EGD)       Physician to perform procedure(s) Dr. ISABEL Sharma  Date of Procedure (s) 10/9/24  Arrival Time 8:15 AM  Time of Procedure(s) 9:15 AM   Location of Procedure(s) [facility] 2nd Floor  Type of Rx Prep sent to patient: N/A  Instructions provided to patient via [facility]    Patient was informed on the following information and verbalized understanding. Screening questionnaire reviewed with patient and complete. If procedure requires anesthesia, a responsible adult needs to be present to accompany the patient home, patient cannot drive after receiving anesthesia. Appointment details are tentative, especially check-in time. Patient will receive a prep-op call 7 days prior to confirm check-in time for procedure. If applicable the patient should contact their pharmacy to verify Rx for procedure prep is ready for pick-up. Patient was advised to call the scheduling department at [phone] if pharmacy states no Rx is available. Patient was advised to call the endoscopy scheduling department if any questions or concerns arise.      [facility] Endoscopy Scheduling Department

## 2024-10-01 NOTE — ANESTHESIA PREPROCEDURE EVALUATION
10/01/2024  Jennifer Nguyen is a 34 y.o., female.  [facility]  Anesthesia Pre-Operative Evaluation       Patient Name: Jennifer Nguyen  YOB: 1990  MRN: [id number]  CSN: [id number]      Code Status: Prior   Date of Procedure: 10/9/2024  Anesthesia: Choice Procedure: Procedure(s) (LRB):  EGD (ESOPHAGOGASTRODUODENOSCOPY) (N/A)  Pre-Operative Diagnosis: Celiac disease [K90.0]  Proceduralist: Surgeons and Role:     * Yamila Sharma MD - Primary        SUBJECTIVE:   Jennifer Nguyen is a 34 y.o. female who  has a past medical history of Acute bilateral low back pain (08/09/2023), ADHD, Allergy, Anxiety disorder, unspecified, Celiac disease, Chronic instability of metacarpophalangeal joint of right thumb (01/10/2022), Chronic thumb pain, right (01/11/2022), Gamekeeper's thumb (01/06/2020), and Mild intermittent asthma, uncomplicated..     she has a current medication list which includes the following long-term medication(s): albuterol, azelastine, azelastine, dextroamphetamine-amphetamine, dextroamphetamine-amphetamine, dextroamphetamine-amphetamine, [START ON 11/17/2024] dextroamphetamine-amphetamine, epinephrine, escitalopram oxalate, fluocinonide 0.05%, fluticasone propionate, fluticasone propionate, fluticasone propionate, fluticasone-salmeterol 250-50 mcg/dose, lisdexamfetamine, lisdexamfetamine, lisdexamfetamine, lisdexamfetamine, lisdexamfetamine, and lorazepam.     ALLERGIES:     Review of patient's allergies indicates:   Allergen Reactions    Insect venom Anxiety, Rash and Shortness Of Breath    Gluten Nausea Only     LDA:          Lines/Drains/Airways       None                  Anesthesia Evaluation      Airway   Mallampati: II  TM distance: Normal  Dental      Pulmonary    (+) asthma  Cardiovascular     Rate: Normal    Neuro/Psych  Psychiatric history: ADHD.     GI/Hepatic/Renal      Endo/Other    (+) arthritis  Abdominal                   MEDICATIONS:     Antibiotics (From admission, onward)      None          VTE Risk Mitigation (From admission, onward)      None              Current Facility-Administered Medications   Medication Dose Route Frequency Provider Last Rate Last Admin    levonorgestreL (MIRENA) 20 mcg/24 hours (6 yrs) 52 mg IUD 1 Intra Uterine Device  1 Intra Uterine Device Intrauterine  Jemma Cochran MD   1 Intra Uterine Device at 10/01/21 0900     Current Outpatient Medications   Medication Sig Dispense Refill    albuterol (VENTOLIN HFA) 90 mcg/actuation inhaler Inhale 1-2 puffs into the lungs 2 (two) times daily as needed for Wheezing or Shortness of Breath. Rescue 18 g 0    allerg xt,D.farinae-D.pteronys (ODACTRA) 12 SQ-HDM Subl Place 1 tablet under the tongue once daily. (Patient not taking: Reported on 9/25/2024) 30 tablet 11    azelastine (ASTELIN) 137 mcg (0.1 %) nasal spray 1 spray (137 mcg total) by Nasal route 2 (two) times daily. (Patient not taking: Reported on 9/25/2024) 30 mL 3    azelastine (OPTIVAR) 0.05 % ophthalmic solution Place 1 drop into both eyes 2 (two) times daily. (Patient not taking: Reported on 9/25/2024) 12 mL 3    cetirizine HCl (ZYRTEC ORAL) Take by mouth.      dextroamphetamine-amphetamine (ADDERALL XR) 15 MG 24 hr capsule Take 1 capsule (15 mg total) by mouth every morning. (Patient not taking: Reported on 9/25/2024) 30 capsule 0    dextroamphetamine-amphetamine (ADDERALL) 5 mg Tab Take 5 mg by mouth daily as needed (inattention PRN). (Patient not taking: Reported on 9/25/2024) 30 tablet 0    dextroamphetamine-amphetamine (ADDERALL) 5 mg Tab Take 5 mg by mouth daily as needed (inattention). (Patient not taking: Reported on 9/25/2024) 30 tablet 0    [START ON 11/17/2024] dextroamphetamine-amphetamine (ADDERALL) 5 mg Tab Take 5 mg by mouth once daily. (Patient not taking: Reported on 9/25/2024) 30 tablet 0     EPINEPHrine (AUVI-Q) 0.3 mg/0.3 mL AtIn Inject 0.3 mLs (0.3 mg total) into the muscle once. for 1 dose 2 mL 11    EScitalopram oxalate (LEXAPRO) 20 MG tablet Take 1 tablet (20 mg total) by mouth once daily. 90 tablet 3    fluconazole (DIFLUCAN) 200 MG Tab Take 1 pill PO 2 times per week. (Patient not taking: Reported on 9/25/2024) 12 tablet 0    fluocinonide 0.05% (LIDEX) 0.05 % cream AAA of hands bid prn flares. (Patient not taking: Reported on 9/25/2024) 60 g 3    fluticasone propionate (CUTIVATE) 0.005 % ointment Apply to affected areas of face BID prn irritation. Do not use for longer than 2 weeks in a row. (Patient not taking: Reported on 9/25/2024) 15 g 0    fluticasone propionate (CUTIVATE) 0.05 % cream Apply to affected areas of body daily prn eczema. (Patient not taking: Reported on 9/25/2024) 60 g 3    fluticasone propionate (FLONASE) 50 mcg/actuation nasal spray 1 spray (50 mcg total) by Each Nostril route once daily. 16 each 11    fluticasone-salmeterol diskus inhaler 250-50 mcg Inhale 1 puff into the lungs once daily. Controller 30 each 3    lisdexamfetamine (VYVANSE) 10 mg Cap Take 3 capsules (30 mg total) by mouth every morning. (Patient not taking: Reported on 9/25/2024) 90 capsule 0    lisdexamfetamine (VYVANSE) 30 MG capsule Take 1 capsule (30 mg total) by mouth every morning. (Patient not taking: Reported on 9/25/2024) 30 capsule 0    lisdexamfetamine (VYVANSE) 30 MG capsule Take 1 capsule (30 mg total) by mouth every morning. (Patient not taking: Reported on 9/25/2024) 30 capsule 0    lisdexamfetamine (VYVANSE) 30 MG capsule Take 1 capsule (30 mg total) by mouth every morning. (Patient not taking: Reported on 9/25/2024) 30 capsule 0    lisdexamfetamine (VYVANSE) 40 MG Cap Take 1 capsule (40 mg total) by mouth once daily. 30 capsule 0    LORazepam (ATIVAN) 0.5 MG tablet Take 1 tablet (0.5 mg total) by mouth every 6 (six) hours as needed for Anxiety. 15 tablet 0     sulfamethoxazole-trimethoprim 400-80mg (BACTRIM,SEPTRA) 400-80 mg per tablet Take 1 tablet by mouth 2 (two) times daily. (Patient not taking: Reported on 9/25/2024) 20 tablet 0    tacrolimus (PROTOPIC) 0.1 % ointment Compound tacrolimus 0.1% cream. Apply to affected areas of eyes BID. Safe to use everyday. (Patient not taking: Reported on 9/25/2024) 30 g 3    tobramycin sulfate 0.3% (TOBREX) 0.3 % ophthalmic solution 1-2 drops topically twice daily to affected toe(s). (Patient not taking: Reported on 9/25/2024) 5 mL 3          History:   There are no hospital problems to display for this patient.    Surgical History:    has a past surgical history that includes Breast surgery (Bilateral, 2011); [place] tooth extraction; Intrauterine device insertion (04/01/2015); Repair of collateral ligament of thumb (Right, 01/06/2020); and Lumbar laminectomy (Right, 04/01/2024).   Social History:    reports being sexually active and has had partner(s) who are male. She reports using the following method of birth control/protection: I.U.D..  reports that she quit smoking about 15 years ago. Her smoking use included cigarettes. She started smoking about 17 years ago. She has been exposed to tobacco smoke. She has never used smokeless tobacco. She reports current alcohol use. She reports that she does not use drugs.     OBJECTIVE:     Vital Signs (Most Recent):    Vital Signs Range (Last 24H):          There is no height or weight on file to calculate BMI.   Wt Readings from Last 4 Encounters:   10/01/24 63.5 kg (140 lb)   09/25/24 66.7 kg (147 lb 0.8 oz)   09/04/24 64.4 kg (141 lb 15.6 oz)   08/27/24 64.4 kg (141 lb 15.6 oz)       Significant Labs:  Lab Results   Component Value Date    WBC 6.34 08/13/2024    HGB 12.7 08/13/2024    HCT 38.2 08/13/2024     08/13/2024     08/13/2024    K 4.2 07/09/2024     07/09/2024    CREATININE 0.7 07/09/2024    BUN 7 07/09/2024    CO2 24 07/09/2024     07/09/2024     "CALCIUM 8.9 07/09/2024    ALKPHOS 62 07/09/2024    ALT 14 07/09/2024    AST 20 07/09/2024    ALBUMIN 4.0 07/09/2024    INR 0.9 03/31/2024    APTT 25.1 03/31/2024    HGBA1C 4.9 07/09/2024     No LMP recorded (lmp unknown). Patient has had an implant.  No results found for this or any previous visit (from the past 72 hours).    EKG:   No results found for this or any previous visit.    TTE:  No results found for this or any previous visit.  No results found for: "EF"   No results found for this or any previous visit.  PHI:  No results found for this or any previous visit.  Stress Test:  No results found for this or any previous visit.     LHC:  No results found for this or any previous visit.     PFT:  No results found for: "FEV1", "FVC", "NXO3UAH", "TLC", "DLCO"     ASSESSMENT/PLAN:        Pre-op Assessment    I have reviewed the Patient Summary Reports.     I have reviewed the Nursing Notes. I have reviewed the NPO Status.   I have reviewed the Medications.     Review of Systems  Anesthesia Hx:  No problems with previous Anesthesia             Denies Family Hx of Anesthesia complications.    Denies Personal Hx of Anesthesia complications.                    Social:   Insomnia      Hematology/Oncology:  Hematology Normal   Oncology Normal                                   EENT/Dental:  chronic allergic rhinitis           Cardiovascular:  Cardiovascular Normal                                            Pulmonary:    Asthma                    Renal/:  Renal/ Normal                 Hepatic/GI:  Hepatic/GI Normal       Celiac disease  EOE          Musculoskeletal:  Arthritis   Chronic fatigue  Leg weakness       Spine Disorders: Lumbqar herniated disc.             Neurological:  Neurology Normal                                      Endocrine:  Endocrine Normal            Dermatological:  Skin Normal    Psych:  Psychiatric history: ADHD. anxiety (Panic disorder) depression              Physical Exam  General: Well " nourished    Airway:  Mallampati: II   Mouth Opening: Normal  TM Distance: Normal    Chest/Lungs:  Normal Respiratory Rate    Heart:  Rate: Normal    Anesthesia Plan  Type of Anesthesia, risks & benefits discussed:    Anesthesia Type: Gen Natural Airway  Intra-op Monitoring Plan: Standard ASA Monitors  Post Op Pain Control Plan: multimodal analgesia  Induction:  IV  Informed Consent: Informed consent signed with the Patient and all parties understand the risks and agree with anesthesia plan.  All questions answered.   ASA Score: 2  Day of Surgery Review of History & Physical: H&P Update referred to the surgeon/provider.    Ready For Surgery From Anesthesia Perspective.     .

## 2024-10-01 NOTE — TELEPHONE ENCOUNTER
"---- Message -----   From: Yamila Sharma MD   Sent: 2024  11:43 AM CDT   To: Martha Casey RN     Procedure: EGD     Diagnosis: celiac     Procedure Timin-12 weeks     *If within 4 weeks selected, please kate as high priority*     *If greater than 12 weeks, please select "5-12 weeks" and delay sending until 3 months prior to requested date*     Location: any site     Additional Scheduling Information: No scheduling concerns     Prep Specifications:N/A     Is the patient taking a GLP-1 Agonist:no     Have you attached a patient to this message: yes   "

## 2024-10-01 NOTE — TELEPHONE ENCOUNTER
September 30, 2024         9/30/24  4:18 PM  Vivian Soto, RN routed this conversation to Me     TB    9/30/24  3:21 PM  Yoandy Stephen MA routed this conversation to [facility] Schedulers  Yoandy Stephen MA       9/30/24  3:21 PM  Note  See patient message.        Jennifer Nguyen to [facility]UNGU[facility] Staff (supporting Yamila Sharma MD)         9/30/24  3:20 PM  Hi!      I have been playing phone tag with the EGD scheduling. They call when I am unable to answer and I seem to have called when they aren't able to take my call during business hours.      I have been traveling on and off the past couple of weeks but am stationary for a while so I can schedule calls. Is there a good time to ensure I get someone?      Warmly,  Jenniefr Osullivan

## 2024-10-07 ENCOUNTER — CLINICAL SUPPORT (OUTPATIENT)
Dept: REHABILITATION | Facility: OTHER | Age: 34
End: 2024-10-07
Payer: COMMERCIAL

## 2024-10-07 ENCOUNTER — PATIENT MESSAGE (OUTPATIENT)
Dept: GASTROENTEROLOGY | Facility: CLINIC | Age: 34
End: 2024-10-07
Payer: COMMERCIAL

## 2024-10-07 DIAGNOSIS — R29.898 WEAKNESS OF BOTH LOWER EXTREMITIES: Primary | ICD-10-CM

## 2024-10-07 PROBLEM — Z00.01 ENCOUNTER FOR GENERAL ADULT MEDICAL EXAMINATION WITH ABNORMAL FINDINGS: Status: RESOLVED | Noted: 2023-09-11 | Resolved: 2024-10-07

## 2024-10-07 PROCEDURE — 97530 THERAPEUTIC ACTIVITIES: CPT | Mod: PN

## 2024-10-07 PROCEDURE — 97112 NEUROMUSCULAR REEDUCATION: CPT | Mod: PN

## 2024-10-07 NOTE — H&P
Short Stay Endoscopy History and Physical    PCP - She Oleary DO  Referring Physician - Yamila Sharma MD  [address] [place] [address]    Procedure - EGD  ASA - per anesthesia  Mallampati - per anesthesia  History of Anesthesia problems - no  Family history Anesthesia problems -  no   Plan of anesthesia - General    HPI  34 y.o. female  Reason for procedure:  Celiac disease [K90.0]       ROS:  Constitutional: No fevers, chills, No weight loss  CV: No chest pain  Pulm: No cough, No shortness of breath  GI: see HPI    Medical History:  has a past medical history of Acute bilateral low back pain (08/09/2023), ADHD, Allergy, Anxiety disorder, unspecified, Celiac disease, Chronic instability of metacarpophalangeal joint of right thumb (01/10/2022), Chronic thumb pain, right (01/11/2022), Gamekeeper's thumb (01/06/2020), and Mild intermittent asthma, uncomplicated.    Surgical History:  has a past surgical history that includes Breast surgery (Bilateral, 2011); [place] tooth extraction; Intrauterine device insertion (04/01/2015); Repair of collateral ligament of thumb (Right, 01/06/2020); and Lumbar laminectomy (Right, 04/01/2024).    Family History: family history includes Breast cancer in her paternal grandmother; Cancer in her maternal grandmother; Dementia in her paternal grandmother; Diabetes in her father; Graves' disease in her mother; Heart disease in her maternal grandfather; Hypertension in her father; No Known Problems in her brother, brother, and sister..    Social History:  reports that she quit smoking about 15 years ago. Her smoking use included cigarettes. She started smoking about 17 years ago. She has been exposed to tobacco smoke. She has never used smokeless tobacco. She reports current alcohol use. She reports that she does not use drugs.    Review of patient's allergies indicates:   Allergen Reactions    Insect venom Anxiety, Rash and Shortness Of Breath    Gluten Nausea Only        Medications:   Facility-Administered Medications Prior to Admission   Medication Dose Route Frequency Provider Last Rate Last Admin    levonorgestreL (MIRENA) 20 mcg/24 hours (6 yrs) 52 mg IUD 1 Intra Uterine Device  1 Intra Uterine Device Intrauterine  Jemma Cochran MD   1 Intra Uterine Device at 10/01/21 0900     Medications Prior to Admission   Medication Sig Dispense Refill Last Dose/Taking    albuterol (VENTOLIN HFA) 90 mcg/actuation inhaler Inhale 1-2 puffs into the lungs 2 (two) times daily as needed for Wheezing or Shortness of Breath. Rescue 18 g 0     allerg xt,D.farinae-D.pteronys (ODACTRA) 12 SQ-HDM Subl Place 1 tablet under the tongue once daily. (Patient not taking: Reported on 9/25/2024) 30 tablet 11     azelastine (ASTELIN) 137 mcg (0.1 %) nasal spray 1 spray (137 mcg total) by Nasal route 2 (two) times daily. (Patient not taking: Reported on 9/25/2024) 30 mL 3     azelastine (OPTIVAR) 0.05 % ophthalmic solution Place 1 drop into both eyes 2 (two) times daily. (Patient not taking: Reported on 9/25/2024) 12 mL 3     cetirizine HCl (ZYRTEC ORAL) Take by mouth.       dextroamphetamine-amphetamine (ADDERALL XR) 15 MG 24 hr capsule Take 1 capsule (15 mg total) by mouth every morning. (Patient not taking: Reported on 9/25/2024) 30 capsule 0     dextroamphetamine-amphetamine (ADDERALL) 5 mg Tab Take 5 mg by mouth daily as needed (inattention PRN). (Patient not taking: Reported on 9/25/2024) 30 tablet 0     dextroamphetamine-amphetamine (ADDERALL) 5 mg Tab Take 5 mg by mouth daily as needed (inattention). (Patient not taking: Reported on 9/25/2024) 30 tablet 0     [START ON 11/17/2024] dextroamphetamine-amphetamine (ADDERALL) 5 mg Tab Take 5 mg by mouth once daily. (Patient not taking: Reported on 9/25/2024) 30 tablet 0     EPINEPHrine (AUVI-Q) 0.3 mg/0.3 mL AtIn Inject 0.3 mLs (0.3 mg total) into the muscle once. for 1 dose 2 mL 11     EScitalopram oxalate (LEXAPRO) 20 MG tablet Take 1 tablet  (20 mg total) by mouth once daily. 90 tablet 3     fluconazole (DIFLUCAN) 200 MG Tab Take 1 pill PO 2 times per week. (Patient not taking: Reported on 9/25/2024) 12 tablet 0     fluocinonide 0.05% (LIDEX) 0.05 % cream AAA of hands bid prn flares. (Patient not taking: Reported on 9/25/2024) 60 g 3     fluticasone propionate (CUTIVATE) 0.005 % ointment Apply to affected areas of face BID prn irritation. Do not use for longer than 2 weeks in a row. (Patient not taking: Reported on 9/25/2024) 15 g 0     fluticasone propionate (CUTIVATE) 0.05 % cream Apply to affected areas of body daily prn eczema. (Patient not taking: Reported on 9/25/2024) 60 g 3     fluticasone propionate (FLONASE) 50 mcg/actuation nasal spray 1 spray (50 mcg total) by Each Nostril route once daily. 16 each 11     fluticasone-salmeterol diskus inhaler 250-50 mcg Inhale 1 puff into the lungs once daily. Controller 30 each 3     lisdexamfetamine (VYVANSE) 10 mg Cap Take 3 capsules (30 mg total) by mouth every morning. (Patient not taking: Reported on 9/25/2024) 90 capsule 0     lisdexamfetamine (VYVANSE) 30 MG capsule Take 1 capsule (30 mg total) by mouth every morning. (Patient not taking: Reported on 9/25/2024) 30 capsule 0     lisdexamfetamine (VYVANSE) 30 MG capsule Take 1 capsule (30 mg total) by mouth every morning. (Patient not taking: Reported on 9/25/2024) 30 capsule 0     lisdexamfetamine (VYVANSE) 30 MG capsule Take 1 capsule (30 mg total) by mouth every morning. (Patient not taking: Reported on 9/25/2024) 30 capsule 0     lisdexamfetamine (VYVANSE) 40 MG Cap Take 1 capsule (40 mg total) by mouth once daily. 30 capsule 0     LORazepam (ATIVAN) 0.5 MG tablet Take 1 tablet (0.5 mg total) by mouth every 6 (six) hours as needed for Anxiety. 15 tablet 0     sulfamethoxazole-trimethoprim 400-80mg (BACTRIM,SEPTRA) 400-80 mg per tablet Take 1 tablet by mouth 2 (two) times daily. (Patient not taking: Reported on 9/25/2024) 20 tablet 0     tacrolimus  (PROTOPIC) 0.1 % ointment Compound tacrolimus 0.1% cream. Apply to affected areas of eyes BID. Safe to use everyday. (Patient not taking: Reported on 9/25/2024) 30 g 3     tobramycin sulfate 0.3% (TOBREX) 0.3 % ophthalmic solution 1-2 drops topically twice daily to affected toe(s). (Patient not taking: Reported on 9/25/2024) 5 mL 3        Physical Exam:    Vital Signs: There were no vitals filed for this visit.    General Appearance: Well appearing in no acute distress  Abdomen: Soft, non tender, non distended with normal bowel sounds, no masses      Labs:  Lab Results   Component Value Date    WBC 6.34 08/13/2024    HGB 12.7 08/13/2024    HCT 38.2 08/13/2024     08/13/2024    CHOL 177 07/09/2024    TRIG 61 07/09/2024    HDL 69 07/09/2024    ALT 14 07/09/2024    AST 20 07/09/2024     08/13/2024    K 4.2 07/09/2024     07/09/2024    CREATININE 0.7 07/09/2024    BUN 7 07/09/2024    CO2 24 07/09/2024    TSH 1.762 07/09/2024    INR 0.9 03/31/2024    HGBA1C 4.9 07/09/2024       I have explained the risks and benefits of this endoscopic procedure to the patient including but not limited to bleeding, inflammation, infection, perforation, and death.    Assessment/Plan:     Celiac disease     - Proceed with EGD   - Also plan for gastric bx with mast cell staining for [facility] history     Yamila Sharma MD  Gastroenterology   [facility]

## 2024-10-07 NOTE — PROGRESS NOTES
[facility]   Physical Therapy Treatment Note     Name: Jennifer Osullivan Nguyen  Clinic Number: [id number]    Therapy Diagnosis:   Encounter Diagnosis   Name Primary?    Weakness of both lower extremities Yes           Physician: Martha Dasilva NP    Visit Date: 10/7/2024    Physician Orders: Physical Therapy Evaluate and Treat  Medical Diagnosis from Referral: lumbar herniated disc  Evaluation Date: 6/5/24  Authorization Period Expiration: 12/31/24  Plan of Care Expiration: 6/5/2024 to 9/5/24  Visit # / Visits authorized: 29/44  FOTO: 0/3  on 6/5/24    Precautions: standard    Time In:  1130am  Time Out: 1230pm  Total Billable Time: 60 minutes    SUBJECTIVE     Pt reports: she is doing today. Pt states that her back is feeling much better. She is using [employer] for workout classes and playing kickball.  She was compliant with home exercise program.  Response to previous treatment: minor soreness  Functional change: tolerated well, no issues    Prior Level of Function: no limitation  Current Level of Function: limited with flexion based movements, recreational activity     Pain:  Current 2/10, worst 2/10, best 1/10   Location: left low back and left posterior hip  Description: Aching with occasional stabbing with trunk rotation  Aggravating Factors: sitting  Easing Factors: rest     Pts goals: Pt would like to return to yoga with no increase in low back pain.    OBJECTIVE     Objective Measures updated at progress report unless specified.     Treatment     Jennifer Osullivan received the treatments listed below in bold:        neuromuscular re-education activities to improve: Coordination, Kinesthetic, Sense, Proprioception, and Posture for 15 minutes. The following activities were included:    Supine TrA + dead bug with 4# dumbbells 20x  SL clams, purple looped TB 2x15 R/L in side plank  SLR with pilates ring press vs. 3#, 3x10 R/L  Seated trunk extension using BTB anchored to 35# KB,  "3x10  Bird dogs 2x10  Bear plank 10x3" holds  Shuttle squats, 75# 3x15  SL shuttle squats 37.5# 3x15 repetitions      therapeutic activities to improve functional performance for 45 minutes, including:    Elliptical x 8 min  TRX row x20x  Pallof press with lift up standing on foam vs 7# on Freemotion 30x R/L  Deadlift vs 20# from ground 20x  high plank 3x45''  Standing chops with medicine ball 3x8 R/L 8#  Ladder drills x  - forward 2-in, lateral 2-in 2-out, side to side 2-in 2-out  laps each  Kettlebell swings vs 15# 20x 3 sets  Hesitation march vs 15# KB 80'x4  Pull down + march RTB 2x10  SL hip circles 30x CW/CCW x 2 sets  squats on bosu 2x10  step ups on bosu 2x10  +Farmer's Carries vs two, 15# KB's 2 laps on turf  +Slam balls vs 10# 20x   +Ladder Drills x3 laps       Patient Education and Home Exercises     Home Exercises Provided and Patient Education Provided     Education provided:   - Pt education regarding proper technique for there-ex/HEP, as well as rationale for exercise/ POC.       Written Home Exercises Provided: Patient instructed to cont prior HEP. Exercises were reviewed and Jennifer Osullivan was able to demonstrate them prior to the end of the session.  Jennifer Osullivan demonstrated good  understanding of the education provided. See EMR under Patient Instructions for exercises provided during therapy sessions    ASSESSMENT   Pt tolerated treatment session well today. Progressed to functional exercises needed for playing kickball with no instance of low back pain (KB swings, slam balls, farmer's carries, and ladder drills). Pt is rapidly approaching her baseline. Plan to DC pt with HEP upon meeting long term goals.     Jennifer Osullivan Is progressing well towards her goals.   Pt prognosis is Good.     Pt will continue to benefit from skilled outpatient physical therapy to address the deficits listed in the problem list box on initial evaluation, provide pt/family education and to maximize pt's level of independence in " the home and community environment.     Pt's spiritual, cultural and educational needs considered and pt agreeable to plan of care and goals.     Anticipated Barriers for therapy: None    GOALS:  Short Term Goals (4 Weeks):  1. Patient will be compliant with home exercise program to supplement therapy in promoting functional mobility. (progressing, not met)    2. Patient will perform lifting activity with good control to demonstrate improved core strength. (progressing, not met)    3. Patient will report no pain during thoracolumbar active range of motion to promote functional mobility.  (progressing, not met)    4. Patient will improve impaired lower extremity manual muscle tests  to >/=4/5 to improve strength for functional tasks. (progressing, not met)          Long Term Goals (6 Weeks):   1. Patient will improve FOTO score to </= 35% limited to decrease perceived limitation with maintaining/changing body position. (progressing, not met)    2. Patient will perform yoga movements with good control to demonstrate improved core strength.  (progressing, not met)    3. Patient will improve impaired lower extremity manual muscle tests to >/=4+/5 to improve strength for functional tasks.  (progressing, not met)    4. Patient will tolerate sitting for 120 minutes with no increase in low back pain to return to PLOF.  (progressing, not met)         PLAN     Plan of care Certification: 6/5/2024 to 9/5/24    Focus on core/LE strength and ROM with emphasis on posture and ADL performance     Outpatient Physical Therapy 2 times weekly for 12 weeks to include the following interventions: Therapeutic Exercises, Manual Therapeutic Technique, Neuromuscular Re Education, Therapeutic Activities. Modalities, Kinesiotape prn, and Functional Dry Needling as needed.    Ioana Keane, PT

## 2024-10-09 ENCOUNTER — HOSPITAL ENCOUNTER (OUTPATIENT)
Facility: HOSPITAL | Age: 34
Discharge: HOME | End: 2024-10-09
Attending: STUDENT IN AN ORGANIZED HEALTH CARE EDUCATION/TRAINING PROGRAM | Admitting: STUDENT IN AN ORGANIZED HEALTH CARE EDUCATION/TRAINING PROGRAM
Payer: COMMERCIAL

## 2024-10-09 ENCOUNTER — ANESTHESIA (OUTPATIENT)
Dept: ENDOSCOPY | Facility: HOSPITAL | Age: 34
End: 2024-10-09
Payer: COMMERCIAL

## 2024-10-09 VITALS
TEMPERATURE: 98 F | HEIGHT: 64 IN | SYSTOLIC BLOOD PRESSURE: 109 MMHG | OXYGEN SATURATION: 100 % | HEART RATE: 57 BPM | BODY MASS INDEX: 24.75 KG/M2 | RESPIRATION RATE: 17 BRPM | DIASTOLIC BLOOD PRESSURE: 64 MMHG | WEIGHT: 145 LBS

## 2024-10-09 DIAGNOSIS — K90.0 CELIAC DISEASE: Primary | ICD-10-CM

## 2024-10-09 LAB
B-HCG UR QL: NEGATIVE
CTP QC/QA: YES

## 2024-10-09 PROCEDURE — 88305 TISSUE EXAM BY PATHOLOGIST: CPT | Performed by: PATHOLOGY

## 2024-10-09 PROCEDURE — 88342 IMHCHEM/IMCYTCHM 1ST ANTB: CPT | Mod: 26,,, | Performed by: PATHOLOGY

## 2024-10-09 PROCEDURE — 94761 N-INVAS EAR/PLS OXIMETRY MLT: CPT

## 2024-10-09 PROCEDURE — 88342 IMHCHEM/IMCYTCHM 1ST ANTB: CPT | Performed by: PATHOLOGY

## 2024-10-09 PROCEDURE — 37000009 HC ANESTHESIA EA ADD 15 MINS: Performed by: STUDENT IN AN ORGANIZED HEALTH CARE EDUCATION/TRAINING PROGRAM

## 2024-10-09 PROCEDURE — 25000003 PHARM REV CODE 250: Performed by: NURSE ANESTHETIST, CERTIFIED REGISTERED

## 2024-10-09 PROCEDURE — 27201012 HC FORCEPS, HOT/COLD, DISP: Performed by: STUDENT IN AN ORGANIZED HEALTH CARE EDUCATION/TRAINING PROGRAM

## 2024-10-09 PROCEDURE — 81025 URINE PREGNANCY TEST: CPT | Performed by: STUDENT IN AN ORGANIZED HEALTH CARE EDUCATION/TRAINING PROGRAM

## 2024-10-09 PROCEDURE — 63600175 PHARM REV CODE 636 W HCPCS: Performed by: NURSE ANESTHETIST, CERTIFIED REGISTERED

## 2024-10-09 PROCEDURE — 43239 EGD BIOPSY SINGLE/MULTIPLE: CPT | Performed by: STUDENT IN AN ORGANIZED HEALTH CARE EDUCATION/TRAINING PROGRAM

## 2024-10-09 PROCEDURE — 43239 EGD BIOPSY SINGLE/MULTIPLE: CPT | Mod: ,,, | Performed by: STUDENT IN AN ORGANIZED HEALTH CARE EDUCATION/TRAINING PROGRAM

## 2024-10-09 PROCEDURE — 88341 IMHCHEM/IMCYTCHM EA ADD ANTB: CPT | Mod: 26,,, | Performed by: PATHOLOGY

## 2024-10-09 PROCEDURE — 37000008 HC ANESTHESIA 1ST 15 MINUTES: Performed by: STUDENT IN AN ORGANIZED HEALTH CARE EDUCATION/TRAINING PROGRAM

## 2024-10-09 PROCEDURE — 88341 IMHCHEM/IMCYTCHM EA ADD ANTB: CPT | Performed by: PATHOLOGY

## 2024-10-09 PROCEDURE — 88305 TISSUE EXAM BY PATHOLOGIST: CPT | Mod: 26,,, | Performed by: PATHOLOGY

## 2024-10-09 PROCEDURE — 99900035 HC TECH TIME PER 15 MIN (STAT)

## 2024-10-09 RX ORDER — SODIUM CHLORIDE 9 MG/ML
INJECTION, SOLUTION INTRAVENOUS CONTINUOUS
Status: DISCONTINUED | OUTPATIENT
Start: 2024-10-09 | End: 2024-10-09 | Stop reason: HOSPADM

## 2024-10-09 RX ORDER — FENTANYL CITRATE 50 UG/ML
INJECTION, SOLUTION INTRAMUSCULAR; INTRAVENOUS
Status: DISCONTINUED | OUTPATIENT
Start: 2024-10-09 | End: 2024-10-09

## 2024-10-09 RX ORDER — LIDOCAINE HYDROCHLORIDE 20 MG/ML
INJECTION INTRAVENOUS
Status: DISCONTINUED | OUTPATIENT
Start: 2024-10-09 | End: 2024-10-09

## 2024-10-09 RX ORDER — PROPOFOL 10 MG/ML
VIAL (ML) INTRAVENOUS
Status: DISCONTINUED | OUTPATIENT
Start: 2024-10-09 | End: 2024-10-09

## 2024-10-09 RX ADMIN — PROPOFOL 80 MG: 10 INJECTION, EMULSION INTRAVENOUS at 08:10

## 2024-10-09 RX ADMIN — PROPOFOL 175 MCG/KG/MIN: 10 INJECTION, EMULSION INTRAVENOUS at 08:10

## 2024-10-09 RX ADMIN — SODIUM CHLORIDE: 0.9 INJECTION, SOLUTION INTRAVENOUS at 08:10

## 2024-10-09 RX ADMIN — LIDOCAINE HYDROCHLORIDE 100 MG: 20 INJECTION INTRAVENOUS at 08:10

## 2024-10-09 RX ADMIN — GLYCOPYRROLATE 0.2 MG: 0.2 INJECTION, SOLUTION INTRAMUSCULAR; INTRAVENOUS at 08:10

## 2024-10-09 RX ADMIN — FENTANYL CITRATE 50 MCG: 50 INJECTION, SOLUTION INTRAMUSCULAR; INTRAVENOUS at 08:10

## 2024-10-09 NOTE — TRANSFER OF CARE
"Anesthesia Transfer of Care Note    Patient: Jennifer Nguyen    Procedure(s) Performed: Procedure(s) (LRB):  EGD (ESOPHAGOGASTRODUODENOSCOPY) (N/A)    Patient location: PACU    Anesthesia Type: general    Transport from OR: Transported from OR on room air with adequate spontaneous ventilation    Post pain: adequate analgesia    Post assessment: no apparent anesthetic complications and tolerated procedure well    Post vital signs: stable    Level of consciousness: awake and alert    Nausea/Vomiting: no nausea/vomiting    Complications: none    Transfer of care protocol was followed      Last vitals: Visit Vitals  /64 (BP Location: Left arm, Patient Position: Lying)   Pulse 71   Temp 36.7 °C (98.1 °F) (Tympanic)   Resp 16   Ht 5' 4" (1.626 m)   Wt 65.8 kg (145 lb)   LMP  (LMP Unknown)   SpO2 97%   Breastfeeding No   BMI 24.89 kg/m²     "

## 2024-10-09 NOTE — PLAN OF CARE
Pt arrived to recovery dosc via stretcher per ENDO team. Bedside report received. Pt attached to bedside monitor. VSS. Pt responds to voice/AAOx4___ post procedure. Pt on room air; oxygen sats _97___%. Pt IV access clean, dry and intact, infusing Normal Saline. Will continue to monitor.

## 2024-10-09 NOTE — ANESTHESIA POSTPROCEDURE EVALUATION
Anesthesia Post Evaluation    Patient: Jennifer Nguyen    Procedure(s) Performed: Procedure(s) (LRB):  EGD (ESOPHAGOGASTRODUODENOSCOPY) (N/A)    Final Anesthesia Type: general      Patient location during evaluation: PACU  Patient participation: Yes- Able to Participate  Level of consciousness: awake and alert  Post-procedure vital signs: reviewed and stable  Pain management: adequate  Airway patency: patent    PONV status at discharge: No PONV  Anesthetic complications: no      Cardiovascular status: stable  Respiratory status: spontaneous ventilation  Hydration status: euvolemic  Follow-up not needed.          Vitals Value Taken Time   /64 10/09/24 0918   Temp 36.6 °C (97.9 °F) 10/09/24 0900   Pulse 53 10/09/24 0921   Resp 16 10/09/24 0921   SpO2 100 % 10/09/24 0921   Vitals shown include unfiled device data.      Event Time   Out of Recovery 09:15:00         Pain/Audie Score: Audie Score: 10 (10/9/2024  9:15 AM)

## 2024-10-09 NOTE — [employer] PATIENT INSTRUCTIONS
Discharge Summary/Instructions after an Endoscopic Procedure  Patient Name: Jennifer Nguyen  Patient MRN: [id number]  Patient YOB: 1990  Wednesday, October 9, 2024  Yamila Sharma MD  Dear patient,  As a result of recent federal legislation (The Federal Cures Act), you may   receive lab or pathology results from your procedure in your [facility]   account before your physician is able to contact you. Your physician or   their representative will relay the results to you with their   recommendations at their soonest availability.  Thank you,  RESTRICTIONS:  During your procedure today, you received medications for sedation.  These   medications may affect your judgment, balance and coordination.  Therefore,   for 24 hours, you have the following restrictions:   - DO NOT drive a car, operate machinery, make legal/financial decisions,   sign important papers or drink alcohol.    ACTIVITY:  Today: no heavy lifting, straining or running due to procedural   sedation/anesthesia.  The following day: return to full activity including work.  DIET:  Eat and drink normally unless instructed otherwise.     TREATMENT FOR COMMON SIDE EFFECTS:  - Mild abdominal pain, nausea, belching, bloating or excessive gas:  rest,   eat lightly and use a heating pad.  - Sore Throat: treat with throat lozenges and/or gargle with warm salt   water.  - Because air was used during the procedure, expelling large amounts of air   from your rectum or belching is normal.  - If a bowel prep was taken, you may not have a bowel movement for 1-3 days.    This is normal.  SYMPTOMS TO WATCH FOR AND REPORT TO YOUR PHYSICIAN:  1. Abdominal pain or bloating, other than gas cramps.  2. Chest pain.  3. Back pain.  4. Signs of infection such as: chills or fever occurring within 24 hours   after the procedure.  5. Rectal bleeding, which would show as bright red, maroon, or black stools.   (A tablespoon of blood from the rectum is not serious, especially  if   hemorrhoids are present.)  6. Vomiting.  7. Weakness or dizziness.  GO DIRECTLY TO THE NEAREST EMERGENCY ROOM IF YOU HAVE ANY OF THE FOLLOWING:      Difficulty breathing              Chills and/or fever over 101 F   Persistent vomiting and/or vomiting blood   Severe abdominal pain   Severe chest pain   Black, tarry stools   Bleeding- more than one tablespoon   Any other symptom or condition that you feel may need urgent attention  Your doctor recommends these additional instructions:  If any biopsies were taken, your doctors clinic will contact you in 1 to 2   weeks with any results.  - Discharge patient to home (ambulatory).   - Resume regular diet.   - Continue present medications.   - Await pathology results.  For questions, problems or results please call your physician - Yamila Sharma MD at Work:  [phone].  [facility] [place] [facility], EMERGENCY ROOM PHONE NUMBER: [phone]  IF A COMPLICATION OR EMERGENCY SITUATION ARISES AND YOU ARE UNABLE TO REACH   YOUR PHYSICIAN - GO DIRECTLY TO THE EMERGENCY ROOM.  Yamila Sharma MD  10/9/2024 8:59:17 AM  This report has been verified and signed electronically.  Dear patient,  As a result of recent federal legislation (The Federal Cures Act), you may   receive lab or pathology results from your procedure in your [facility]   account before your physician is able to contact you. Your physician or   their representative will relay the results to you with their   recommendations at their soonest availability.  Thank you,  [employer]

## 2024-10-09 NOTE — PLAN OF CARE
Discharge instructions given to patient/SO with verbalization of understanding all.  Written report of procedure provided by MD and given to patient. MD came to bedside and gave report of findings. VSS, denies n/v and tolerating PO, rates pain level tolerable. IV removed, and family notified for patient discharge home.

## 2024-10-14 ENCOUNTER — CLINICAL SUPPORT (OUTPATIENT)
Dept: REHABILITATION | Facility: OTHER | Age: 34
End: 2024-10-14
Payer: COMMERCIAL

## 2024-10-14 ENCOUNTER — PATIENT MESSAGE (OUTPATIENT)
Dept: PRIMARY CARE CLINIC | Facility: CLINIC | Age: 34
End: 2024-10-14
Payer: COMMERCIAL

## 2024-10-14 DIAGNOSIS — R29.898 WEAKNESS OF BOTH LOWER EXTREMITIES: Primary | ICD-10-CM

## 2024-10-14 DIAGNOSIS — M25.50 HYPERMOBILITY ARTHRALGIA: Primary | ICD-10-CM

## 2024-10-14 PROCEDURE — 97530 THERAPEUTIC ACTIVITIES: CPT | Mod: PN

## 2024-10-14 PROCEDURE — 97112 NEUROMUSCULAR REEDUCATION: CPT | Mod: PN

## 2024-10-14 NOTE — PROGRESS NOTES
[facility]   Physical Therapy Treatment Note     Name: Jennifer Osullivan Nguyen  Clinic Number: [id number]    Therapy Diagnosis:   Encounter Diagnosis   Name Primary?    Weakness of both lower extremities Yes           Physician: Martha Dasilva NP    Visit Date: 10/14/2024    Physician Orders: Physical Therapy Evaluate and Treat  Medical Diagnosis from Referral: lumbar herniated disc  Evaluation Date: 6/5/24  Authorization Period Expiration: 12/31/24  Plan of Care Expiration: 6/5/2024 to 9/5/24  Visit # / Visits authorized: 30/44  FOTO: 0/3  on 6/5/24    Precautions: standard    Time In:  1130am  Time Out: 1230pm  Total Billable Time: 60 minutes    SUBJECTIVE     Pt reports: she is doing well today. Pt states that she tailgated at [facility] this weekend and stood for 12 hours with no increase in low back pain. Pt did note minor numbness in her foot that subsided.       She was compliant with home exercise program.  Response to previous treatment: minor soreness  Functional change: tolerated well, no issues    Prior Level of Function: no limitation  Current Level of Function: limited with flexion based movements, recreational activity     Pain:  Current 2/10, worst 2/10, best 1/10   Location: left low back and left posterior hip  Description: Aching with occasional stabbing with trunk rotation  Aggravating Factors: sitting  Easing Factors: rest     Pts goals: Pt would like to return to yoga with no increase in low back pain.    OBJECTIVE     Objective Measures updated at progress report unless specified.     Treatment     Jennifer Osullivan received the treatments listed below in bold:        neuromuscular re-education activities to improve: Coordination, Kinesthetic, Sense, Proprioception, and Posture for 15 minutes. The following activities were included:    Supine TrA + dead bug with 4# dumbbells 20x  SL clams, purple looped TB 2x15 R/L in side plank  SLR with pilates ring press vs. 3#, 3x10  "R/L  Seated trunk extension using BTB anchored to 35# KB, 3x10  Bird dogs 2x10  Bear plank 10x3" holds  Shuttle squats, 75# 3x15  SL shuttle squats 37.5# 3x15 repetitions      therapeutic activities to improve functional performance for 45 minutes, including:    Elliptical x 8 min  TRX row x20x  Pallof press with lift up standing on foam vs 7# on Freemotion 30x R/L  Deadlift vs 15# from ground 20x  high plank 3x45''  Standing chops with medicine ball 3x8 R/L 8#  Ladder drills x  - forward 2-in, lateral 2-in 2-out, side to side 2-in 2-out  laps each  Kettlebell swings vs 15# 20x 3 sets  Hesitation march vs 15# KB 80'x4  Pull down + march RTB 2x10  SL hip circles 30x CW/CCW x 2 sets  squats on bosu 2x10  step ups on bosu 2x10  Farmer's Carries vs two, 15# KB's 2 laps on turf  Slam balls vs 10# 20x   Ladder Drills x3 laps       Patient Education and Home Exercises     Home Exercises Provided and Patient Education Provided     Education provided:   - Pt education regarding proper technique for there-ex/HEP, as well as rationale for exercise/ POC.       Written Home Exercises Provided: Patient instructed to cont prior HEP. Exercises were reviewed and Jennifer Osullivan was able to demonstrate them prior to the end of the session.  Jennifer Osullivan demonstrated good  understanding of the education provided. See EMR under Patient Instructions for exercises provided during therapy sessions    ASSESSMENT   Pt tolerated treatment session well today. Pt has improved her tolerance to static standing and walking. Continue PT POC plan to discharge next visit.    Jennifer Osullivan Is progressing well towards her goals.   Pt prognosis is Good.     Pt will continue to benefit from skilled outpatient physical therapy to address the deficits listed in the problem list box on initial evaluation, provide pt/family education and to maximize pt's level of independence in the home and community environment.     Pt's spiritual, cultural and educational needs " considered and pt agreeable to plan of care and goals.     Anticipated Barriers for therapy: None    GOALS:  Short Term Goals (4 Weeks):  1. Patient will be compliant with home exercise program to supplement therapy in promoting functional mobility. (progressing, not met)    2. Patient will perform lifting activity with good control to demonstrate improved core strength. (progressing, not met)    3. Patient will report no pain during thoracolumbar active range of motion to promote functional mobility.  (progressing, not met)    4. Patient will improve impaired lower extremity manual muscle tests  to >/=4/5 to improve strength for functional tasks. (progressing, not met)          Long Term Goals (6 Weeks):   1. Patient will improve FOTO score to </= 35% limited to decrease perceived limitation with maintaining/changing body position. (progressing, not met)    2. Patient will perform yoga movements with good control to demonstrate improved core strength.  (progressing, not met)    3. Patient will improve impaired lower extremity manual muscle tests to >/=4+/5 to improve strength for functional tasks.  (progressing, not met)    4. Patient will tolerate sitting for 120 minutes with no increase in low back pain to return to PLOF.  (progressing, not met)         PLAN     Plan of care Certification: 6/5/2024 to 9/5/24    Focus on core/LE strength and ROM with emphasis on posture and ADL performance     Outpatient Physical Therapy 2 times weekly for 12 weeks to include the following interventions: Therapeutic Exercises, Manual Therapeutic Technique, Neuromuscular Re Education, Therapeutic Activities. Modalities, Kinesiotape prn, and Functional Dry Needling as needed.    Ioana Keane, PT

## 2024-10-15 LAB
COMMENT: NORMAL
FINAL PATHOLOGIC DIAGNOSIS: NORMAL
GROSS: NORMAL
Lab: NORMAL

## 2024-10-15 NOTE — TELEPHONE ENCOUNTER
Please fax genetics referral and labs from 8/13/24 that I printed to   Fax: [phone]     Orders Placed This Encounter    Ambulatory referral/consult to Genetics       Dr. She Oleary D.O.   [facility]

## 2024-10-15 NOTE — TELEPHONE ENCOUNTER
[facility] has been faxed patient referral . Email of fax receivable has been sent as well. No further comments.

## 2024-10-21 ENCOUNTER — CLINICAL SUPPORT (OUTPATIENT)
Dept: REHABILITATION | Facility: OTHER | Age: 34
End: 2024-10-21
Payer: COMMERCIAL

## 2024-10-21 DIAGNOSIS — R29.898 WEAKNESS OF BOTH LOWER EXTREMITIES: Primary | ICD-10-CM

## 2024-10-21 PROCEDURE — 97112 NEUROMUSCULAR REEDUCATION: CPT | Mod: PN

## 2024-10-21 PROCEDURE — 97530 THERAPEUTIC ACTIVITIES: CPT | Mod: PN

## 2024-10-21 NOTE — PROGRESS NOTES
[facility]   Physical Therapy Discharge Summary     Name: Jennifer Nguyen  Clinic Number: [id number]    Therapy Diagnosis:   Encounter Diagnosis   Name Primary?    Weakness of both lower extremities Yes           Physician: Martha Dasilva NP    Visit Date: 10/21/2024    Physician Orders: Physical Therapy Evaluate and Treat  Medical Diagnosis from Referral: lumbar herniated disc  Evaluation Date: 6/5/24  Authorization Period Expiration: 12/31/24  Plan of Care Expiration: 6/5/2024 to 9/5/24  Visit # / Visits authorized: 30/44  FOTO: 0/3  on 6/5/24    Precautions: standard    Time In:  1130am  Time Out: 1230pm  Total Billable Time: 60 minutes    SUBJECTIVE     Pt reports: Discharge patient today. Patient has partially/fully met their short and long term goals. PT educated pt on progression of home exercise program and pt demonstrated understanding. All questions/concerns were answered/addressed by PT.        She was compliant with home exercise program.  Response to previous treatment: minor soreness  Functional change: tolerated well, no issues    Prior Level of Function: no limitation  Current Level of Function: limited with flexion based movements, recreational activity     Pain:  Current 2/10, worst 2/10, best 1/10   Location: left low back and left posterior hip  Description: Aching with occasional stabbing with trunk rotation  Aggravating Factors: sitting  Easing Factors: rest     Pts goals: Pt would like to return to yoga with no increase in low back pain.    OBJECTIVE     Objective Measures updated at progress report unless specified.     Treatment     Jennifer Osullivan received the treatments listed below in bold:        neuromuscular re-education activities to improve: Coordination, Kinesthetic, Sense, Proprioception, and Posture for 15 minutes. The following activities were included:    Supine TrA + dead bug with 4# dumbbells 20x  SL clams, purple looped TB 2x15 R/L in side  "plank  SLR with pilates ring press vs. 3#, 3x10 R/L  Seated trunk extension using BTB anchored to 35# KB, 3x10  Bird dogs 2x10  Bear plank 10x3" holds  Shuttle squats, 75# 3x15  SL shuttle squats 37.5# 3x15 repetitions      therapeutic activities to improve functional performance for 45 minutes, including:    Elliptical x 8 min  TRX row x20x  Pallof press with lift up standing on foam vs 7# on Freemotion 30x R/L  Deadlift vs 15# from ground 20x  high plank 3x45''  Standing chops with medicine ball 3x8 R/L 8#  Ladder drills x  - forward 2-in, lateral 2-in 2-out, side to side 2-in 2-out  laps each  Kettlebell swings vs 15# 20x 3 sets  Hesitation march vs 15# KB 80'x4  Pull down + march RTB 2x10  SL hip circles 30x CW/CCW x 2 sets  squats on bosu 2x10  step ups on bosu 2x10  Farmer's Carries vs two, 15# KB's 2 laps on turf  Slam balls vs 10# 20x   Ladder Drills x3 laps   +HEP Explanation/Discharge instructions      Patient Education and Home Exercises     Home Exercises Provided and Patient Education Provided     Education provided:   - Pt education regarding proper technique for there-ex/HEP, as well as rationale for exercise/ POC.       Written Home Exercises Provided: Patient instructed to cont prior HEP. Exercises were reviewed and Jennifer Osullivan was able to demonstrate them prior to the end of the session.  Jennifer Osullivan demonstrated good  understanding of the education provided. See EMR under Patient Instructions for exercises provided during therapy sessions    ASSESSMENT   Discharge patient today. Patient has partially/fully met their short and long term goals. PT educated pt on progression of home exercise program and pt demonstrated understanding. All questions/concerns were answered/addressed by PT.      Jennifer Osullivan Is progressing well towards her goals.   Pt prognosis is Good.     Pt will continue to benefit from skilled outpatient physical therapy to address the deficits listed in the problem list box on initial " evaluation, provide pt/family education and to maximize pt's level of independence in the home and community environment.     Pt's spiritual, cultural and educational needs considered and pt agreeable to plan of care and goals.     Anticipated Barriers for therapy: None    GOALS:  Short Term Goals (4 Weeks):  1. Patient will be compliant with home exercise program to supplement therapy in promoting functional mobility. (met)    2. Patient will perform lifting activity with good control to demonstrate improved core strength. (met)    3. Patient will report no pain during thoracolumbar active range of motion to promote functional mobility.  (met)    4. Patient will improve impaired lower extremity manual muscle tests  to >/=4/5 to improve strength for functional tasks. (met)          Long Term Goals (6 Weeks):   1. Patient will improve FOTO score to </= 35% limited to decrease perceived limitation with maintaining/changing body position. (met)    2. Patient will perform yoga movements with good control to demonstrate improved core strength.  ( met)    3. Patient will improve impaired lower extremity manual muscle tests to >/=4+/5 to improve strength for functional tasks.  (met)    4. Patient will tolerate sitting for 120 minutes with no increase in low back pain to return to PLOF.  (progressing, not met)         PLAN     Plan of care Certification: 6/5/2024 to 9/5/24    Discharge patient today. Patient has partially/fully met their short and long term goals. PT educated pt on progression of home exercise program and pt demonstrated understanding. All questions/concerns were answered/addressed by PT.      Ioana Keane, PT

## 2024-10-22 ENCOUNTER — PATIENT MESSAGE (OUTPATIENT)
Dept: PSYCHIATRY | Facility: CLINIC | Age: 34
End: 2024-10-22
Payer: COMMERCIAL

## 2024-10-22 ENCOUNTER — PATIENT MESSAGE (OUTPATIENT)
Dept: ALLERGY | Facility: CLINIC | Age: 34
End: 2024-10-22
Payer: COMMERCIAL

## 2024-10-22 DIAGNOSIS — T78.2XXD ANAPHYLAXIS, SUBSEQUENT ENCOUNTER: Primary | ICD-10-CM

## 2024-10-22 DIAGNOSIS — F90.0 ATTENTION DEFICIT HYPERACTIVITY DISORDER (ADHD), PREDOMINANTLY INATTENTIVE TYPE: Primary | ICD-10-CM

## 2024-10-22 DIAGNOSIS — Z91.038 ALLERGY TO INSECT STINGS: Primary | ICD-10-CM

## 2024-10-23 RX ORDER — LISDEXAMFETAMINE DIMESYLATE 40 MG/1
40 CAPSULE ORAL DAILY
Qty: 30 CAPSULE | Refills: 0 | Status: SHIPPED | OUTPATIENT
Start: 2024-10-23

## 2024-11-26 ENCOUNTER — PATIENT MESSAGE (OUTPATIENT)
Dept: PRIMARY CARE CLINIC | Facility: CLINIC | Age: 34
End: 2024-11-26
Payer: COMMERCIAL

## 2024-11-26 DIAGNOSIS — M25.50 HYPERMOBILITY ARTHRALGIA: Primary | ICD-10-CM

## 2024-12-02 ENCOUNTER — OFFICE VISIT (OUTPATIENT)
Dept: URGENT CARE | Facility: CLINIC | Age: 34
End: 2024-12-02
Payer: COMMERCIAL

## 2024-12-02 ENCOUNTER — PATIENT MESSAGE (OUTPATIENT)
Dept: PSYCHIATRY | Facility: CLINIC | Age: 34
End: 2024-12-02
Payer: COMMERCIAL

## 2024-12-02 ENCOUNTER — PATIENT MESSAGE (OUTPATIENT)
Dept: GYNECOLOGIC ONCOLOGY | Facility: CLINIC | Age: 34
End: 2024-12-02
Payer: COMMERCIAL

## 2024-12-02 VITALS
WEIGHT: 145 LBS | HEART RATE: 69 BPM | BODY MASS INDEX: 24.75 KG/M2 | RESPIRATION RATE: 20 BRPM | OXYGEN SATURATION: 99 % | DIASTOLIC BLOOD PRESSURE: 87 MMHG | HEIGHT: 64 IN | SYSTOLIC BLOOD PRESSURE: 121 MMHG | TEMPERATURE: 99 F

## 2024-12-02 DIAGNOSIS — F90.0 ATTENTION DEFICIT HYPERACTIVITY DISORDER (ADHD), PREDOMINANTLY INATTENTIVE TYPE: Primary | ICD-10-CM

## 2024-12-02 DIAGNOSIS — L20.9 ATOPIC DERMATITIS, UNSPECIFIED TYPE: Primary | ICD-10-CM

## 2024-12-02 DIAGNOSIS — L30.9 ECZEMA, UNSPECIFIED TYPE: ICD-10-CM

## 2024-12-02 PROCEDURE — 99213 OFFICE O/P EST LOW 20 MIN: CPT | Mod: S$GLB,,, | Performed by: NURSE PRACTITIONER

## 2024-12-02 RX ORDER — FLUTICASONE PROPIONATE 0.05 MG/G
OINTMENT TOPICAL
Qty: 15 G | Refills: 0 | Status: SHIPPED | OUTPATIENT
Start: 2024-12-02

## 2024-12-02 RX ORDER — FLUOCINONIDE CREAM (EMULSIFIED BASE) 0.5 MG/G
CREAM TOPICAL
Qty: 60 G | Refills: 0 | Status: SHIPPED | OUTPATIENT
Start: 2024-12-02

## 2024-12-02 NOTE — PROGRESS NOTES
"Subjective:      Patient ID: Jennifer Nguyen is a 34 y.o. female.    Vitals:  height is 5' 4" (1.626 m) and weight is 65.8 kg (145 lb). Her oral temperature is 98.5 °F (36.9 °C). Her blood pressure is 121/87 and her pulse is 69. Her respiration is 20 and oxygen saturation is 99%.     Chief Complaint: Rash    34 year old female c/o rash. Pt states she has eczema, but says this is worse and painful. The rash is presented on the neck(itchy), chest(inner deep itching under the skin)and lower part of her face(pressure on bilateral eyes). Pt states she has been dealing with rash for maybe over a month, but a few days ago is when the rash became painful. Pt states in the past has had used otc Hydrocortison with no relief so she stopped.  Currently using allergy medication, cerave, and aquaphor with no relief.  No changes to soaps or detergents, shampoos or conditioners.          Rash  This is a new problem. The current episode started in the past 7 days. The problem has been gradually worsening since onset. The affected locations include the face, neck and torso. The rash is characterized by dryness, itchiness, pain, redness and swelling. She was exposed to nothing. Pertinent negatives include no anorexia, congestion, cough, diarrhea, eye pain, facial edema, fatigue, fever, joint pain, rhinorrhea, shortness of breath, sore throat or vomiting. Past treatments include topical steroids, antihistamine and analgesics. The treatment provided mild relief. Her past medical history is significant for allergies, asthma and eczema.     Constitution: Negative for fatigue and fever.   HENT:  Negative for congestion and sore throat.    Eyes:  Negative for eye pain.   Respiratory:  Negative for cough and shortness of breath.    Gastrointestinal:  Negative for vomiting and diarrhea.   Skin:  Positive for rash. Negative for erythema and hives.   Allergic/Immunologic: Positive for itching. Negative for hives.      Objective:     Physical " Exam   Constitutional: She is oriented to person, place, and time. She appears well-developed.   HENT:   Head: Normocephalic and atraumatic. Head is without abrasion, without contusion and without laceration.   Ears:   Right Ear: External ear normal.   Left Ear: External ear normal.   Nose: Nose normal.   Mouth/Throat: Oropharynx is clear and moist and mucous membranes are normal.   Eyes: Conjunctivae, EOM and lids are normal. Pupils are equal, round, and reactive to light.   Neck: Trachea normal and phonation normal. Neck supple.   Cardiovascular: Normal rate, regular rhythm and normal heart sounds.   Pulmonary/Chest: Effort normal and breath sounds normal. No stridor. No respiratory distress.   Musculoskeletal: Normal range of motion.         General: Normal range of motion.   Neurological: She is alert and oriented to person, place, and time.   Skin: Skin is warm, dry, intact, rash (to face, both eyelids, and chest (see photo). Rough dry skin that is raised) and not papular. Capillary refill takes less than 2 seconds. No abrasion, No burn, No bruising, No erythema and No ecchymosis   Psychiatric: Her speech is normal and behavior is normal. Judgment and thought content normal.   Nursing note and vitals reviewed.            Assessment:     1. Atopic dermatitis, unspecified type    2. Eczema, unspecified type        Plan:       Atopic dermatitis, unspecified type  -     fluticasone propionate (CUTIVATE) 0.005 % ointment; APPLY TO AFFECTED AREAS OF FACE TWICE DAILY AS NEEDED FOR IRRITATION. DO NOT USE FOR LONGER THAN 2 WEEKS IN A ROW.  Dispense: 15 g; Refill: 0  -     fluocinonide-emollient (FLUOCINONIDE-E) 0.05 % Crea; APPLY TO AFFECTED AREA OF HANDS TWICE A DAY AS NEEDED FLARES.  Dispense: 60 g; Refill: 0    Eczema, unspecified type  -     fluticasone propionate (CUTIVATE) 0.005 % ointment; APPLY TO AFFECTED AREAS OF FACE TWICE DAILY AS NEEDED FOR IRRITATION. DO NOT USE FOR LONGER THAN 2 WEEKS IN A ROW.  Dispense:  15 g; Refill: 0  -     fluocinonide-emollient (FLUOCINONIDE-E) 0.05 % Crea; APPLY TO AFFECTED AREA OF HANDS TWICE A DAY AS NEEDED FLARES.  Dispense: 60 g; Refill: 0

## 2024-12-03 RX ORDER — LISDEXAMFETAMINE DIMESYLATE 40 MG/1
40 CAPSULE ORAL DAILY
Qty: 30 CAPSULE | Refills: 0 | Status: SHIPPED | OUTPATIENT
Start: 2024-12-03

## 2024-12-03 NOTE — PATIENT INSTRUCTIONS
Continue with oral antihistamine daily as needed for itching.  Steroids as directed topically.  Follow up closely with your Primary Care and Dermatologist.  Return here or go to the ER as needed for worsening condition.

## 2024-12-20 ENCOUNTER — CLINICAL SUPPORT (OUTPATIENT)
Dept: ALLERGY | Facility: CLINIC | Age: 34
End: 2024-12-20
Payer: COMMERCIAL

## 2024-12-20 DIAGNOSIS — J30.9 CHRONIC ALLERGIC RHINITIS: Primary | ICD-10-CM

## 2024-12-20 DIAGNOSIS — Z91.038 ALLERGY TO INSECT BITES AND STINGS: ICD-10-CM

## 2024-12-20 PROCEDURE — 99999 PR PBB SHADOW E&M-EST. PATIENT-LVL I: CPT | Mod: PBBFAC,,,

## 2024-12-30 ENCOUNTER — OFFICE VISIT (OUTPATIENT)
Dept: URGENT CARE | Facility: CLINIC | Age: 34
End: 2024-12-30
Payer: COMMERCIAL

## 2024-12-30 VITALS
RESPIRATION RATE: 18 BRPM | TEMPERATURE: 98 F | BODY MASS INDEX: 24.89 KG/M2 | OXYGEN SATURATION: 99 % | DIASTOLIC BLOOD PRESSURE: 74 MMHG | HEART RATE: 60 BPM | WEIGHT: 145 LBS | SYSTOLIC BLOOD PRESSURE: 117 MMHG

## 2024-12-30 DIAGNOSIS — N39.0 COMPLICATED UTI (URINARY TRACT INFECTION): ICD-10-CM

## 2024-12-30 DIAGNOSIS — R30.0 DYSURIA: Primary | ICD-10-CM

## 2024-12-30 LAB
B-HCG UR QL: NEGATIVE
BILIRUBIN, UA POC OHS: NEGATIVE
BLOOD, UA POC OHS: ABNORMAL
CLARITY, UA POC OHS: CLEAR
COLOR, UA POC OHS: ABNORMAL
CTP QC/QA: YES
GLUCOSE, UA POC OHS: NEGATIVE
KETONES, UA POC OHS: NEGATIVE
LEUKOCYTES, UA POC OHS: NEGATIVE
NITRITE, UA POC OHS: POSITIVE
PH, UA POC OHS: 5.5
PROTEIN, UA POC OHS: 30
SPECIFIC GRAVITY, UA POC OHS: >=1.03
UROBILINOGEN, UA POC OHS: 1

## 2024-12-30 PROCEDURE — 87147 CULTURE TYPE IMMUNOLOGIC: CPT | Performed by: INTERNAL MEDICINE

## 2024-12-30 PROCEDURE — 87186 SC STD MICRODIL/AGAR DIL: CPT | Performed by: INTERNAL MEDICINE

## 2024-12-30 PROCEDURE — 87088 URINE BACTERIA CULTURE: CPT | Performed by: INTERNAL MEDICINE

## 2024-12-30 PROCEDURE — 87086 URINE CULTURE/COLONY COUNT: CPT | Performed by: INTERNAL MEDICINE

## 2024-12-30 RX ORDER — CEFTRIAXONE 1 G/1
1 INJECTION, POWDER, FOR SOLUTION INTRAMUSCULAR; INTRAVENOUS ONCE
Status: COMPLETED | OUTPATIENT
Start: 2024-12-30 | End: 2024-12-30

## 2024-12-30 RX ORDER — SULFAMETHOXAZOLE AND TRIMETHOPRIM 800; 160 MG/1; MG/1
1 TABLET ORAL 2 TIMES DAILY
Qty: 14 TABLET | Refills: 0 | Status: SHIPPED | OUTPATIENT
Start: 2024-12-30 | End: 2025-01-06

## 2024-12-30 RX ORDER — LIDOCAINE HYDROCHLORIDE 10 MG/ML
2.1 INJECTION, SOLUTION INFILTRATION; PERINEURAL
Status: COMPLETED | OUTPATIENT
Start: 2024-12-30 | End: 2024-12-30

## 2024-12-30 RX ADMIN — LIDOCAINE HYDROCHLORIDE 2.1 ML: 10 INJECTION, SOLUTION INFILTRATION; PERINEURAL at 08:12

## 2024-12-30 RX ADMIN — CEFTRIAXONE 1 G: 1 INJECTION, POWDER, FOR SOLUTION INTRAMUSCULAR; INTRAVENOUS at 09:12

## 2024-12-30 NOTE — PROGRESS NOTES
Subjective:      Patient ID: Jennifer Nguyen is a 34 y.o. female.    Vitals:  weight is 65.8 kg (145 lb). Her oral temperature is 97.9 °F (36.6 °C). Her blood pressure is 117/74 and her pulse is 60. Her respiration is 18 and oxygen saturation is 99%.     Chief Complaint: Dysuria    Pt is a 34 year old female present with dysuria, nausea, back pain x 1 day. No blood in urine, fever or vomiting. Pt states she took Azo at home with a little relief.     PMHX: chronic UTIs    Dysuria   This is a new problem. The current episode started gradual onset. The problem has been unchanged. The pain is at a severity of 5/10. The pain is mild. There has been no fever. She is Sexually active. Associated symptoms include flank pain, hematuria and nausea. Pertinent negatives include no discharge, urgency or constipation. Treatments tried: azo.       Gastrointestinal:  Positive for nausea. Negative for constipation.   Genitourinary:  Positive for dysuria, flank pain and hematuria. Negative for urgency.   Skin:  Negative for erythema.      Objective:     Physical Exam   Constitutional: She is oriented to person, place, and time. She appears well-developed. No distress.   HENT:   Head: Normocephalic and atraumatic.   Ears:   Right Ear: External ear normal.   Left Ear: External ear normal.   Nose: Nose normal.   Mouth/Throat: Oropharynx is clear and moist. No oropharyngeal exudate.   Eyes: Conjunctivae and EOM are normal. Pupils are equal, round, and reactive to light. Right eye exhibits no discharge. Left eye exhibits no discharge. No scleral icterus.   Neck: Neck supple.   Pulmonary/Chest: Effort normal.   Abdominal: There is no abdominal tenderness. There is left CVA tenderness. There is no guarding.   Lymphadenopathy:     She has no cervical adenopathy.   Neurological: She is alert and oriented to person, place, and time.   Skin: Skin is warm, dry, not diaphoretic and no rash. Capillary refill takes less than 2 seconds. No erythema    Psychiatric: Her behavior is normal.   Nursing note and vitals reviewed.    Results for orders placed or performed in visit on 12/30/24   POCT Urinalysis(Instrument)    Collection Time: 12/30/24  8:42 AM   Result Value Ref Range    Color, POC UA Orange (A) Yellow, Straw, Colorless    Clarity, POC UA Clear Clear    Glucose, POC UA Negative Negative    Bilirubin, POC UA Negative Negative    Ketones, POC UA Negative Negative    Spec Grav POC UA >=1.030 1.005 - 1.030    Blood, POC UA Moderate (A) Negative    pH, POC UA 5.5 5.0 - 8.0    Protein, POC UA 30 (A) Negative    Urobilinogen, POC UA 1.0 <=1.0    Nitrite, POC UA Positive (A) Negative    WBC, POC UA Negative Negative   POCT urine pregnancy    Collection Time: 12/30/24  8:56 AM   Result Value Ref Range    POC Preg Test, Ur Negative Negative     Acceptable Yes          Assessment:     1. Dysuria    2. Complicated UTI (urinary tract infection)        Plan:       Dysuria  -     POCT Urinalysis(Instrument)    Complicated UTI (urinary tract infection)  -     cefTRIAXone injection 1 g  -     LIDOcaine HCL 10 mg/ml (1%) injection 2.1 mL  -     sulfamethoxazole-trimethoprim 800-160mg (BACTRIM DS) 800-160 mg Tab; Take 1 tablet by mouth 2 (two) times daily. for 7 days  Dispense: 14 tablet; Refill: 0  -     CULTURE, URINE  -     POCT urine pregnancy

## 2025-01-02 LAB
BACTERIA UR CULT: ABNORMAL
BACTERIA UR CULT: ABNORMAL

## 2025-01-08 ENCOUNTER — PATIENT MESSAGE (OUTPATIENT)
Dept: ALLERGY | Facility: CLINIC | Age: 35
End: 2025-01-08
Payer: COMMERCIAL

## 2025-01-15 ENCOUNTER — OFFICE VISIT (OUTPATIENT)
Dept: PSYCHIATRY | Facility: CLINIC | Age: 35
End: 2025-01-15
Payer: COMMERCIAL

## 2025-01-15 DIAGNOSIS — F33.1 MODERATE EPISODE OF RECURRENT MAJOR DEPRESSIVE DISORDER: Primary | ICD-10-CM

## 2025-01-15 DIAGNOSIS — F90.0 ATTENTION DEFICIT HYPERACTIVITY DISORDER (ADHD), PREDOMINANTLY INATTENTIVE TYPE: ICD-10-CM

## 2025-01-15 RX ORDER — LISDEXAMFETAMINE DIMESYLATE 40 MG/1
40 CAPSULE ORAL DAILY
Qty: 30 CAPSULE | Refills: 0 | Status: SHIPPED | OUTPATIENT
Start: 2025-03-13

## 2025-01-15 RX ORDER — MIRTAZAPINE 7.5 MG/1
7.5 TABLET, FILM COATED ORAL NIGHTLY
Qty: 30 TABLET | Refills: 11 | Status: SHIPPED | OUTPATIENT
Start: 2025-01-15 | End: 2026-01-15

## 2025-01-15 RX ORDER — LISDEXAMFETAMINE DIMESYLATE 40 MG/1
40 CAPSULE ORAL DAILY
Qty: 30 CAPSULE | Refills: 0 | Status: SHIPPED | OUTPATIENT
Start: 2025-01-15

## 2025-01-15 RX ORDER — LISDEXAMFETAMINE DIMESYLATE 40 MG/1
40 CAPSULE ORAL DAILY
Qty: 30 CAPSULE | Refills: 0 | Status: SHIPPED | OUTPATIENT
Start: 2025-02-14

## 2025-01-15 RX ORDER — LORAZEPAM 0.5 MG/1
0.5 TABLET ORAL EVERY 6 HOURS PRN
Qty: 15 TABLET | Refills: 0 | Status: SHIPPED | OUTPATIENT
Start: 2025-01-15 | End: 2025-02-14

## 2025-01-15 RX ORDER — ESCITALOPRAM OXALATE 20 MG/1
20 TABLET ORAL DAILY
Qty: 90 TABLET | Refills: 3 | Status: SHIPPED | OUTPATIENT
Start: 2025-01-15 | End: 2026-01-15

## 2025-01-15 NOTE — PROGRESS NOTES
Outpatient Psychiatry Follow-Up Visit (MD/NP)    1/15/2025    Chief Complaint:  Jennifer Nguyen is a 34 y.o. female who presents today for follow-up of attention problems.  Met with patient.      Interval History and Content of Current Session:  Interim Events/Subjective Report/Content of Current Session:   Jennifer Nguyen checked in on time for her visit- pt takes 40 mg vyvanse daily. Lexapro 20 mg ativan PRN. Last seen in September. Dealing with family stressors - dad is in some degree of cognitive decline, and she is worried about him. She is moving in with her boyfriend and is excited about that.   In December, she noticed herself feeling more depressed and worn out. She has good insight and is aware that she wasn't able to see her counselor and that made things harder. She is tired frequently and has been sleeping more than usual.   The increased vyvanse dose has been a large benefit. She is worried about whether the lexapro has been sufficient for her depressive symptoms. Discussed mirtazipine for depression augmentation. Reviewed risks, sedation, apptite increase, serotonin syndrome, alisson, increased SI.     PSYCHOTHERAPY ADD-ON +40532 30 minutes (range 16-37 minutes)  Therapeutic intervention type: supportive psychotherapy  Why chosen therapy is appropriate versus another modality: relevant to diagnosis  Target symptoms addressed: depression, anxiety   Topics and themes discussed: illness/death of a loved one, stress related to medical comorbidities  Primary focus: dad's illness, family issues  Psychotherapeutic techniques employed: active listening and empathic responses  Outcome monitoring methods: self-report  The patient's response to the intervention is: accepting.   The patient's progress toward treatment goals is: fair.  Duration of intervention: 17 minutes        She denies any side effects from her medication.    No thoughts of suicide.   No HI, no AVH.       Initial history with me:  Had tried  wellbutrin, it made her irritable, which she was not a fan of. She likes the vyvanse, but she is concerned about the cost. She is able to sit still, sit in silence. She is no longer as distracted by extraneous stimuli. 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 She feels like her skin may be drier from the medication- she does have a history of eczema. She is ok with it. She denies chest pain, palpitations. NO hx of heart murmur, no hx of heart problems.   She states her anxiety has been OK. She is also in therapy and saw her therapist yesterday. A lot of her anxiety was secondary to problems with focus. She feels like things are much better and her focus and functioning is much better. She had tried anxiety, depression medications and had not seen benefit.   She has had weird bruising, fatigue- saw GP, saw a hematologist.     Pt is from [place], [place]. Appetite has decreased, she has lost about 10 lbs.   Denies SI, HI, AVH. She is very accident prone.   No thoughts about suicide.     No gun in her house. Did attempt suicide in 9th grade. Has a history of self harm-  was picking scabs during the pandemic, but hasn't cut in a long time. She lives alone, and was very isolated in the pandemic. IT was hard for her to cope with the loneliness.     Review of Systems   PSYCHIATRIC: Pertinant items are noted in the narrative.  CONSTITUTIONAL: No weight gain or loss.   MUSCULOSKELETAL: No pain or stiffness of the joints.  ENT: See above.  RESPIRATORY: See above.  CARDIOVASCULAR: No tachycardia or chest pain.  GASTROINTESTINAL: No nausea, vomiting, pain, constipation or diarrhea.    Past Medical, Family and Social History: The patient's past medical, family and social history have been reviewed and updated as appropriate within the electronic medical record - see encounter notes.    Compliance: yes    Side effects: see above    Risk Parameters:  Patient reports no suicidal ideation  Patient reports no homicidal ideation  Patient  "reports no self-injurious behavior  Patient reports no violent behavior    Exam (detailed: at least 9 elements; comprehensive: all 15 elements)   Constitutional  Vitals:  There were no vitals filed for this visit.           General:  unremarkable, age appropriate     Musculoskeletal  Muscle Strength/Tone:  not examined   Gait & Station:  non-ataxic     Psychiatric  Speech:  no latency; no press   Mood & Affect:  "It has been a hard few months"   congruent and appropriate   Thought Process:  normal and logical   Associations:  intact   Thought Content:  normal, no suicidality, no homicidality, delusions, or paranoia   Insight:  intact   Judgement: behavior is adequate to circumstances   Orientation:  grossly intact   Memory: intact for content of interview   Language: grossly intact   Attention Span & Concentration:  able to focus   Fund of Knowledge:  intact and appropriate to age and level of education     Assessment and Diagnosis   Status/Progress: Based on the examination today, the patient's problem(s) is/are adequately but not ideally controlled.  New problems have not been presented today.   Co-morbidities, Diagnostic uncertainty, and Lack of compliance are not complicating management of the primary condition.  There are no active rule-out diagnoses for this patient at this time.     General Impression: Jennifer Nguyen, a 34 y.o.  female, presenting for follow-up visit for management of ADHD symptoms. Presents 12/22/22 - did not tolerate Wellbutrin; Vyvanse started. Presents at 3/21/22 for F/u, vvyanse has been helping and she denies SE, feels great benefit.   9/2023- suffering due to chronic pain, having depression and anxiety.   7/9/2024- doing well. No issues with medications wishes to continue  9/18/24- doing generally well, anxiety and depression improved. Intattention still an issue, may try higher dose of vyvanse.     ADHD, inattentive type   Major depressive disorder, recurrent, moderate  Chronic " pain     Intervention/Counseling/Treatment Plan   Medication Management: Continue current medications.    Continue vyvanse to 40 mg daily. Can continue adderall 5 mg IR PRN.   Patient has no contraindications: no h/o allergic rxn, agitation, anxiety, tourette's, arrythmia, cardiovascular disease, cardiac structural abnormalities, hyperthyroidism, glaucoma, Other psychiatric illness, etc.   Reviewed possible side effects multiple times. Discussed diagnosis, risks and benefits of proposed treatment vs alternative treatments vs no treatment, and potential side effects of these treatments (death, dependency, psychosis, alisson, aggression, HTN, tics, MI, stroke, arrythmia, seizure, anaphylaxis or other allergic reactions, leukopenia, nervousness, anorexia, insomnia, tachycardia, palpitations, dizziness, BP changes, HR changes, visual disturbance, etc.). The patient expresses understanding of the above and displays the capacity to agree with this treatment given said understanding. Patient also agrees that, currently, the benefits outweigh the risks and would like to pursue treatment at this time.  The risks and benefits of medication were discussed with the patient.    Discussed diagnosis, risks and benefits of proposed treatment above vs alternative treatments vs no treatment, and potential side effects of these treatments. The patient expresses understanding of the above and displays the capacity to agree with this treatment given said understanding. Patient also agrees that, currently, the benefits outweigh the risks and would like to pursue treatment at this time.  Pt is experiencing a recurrence of her depression. continue lexapro to 20 mg daily.  Reviewed r/b/SE of medication including but not limited to GI distress, serotonin syndrome, manic switching, increased SI, sexual SE. All questions and concerns addressed. No SI. Can try additional mirtazipine 7.5 mg qhs. Reviewed the risks as above.   Wrote #15 0.5 mg  ativan for PRN anxiety, insomnia. Informed pt of the risks of continuousBenzodiazepine use including tolerance, dependence and withdrawals that may be life threatening upon abrupt cessation. Also advised not to take Benzodiazepines with Opiates or other sedatives and also not to drive or operate heavy machinery while using Benzodiazepines. Reminded never to mix with alcohol or opiates. Refilled today but uses only rarely.  Continue with therapy.  Discussed inherent unpredictability of medications in each individual.   Encouraged Patient to keep future appointments.   Take medications as prescribed and abstain from substance abuse.   In the event of an emergency patient was advised to go to the emergency room    Return to Clinic:3 months or sooner ELSIE Kessler MD

## 2025-02-18 ENCOUNTER — LAB VISIT (OUTPATIENT)
Dept: LAB | Facility: OTHER | Age: 35
End: 2025-02-18
Payer: COMMERCIAL

## 2025-02-18 ENCOUNTER — OFFICE VISIT (OUTPATIENT)
Dept: PRIMARY CARE CLINIC | Facility: CLINIC | Age: 35
End: 2025-02-18
Payer: COMMERCIAL

## 2025-02-18 ENCOUNTER — OFFICE VISIT (OUTPATIENT)
Dept: PSYCHIATRY | Facility: CLINIC | Age: 35
End: 2025-02-18
Payer: COMMERCIAL

## 2025-02-18 VITALS
DIASTOLIC BLOOD PRESSURE: 80 MMHG | HEART RATE: 92 BPM | BODY MASS INDEX: 25.39 KG/M2 | SYSTOLIC BLOOD PRESSURE: 128 MMHG | OXYGEN SATURATION: 95 % | WEIGHT: 147.94 LBS

## 2025-02-18 DIAGNOSIS — R23.3 SPONTANEOUS ECCHYMOSIS: ICD-10-CM

## 2025-02-18 DIAGNOSIS — L23.9 ALLERGIC CONTACT DERMATITIS, UNSPECIFIED TRIGGER: ICD-10-CM

## 2025-02-18 DIAGNOSIS — F90.0 ATTENTION DEFICIT HYPERACTIVITY DISORDER (ADHD), PREDOMINANTLY INATTENTIVE TYPE: ICD-10-CM

## 2025-02-18 DIAGNOSIS — J30.89 ALLERGIC RHINITIS DUE TO DUST MITE: ICD-10-CM

## 2025-02-18 DIAGNOSIS — L20.9 ATOPIC DERMATITIS, UNSPECIFIED TYPE: Primary | ICD-10-CM

## 2025-02-18 DIAGNOSIS — F33.1 MODERATE EPISODE OF RECURRENT MAJOR DEPRESSIVE DISORDER: ICD-10-CM

## 2025-02-18 LAB
APTT PPP: 25.4 SEC (ref 21–32)
BASOPHILS # BLD AUTO: 0.04 K/UL (ref 0–0.2)
BASOPHILS NFR BLD: 0.5 % (ref 0–1.9)
DIFFERENTIAL METHOD BLD: ABNORMAL
EOSINOPHIL # BLD AUTO: 1 K/UL (ref 0–0.5)
EOSINOPHIL NFR BLD: 13.5 % (ref 0–8)
ERYTHROCYTE [DISTWIDTH] IN BLOOD BY AUTOMATED COUNT: 12.4 % (ref 11.5–14.5)
FIBRINOGEN PPP-MCNC: 308 MG/DL (ref 182–400)
HCT VFR BLD AUTO: 39 % (ref 37–48.5)
HGB BLD-MCNC: 12.9 G/DL (ref 12–16)
IMM GRANULOCYTES # BLD AUTO: 0.01 K/UL (ref 0–0.04)
IMM GRANULOCYTES NFR BLD AUTO: 0.1 % (ref 0–0.5)
INR PPP: 0.9 (ref 0.8–1.2)
IRON SERPL-MCNC: 88 UG/DL (ref 30–160)
LYMPHOCYTES # BLD AUTO: 1.8 K/UL (ref 1–4.8)
LYMPHOCYTES NFR BLD: 23 % (ref 18–48)
MCH RBC QN AUTO: 29 PG (ref 27–31)
MCHC RBC AUTO-ENTMCNC: 33.1 G/DL (ref 32–36)
MCV RBC AUTO: 88 FL (ref 82–98)
MONOCYTES # BLD AUTO: 0.4 K/UL (ref 0.3–1)
MONOCYTES NFR BLD: 5.5 % (ref 4–15)
NEUTROPHILS # BLD AUTO: 4.4 K/UL (ref 1.8–7.7)
NEUTROPHILS NFR BLD: 57.4 % (ref 38–73)
NRBC BLD-RTO: 0 /100 WBC
PLATELET # BLD AUTO: 325 K/UL (ref 150–450)
PMV BLD AUTO: 8.8 FL (ref 9.2–12.9)
PROTHROMBIN TIME: 10.6 SEC (ref 9–12.5)
RBC # BLD AUTO: 4.45 M/UL (ref 4–5.4)
SATURATED IRON: 24 % (ref 20–50)
TOTAL IRON BINDING CAPACITY: 360 UG/DL (ref 250–450)
TRANSFERRIN SERPL-MCNC: 243 MG/DL (ref 200–375)
WBC # BLD AUTO: 7.65 K/UL (ref 3.9–12.7)

## 2025-02-18 PROCEDURE — 36415 COLL VENOUS BLD VENIPUNCTURE: CPT

## 2025-02-18 PROCEDURE — 85025 COMPLETE CBC W/AUTO DIFF WBC: CPT

## 2025-02-18 PROCEDURE — 83540 ASSAY OF IRON: CPT

## 2025-02-18 PROCEDURE — 85610 PROTHROMBIN TIME: CPT

## 2025-02-18 PROCEDURE — 85730 THROMBOPLASTIN TIME PARTIAL: CPT

## 2025-02-18 PROCEDURE — 85384 FIBRINOGEN ACTIVITY: CPT

## 2025-02-18 RX ORDER — LISDEXAMFETAMINE DIMESYLATE 40 MG/1
40 CAPSULE ORAL DAILY
Qty: 30 CAPSULE | Refills: 0 | Status: SHIPPED | OUTPATIENT
Start: 2025-03-13

## 2025-02-18 RX ORDER — LISDEXAMFETAMINE DIMESYLATE 40 MG/1
40 CAPSULE ORAL DAILY
Qty: 30 CAPSULE | Refills: 0 | Status: SHIPPED | OUTPATIENT
Start: 2025-02-18

## 2025-02-18 RX ORDER — FLUTICASONE PROPIONATE 0.05 MG/G
OINTMENT TOPICAL
Qty: 15 G | Refills: 0 | Status: SHIPPED | OUTPATIENT
Start: 2025-02-18

## 2025-02-18 RX ORDER — LORAZEPAM 0.5 MG/1
0.5 TABLET ORAL EVERY 6 HOURS PRN
Qty: 15 TABLET | Refills: 0 | Status: SHIPPED | OUTPATIENT
Start: 2025-02-18 | End: 2025-03-20

## 2025-02-18 RX ORDER — PREDNISONE 20 MG/1
20 TABLET ORAL 2 TIMES DAILY
Qty: 10 TABLET | Refills: 0 | Status: SHIPPED | OUTPATIENT
Start: 2025-02-18 | End: 2025-02-23

## 2025-02-18 RX ORDER — FLUOCINONIDE 0.5 MG/G
CREAM TOPICAL
Qty: 60 G | Refills: 3 | Status: SHIPPED | OUTPATIENT
Start: 2025-02-18

## 2025-02-18 RX ORDER — MIRTAZAPINE 7.5 MG/1
7.5 TABLET, FILM COATED ORAL NIGHTLY
Qty: 30 TABLET | Refills: 11 | Status: SHIPPED | OUTPATIENT
Start: 2025-02-18 | End: 2026-02-18

## 2025-02-18 NOTE — PROGRESS NOTES
Outpatient Psychiatry Follow-Up Visit (MD/NP)    2/18/2025    Chief Complaint:  Jennifer Nguyen is a 34 y.o. female who presents today for follow-up of attention problems.  Met with patient.      Interval History and Content of Current Session:  Interim Events/Subjective Report/Content of Current Session:   Jennifer Nguyen checked in on time for her visit- pt takes 40 mg vyvanse daily. Lexapro 20 mg ativan PRN, last time tried mirtazipine 7.5 mg. She says she thinks the medicine has helped a lot. Sometimes has more vivid dreams. She overall feels benefit from the medication. She is getting better sleep. No appetite changes, no dry mouth, no constipation. No issues with other medication- no issues with vyvanse, lexapro, ativan. Right now she is happy with the medication and does not want to change it.     She denies any side effects from her medication.    No thoughts of suicide.   No HI, no AVH.       Initial history with me:  Had tried wellbutrin, it made her irritable, which she was not a fan of. She likes the vyvanse, but she is concerned about the cost. She is able to sit still, sit in silence. She is no longer as distracted by extraneous stimuli. 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 She feels like her skin may be drier from the medication- she does have a history of eczema. She is ok with it. She denies chest pain, palpitations. NO hx of heart murmur, no hx of heart problems.   She states her anxiety has been OK. She is also in therapy and saw her therapist yesterday. A lot of her anxiety was secondary to problems with focus. She feels like things are much better and her focus and functioning is much better. She had tried anxiety, depression medications and had not seen benefit.   She has had weird bruising, fatigue- saw GP, saw a hematologist.     Pt is from [place], [place]. Appetite has decreased, she has lost about 10 lbs.   Denies SI, HI, AVH. She is very accident prone.   No thoughts about suicide.     No  "gun in her house. Did attempt suicide in 9th grade. Has a history of self harm-  was picking scabs during the pandemic, but hasn't cut in a long time. She lives alone, and was very isolated in the pandemic. IT was hard for her to cope with the loneliness.     Review of Systems   PSYCHIATRIC: Pertinant items are noted in the narrative.  CONSTITUTIONAL: No weight gain or loss.   MUSCULOSKELETAL: No pain or stiffness of the joints.  ENT: See above.  RESPIRATORY: See above.  CARDIOVASCULAR: No tachycardia or chest pain.  GASTROINTESTINAL: No nausea, vomiting, pain, constipation or diarrhea.    Past Medical, Family and Social History: The patient's past medical, family and social history have been reviewed and updated as appropriate within the electronic medical record - see encounter notes.    Compliance: yes    Side effects: see above    Risk Parameters:  Patient reports no suicidal ideation  Patient reports no homicidal ideation  Patient reports no self-injurious behavior  Patient reports no violent behavior    Exam (detailed: at least 9 elements; comprehensive: all 15 elements)   Constitutional  Vitals:  There were no vitals filed for this visit.           General:  unremarkable, age appropriate     Musculoskeletal  Muscle Strength/Tone:  not examined   Gait & Station:  non-ataxic     Psychiatric  Speech:  no latency; no press   Mood & Affect:  "Maybe better"  congruent and appropriate   Thought Process:  normal and logical   Associations:  intact   Thought Content:  normal, no suicidality, no homicidality, delusions, or paranoia   Insight:  intact   Judgement: behavior is adequate to circumstances   Orientation:  grossly intact   Memory: intact for content of interview   Language: grossly intact   Attention Span & Concentration:  able to focus   Fund of Knowledge:  intact and appropriate to age and level of education     Assessment and Diagnosis   Status/Progress: Based on the examination today, the patient's " problem(s) is/are adequately but not ideally controlled.  New problems have not been presented today.   Co-morbidities, Diagnostic uncertainty, and Lack of compliance are not complicating management of the primary condition.  There are no active rule-out diagnoses for this patient at this time.     General Impression: Jennifer Nguyen, a 34 y.o.  female, presenting for follow-up visit for management of ADHD symptoms. Presents 12/22/22 - did not tolerate Wellbutrin; Vyvanse started. Presents at 3/21/22 for F/u, vvyanse has been helping and she denies SE, feels great benefit.   9/2023- suffering due to chronic pain, having depression and anxiety.   7/9/2024- doing well. No issues with medications wishes to continue  9/18/24- doing generally well, anxiety and depression improved. Intattention still an issue, may try higher dose of vyvanse.     ADHD, inattentive type   Major depressive disorder, recurrent, moderate  Chronic pain     Intervention/Counseling/Treatment Plan   Medication Management: Continue current medications.    Continue vyvanse to 40 mg daily. Can continue adderall 5 mg IR PRN.   Patient has no contraindications: no h/o allergic rxn, agitation, anxiety, tourette's, arrythmia, cardiovascular disease, cardiac structural abnormalities, hyperthyroidism, glaucoma, Other psychiatric illness, etc.   Reviewed possible side effects multiple times. Discussed diagnosis, risks and benefits of proposed treatment vs alternative treatments vs no treatment, and potential side effects of these treatments (death, dependency, psychosis, alisson, aggression, HTN, tics, MI, stroke, arrythmia, seizure, anaphylaxis or other allergic reactions, leukopenia, nervousness, anorexia, insomnia, tachycardia, palpitations, dizziness, BP changes, HR changes, visual disturbance, etc.). The patient expresses understanding of the above and displays the capacity to agree with this treatment given said understanding. Patient also agrees  that, currently, the benefits outweigh the risks and would like to pursue treatment at this time.  The risks and benefits of medication were discussed with the patient.    Discussed diagnosis, risks and benefits of proposed treatment above vs alternative treatments vs no treatment, and potential side effects of these treatments. The patient expresses understanding of the above and displays the capacity to agree with this treatment given said understanding. Patient also agrees that, currently, the benefits outweigh the risks and would like to pursue treatment at this time.  Pt is experiencing a recurrence of her depression. continue lexapro to 20 mg daily.  Reviewed r/b/SE of medication including but not limited to GI distress, serotonin syndrome, manic switching, increased SI, sexual SE. All questions and concerns addressed. No SI. Can continue additional mirtazipine 7.5 mg qhs. Reviewed the risks as above.   Wrote #15 0.5 mg ativan for PRN anxiety, insomnia. Informed pt of the risks of continuousBenzodiazepine use including tolerance, dependence and withdrawals that may be life threatening upon abrupt cessation. Also advised not to take Benzodiazepines with Opiates or other sedatives and also not to drive or operate heavy machinery while using Benzodiazepines. Reminded never to mix with alcohol or opiates. Refilled today but uses only rarely.  Continue with therapy.  Discussed inherent unpredictability of medications in each individual.   Encouraged Patient to keep future appointments.   Take medications as prescribed and abstain from substance abuse.   In the event of an emergency patient was advised to go to the emergency room    Return to Clinic:3 months or sooner ELSIE Kessler MD

## 2025-02-18 NOTE — ASSESSMENT & PLAN NOTE
- Possible involvement in current eczematous flare due to moving furniture  - Recommend continue daily antihistamine

## 2025-02-18 NOTE — PROGRESS NOTES
INTERNAL MEDICINE  [facility]    Reason for visit:   Chief Complaint   Patient presents with    Bleeding/Bruising    Rash     HPI: Jennifer Nguyen is a 34 y.o. female   - former smoker (quit 2009) with anxiety, ADHD, asthma, eczema, and celiac disease presenting today for eczematous flare and spontaneous bruising.    Patient is an established patient of PCP, Dr. She Oleary DO. This patient is new to me.    Dermatology Janett Ferguson MD  Back and spine:Martha Dasilva NP  Allergist: Reji Frederick MD  Psychiatry: Lien Ochoa MD  Neurosurgery Fausto Ugalde DO   - 4/2024 herniated disc     She reports facial swelling and pain secondary to an eczematous rash, describing discomfort upon blinking and making facial expressions. The eczema is currently present on her face, ears, neck, extensor surfaces of her upper and lower extremities. She recently moved furniture around her home and the rash appears 2 days after. She does have a history of allergies reactive to dust. Her last exacerbation occurred in early December for which she visited urgent care and was prescribed topical steroids which she used for two weeks then discontinued. The topical steroids did help to improve the appearance and discomfort of the eczema however she has not used them since the last flare and is currently out of the topical steroids. Currently, she is using CeraVe lotion and healing ointment as recommended by her dermatologist. 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    She also reports bilateral bruising on her shoulders, arms, and legs, with leg bruising being most severe. The most severe bruising began on Sunday 2/16 and has been worsening since. She denies any trauma to the affected areas however she does report recently moving furniture around at home  involving some heavy lifting. The bruises are both superficial and deep, and are notably painless - she would not be aware of them if not for their visibility. She does have a history of easy bruising and was evaluated by hem/onc for this in 2022 with unremarkable workup including normal platelets, PT/INR, APTT, fibrinogen, andiron studies. She does not take any anticoagulants and reports very infrequent use of NSAIDs (last use one month ago). She has not applied topical steroids to the affected areas in several months. She does report feeling safe at home.        Answers submitted by the patient for this visit:  Review of Systems Questionnaire (Submitted on 2/18/2025)  activity change: No  unexpected weight change: No  rhinorrhea: No  trouble swallowing: No  visual disturbance: No  chest tightness: No  polyuria: No  difficulty urinating: No  menstrual problem: No  joint swelling: No  arthralgias: No  confusion: No  dysphoric mood: No    Review of Systems   HENT:  Negative for hearing loss.    Eyes:  Negative for discharge.   Respiratory:  Negative for shortness of breath and wheezing.    Cardiovascular:  Negative for chest pain and palpitations.   Gastrointestinal:  Negative for blood in stool, constipation, diarrhea and vomiting.   Genitourinary:  Negative for dysuria and hematuria.   Musculoskeletal:  Negative for neck pain.   Skin:  Positive for itching and rash.   Neurological:  Negative for weakness and headaches.   Endo/Heme/Allergies:  Positive for environmental allergies. Negative for polydipsia. Bruises/bleeds easily.       Social History     Social History Narrative    Significant other. No children. 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 30 mL 3 Taking    dextroamphetamine-amphetamine (ADDERALL) 5 mg Tab Take 5 mg by mouth daily as needed (inattention PRN). 30 tablet 0 Taking As Needed    EPINEPHrine 2 mg/spray (0.1 mL) Spry 1 spray by Nasal route as needed (Anaphylaxis). 2 each 6 Taking As Needed    EPINEPHrine 2 mg/spray (0.1 mL) Spry 2 mg by Nasal route as needed (Anaphylaxis). 2 each 6 Taking As Needed    EScitalopram oxalate (LEXAPRO) 20 MG tablet Take 1 tablet (20 mg total) by mouth once daily. 90 tablet 3 Taking    fluocinonide-emollient (FLUOCINONIDE-E) 0.05 % Crea APPLY TO AFFECTED AREA OF HANDS TWICE A DAY AS NEEDED FLARES. 60 g 0 Taking    fluticasone propionate (FLONASE) 50 mcg/actuation nasal spray 1 spray (50 mcg total) by Each Nostril route once daily. 16 each 11 Taking    [START ON 3/13/2025] lisdexamfetamine (VYVANSE) 40 MG Cap Take 1 capsule (40 mg total) by mouth once daily. 30 capsule 0 Taking    mirtazapine (REMERON) 7.5 MG Tab Take 1 tablet (7.5 mg total) by mouth every evening. 30 tablet 11 Taking    [DISCONTINUED] fluocinonide 0.05% (LIDEX) 0.05 % cream AAA of hands bid prn flares. 60 g 3 Taking    [DISCONTINUED] fluticasone propionate (CUTIVATE) 0.005 % ointment Apply to affected areas of face BID prn irritation. Do not use for longer than 2 weeks in a row. 15 g 0 Taking    [DISCONTINUED] fluticasone propionate (CUTIVATE) 0.005 % ointment APPLY TO AFFECTED AREAS OF FACE TWICE DAILY AS NEEDED FOR IRRITATION. DO NOT USE FOR LONGER THAN 2 WEEKS IN A ROW. 15 g 0 Taking    [DISCONTINUED] fluticasone propionate (CUTIVATE) 0.05 % cream Apply to affected areas of body daily prn eczema. 60 g 3 Taking    [DISCONTINUED] lisdexamfetamine (VYVANSE) 40 MG Cap Take 1 capsule (40 mg total) by mouth once daily. 30 capsule 0 Taking    [DISCONTINUED] lisdexamfetamine (VYVANSE) 40 MG Cap Take 1  capsule (40 mg total) by mouth once daily. 30 capsule 0 Taking    albuterol (VENTOLIN HFA) 90 mcg/actuation inhaler Inhale 1-2 puffs into the lungs 2 (two) times daily as needed for Wheezing or Shortness of Breath. Rescue 18 g 0     allerg xt,D.farinae-D.pteronys (ODACTRA) 12 SQ-HDM Subl Place 1 tablet under the tongue once daily. (Patient not taking: Reported on 9/25/2024) 30 tablet 11 Not Taking    azelastine (OPTIVAR) 0.05 % ophthalmic solution Place 1 drop into both eyes 2 (two) times daily. (Patient not taking: Reported on 2/18/2025) 12 mL 3 Not Taking    cetirizine HCl (ZYRTEC ORAL) Take by mouth. (Patient not taking: Reported on 2/18/2025)   Not Taking    fluticasone-salmeterol diskus inhaler 250-50 mcg Inhale 1 puff into the lungs once daily. Controller 30 each 3     LORazepam (ATIVAN) 0.5 MG tablet Take 1 tablet (0.5 mg total) by mouth every 6 (six) hours as needed for Anxiety. 15 tablet 0     [DISCONTINUED] dextroamphetamine-amphetamine (ADDERALL XR) 15 MG 24 hr capsule Take 1 capsule (15 mg total) by mouth every morning. (Patient not taking: Reported on 2/18/2025) 30 capsule 0 Not Taking    [DISCONTINUED] dextroamphetamine-amphetamine (ADDERALL) 5 mg Tab Take 5 mg by mouth daily as needed (inattention). (Patient not taking: Reported on 2/18/2025) 30 tablet 0 Not Taking    [DISCONTINUED] dextroamphetamine-amphetamine (ADDERALL) 5 mg Tab Take 5 mg by mouth once daily. (Patient not taking: Reported on 2/18/2025) 30 tablet 0 Not Taking    [DISCONTINUED] fluconazole (DIFLUCAN) 200 MG Tab Take 1 pill PO 2 times per week. (Patient not taking: Reported on 2/18/2025) 12 tablet 0 Not Taking    [DISCONTINUED] tacrolimus (PROTOPIC) 0.1 % ointment Compound tacrolimus 0.1% cream. Apply to affected areas of eyes BID. Safe to use everyday. (Patient not taking: Reported on 2/18/2025) 30 g 3 Not Taking    [DISCONTINUED] tobramycin sulfate 0.3% (TOBREX) 0.3 % ophthalmic solution 1-2 drops topically twice daily to affected  toe(s). (Patient not taking: Reported on 2/18/2025) 5 mL 3 Not Taking       Vital signs:   Vitals:    02/18/25 0920   BP: 128/80   BP Location: Right arm   Patient Position: Sitting   Pulse: 92   SpO2: 95%   Weight: 67.1 kg (147 lb 14.9 oz)     Body mass index is 25.39 kg/m².    PHYSICAL EXAM:     Physical Exam  Vitals and nursing note reviewed.   Constitutional:       General: She is not in acute distress.     Appearance: Normal appearance. She is not ill-appearing or diaphoretic.   HENT:      Head: Normocephalic and atraumatic.      Right Ear: No tenderness.      Left Ear: External ear normal.      Ears:        Comments: Pinpoint erythematous lesion to above marked area of the right lower lobule  Cardiovascular:      Rate and Rhythm: Normal rate.   Pulmonary:      Effort: Pulmonary effort is normal. No respiratory distress.   Musculoskeletal:         General: No swelling, tenderness, deformity or signs of injury.      Right lower leg: No edema.      Left lower leg: No edema.   Skin:     General: Skin is warm and dry.      Coloration: Skin is not jaundiced or pale.      Findings: Bruising, ecchymosis (to bilateral lower extremities scattered to bilateral inner thighs), erythema and rash present. Rash is macular (maculopapular rash BUE extensor surfaces (elbows/left wrist), generalized abdomen, neck, and face) and papular.   Neurological:      Mental Status: She is alert and oriented to person, place, and time.      Motor: No weakness.      Gait: Gait normal.   Psychiatric:         Mood and Affect: Mood normal.         Behavior: Behavior normal.         Thought Content: Thought content normal.         Judgment: Judgment normal.                     ASSESSMENT/PLAN:    Assessment & Plan    IMPRESSION:  - Assessed eczema flare-up and bruising concerns  - Considered short-term oral steroid treatment (prednisone) for facial swelling inflammation and pain  - Recommend continuation of topical steroid treatments for eczema  management  - Ordered CBC, PT/PTT, and iron studies to investigate unexplained bruising  - Evaluated need for hematology referral pending lab results        1. Atopic dermatitis, unspecified type  -     fluticasone propionate (CUTIVATE) 0.005 % ointment; Apply to affected areas of face/neck/ears BID for 7 days, then PRN. Do not use for longer than 2 weeks consistently.  Dispense: 15 g; Refill: 0  -     fluocinonide 0.05% (LIDEX) 0.05 % cream; Apply to affected areas below the neck twice daily for 7 days, then as needed.  Dispense: 60 g; Refill: 3  -     predniSONE (DELTASONE) 20 MG tablet; Take 1 tablet (20 mg total) by mouth 2 (two) times daily. for 5 days  Dispense: 10 tablet; Refill: 0    2. Allergic contact dermatitis, unspecified trigger  -     fluticasone propionate (CUTIVATE) 0.005 % ointment; Apply to affected areas of face/neck/ears BID for 7 days, then PRN. Do not use for longer than 2 weeks consistently.  Dispense: 15 g; Refill: 0  -     fluocinonide 0.05% (LIDEX) 0.05 % cream; Apply to affected areas below the neck twice daily for 7 days, then as needed.  Dispense: 60 g; Refill: 3    3. Spontaneous ecchymosis  -     CBC Auto Differential; Future; Expected date: 02/18/2025  -     PROTIME-INR; Future; Expected date: 02/18/2025  -     APTT; Future; Expected date: 03/04/2025  -     IRON AND TIBC; Future; Expected date: 02/18/2025  -     FIBRINOGEN; Future; Expected date: 02/18/2025    4. Allergic rhinitis due to dust mite  Assessment & Plan:  - Possible involvement in current eczematous flare due to moving furniture  - Recommend continue daily antihistamine      Health maintenance addressed: UTD  Vaccines recommended: Influenza and Covid-19 update    No follow-ups on file.   Follow-up with dermatology for evaluation of eczema.  Will plan additional follow-up pending lab results.    ASHA Ruiz   Internal Medicine

## 2025-03-18 ENCOUNTER — OFFICE VISIT (OUTPATIENT)
Dept: DERMATOLOGY | Facility: CLINIC | Age: 35
End: 2025-03-18
Payer: COMMERCIAL

## 2025-03-18 ENCOUNTER — TELEPHONE (OUTPATIENT)
Dept: ALLERGY | Facility: CLINIC | Age: 35
End: 2025-03-18
Payer: COMMERCIAL

## 2025-03-18 DIAGNOSIS — L23.9 ALLERGIC CONTACT DERMATITIS, UNSPECIFIED TRIGGER: ICD-10-CM

## 2025-03-18 DIAGNOSIS — L20.84 INTRINSIC ATOPIC DERMATITIS: Primary | ICD-10-CM

## 2025-03-18 PROCEDURE — 99214 OFFICE O/P EST MOD 30 MIN: CPT | Mod: S$GLB,,, | Performed by: DERMATOLOGY

## 2025-03-18 PROCEDURE — 1160F RVW MEDS BY RX/DR IN RCRD: CPT | Mod: CPTII,S$GLB,, | Performed by: DERMATOLOGY

## 2025-03-18 PROCEDURE — 1159F MED LIST DOCD IN RCRD: CPT | Mod: CPTII,S$GLB,, | Performed by: DERMATOLOGY

## 2025-03-18 PROCEDURE — G2211 COMPLEX E/M VISIT ADD ON: HCPCS | Mod: S$GLB,,, | Performed by: DERMATOLOGY

## 2025-03-18 RX ORDER — FLUTICASONE PROPIONATE 0.05 MG/G
OINTMENT TOPICAL
Qty: 60 G | Refills: 2 | Status: SHIPPED | OUTPATIENT
Start: 2025-03-18

## 2025-03-18 NOTE — TELEPHONE ENCOUNTER
----- Message from Jessica sent at 3/17/2025  4:43 PM CDT -----  Regarding: Appt needed  Contact: [phone]  Appt Type: Skin reaction Date/Time/Preference:Provider:Former pt Dr. Husain Caller:The pt Contact Preference: [phone] Information: Thank you.

## 2025-03-18 NOTE — PROGRESS NOTES
Patient Information  Name: Jennifer Nguyen  : 1990  MRN: [id number]     Referring Physician:  No ref. provider found   Primary Care Physician:  She Oleary DO   Date of Visit: 3/18/25      Subjective:     History of Present lllness:    Jennifer Nguyen is a 34 y.o. female who presents with a chief complaint of eczema.    Location: face, crease of arms and behind knees are the worst  Duration: yeats  Signs/Symptoms: dry flaking and itching when flared, swelling of face and eyelids is new onset   Relieving factors/Prior treatments: fluticasone ointment and lidex cream was most recently given along with ILK injections, helped clear     Patient was last seen: 2024.  Prior notes by myself reviewed.   Clinical documentation obtained by nursing staff reviewed.    Review of Systems    Objective:   Physical Exam   Constitutional: She appears well-developed and well-nourished. No distress.   Neurological: She is alert and oriented to person, place, and time. She is not disoriented.   Psychiatric: She has a normal mood and affect.   Skin:   Areas Examined (abnormalities noted in diagram):   Head / Face Inspection Performed                 Diagram Legend     Erythematous scaling macule/papule c/w actinic keratosis       Vascular papule c/w angioma      Pigmented verrucoid papule/plaque c/w seborrheic keratosis      Yellow umbilicated papule c/w sebaceous hyperplasia      Irregularly shaped tan macule c/w lentigo     1-2 mm smooth white papules consistent with Milia      Movable subcutaneous cyst with punctum c/w epidermal inclusion cyst      Subcutaneous movable cyst c/w pilar cyst      Firm pink to brown papule c/w dermatofibroma      Pedunculated fleshy papule(s) c/w skin tag(s)      Evenly pigmented macule c/w junctional nevus     Mildly variegated pigmented, slightly irregular-bordered macule c/w mildly atypical nevus      Flesh colored to evenly pigmented papule c/w intradermal nevus       Pink pearly  papule/plaque c/w basal cell carcinoma      Erythematous hyperkeratotic cursted plaque c/w SCC      Surgical scar with no sign of skin cancer recurrence      Open and closed comedones      Inflammatory papules and pustules      Verrucoid papule consistent consistent with wart     Erythematous eczematous patches and plaques     Dystrophic onycholytic nail with subungual debris c/w onychomycosis     Umbilicated papule    Erythematous-base heme-crusted tan verrucoid plaque consistent with inflamed seborrheic keratosis     Erythematous Silvery Scaling Plaque c/w Psoriasis     See annotation    No images are attached to the encounter or orders placed in the encounter.      [] Data reviewed  [] Prior external notes reviewed  [] Independent review of test  [] Management discussed with another provider  [] Independent historian    Assessment / Plan:        Intrinsic atopic dermatitis  - chronic problem with exacerbation/progression  Concern for superimposed allergic contact dermatitis.  -     fluticasone propionate (CUTIVATE) 0.005 % ointment; Apply to affected areas of face/neck/ears/body BID prn rash. Do not use for longer than 2 weeks consistently.  Dispense: 60 g; Refill: 2  Side effects from the overuse of topical steroids include thinning of skin, easy tearing/bruising of skin, stretch marks, spider veins, etc. Use the topical steroid no more than 2 days per week if used long-term and/or take breaks from the topical steroid, especially if any of the above side effects are noticed.  Risks of long-term use of topical steroids around the eyes include glaucoma and cataracts. Will be safer to add in a topical calcineurin inhibitor:  -     tacrolimus-niacinamide 0.1-4 % Oint; Apply to affected areas of face BID prn rash. Safe to use everyday.  Dispense: 30 g; Refill: 5  Consider dupixent if not improved with allergen avoidance.    Allergic contact dermatitis, unspecified trigger  -     fluticasone propionate (CUTIVATE)  0.005 % ointment; Apply to affected areas of face/neck/ears/body BID prn rash. Do not use for longer than 2 weeks consistently.  Dispense: 60 g; Refill: 2  -     tacrolimus-niacinamide 0.1-4 % Oint; Apply to affected areas of face BID prn rash. Safe to use everyday.  Dispense: 30 g; Refill: 5  -     Patch Testing; Future      Follow up for patch testing.      Janett Raygoza MD, FAAD  [facility]

## 2025-03-19 ENCOUNTER — CLINICAL SUPPORT (OUTPATIENT)
Dept: ALLERGY | Facility: CLINIC | Age: 35
End: 2025-03-19
Payer: COMMERCIAL

## 2025-03-19 ENCOUNTER — OFFICE VISIT (OUTPATIENT)
Dept: ALLERGY | Facility: CLINIC | Age: 35
End: 2025-03-19
Payer: COMMERCIAL

## 2025-03-19 VITALS — WEIGHT: 148.13 LBS | HEIGHT: 64 IN | BODY MASS INDEX: 25.29 KG/M2

## 2025-03-19 DIAGNOSIS — Z91.038 ALLERGY TO INSECT BITES AND STINGS: Primary | ICD-10-CM

## 2025-03-19 DIAGNOSIS — L25.9 CONTACT DERMATITIS, UNSPECIFIED CONTACT DERMATITIS TYPE, UNSPECIFIED TRIGGER: Primary | ICD-10-CM

## 2025-03-19 DIAGNOSIS — T63.421D FIRE ANT BITE, ACCIDENTAL OR UNINTENTIONAL, SUBSEQUENT ENCOUNTER: ICD-10-CM

## 2025-03-19 DIAGNOSIS — D72.19 OTHER EOSINOPHILIA: ICD-10-CM

## 2025-03-19 PROCEDURE — 99999 PR PBB SHADOW E&M-EST. PATIENT-LVL I: CPT | Mod: PBBFAC,,,

## 2025-03-19 PROCEDURE — 95115 IMMUNOTHERAPY ONE INJECTION: CPT | Mod: S$GLB,,, | Performed by: ALLERGY & IMMUNOLOGY

## 2025-03-19 PROCEDURE — 99999 PR PBB SHADOW E&M-EST. PATIENT-LVL III: CPT | Mod: PBBFAC,,, | Performed by: ALLERGY & IMMUNOLOGY

## 2025-03-19 NOTE — PROGRESS NOTES
ALLERGY & IMMUNOLOGY CLINIC     HISTORY OF PRESENT ILLNESS     HPI: Jennifer Nguyen is a 34 y.o. 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     The swelling is worst on her eyes/face and includes the areas of eczema that she has had problems with in the past. Separately she does have flushing reactions in an unusual patter with alcohol, and is concerned that this baseline problem may be contributing to the new problem of eczematous eruptions and edema. Her baseline reactions are only flushing with heat, and no angioedema or urticaria.     PMH: Fire ant anaphylaxis,Chronic allergic rhinitis,elevated baseline tryptase,Asthma     SH:   Social History     Social History Narrative    Significant other. No children. 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        FH:      Pedigree Relation Problem Comments   Mother (Alive) Graves' disease       Father (Alive) Diabetes    Hypertension       Sister (Alive) No Known Problems       Brother (Alive) No Known Problems       Brother (Alive) No Known Problems       Maternal Grandmother () Cancer unknown etiology      Maternal Grandfather () Heart disease       Paternal Grandmother () Breast cancer    Dementia       Paternal Grandfather ()    Neg Hx Colon cancer    Esophageal cancer    Ovarian cancer            MEDICAL HISTORY   MedHx:   Problem List[1]    Fire ant anaphylaxis  Chronic allergic rhinitis  Asthma     Medications:   Medications Ordered Prior to Encounter[2]    SurgHx:  Past Surgical History:   Procedure Laterality Date    BREAST SURGERY Bilateral     REDUCTION    ESOPHAGOGASTRODUODENOSCOPY N/A 10/9/2024    Procedure: EGD (ESOPHAGOGASTRODUODENOSCOPY);  Surgeon: Yamila Sharma MD;  Location: [facility];  Service: Endoscopy;  Laterality: N/A;  Referred by Dr. Sharma Bolckow Antonieta  10/2-room closed, adjusted  "time 15 mins back-Kpvt    INTRAUTERINE DEVICE INSERTION  04/01/2015    KJB---MIRENA    LUMBAR LAMINECTOMY Right 04/01/2024    Procedure: LAMINECTOMY, SPINE, LUMBAR, L4- L5;  Surgeon: Fausto Camacho DO;  Location: [address];  Service: Neurosurgery;  Laterality: Right;  R sided L4/L5 MIS lami/discectomy, neuromonitoring, Evan 4 post, microscope    REPAIR OF COLLATERAL LIGAMENT OF THUMB Right 01/06/2020    Procedure: REPAIR, LIGAMENT, COLLATERAL, THUMB right;  Surgeon: Lisette Zaman MD;  Location: [facility];  Service: Orthopedics;  Laterality: Right;  regional MAC    WISDOM TOOTH EXTRACTION        PHYSICAL EXAM   VS: Ht 5' 4" (1.626 m)   Wt 67.2 kg (148 lb 2.4 oz)   BMI 25.43 kg/m²   GENERAL: Alert, NAD, well-appearing, cooperative  EYES: no conjunctival injection  DERM: Eczematous eruptions worst on the eyelids, around eyes, also flares on antecubital fossae. Excoriations on hands/wrists.        ASSESSMENT & PLAN     Jennifer Nguyen is a 34 y.o. female with     Contact dermatitis, unspecified contact dermatitis type, unspecified trigger    TRUE/Patch testing is needed to help identify contact allergen triggering this reaction. We did discuss repeat open application testing, but given the extensive involvement of her antecubital fossae this may not be an effective approach. She does have a dermatology appointment scheduled. This is also likely the cause of her eosinophilia on last CBC.     This is different from her baseline flushing reactions, often triggered by alcohol but not the typical pattern for alcohol dehydrogenase deficiency. Because those reactions do not itch, and no not cause extravascular leak (angioedema/urticaria) it is not histamine mediated, and does not seem related to the significant eruption that is ongoing. It is possible that the flushing condition worsens the contact dermatitis, but suspect that they are different entities.     We reviewed her on-going Q12 week fire ant " immunotherapy. With this spacing we do not have clear evidence on cure rates, but I do feel comfortable with continuing with the original goal of 5 years.     Answers submitted by the patient for this visit:  Allergy and Asthma Questionnaire  (Submitted on 3/19/2025)  facial swelling: Yes  Sinus pain?: No  sinus pressure : No  eye itching: No  eye redness: No  eye discharge: No  eye pain: Yes  Light sensitivity / light hurts the eyes?: Yes  cough: No  wheezing: No  shortness of breath: No  apnea: No  choking: No  chest tightness: No  rash: Yes  color change : Yes         [1]   Patient Active Problem List  Diagnosis    Anxiety    Celiac disease    Generalized anxiety disorder    Panic disorder    Depression    IUD (intrauterine device) in place- strings lost on 9/15    ADHD (attention deficit hyperactivity disorder)    Insomnia    Chronic fatigue    Hymenoptera allergy    Allergic rhinitis due to dust mite    Allergic rhinitis due to pollen    Mild intermittent asthma without complication    Spondylosis without myelopathy or radiculopathy, lumbosacral region    Lumbar herniated disc    Leg weakness    Brain fog    Eosinophilia    Hyponatremia    Hypermobility arthralgia   [2]   Current Outpatient Medications on File Prior to Visit   Medication Sig Dispense Refill    albuterol (VENTOLIN HFA) 90 mcg/actuation inhaler Inhale 1-2 puffs into the lungs 2 (two) times daily as needed for Wheezing or Shortness of Breath. Rescue 18 g 0    allerg xt,D.farinae-D.pteronys (ODACTRA) 12 SQ-HDM Subl Place 1 tablet under the tongue once daily. 30 tablet 11    azelastine (ASTELIN) 137 mcg (0.1 %) nasal spray 1 spray (137 mcg total) by Nasal route 2 (two) times daily. 30 mL 3    azelastine (OPTIVAR) 0.05 % ophthalmic solution Place 1 drop into both eyes 2 (two) times daily. 12 mL 3    cetirizine HCl (ZYRTEC ORAL) Take by mouth.      dextroamphetamine-amphetamine (ADDERALL) 5 mg Tab Take 5 mg by mouth daily as needed (inattention PRN).  30 tablet 0    EPINEPHrine 2 mg/spray (0.1 mL) Spry 1 spray by Nasal route as needed (Anaphylaxis). 2 each 6    EPINEPHrine 2 mg/spray (0.1 mL) Spry 2 mg by Nasal route as needed (Anaphylaxis). 2 each 6    EScitalopram oxalate (LEXAPRO) 20 MG tablet Take 1 tablet (20 mg total) by mouth once daily. 90 tablet 3    fluocinonide 0.05% (LIDEX) 0.05 % cream Apply to affected areas below the neck twice daily for 7 days, then as needed. 60 g 3    fluocinonide-emollient (FLUOCINONIDE-E) 0.05 % Crea APPLY TO AFFECTED AREA OF HANDS TWICE A DAY AS NEEDED FLARES. 60 g 0    fluticasone propionate (CUTIVATE) 0.005 % ointment Apply to affected areas of face/neck/ears/body BID prn rash. Do not use for longer than 2 weeks consistently. 60 g 2    fluticasone propionate (FLONASE) 50 mcg/actuation nasal spray 1 spray (50 mcg total) by Each Nostril route once daily. 16 each 11    fluticasone-salmeterol diskus inhaler 250-50 mcg Inhale 1 puff into the lungs once daily. Controller 30 each 3    lisdexamfetamine (VYVANSE) 40 MG Cap Take 1 capsule (40 mg total) by mouth once daily. 30 capsule 0    lisdexamfetamine (VYVANSE) 40 MG Cap Take 1 capsule (40 mg total) by mouth once daily. 30 capsule 0    LORazepam (ATIVAN) 0.5 MG tablet Take 1 tablet (0.5 mg total) by mouth every 6 (six) hours as needed for Anxiety. 15 tablet 0    mirtazapine (REMERON) 7.5 MG Tab Take 1 tablet (7.5 mg total) by mouth every evening. 30 tablet 11    tacrolimus-niacinamide 0.1-4 % Oint Apply to affected areas of face BID prn rash. Safe to use everyday. 30 g 5     Current Facility-Administered Medications on File Prior to Visit   Medication Dose Route Frequency Provider Last Rate Last Admin    levonorgestreL (MIRENA) 20 mcg/24 hours (6 yrs) 52 mg IUD 1 Intra Uterine Device  1 Intra Uterine Device Intrauterine  Jemma Cochran MD   1 Intra Uterine Device at 10/01/21 0900

## 2025-04-02 DIAGNOSIS — F90.0 ATTENTION DEFICIT HYPERACTIVITY DISORDER (ADHD), PREDOMINANTLY INATTENTIVE TYPE: Primary | ICD-10-CM

## 2025-04-06 RX ORDER — DEXTROAMPHETAMINE SACCHARATE, AMPHETAMINE ASPARTATE, DEXTROAMPHETAMINE SULFATE AND AMPHETAMINE SULFATE 1.25; 1.25; 1.25; 1.25 MG/1; MG/1; MG/1; MG/1
5 TABLET ORAL DAILY PRN
Qty: 30 TABLET | Refills: 0 | Status: SHIPPED | OUTPATIENT
Start: 2025-04-06

## (undated) DEVICE — DRAPE SURG W/TWL 17 5/8X23

## (undated) DEVICE — CORD BIPOLAR 12 FOOT

## (undated) DEVICE — TRAY NEURO OMC

## (undated) DEVICE — COVER PROXIMA MAYO STAND

## (undated) DEVICE — FORCEP STRAIGHT DISP

## (undated) DEVICE — SLING ARM LARGE FOAM STRAP

## (undated) DEVICE — SCRUB 10% POVIDONE IODINE 4OZ

## (undated) DEVICE — TRAY CATH FOL SIL URIMTR 16FR

## (undated) DEVICE — BUR BONE CUT MICRO TPS 3X3.8MM

## (undated) DEVICE — BUCKET PLASTER DISPOSABLE

## (undated) DEVICE — SUT 0 8-27IN VICRYL PL CT-1

## (undated) DEVICE — DRESSING N ADH OIL EMUL 3X3

## (undated) DEVICE — SUT MONOCRYL PLUS UD 3-0 27

## (undated) DEVICE — DRAPE C-ARM ELAS CLIP 42X120IN

## (undated) DEVICE — DRESSING MEPILEX BORDER 4 X 4

## (undated) DEVICE — SPLINT PLASTER EXT FAST 4X15

## (undated) DEVICE — SEE MEDLINE ITEM 157131

## (undated) DEVICE — ADHESIVE DERMABOND ADVANCED

## (undated) DEVICE — TOURNIQUET SB QC DP 18X4IN

## (undated) DEVICE — DRAPE C-ARMOR EQUIPMENT COVER

## (undated) DEVICE — GLOVE BIOGEL PI MICRO INDIC 7

## (undated) DEVICE — PAD CAST SPECIALIST STRL 3

## (undated) DEVICE — DRESSING TRANS 4X4 TEGADERM

## (undated) DEVICE — KIT SURGIFLO HEMOSTATIC MATRIX

## (undated) DEVICE — PAD UNDERPAD 30X30

## (undated) DEVICE — DRAPE STERI INSTRUMENT 1018

## (undated) DEVICE — DRAPE ABDOMINAL TIBURON 14X11

## (undated) DEVICE — DRESSING AQUACEL FOAM 5 X 5

## (undated) DEVICE — GAUZE SPONGE 4X4 12PLY

## (undated) DEVICE — DRAPE TOP 53X102IN

## (undated) DEVICE — MARKER SKIN RULER STERILE

## (undated) DEVICE — ELECTRODE REM PLYHSV RETURN 9

## (undated) DEVICE — GLOVE BIOGEL ECLIPSE SZ 7

## (undated) DEVICE — SUT CTD VICRYL 2-0 CR/CT-2

## (undated) DEVICE — PACK UPPER EXTREMITY BAPTIST

## (undated) DEVICE — NDL SPINAL 18GX3.5 SPINOCAN

## (undated) DEVICE — SEE MEDLINE ITEM 146308

## (undated) DEVICE — DRESSING MEPILEX BORDR AG 4X10

## (undated) DEVICE — TUBE FRAZIER 5MM 2FT SOFT TIP

## (undated) DEVICE — SLING ARM MEDIUM FOAM STRAP

## (undated) DEVICE — BANDAGE ELASTIC 3X5 VELCRO ST

## (undated) DEVICE — DRESSING SURGICAL 1/2X1/2

## (undated) DEVICE — APPLICATOR CHLORAPREP CLR 10.5

## (undated) DEVICE — PAD CAST SPECIALIST STRL 4

## (undated) DEVICE — SUT VICRYL 3-0 27 SH

## (undated) DEVICE — SOL 9P NACL IRR PIC IL

## (undated) DEVICE — DRAPE STERI-DRAPE 1000 17X11IN

## (undated) DEVICE — SUT MCRYL PLUS 4-0 PS2 27IN